# Patient Record
Sex: MALE | Race: WHITE | NOT HISPANIC OR LATINO
[De-identification: names, ages, dates, MRNs, and addresses within clinical notes are randomized per-mention and may not be internally consistent; named-entity substitution may affect disease eponyms.]

---

## 2017-01-09 ENCOUNTER — APPOINTMENT (OUTPATIENT)
Dept: DERMATOLOGY | Facility: CLINIC | Age: 82
End: 2017-01-09

## 2017-07-10 ENCOUNTER — APPOINTMENT (OUTPATIENT)
Dept: DERMATOLOGY | Facility: CLINIC | Age: 82
End: 2017-07-10

## 2018-01-10 ENCOUNTER — APPOINTMENT (OUTPATIENT)
Dept: DERMATOLOGY | Facility: CLINIC | Age: 83
End: 2018-01-10
Payer: MEDICARE

## 2018-01-10 ENCOUNTER — APPOINTMENT (OUTPATIENT)
Dept: CARDIOLOGY | Facility: CLINIC | Age: 83
End: 2018-01-10
Payer: MEDICARE

## 2018-01-10 PROCEDURE — 17003 DESTRUCT PREMALG LES 2-14: CPT

## 2018-01-10 PROCEDURE — 17000 DESTRUCT PREMALG LESION: CPT

## 2018-01-10 PROCEDURE — 99214 OFFICE O/P EST MOD 30 MIN: CPT

## 2018-01-10 PROCEDURE — 93000 ELECTROCARDIOGRAM COMPLETE: CPT

## 2018-01-10 PROCEDURE — 99214 OFFICE O/P EST MOD 30 MIN: CPT | Mod: 25

## 2018-04-02 ENCOUNTER — APPOINTMENT (OUTPATIENT)
Dept: CARDIOLOGY | Facility: CLINIC | Age: 83
End: 2018-04-02

## 2018-04-27 ENCOUNTER — APPOINTMENT (OUTPATIENT)
Dept: CARDIOLOGY | Facility: CLINIC | Age: 83
End: 2018-04-27
Payer: MEDICARE

## 2018-04-27 PROCEDURE — 93306 TTE W/DOPPLER COMPLETE: CPT

## 2018-04-27 PROCEDURE — 93880 EXTRACRANIAL BILAT STUDY: CPT

## 2018-05-30 PROBLEM — Z87.898 HISTORY OF DIZZINESS: Status: RESOLVED | Noted: 2018-05-30 | Resolved: 2018-05-30

## 2018-06-01 ENCOUNTER — APPOINTMENT (OUTPATIENT)
Dept: CARDIOLOGY | Facility: CLINIC | Age: 83
End: 2018-06-01
Payer: MEDICARE

## 2018-06-01 VITALS
HEART RATE: 63 BPM | WEIGHT: 155 LBS | SYSTOLIC BLOOD PRESSURE: 133 MMHG | HEIGHT: 68 IN | BODY MASS INDEX: 23.49 KG/M2 | DIASTOLIC BLOOD PRESSURE: 80 MMHG | RESPIRATION RATE: 16 BRPM

## 2018-06-01 DIAGNOSIS — Z87.898 PERSONAL HISTORY OF OTHER SPECIFIED CONDITIONS: ICD-10-CM

## 2018-06-01 PROCEDURE — 99214 OFFICE O/P EST MOD 30 MIN: CPT

## 2018-06-01 PROCEDURE — 93000 ELECTROCARDIOGRAM COMPLETE: CPT

## 2018-07-10 ENCOUNTER — APPOINTMENT (OUTPATIENT)
Dept: CARDIOLOGY | Facility: CLINIC | Age: 83
End: 2018-07-10
Payer: MEDICARE

## 2018-07-10 ENCOUNTER — APPOINTMENT (OUTPATIENT)
Dept: DERMATOLOGY | Facility: CLINIC | Age: 83
End: 2018-07-10
Payer: MEDICARE

## 2018-07-10 VITALS
WEIGHT: 153 LBS | RESPIRATION RATE: 16 BRPM | BODY MASS INDEX: 23.19 KG/M2 | DIASTOLIC BLOOD PRESSURE: 66 MMHG | HEIGHT: 68 IN | HEART RATE: 58 BPM | SYSTOLIC BLOOD PRESSURE: 136 MMHG

## 2018-07-10 PROCEDURE — 99213 OFFICE O/P EST LOW 20 MIN: CPT

## 2018-07-10 PROCEDURE — 93000 ELECTROCARDIOGRAM COMPLETE: CPT

## 2018-07-10 PROCEDURE — 99215 OFFICE O/P EST HI 40 MIN: CPT

## 2018-07-10 RX ORDER — AZITHROMYCIN 250 MG/1
250 TABLET, FILM COATED ORAL
Qty: 6 | Refills: 0 | Status: COMPLETED | COMMUNITY
Start: 2018-03-29

## 2018-07-10 RX ORDER — PREDNISONE 20 MG/1
20 TABLET ORAL
Qty: 5 | Refills: 0 | Status: COMPLETED | COMMUNITY
Start: 2018-04-06

## 2018-07-26 LAB — INR PPP: 2.8 RATIO

## 2018-10-05 ENCOUNTER — APPOINTMENT (OUTPATIENT)
Dept: CARDIOLOGY | Facility: CLINIC | Age: 83
End: 2018-10-05
Payer: MEDICARE

## 2018-10-05 VITALS
HEIGHT: 65 IN | RESPIRATION RATE: 15 BRPM | HEART RATE: 39 BPM | BODY MASS INDEX: 25.99 KG/M2 | SYSTOLIC BLOOD PRESSURE: 119 MMHG | DIASTOLIC BLOOD PRESSURE: 80 MMHG | WEIGHT: 156 LBS

## 2018-10-05 DIAGNOSIS — Z00.00 ENCOUNTER FOR GENERAL ADULT MEDICAL EXAMINATION W/OUT ABNORMAL FINDINGS: ICD-10-CM

## 2018-10-05 PROCEDURE — 93000 ELECTROCARDIOGRAM COMPLETE: CPT

## 2018-10-05 PROCEDURE — 99214 OFFICE O/P EST MOD 30 MIN: CPT

## 2018-10-21 ENCOUNTER — RESULT REVIEW (OUTPATIENT)
Age: 83
End: 2018-10-21

## 2018-10-22 ENCOUNTER — APPOINTMENT (OUTPATIENT)
Dept: DERMATOLOGY | Facility: CLINIC | Age: 83
End: 2018-10-22
Payer: MEDICARE

## 2018-10-22 PROCEDURE — 99214 OFFICE O/P EST MOD 30 MIN: CPT | Mod: 25

## 2018-10-22 PROCEDURE — 11100 BX SKIN SUBCUTANEOUS&/MUCOUS MEMBRANE 1 LESION: CPT

## 2018-11-14 ENCOUNTER — APPOINTMENT (OUTPATIENT)
Dept: DERMATOLOGY | Facility: CLINIC | Age: 83
End: 2018-11-14

## 2018-11-14 ENCOUNTER — APPOINTMENT (OUTPATIENT)
Dept: DERMATOLOGY | Facility: CLINIC | Age: 83
End: 2018-11-14
Payer: MEDICARE

## 2018-11-14 PROCEDURE — 17261 DSTRJ MAL LES T/A/L .6-1.0CM: CPT

## 2019-01-10 ENCOUNTER — APPOINTMENT (OUTPATIENT)
Dept: DERMATOLOGY | Facility: CLINIC | Age: 84
End: 2019-01-10

## 2019-01-15 ENCOUNTER — APPOINTMENT (OUTPATIENT)
Dept: CARDIOLOGY | Facility: CLINIC | Age: 84
End: 2019-01-15
Payer: MEDICARE

## 2019-01-15 VITALS
HEART RATE: 53 BPM | RESPIRATION RATE: 14 BRPM | WEIGHT: 157 LBS | BODY MASS INDEX: 26.16 KG/M2 | HEIGHT: 65 IN | DIASTOLIC BLOOD PRESSURE: 63 MMHG | SYSTOLIC BLOOD PRESSURE: 124 MMHG

## 2019-01-15 PROCEDURE — 93000 ELECTROCARDIOGRAM COMPLETE: CPT

## 2019-01-15 PROCEDURE — 99214 OFFICE O/P EST MOD 30 MIN: CPT

## 2019-01-15 NOTE — REASON FOR VISIT
[Follow-Up - Clinic] : a clinic follow-up of [FreeTextEntry1] : The patient is a 89-year-old white male who presents back to the office today for 24-hour Holter monitor because of feelings of intermittent fluttering-like sensation in the chest he reported. He is also reports he's been having some shortness of breath like feeling sometimes at rest and sometimes with activity and occasional chest discomfort pressure;\par \par

## 2019-01-15 NOTE — HISTORY OF PRESENT ILLNESS
[FreeTextEntry1] : He was noted to have bradycardia on his EKG and questionable rhythm a possible sinus bradycardia with first degree AV block versus possible junctional type rhythm;\par \par He states he's taking his medications regularly and was instructed in the past to cut back on Digitek to 0.1 25 QOD

## 2019-01-15 NOTE — PHYSICAL EXAM
[Normal Conjunctiva] : the conjunctiva exhibited no abnormalities [Eyelids - No Xanthelasma] : the eyelids demonstrated no xanthelasmas [Normal Oral Mucosa] : normal oral mucosa [No Oral Pallor] : no oral pallor [No Oral Cyanosis] : no oral cyanosis [Normal Jugular Venous A Waves Present] : normal jugular venous A waves present [Normal Jugular Venous V Waves Present] : normal jugular venous V waves present [No Jugular Venous Sanchez A Waves] : no jugular venous sanchez A waves [Respiration, Rhythm And Depth] : normal respiratory rhythm and effort [Exaggerated Use Of Accessory Muscles For Inspiration] : no accessory muscle use [Abdomen Soft] : soft [Abdomen Tenderness] : non-tender [Abdomen Mass (___ Cm)] : no abdominal mass palpated [Nail Clubbing] : no clubbing of the fingernails [Cyanosis, Localized] : no localized cyanosis [Petechial Hemorrhages (___cm)] : no petechial hemorrhages [] : no ischemic changes [FreeTextEntry1] : Small ecchymotic areas in the upper extremities and forearms noted [Oriented To Time, Place, And Person] : oriented to person, place, and time [Affect] : the affect was normal [Mood] : the mood was normal [No Anxiety] : not feeling anxious

## 2019-01-23 ENCOUNTER — APPOINTMENT (OUTPATIENT)
Dept: CARDIOLOGY | Facility: CLINIC | Age: 84
End: 2019-01-23
Payer: MEDICARE

## 2019-01-23 ENCOUNTER — NON-APPOINTMENT (OUTPATIENT)
Age: 84
End: 2019-01-23

## 2019-01-23 VITALS
DIASTOLIC BLOOD PRESSURE: 78 MMHG | BODY MASS INDEX: 25.83 KG/M2 | WEIGHT: 155 LBS | HEIGHT: 65 IN | RESPIRATION RATE: 14 BRPM | SYSTOLIC BLOOD PRESSURE: 138 MMHG

## 2019-01-23 PROCEDURE — 99214 OFFICE O/P EST MOD 30 MIN: CPT

## 2019-01-23 PROCEDURE — 93000 ELECTROCARDIOGRAM COMPLETE: CPT

## 2019-01-23 NOTE — REASON FOR VISIT
[Follow-Up - Clinic] : a clinic follow-up of [FreeTextEntry1] : The patient is a 89-year-old white male who presents back to the office today to discuss the results of the 24-hour Holter monitor after he had been experiencing some feelings of palpitations and fluttering in the chest with some associated shortness of breath and nondescript chest discomfort; he states at times he noted some slight audible wheezing and thought it could have been a chest cold;\par \par The 24-hour Holter monitor showed periods of marked sinus bradycardia and junctional rhythms in the high 30s with an average heart rate in the 40s and frequent PVCs and occasional bigeminy.\par \par The patient was asked to discontinue the low dose Digitek he had been taking and continue other medications the same\par \par Today, he reports that overall he is feeling better and has had no further wheezing, shortness of breath or palpitations.;;

## 2019-01-23 NOTE — REVIEW OF SYSTEMS
[Feeling Fatigued] : feeling fatigued [Palpitations] : palpitations [Joint Pain] : joint pain [Joint Stiffness] : joint stiffness [Negative] : Heme/Lymph

## 2019-01-23 NOTE — PHYSICAL EXAM
[Normal Conjunctiva] : the conjunctiva exhibited no abnormalities [Eyelids - No Xanthelasma] : the eyelids demonstrated no xanthelasmas [Normal Oral Mucosa] : normal oral mucosa [No Oral Pallor] : no oral pallor [No Oral Cyanosis] : no oral cyanosis [Normal Jugular Venous A Waves Present] : normal jugular venous A waves present [Normal Jugular Venous V Waves Present] : normal jugular venous V waves present [No Jugular Venous Sanchez A Waves] : no jugular venous sanchez A waves [Respiration, Rhythm And Depth] : normal respiratory rhythm and effort [Exaggerated Use Of Accessory Muscles For Inspiration] : no accessory muscle use [Abdomen Soft] : soft [Abdomen Tenderness] : non-tender [Abdomen Mass (___ Cm)] : no abdominal mass palpated [Nail Clubbing] : no clubbing of the fingernails [Cyanosis, Localized] : no localized cyanosis [Petechial Hemorrhages (___cm)] : no petechial hemorrhages [] : no rash [No Venous Stasis] : no venous stasis [Skin Lesions] : no skin lesions [No Skin Ulcers] : no skin ulcer [No Xanthoma] : no  xanthoma was observed [FreeTextEntry1] : Small ecchymotic areas in the upper extremities and forearms noted [Oriented To Time, Place, And Person] : oriented to person, place, and time [Affect] : the affect was normal [Mood] : the mood was normal [No Anxiety] : not feeling anxious

## 2019-01-23 NOTE — HISTORY OF PRESENT ILLNESS
[FreeTextEntry1] : His past history he reminds me is that of paroxysmal atrial fibrillation and previous history of? SVT for which he states approximately 7 years ago at [facility] in [place], he underwent an attempt at cardiac ablation;  he states it was aborted since he had "too many tracts to ablate"--and he was treated medically at that point;

## 2019-01-23 NOTE — ASSESSMENT
[FreeTextEntry1] : EKG shows sinus bradycardia rate of 52 with right bundle branch block pattern and nonspecific T-wave changes;\par \par \par Last transthoracic echocardiogram demonstrated preserved LV systolic function with ejection fraction in range of 60%, mild aortic sclerosis without stenosis, mild AI and TR and mild MVP with trivial pericardial effusion suggested;\par \par \par In summary the patient is a 89-year-old gentleman with prior history of atrial arrhythmia with paroxysmal atrophic and apparently prior history for SVT and was on Digitek 0.125 q.o.d. and metoprolol but recently found on 24-hour Holter monitor to have marked bradycardia arrhythmias with periodic junctional rhythms at a rate of around 40;\par \par Digitek was discontinued;\par Overall patient feeling better;\par \par Plan:\par \par Recommend EP evaluation with Dr. Nicolás Anand in the near future;\par Patient recommended to continue current medical regimen;\par To report any untoward chest symptoms between now and next visit within 6-8 weeks;

## 2019-01-31 ENCOUNTER — APPOINTMENT (OUTPATIENT)
Dept: ELECTROPHYSIOLOGY | Facility: CLINIC | Age: 84
End: 2019-01-31
Payer: MEDICARE

## 2019-01-31 VITALS
BODY MASS INDEX: 25.83 KG/M2 | HEIGHT: 65 IN | OXYGEN SATURATION: 99 % | HEART RATE: 48 BPM | WEIGHT: 155 LBS | RESPIRATION RATE: 17 BRPM | DIASTOLIC BLOOD PRESSURE: 76 MMHG | SYSTOLIC BLOOD PRESSURE: 135 MMHG

## 2019-01-31 PROCEDURE — 99205 OFFICE O/P NEW HI 60 MIN: CPT

## 2019-01-31 PROCEDURE — 93000 ELECTROCARDIOGRAM COMPLETE: CPT

## 2019-01-31 NOTE — HISTORY OF PRESENT ILLNESS
[FreeTextEntry1] : Mr. White is a pensive 89 yo gentleman with a h/o paroxysmal AF and "SVT" s/p EPS with no ablation at [facility] 7 ya 2/2 "too many tachycardias," (per patient) who is referred to electrophysiology for consultation regarding bradycardia. \par \par The patient states that he has no complaints other than "I'm getting old" and "I have some trouble walking." Otherwise, he denies any CP, ALMANZA, orthopnea/PND/LE edema, LH/dizziness/syncope, palpitations, or n/v/diaphoresis.

## 2019-01-31 NOTE — DISCUSSION/SUMMARY
[FreeTextEntry1] : 89 yo male with significant, but asymptomatic, conduction system disease (sinus node dysfunction, RBBB) with a stable junctional rhythm currently (either originating at the level of the AV node or in the left bundle branch system). \par \par Explained to the patient the progressive nature of degenerative conduction system disease and the unpredictable deterioration to either AV block or sinus arrest. Explored the options (from most to least conservative) of upfront PPM implantation vs. EPS to risk stratify +/- PPM vs. ILR implantation vs. ILR implantation alone. \par \par Given he currently does not feel his ADLs are limited in any way, he wishes to avoid PPM implantation at this time. The risks and benefits were explained in detail at length (he inquired specifically about the risk of death). He wants to discuss with his daughter prior to making any decision. \par \par He will call the office back, and I offered to speak with daughter to review and answer any additional questions. \par \par Plan:\par 1. Once patient decides, at the minimum will proceed with ILR implantation

## 2019-01-31 NOTE — PHYSICAL EXAM
[General Appearance - Well Developed] : well developed [Normal Appearance] : normal appearance [Well Groomed] : well groomed [General Appearance - Well Nourished] : well nourished [General Appearance - In No Acute Distress] : no acute distress [Normal Conjunctiva] : the conjunctiva exhibited no abnormalities [Eyelids - No Xanthelasma] : the eyelids demonstrated no xanthelasmas [Normal Oral Mucosa] : normal oral mucosa [No Oral Pallor] : no oral pallor [No Oral Cyanosis] : no oral cyanosis [Normal Jugular Venous A Waves Present] : normal jugular venous A waves present [Normal Jugular Venous V Waves Present] : normal jugular venous V waves present [Respiration, Rhythm And Depth] : normal respiratory rhythm and effort [Auscultation Breath Sounds / Voice Sounds] : lungs were clear to auscultation bilaterally [Abdomen Soft] : soft [Abdomen Tenderness] : non-tender [Nail Clubbing] : no clubbing of the fingernails [Cyanosis, Localized] : no localized cyanosis [Skin Color & Pigmentation] : normal skin color and pigmentation [Skin Turgor] : normal skin turgor [] : no rash [No Venous Stasis] : no venous stasis [Skin Lesions] : no skin lesions [No Skin Ulcers] : no skin ulcer [No Xanthoma] : no  xanthoma was observed [FreeTextEntry1] : Small ecchymotic areas in the upper extremities and forearms noted [Oriented To Time, Place, And Person] : oriented to person, place, and time [Impaired Insight] : insight and judgment were intact [Affect] : the affect was normal [Mood] : the mood was normal [No Anxiety] : not feeling anxious

## 2019-01-31 NOTE — CARDIOLOGY SUMMARY
[___] : [unfilled] [LVEF ___%] : LVEF [unfilled]% [None] : no pulmonary hypertension [Enlarged] : enlarged LA size [Moderate] : moderate mitral regurgitation

## 2019-03-06 ENCOUNTER — NON-APPOINTMENT (OUTPATIENT)
Age: 84
End: 2019-03-06

## 2019-03-06 ENCOUNTER — APPOINTMENT (OUTPATIENT)
Dept: CARDIOLOGY | Facility: CLINIC | Age: 84
End: 2019-03-06
Payer: MEDICARE

## 2019-03-06 VITALS
HEIGHT: 65 IN | SYSTOLIC BLOOD PRESSURE: 124 MMHG | WEIGHT: 162 LBS | DIASTOLIC BLOOD PRESSURE: 73 MMHG | BODY MASS INDEX: 26.99 KG/M2 | RESPIRATION RATE: 16 BRPM | HEART RATE: 57 BPM

## 2019-03-06 PROCEDURE — 99214 OFFICE O/P EST MOD 30 MIN: CPT

## 2019-03-06 PROCEDURE — 93000 ELECTROCARDIOGRAM COMPLETE: CPT

## 2019-03-06 NOTE — REASON FOR VISIT
[Follow-Up - Clinic] : a clinic follow-up of [FreeTextEntry1] : The patient is a 89-year-old white male with a known history for paroxysmal atrial fibrillation, bradycardia arrhythmias and some recent intermittent shortness of breath and was recommended recently to have an EEG evaluation consultation with Dr. Anand;  Patient was found to have junctional bradycardia and felt he had had some significant beginnings of conduction system disease and was offered a pacemaker versus possible ILR;  \par \par Ultimately, the patient opted to hold off on pacemaker and high LR and will notify us if he changes his mind.;\par \par

## 2019-03-06 NOTE — HISTORY OF PRESENT ILLNESS
[FreeTextEntry1] : He denies any significant dizziness or syncope but has had some periods of shortness of breath especially when he sleeps back and tries to relax sometimes he feels a little more winded; with occasional slight wheeze;

## 2019-03-06 NOTE — ASSESSMENT
[FreeTextEntry1] : EKG demonstrates probable atrial fibrillation with a slow ventricular rate, right bundle branch pattern and borderline nonspecific T-wave changes;\par \par In summary the patient is a 89-year-old gentleman with history for paroxysmal atrial fib flutter periods of bradycardia arrhythmias and possibly symptomatic intermittently with fatigue and some shortness of breath;\par \par Plan:\par \par Have counseled patient on importance of recognizing any change or worsening of his symptoms which could be secondary to bradycardia arrhythmias (i.e. shortness of breath or wheeze);\par \par He states he will consider this and get back to us if is any change in its pattern\par \par Recommend transthoracic echocardiogram in the near future to reassess cardiac function and valvulopathy\par \par Return to this office within 6-8 weeks or p.r.n.;;;

## 2019-03-20 ENCOUNTER — APPOINTMENT (OUTPATIENT)
Dept: CARDIOLOGY | Facility: CLINIC | Age: 84
End: 2019-03-20
Payer: MEDICARE

## 2019-03-20 PROCEDURE — 93306 TTE W/DOPPLER COMPLETE: CPT

## 2019-04-04 ENCOUNTER — APPOINTMENT (OUTPATIENT)
Dept: ORTHOPEDIC SURGERY | Facility: CLINIC | Age: 84
End: 2019-04-04
Payer: MEDICARE

## 2019-04-04 DIAGNOSIS — Z96.651 AFTERCARE FOLLOWING JOINT REPLACEMENT SURGERY: ICD-10-CM

## 2019-04-04 DIAGNOSIS — Z47.1 AFTERCARE FOLLOWING JOINT REPLACEMENT SURGERY: ICD-10-CM

## 2019-04-04 PROCEDURE — 99213 OFFICE O/P EST LOW 20 MIN: CPT

## 2019-04-04 NOTE — DISCUSSION/SUMMARY
[de-identified] : Due to consistent pain over 3 weeks we'll order CAT scan to evaluate for possible occult fracture.\par Patient will follow joint surgery after the CAT scan.

## 2019-04-04 NOTE — REASON FOR VISIT
[Initial Visit] : an initial visit for [Other: ____] : [unfilled] [Artificial Knee Joint] : artificial knee joint

## 2019-04-04 NOTE — REVIEW OF SYSTEMS
[Joint Pain] : joint pain [Feeling Tired] : fatigue [Negative] : Heme/Lymph [FreeTextEntry9] : right knee pain

## 2019-04-04 NOTE — PHYSICAL EXAM
[Cane] : ambulates with cane [UE/LE] : Sensory: Intact in bilateral upper & lower extremities [ALL] : dorsalis pedis, posterior tibial, femoral, popliteal, and radial 2+ and symmetric bilaterally [Poor Appearance] : well-appearing [Acute Distress] : not in acute distress [Normal] : Oriented to person, place, and time, insight and judgement were intact and the affect was normal [de-identified] : Right knee positive well-healed incision to the anterior aspect of the knee. No current edema. Range of motion 0-110 with mild pain on full flexion. Positive tenderness to the anterior aspect of the knee. Positive crepitus upon flexion of the knee. Patient able to straight leg raise. Varus and valgus stress intact. [de-identified] : Outside to review right knee and to view right tib-fib x-rays reveal no acute fracture.

## 2019-04-04 NOTE — HISTORY OF PRESENT ILLNESS
[de-identified] : The patient is a 89-year-old male who presents complaining of right knee pain. Patient had a total knee replacement done at [facility] approximately 10 years ago. Patient states she fell 3 weeks ago landing on her right knee has anterior right knee pain. Patient was seen by his PMD who ordered x-rays at the anterior tib-fib and the right knee both x-rays were reported as negative.  patient states however her pain has continued to the anterior aspect of the knee. Patient uses a walker to ambulate. Patient states he no longer see his orthopedist. his orthopedist

## 2019-04-10 ENCOUNTER — OTHER (OUTPATIENT)
Age: 84
End: 2019-04-10

## 2019-04-22 ENCOUNTER — APPOINTMENT (OUTPATIENT)
Dept: DERMATOLOGY | Facility: CLINIC | Age: 84
End: 2019-04-22
Payer: MEDICARE

## 2019-04-22 PROCEDURE — 99213 OFFICE O/P EST LOW 20 MIN: CPT

## 2019-04-25 ENCOUNTER — APPOINTMENT (OUTPATIENT)
Dept: ORTHOPEDIC SURGERY | Facility: CLINIC | Age: 84
End: 2019-04-25
Payer: MEDICARE

## 2019-04-25 VITALS
BODY MASS INDEX: 26.99 KG/M2 | WEIGHT: 162 LBS | DIASTOLIC BLOOD PRESSURE: 68 MMHG | HEART RATE: 55 BPM | HEIGHT: 65 IN | SYSTOLIC BLOOD PRESSURE: 147 MMHG

## 2019-04-25 PROCEDURE — 99213 OFFICE O/P EST LOW 20 MIN: CPT

## 2019-04-25 NOTE — PHYSICAL EXAM
[de-identified] : The patient appears well nourished  and in no apparent distress.  The patient is alert and oriented to person, place, and time.   Affect and mood appear normal. The head is normocephalic and atraumatic.  The eyes reveal normal sclera and extra ocular muscles are intact. The tongue is midline with no apparent lesions.  Skin shows normal turgor with no evidence of eczema or psoriasis.  No respiratory distress noted.  [de-identified] : Exam of the right knee shows a well healed incision. -10 degrees of extension. No pain with ROM or palpation and no effusion.  [de-identified] : CT - performed at [facility] on 4/5/2019 of the right knee- \par Total knee arthroplasty. Osteoporosis. No evidence for fracture or periprosthetic ostial lysis is demonstrated. Prepatellar soft tissue swelling . Vascular calcification.\par \par

## 2019-04-25 NOTE — HISTORY OF PRESENT ILLNESS
[de-identified] : The patient is a 89-year-old male who presents for evaluation of his right knee pain. Patient had a total knee replacement done at [facility] approximately 10 years ago. Patient states he fell about 6 weeks ago landing on her right knee has anterior right knee pain. Patient was seen by his PMD who ordered x-rays at the anterior tib-fib and the right knee both x-rays were reported as negative.  Pain has continued to the anterior aspect of the knee and he was seen in the orthopedic urgent clinic. He was sent for a CT scan of the right knee. Since that time, he reports resolution of his knee symptoms. Patient continues to use a walker to ambulate.

## 2019-04-25 NOTE — DISCUSSION/SUMMARY
[de-identified] : The patient is a 89 year old male 10 years s/p right knee replacement by another surgeon. On CT there is no evidence of radiolucency or fracture. His knee pain has resolved.  His main issue at this point is his low back and he was referred to a chiropractor upon his request.

## 2019-04-25 NOTE — REVIEW OF SYSTEMS
[Joint Pain] : joint pain [Feeling Tired] : fatigue [Depression] : depression [Negative] : Endocrine

## 2019-04-25 NOTE — ADDENDUM
[FreeTextEntry1] : This note was authored by Gerry Story working as a medical scribe for Dr. Anthony Gustafson. The note was reviewed, edited, and revised by Dr. Anthony Gustafson whom is in agreement with the exam findings, imaging findings, and treatment plan. 04/25/2019.

## 2019-04-25 NOTE — CONSULT LETTER
[Dear  ___] : Dear  [unfilled], [Please see my note below.] : Please see my note below. [Consult Closing:] : Thank you very much for allowing me to participate in the care of this patient.  If you have any questions, please do not hesitate to contact me. [Sincerely,] : Sincerely, [FreeTextEntry2] : Eric Donald MD [FreeTextEntry3] : Anthony Gustafson MD Primary [facility] Rev[facility] Hip and Knee Replacement [facility] [place]

## 2019-06-05 ENCOUNTER — NON-APPOINTMENT (OUTPATIENT)
Age: 84
End: 2019-06-05

## 2019-06-05 ENCOUNTER — APPOINTMENT (OUTPATIENT)
Dept: CARDIOLOGY | Facility: CLINIC | Age: 84
End: 2019-06-05
Payer: MEDICARE

## 2019-06-05 VITALS
HEART RATE: 81 BPM | BODY MASS INDEX: 24.66 KG/M2 | DIASTOLIC BLOOD PRESSURE: 67 MMHG | WEIGHT: 148 LBS | RESPIRATION RATE: 16 BRPM | SYSTOLIC BLOOD PRESSURE: 128 MMHG | HEIGHT: 65 IN

## 2019-06-05 PROCEDURE — 93000 ELECTROCARDIOGRAM COMPLETE: CPT

## 2019-06-05 PROCEDURE — 99214 OFFICE O/P EST MOD 30 MIN: CPT

## 2019-06-05 NOTE — REASON FOR VISIT
[Follow-Up - Clinic] : a clinic follow-up of [FreeTextEntry1] : Patient is a 89-year-old male with known history for paroxysmal atrial fibrillation on anticoagulation with warfarin as well as intermittent bradycardia and tachyarrhythmias who presents back to the office for general cardiac checkup;\par \par He had an EP evaluation with Dr. Anand who said likely the patient would need a pacemaker and at least in implantable loop recorder, but the patient had declined at this time;\par \par Fortunately, he states he's without any significant symptoms of lightheadedness dizziness or palpitations.;\par \par

## 2019-06-05 NOTE — REVIEW OF SYSTEMS
[Feeling Fatigued] : feeling fatigued [Change In The Stool] : change in stool [Negative] : Heme/Lymph

## 2019-06-05 NOTE — HISTORY OF PRESENT ILLNESS
[FreeTextEntry1] : He apparently fell a few weeks ago but says he actually slipped and lost his balance and injured his back-- and currently doing a little bit  better;\par \par \par \par ;He has additional somatic complaints such as irritable bowel and intermittent loose stools and is not sure whether it is medication related or other problems;\par \par He ambulates with a cane at times and also uses a walker;\par \par

## 2019-06-05 NOTE — ASSESSMENT
[FreeTextEntry1] : EKG shows normal sinus rhythm at a rate of 81 with right bundle branch block pattern and first degree AV block;\par \par In summary the patient is a 89-year-old gentleman with known history of paroxysmal H. with fibrillation and bradycardia/tachycardia arrhythmias who recently seems to be feeling fairly well. Her permanent pacemaker was recommended by electrophysiologist however patient reluctant at this time and says he will consider it;\par \par Plan:\par \par Patient reassured;\par \par We will continue to monitor for any symptoms or signs of advanced heart block;\par \par Patient to report any change in his symptoms with dizziness lightheadedness etc.\par \par To followup within 2-3 months or p.r.n.;

## 2019-08-26 ENCOUNTER — APPOINTMENT (OUTPATIENT)
Dept: CARDIOLOGY | Facility: CLINIC | Age: 84
End: 2019-08-26
Payer: MEDICARE

## 2019-08-26 VITALS
SYSTOLIC BLOOD PRESSURE: 122 MMHG | RESPIRATION RATE: 16 BRPM | HEIGHT: 65 IN | HEART RATE: 79 BPM | BODY MASS INDEX: 25.66 KG/M2 | DIASTOLIC BLOOD PRESSURE: 76 MMHG | WEIGHT: 154 LBS

## 2019-08-26 PROCEDURE — 99214 OFFICE O/P EST MOD 30 MIN: CPT

## 2019-08-26 NOTE — HISTORY OF PRESENT ILLNESS
[FreeTextEntry1] : Tolerating current medical regimen and has had no overt bruising or bleeding on warfarin;

## 2019-08-26 NOTE — REASON FOR VISIT
[Follow-Up - Clinic] : a clinic follow-up of [FreeTextEntry1] : The patient is a 89-year-old gentleman with known history of paroxysmal atrial fibrillation and atrial ectopic beats who presents back to the office for general cardiac checkup today;\par \par He reports that he has had no significant dizziness or lightheadedness but gets occasional infrequent palpitations of the chest;\par \par He has been on anticoagulation with warfarin which is monitored by his primary care;\par \par He denies chest pain, dyspnea or syncope;

## 2019-08-26 NOTE — ASSESSMENT
[FreeTextEntry1] : Last EKG from 6/5/19 showed normal sinus rhythm with frequent PVCs with right bundle branch block pattern and marked first degree AV block;\par \par In summary the patient is a 89-year-old gentleman who had a history of intermittent tachybradycardia syndrome and had an EP evaluation who recommended cardiac pacemaker however patient had refused at that time in the early spring/summer.;\par He remains asymptomatic from a cardiac standpoint with the exception of occasional palpitation\par \par Plan:\par \par No additional cardiac workup indicated at this time\par \par Patient to continue current medical regimen;\par \par Patient report any untoward lightheadedness or dizziness between now and next visit within 3 months;;;

## 2019-08-26 NOTE — PHYSICAL EXAM
[Normal Oral Mucosa] : normal oral mucosa [Normal Conjunctiva] : the conjunctiva exhibited no abnormalities [Eyelids - No Xanthelasma] : the eyelids demonstrated no xanthelasmas [No Oral Pallor] : no oral pallor [No Oral Cyanosis] : no oral cyanosis [Normal Jugular Venous A Waves Present] : normal jugular venous A waves present [Normal Jugular Venous V Waves Present] : normal jugular venous V waves present [No Jugular Venous Sanchez A Waves] : no jugular venous sanchez A waves [Respiration, Rhythm And Depth] : normal respiratory rhythm and effort [Exaggerated Use Of Accessory Muscles For Inspiration] : no accessory muscle use [Abdomen Soft] : soft [Abdomen Tenderness] : non-tender [Abdomen Mass (___ Cm)] : no abdominal mass palpated [Nail Clubbing] : no clubbing of the fingernails [Cyanosis, Localized] : no localized cyanosis [Petechial Hemorrhages (___cm)] : no petechial hemorrhages [No Venous Stasis] : no venous stasis [] : no rash [No Skin Ulcers] : no skin ulcer [Skin Lesions] : no skin lesions [FreeTextEntry1] : Small ecchymotic areas in the upper extremities and forearms noted [No Xanthoma] : no  xanthoma was observed [Affect] : the affect was normal [Mood] : the mood was normal [Oriented To Time, Place, And Person] : oriented to person, place, and time [No Anxiety] : not feeling anxious

## 2019-10-03 ENCOUNTER — APPOINTMENT (OUTPATIENT)
Dept: CARDIOLOGY | Facility: CLINIC | Age: 84
End: 2019-10-03
Payer: MEDICARE

## 2019-10-03 ENCOUNTER — NON-APPOINTMENT (OUTPATIENT)
Age: 84
End: 2019-10-03

## 2019-10-03 VITALS
WEIGHT: 154 LBS | HEIGHT: 65 IN | HEART RATE: 80 BPM | BODY MASS INDEX: 25.66 KG/M2 | RESPIRATION RATE: 16 BRPM | DIASTOLIC BLOOD PRESSURE: 90 MMHG | SYSTOLIC BLOOD PRESSURE: 140 MMHG

## 2019-10-03 PROCEDURE — 93000 ELECTROCARDIOGRAM COMPLETE: CPT

## 2019-10-03 PROCEDURE — 99214 OFFICE O/P EST MOD 30 MIN: CPT

## 2019-10-03 NOTE — REASON FOR VISIT
[Follow-Up - Clinic] : a clinic follow-up of [FreeTextEntry1] : The patient is a  89-year-old white male with a known history for paroxysmal atrial fibrillation and atrial ectopic beats who presents back to the office for general cardiac checkup today;\par \par He has a remote history for cardiac ablation several years ago\par \par Overall he denies any significant symptoms chest pain palpitations dizziness or syncope;\par \par He does get some exertional dyspnea and a most recent has been suffering from signs and symptoms of an upper respiratory infection and a slight bronchial wheeze;\par ;

## 2019-10-03 NOTE — PHYSICAL EXAM
[Normal Conjunctiva] : the conjunctiva exhibited no abnormalities [Eyelids - No Xanthelasma] : the eyelids demonstrated no xanthelasmas [No Oral Pallor] : no oral pallor [No Oral Cyanosis] : no oral cyanosis [Normal Jugular Venous A Waves Present] : normal jugular venous A waves present [Normal Jugular Venous V Waves Present] : normal jugular venous V waves present [No Jugular Venous Sanchez A Waves] : no jugular venous sanchez A waves [Respiration, Rhythm And Depth] : normal respiratory rhythm and effort [Abdomen Soft] : soft [Abdomen Tenderness] : non-tender [Abdomen Mass (___ Cm)] : no abdominal mass palpated [Nail Clubbing] : no clubbing of the fingernails [Cyanosis, Localized] : no localized cyanosis [Petechial Hemorrhages (___cm)] : no petechial hemorrhages [] : no rash [Skin Lesions] : no skin lesions [No Venous Stasis] : no venous stasis [No Skin Ulcers] : no skin ulcer [No Xanthoma] : no  xanthoma was observed [FreeTextEntry1] : Small ecchymotic areas in the upper extremities and forearms noted [Oriented To Time, Place, And Person] : oriented to person, place, and time [Mood] : the mood was normal [Affect] : the affect was normal [No Anxiety] : not feeling anxious

## 2019-10-03 NOTE — HISTORY OF PRESENT ILLNESS
[FreeTextEntry1] : The patient reports he's been taking some over-the-counter symptomatic supplements and decongestants and states he did have a trial of antibiotics;\par \par

## 2019-10-03 NOTE — ASSESSMENT
[FreeTextEntry1] : EKG shows normal sinus rhythm with marked first-degree AV block, right bundle branch block pattern, frequent PACs and rare PVCs seen;\par \par In summary the patient is a 89-year-old gentleman who has a history of paroxysmal atrial fib with prior cardiac ablation and now normal sinus rhythm with first degree AV blockand PACs and PVCs was had; a recent upper respiratory infection but otherwise stable cardiac panel and no overt signs of heart failure\par \par Plan:\par \par No additional cardiac workup indicated at this time;\par \par Patient to followup toprimary care if no improvement in upper respiratory; URI\par \par Followup to this office within 3; months or p.r.n.

## 2019-10-22 ENCOUNTER — APPOINTMENT (OUTPATIENT)
Dept: DERMATOLOGY | Facility: CLINIC | Age: 84
End: 2019-10-22
Payer: MEDICARE

## 2019-10-22 PROCEDURE — 17000 DESTRUCT PREMALG LESION: CPT

## 2019-10-22 PROCEDURE — 17003 DESTRUCT PREMALG LES 2-14: CPT

## 2019-10-22 PROCEDURE — 99214 OFFICE O/P EST MOD 30 MIN: CPT | Mod: 25

## 2019-10-31 ENCOUNTER — INPATIENT (INPATIENT)
Facility: HOSPITAL | Age: 84
LOS: 1 days | Discharge: HOME | DRG: 552 | End: 2019-11-02
Attending: HOSPITALIST | Admitting: HOSPITALIST
Payer: COMMERCIAL

## 2019-10-31 VITALS
TEMPERATURE: 98 F | DIASTOLIC BLOOD PRESSURE: 89 MMHG | WEIGHT: 154.98 LBS | SYSTOLIC BLOOD PRESSURE: 149 MMHG | OXYGEN SATURATION: 96 % | RESPIRATION RATE: 22 BRPM | HEART RATE: 91 BPM | HEIGHT: 65 IN

## 2019-10-31 DIAGNOSIS — Z96.651 PRESENCE OF RIGHT ARTIFICIAL KNEE JOINT: Chronic | ICD-10-CM

## 2019-10-31 LAB
ACANTHOCYTES BLD QL SMEAR: SLIGHT — SIGNIFICANT CHANGE UP
ALBUMIN SERPL ELPH-MCNC: 3.8 G/DL — SIGNIFICANT CHANGE UP (ref 3.3–5.2)
ALP SERPL-CCNC: 91 U/L — SIGNIFICANT CHANGE UP (ref 40–120)
ALT FLD-CCNC: 14 U/L — SIGNIFICANT CHANGE UP
ANION GAP SERPL CALC-SCNC: 11 MMOL/L — SIGNIFICANT CHANGE UP (ref 5–17)
APPEARANCE UR: CLEAR — SIGNIFICANT CHANGE UP
APTT BLD: 36.9 SEC — HIGH (ref 27.5–36.3)
AST SERPL-CCNC: 21 U/L — SIGNIFICANT CHANGE UP
BACTERIA # UR AUTO: ABNORMAL
BASOPHILS # BLD AUTO: 0 K/UL — SIGNIFICANT CHANGE UP (ref 0–0.2)
BASOPHILS NFR BLD AUTO: 0 % — SIGNIFICANT CHANGE UP (ref 0–2)
BILIRUB SERPL-MCNC: 0.3 MG/DL — LOW (ref 0.4–2)
BILIRUB UR-MCNC: NEGATIVE — SIGNIFICANT CHANGE UP
BUN SERPL-MCNC: 34 MG/DL — HIGH (ref 8–20)
CALCIUM SERPL-MCNC: 8.7 MG/DL — SIGNIFICANT CHANGE UP (ref 8.6–10.2)
CHLORIDE SERPL-SCNC: 103 MMOL/L — SIGNIFICANT CHANGE UP (ref 98–107)
CO2 SERPL-SCNC: 24 MMOL/L — SIGNIFICANT CHANGE UP (ref 22–29)
COLOR SPEC: YELLOW — SIGNIFICANT CHANGE UP
CREAT SERPL-MCNC: 1.04 MG/DL — SIGNIFICANT CHANGE UP (ref 0.5–1.3)
DIFF PNL FLD: ABNORMAL
EOSINOPHIL # BLD AUTO: 0 K/UL — SIGNIFICANT CHANGE UP (ref 0–0.5)
EOSINOPHIL NFR BLD AUTO: 0 % — SIGNIFICANT CHANGE UP (ref 0–6)
EPI CELLS # UR: SIGNIFICANT CHANGE UP
GLUCOSE SERPL-MCNC: 125 MG/DL — HIGH (ref 70–115)
GLUCOSE UR QL: NEGATIVE MG/DL — SIGNIFICANT CHANGE UP
HCT VFR BLD CALC: 36.9 % — LOW (ref 39–50)
HGB BLD-MCNC: 12.1 G/DL — LOW (ref 13–17)
INR BLD: 1.87 RATIO — HIGH (ref 0.88–1.16)
KETONES UR-MCNC: ABNORMAL
LEUKOCYTE ESTERASE UR-ACNC: NEGATIVE — SIGNIFICANT CHANGE UP
LIDOCAIN IGE QN: 37 U/L — SIGNIFICANT CHANGE UP (ref 22–51)
LYMPHOCYTES # BLD AUTO: 34.8 % — SIGNIFICANT CHANGE UP (ref 13–44)
LYMPHOCYTES # BLD AUTO: 4.84 K/UL — HIGH (ref 1–3.3)
MAGNESIUM SERPL-MCNC: 2.1 MG/DL — SIGNIFICANT CHANGE UP (ref 1.6–2.6)
MANUAL SMEAR VERIFICATION: SIGNIFICANT CHANGE UP
MCHC RBC-ENTMCNC: 31.2 PG — SIGNIFICANT CHANGE UP (ref 27–34)
MCHC RBC-ENTMCNC: 32.8 GM/DL — SIGNIFICANT CHANGE UP (ref 32–36)
MCV RBC AUTO: 95.1 FL — SIGNIFICANT CHANGE UP (ref 80–100)
MONOCYTES # BLD AUTO: 0.24 K/UL — SIGNIFICANT CHANGE UP (ref 0–0.9)
MONOCYTES NFR BLD AUTO: 1.7 % — LOW (ref 2–14)
NEUTROPHILS # BLD AUTO: 8.83 K/UL — HIGH (ref 1.8–7.4)
NEUTROPHILS NFR BLD AUTO: 62.6 % — SIGNIFICANT CHANGE UP (ref 43–77)
NEUTS BAND # BLD: 0.9 % — SIGNIFICANT CHANGE UP (ref 0–8)
NITRITE UR-MCNC: NEGATIVE — SIGNIFICANT CHANGE UP
NT-PROBNP SERPL-SCNC: 929 PG/ML — HIGH (ref 0–300)
PH UR: 6 — SIGNIFICANT CHANGE UP (ref 5–8)
PLAT MORPH BLD: NORMAL — SIGNIFICANT CHANGE UP
PLATELET # BLD AUTO: 101 K/UL — LOW (ref 150–400)
POIKILOCYTOSIS BLD QL AUTO: SLIGHT — SIGNIFICANT CHANGE UP
POTASSIUM SERPL-MCNC: 4.2 MMOL/L — SIGNIFICANT CHANGE UP (ref 3.5–5.3)
POTASSIUM SERPL-SCNC: 4.2 MMOL/L — SIGNIFICANT CHANGE UP (ref 3.5–5.3)
PROT SERPL-MCNC: 6.4 G/DL — LOW (ref 6.6–8.7)
PROT UR-MCNC: 30 MG/DL
PROTHROM AB SERPL-ACNC: 21.9 SEC — HIGH (ref 10–12.9)
RBC # BLD: 3.88 M/UL — LOW (ref 4.2–5.8)
RBC # FLD: 13.6 % — SIGNIFICANT CHANGE UP (ref 10.3–14.5)
RBC BLD AUTO: NORMAL — SIGNIFICANT CHANGE UP
RBC CASTS # UR COMP ASSIST: SIGNIFICANT CHANGE UP /HPF (ref 0–4)
SMUDGE CELLS # BLD: PRESENT — SIGNIFICANT CHANGE UP
SODIUM SERPL-SCNC: 138 MMOL/L — SIGNIFICANT CHANGE UP (ref 135–145)
SP GR SPEC: 1.02 — SIGNIFICANT CHANGE UP (ref 1.01–1.02)
TROPONIN T SERPL-MCNC: <0.01 NG/ML — SIGNIFICANT CHANGE UP (ref 0–0.06)
TSH SERPL-MCNC: 1.37 UIU/ML — SIGNIFICANT CHANGE UP (ref 0.27–4.2)
UROBILINOGEN FLD QL: NEGATIVE MG/DL — SIGNIFICANT CHANGE UP
WBC # BLD: 13.91 K/UL — HIGH (ref 3.8–10.5)
WBC # FLD AUTO: 13.91 K/UL — HIGH (ref 3.8–10.5)
WBC UR QL: SIGNIFICANT CHANGE UP

## 2019-10-31 PROCEDURE — 99285 EMERGENCY DEPT VISIT HI MDM: CPT

## 2019-10-31 PROCEDURE — 93010 ELECTROCARDIOGRAM REPORT: CPT

## 2019-10-31 RX ORDER — ACETAMINOPHEN 500 MG
650 TABLET ORAL ONCE
Refills: 0 | Status: COMPLETED | OUTPATIENT
Start: 2019-10-31 | End: 2019-10-31

## 2019-10-31 RX ADMIN — Medication 650 MILLIGRAM(S): at 23:59

## 2019-10-31 NOTE — ED PROVIDER NOTE - NS ED ROS FT
no weight change, no fever or chills  no recent travel, no recent abox, no sick contacts  no recent change in medications  no rash, no bruises  no visual changes no eye discharge  no cough cold or congestion,   + sob, no chest pain  no orthopnea, no pnd  no abd pain, no n/v/d  no hematuria, no change in urinary habits  + joint pain, no deformity  no headache, no paresthesia

## 2019-10-31 NOTE — ED ADULT NURSE NOTE - PMH
Afib    Cyst of kidney, acquired    Cyst of pancreas    GERD (gastroesophageal reflux disease)    Irritable bowel syndrome with diarrhea    Prostate CA  radiation  Tachycardia    VTE (venous thromboembolism)

## 2019-10-31 NOTE — ED ADULT TRIAGE NOTE - CHIEF COMPLAINT QUOTE
Patient A&Ox4 complaining of back pain & pain to left hip secondary to fall from standing height. Stated tripped & fell, hitting floor. Also complaining of shortness of breath since fall, respirations even/nonlabored. Patient on O2 by EMS, left on in triage.

## 2019-10-31 NOTE — ED PROVIDER NOTE - PHYSICAL EXAMINATION
Constitutional : Appears uncomfortably, talking in few words  Head :NC AT , no swelling  Eyes :eomi, no swelling  Mouth :mm moist,  Neck : supple, trachea in midline  Chest :Shai air entry, symm chest expansion, no distress  thoracic mid pain and chest wall pian, no skin changes, no rash noted  Heart :S1 S2 distant  Abdomen :abd soft, non tender  Musc/Skel :ext no swelling, no deformity, gen weakness  no spine tenderness, distal pulses present  Neuro  :AAO 3 no focal deficits Constitutional : Appears uncomfortably, talking in few words  Head :NC AT , no swelling  Eyes :eomi, no swelling  Mouth :mm moist,  Neck : supple, trachea in midline  Chest :Shai air entry, symm chest expansion, no distress  thoracic mid pain and chest wall pian, no skin changes, no rash noted  kyphosis  Heart :S1 S2 distant  Abdomen :abd soft, non tender  Musc/Skel :ext no swelling, no deformity, gen weakness  left sided leg pain, no skin changes noted  no spine tenderness, distal pulses present  Neuro  :AAO 3 no focal deficits

## 2019-10-31 NOTE — ED PROVIDER NOTE - CLINICAL SUMMARY MEDICAL DECISION MAKING FREE TEXT BOX
89y old with complaints of fall, presents with ant lower chest wall pain, hypoxia, h/o dvt, plan to get a cta for pe study, pain control, and re evalaution, 89y old with complaints of fall, presents with ant lower chest wall pain, hypoxia, h/o dvt, plan to get a cta for pe study, pain control, and re evalaution, patient is on coumadin, plan to get trauma scan on patient,

## 2019-10-31 NOTE — ED ADULT NURSE NOTE - NSIMPLEMENTINTERV_GEN_ALL_ED
Implemented All Universal Safety Interventions:  [place] to call system. Call bell, personal items and telephone within reach. Instruct patient to call for assistance. Room bathroom lighting operational. Non-slip footwear when patient is off stretcher. Physically safe environment: no spills, clutter or unnecessary equipment. Stretcher in lowest position, wheels locked, appropriate side rails in place.

## 2019-10-31 NOTE — ED PROVIDER NOTE - OBJECTIVE STATEMENT
y/o M pt with significant PMHx of Afib, GERD, Prostate CA radiation and VTE presents to the ED s/p fall. Pt states that he has a ligh that goes on automaticall yand it didn't go ion yet,. he turned off the other light and he fell on his back due to losing his balance. Denies hitting his head and states he remeberes what happends. states it kncokd the wind out of him. states it doesn't hurt until he breathes.   takes morphin 3 mg due to rugth sided dvt.   2 back bone fractures on his back. fell on his mid back  Denies abdominal pain and chest pain, nausea, vomiting and diarrhea.  2 dvt to the leg   states he had dinner today and it went down okay. 2 years he broke his pelvvis 89y/o M pt with significant PMHx of Afib, GERD, Prostate CA radiation and VTE presents to the ED s/p fall. Pt states that he has a ligh that goes on automaticall yand it didn't go ion yet,. he turned off the other light and he fell on his back due to losing his balance. Denies hitting his head and states he remeberes what happends. states it kncokd the wind out of him. states it doesn't hurt until he breathes.   takes morphin 3 mg due to rugth sided dvt.   2 back bone fractures on his back. fell on his mid back  Denies abdominal pain and chest pain, nausea, vomiting and diarrhea.  2 dvt to the leg   states he had dinner today and it went down okay. 2 years he broke his pelvvis

## 2019-10-31 NOTE — ED PROVIDER NOTE - CARE PLAN
Principal Discharge DX:	Hypoxia  Secondary Diagnosis:	Fall from standing, initial encounter  Secondary Diagnosis:	Back pain  Secondary Diagnosis:	Fracture of vertebra, thoracic

## 2019-11-01 ENCOUNTER — TRANSCRIPTION ENCOUNTER (OUTPATIENT)
Age: 84
End: 2019-11-01

## 2019-11-01 DIAGNOSIS — R09.02 HYPOXEMIA: ICD-10-CM

## 2019-11-01 LAB
BASE EXCESS BLDA CALC-SCNC: 0.7 MMOL/L — SIGNIFICANT CHANGE UP (ref -3–3)
BLOOD GAS COMMENTS ARTERIAL: SIGNIFICANT CHANGE UP
GAS PNL BLDA: SIGNIFICANT CHANGE UP
HCO3 BLDA-SCNC: 25 MMOL/L — SIGNIFICANT CHANGE UP (ref 20–26)
HCT VFR BLD CALC: 38.1 % — LOW (ref 39–50)
HGB BLD-MCNC: 12.6 G/DL — LOW (ref 13–17)
HOROWITZ INDEX BLDA+IHG-RTO: SIGNIFICANT CHANGE UP
INR BLD: 1.79 RATIO — HIGH (ref 0.88–1.16)
MCHC RBC-ENTMCNC: 30.9 PG — SIGNIFICANT CHANGE UP (ref 27–34)
MCHC RBC-ENTMCNC: 33.1 GM/DL — SIGNIFICANT CHANGE UP (ref 32–36)
MCV RBC AUTO: 93.4 FL — SIGNIFICANT CHANGE UP (ref 80–100)
PCO2 BLDA: 41 MMHG — SIGNIFICANT CHANGE UP (ref 35–45)
PH BLDA: 7.4 — SIGNIFICANT CHANGE UP (ref 7.35–7.45)
PLATELET # BLD AUTO: 106 K/UL — LOW (ref 150–400)
PO2 BLDA: 60 MMHG — LOW (ref 83–108)
PROTHROM AB SERPL-ACNC: 21 SEC — HIGH (ref 10–12.9)
RBC # BLD: 4.08 M/UL — LOW (ref 4.2–5.8)
RBC # FLD: 13.4 % — SIGNIFICANT CHANGE UP (ref 10.3–14.5)
SAO2 % BLDA: 92 % — LOW (ref 95–99)
WBC # BLD: 13.99 K/UL — HIGH (ref 3.8–10.5)
WBC # FLD AUTO: 13.99 K/UL — HIGH (ref 3.8–10.5)

## 2019-11-01 PROCEDURE — 71275 CT ANGIOGRAPHY CHEST: CPT | Mod: 26

## 2019-11-01 PROCEDURE — 12345: CPT | Mod: NC

## 2019-11-01 PROCEDURE — 70450 CT HEAD/BRAIN W/O DYE: CPT | Mod: 26

## 2019-11-01 PROCEDURE — 99223 1ST HOSP IP/OBS HIGH 75: CPT

## 2019-11-01 PROCEDURE — 72125 CT NECK SPINE W/O DYE: CPT | Mod: 26

## 2019-11-01 PROCEDURE — 74177 CT ABD & PELVIS W/CONTRAST: CPT | Mod: 26

## 2019-11-01 RX ORDER — LIDOCAINE 4 G/100G
1 CREAM TOPICAL EVERY 24 HOURS
Refills: 0 | Status: DISCONTINUED | OUTPATIENT
Start: 2019-11-01 | End: 2019-11-02

## 2019-11-01 RX ORDER — INFLUENZA VIRUS VACCINE 15; 15; 15; 15 UG/.5ML; UG/.5ML; UG/.5ML; UG/.5ML
0.5 SUSPENSION INTRAMUSCULAR ONCE
Refills: 0 | Status: COMPLETED | OUTPATIENT
Start: 2019-11-01 | End: 2019-11-02

## 2019-11-01 RX ORDER — METOPROLOL TARTRATE 50 MG
50 TABLET ORAL
Refills: 0 | Status: DISCONTINUED | OUTPATIENT
Start: 2019-11-01 | End: 2019-11-02

## 2019-11-01 RX ORDER — TAMSULOSIN HYDROCHLORIDE 0.4 MG/1
0.4 CAPSULE ORAL AT BEDTIME
Refills: 0 | Status: DISCONTINUED | OUTPATIENT
Start: 2019-11-01 | End: 2019-11-02

## 2019-11-01 RX ORDER — OXYCODONE AND ACETAMINOPHEN 5; 325 MG/1; MG/1
2 TABLET ORAL EVERY 6 HOURS
Refills: 0 | Status: DISCONTINUED | OUTPATIENT
Start: 2019-11-01 | End: 2019-11-02

## 2019-11-01 RX ORDER — OXYCODONE AND ACETAMINOPHEN 5; 325 MG/1; MG/1
1 TABLET ORAL EVERY 4 HOURS
Refills: 0 | Status: DISCONTINUED | OUTPATIENT
Start: 2019-11-01 | End: 2019-11-02

## 2019-11-01 RX ORDER — WARFARIN SODIUM 2.5 MG/1
3 TABLET ORAL ONCE
Refills: 0 | Status: COMPLETED | OUTPATIENT
Start: 2019-11-01 | End: 2019-11-01

## 2019-11-01 RX ORDER — DIGOXIN 250 MCG
0.12 TABLET ORAL DAILY
Refills: 0 | Status: DISCONTINUED | OUTPATIENT
Start: 2019-11-01 | End: 2019-11-02

## 2019-11-01 RX ORDER — METRONIDAZOLE 500 MG
500 TABLET ORAL EVERY 8 HOURS
Refills: 0 | Status: DISCONTINUED | OUTPATIENT
Start: 2019-11-01 | End: 2019-11-02

## 2019-11-01 RX ORDER — KETOROLAC TROMETHAMINE 30 MG/ML
15 SYRINGE (ML) INJECTION EVERY 6 HOURS
Refills: 0 | Status: DISCONTINUED | OUTPATIENT
Start: 2019-11-01 | End: 2019-11-01

## 2019-11-01 RX ORDER — MULTIVIT-MIN/FERROUS GLUCONATE 9 MG/15 ML
1 LIQUID (ML) ORAL DAILY
Refills: 0 | Status: DISCONTINUED | OUTPATIENT
Start: 2019-11-01 | End: 2019-11-02

## 2019-11-01 RX ORDER — ACETAMINOPHEN 500 MG
650 TABLET ORAL EVERY 6 HOURS
Refills: 0 | Status: DISCONTINUED | OUTPATIENT
Start: 2019-11-01 | End: 2019-11-01

## 2019-11-01 RX ADMIN — Medication 50 MILLIGRAM(S): at 06:27

## 2019-11-01 RX ADMIN — Medication 650 MILLIGRAM(S): at 06:31

## 2019-11-01 RX ADMIN — Medication 15 MILLIGRAM(S): at 17:40

## 2019-11-01 RX ADMIN — Medication 650 MILLIGRAM(S): at 00:31

## 2019-11-01 RX ADMIN — Medication 1 TABLET(S): at 11:32

## 2019-11-01 RX ADMIN — WARFARIN SODIUM 3 MILLIGRAM(S): 2.5 TABLET ORAL at 22:01

## 2019-11-01 RX ADMIN — Medication 15 MILLIGRAM(S): at 15:35

## 2019-11-01 RX ADMIN — Medication 650 MILLIGRAM(S): at 05:29

## 2019-11-01 RX ADMIN — LIDOCAINE 1 PATCH: 4 CREAM TOPICAL at 15:35

## 2019-11-01 RX ADMIN — Medication 650 MILLIGRAM(S): at 14:08

## 2019-11-01 RX ADMIN — Medication 500 MILLIGRAM(S): at 22:00

## 2019-11-01 RX ADMIN — Medication 650 MILLIGRAM(S): at 12:58

## 2019-11-01 RX ADMIN — Medication 50 MILLIGRAM(S): at 17:39

## 2019-11-01 RX ADMIN — TAMSULOSIN HYDROCHLORIDE 0.4 MILLIGRAM(S): 0.4 CAPSULE ORAL at 22:00

## 2019-11-01 NOTE — PHYSICAL THERAPY INITIAL EVALUATION ADULT - NEUROVASCULAR ASSESSMENT LLE
warm/no discoloration/Dorsal pedal pulse difficult to palpate, capillary refill <3 sec, toes warm bilaterally./no tingling/no numbness

## 2019-11-01 NOTE — H&P ADULT - HISTORY OF PRESENT ILLNESS
89 y/o male with PMH of Afib on Coumadin, Prostate CA radiation and DVT came to the ED s/p fall. Patient said he has a light in his room that normal goes on automatically; he went in his room turned off a light with the hope that the automatic light will turn on but it did not; as he was walking in the dark he lost his balance and fell on his back. He reported pain on the left thigh. Patient had no LOC, he was able to recall all that happened. He has no HA, blurry vision, dizziness, weakness, numbness, paresthesia, chest pain, shortness of breath, palpitation, fever, chills, nausea, vomiting, change in bowel/urinary habit.

## 2019-11-01 NOTE — DISCHARGE NOTE PROVIDER - HOSPITAL COURSE
89 y/o male with PMH of Afib on Coumadin, Prostate CA radiation and DVT came to the ED s/p fall. Patient said he has a light in his room that normal goes on automatically; he went in his room turned off a light with the hope that the automatic light will turn on but it did not; as he was walking in the dark he lost his balance and fell on his back. He reported pain on the left thigh. Patient had no LOC, he was able to recall all that happened. He has no HA, blurry vision, dizziness, weakness, numbness, paresthesia, chest pain, shortness of breath, palpitation, fever, chills, nausea, vomiting, change in bowel/urinary habit.          He was initially seen by Trauma and cleared after initial CT scan of head and spine were negative for any acute fractures or dislocations. He was admitted for pain management, which has been achieved with Lidoderm and Percocet. His family has arranged for 24 hours home care aid         He was noted to have Acute hypoxic respiratory distress which improved with supplemental oxygen and eventually take off O2. CTA chest done; PE ruled out; noted to have chronic rib fracture. No longer hypoxic        Mild focal Diverticulitis which may explain leukocytosis, though no other symptoms. Likely early developing. Started treatment with PO Levaquin and Flagyl x 7 days    As per daughter he has had diverticulitis in the past.         He was continued on  Digoxin 0.125mcg, Metoprolol 50mg bid and Warfarin 3mg for AF and Floamx for BPH        Medically stable for discharge        Discharge time 35 minutes

## 2019-11-01 NOTE — PATIENT PROFILE ADULT - ABILITY TO HEAR (WITH HEARING AID OR HEARING APPLIANCE IF NORMALLY USED):
Mildly to Moderately Impaired: difficulty hearing in some environments or speaker may need to increase volume or speak distinctly/pt did not bring hearing aides

## 2019-11-01 NOTE — H&P ADULT - ASSESSMENT
89 y/o male with PMH of Afib on Coumadin, Prostate CA radiation and DVT came to the ED s/p fall. Patient said he has a light in his room that normal goes on automatically; he went in his room turned off a light with the hope that the automatic light will turn on but it did not; as he was walking in the dark he lost his balance and fell on his back. He reported pain on the left thigh. Patient had no LOC, he was able to recall all that happened. He has no HA, blurry vision, dizziness, weakness, numbness, paresthesia, chest pain, shortness of breath, palpitation, fever, chills, nausea, vomiting, change in bowel/urinary habit.        Mechanical fall   Admit to medical floor with    CT head and neck: no acute intracranial finding; no fracture   Fall precaution   PT evaluation in AM   Tylenol PRN for pain    Acute hypoxic respiratory distress   As per ED, patient became hypoxic when attempted to walk him; he was placed on O2 via NC   Patient not on O2 at home and no hx of lung dx   CTA chest done; PE ruled out; noted to have chronic rib fracture  Continue O2 via NC as needed     Afib   Continue Digoxin 0.125mcg   Metoprolol 50mg bid with holding parameters  Warfarin 3mg     Urinary retention   Continue Flomax 0.4mg     Supportive   DVT prophylaxis: on Coumadin   Diet: DASH

## 2019-11-01 NOTE — CONSULT NOTE ADULT - SUBJECTIVE AND OBJECTIVE BOX
HPI:   89y Male BIBEMS on 11-01-19 by ground activated as trauma consult. Pt has PMHx afib and dvt for which he is on coumadin. Patient presents s/p mechanical fall he sustained after his outdoor lights failed to turn on properly, leaving him to trip in the dark. He denies LOC or presyncopal symptoms. He says he did not hit his head during fall.  He has history of falls w/ known chronic rib fractures, pubic rami fractures, and multiple vertebral body compression fractures. He lives at home alone. On evaluation, patient protecting airway, conversing with ease, had nonlabored breathing, had intact peripheral pulses, and was moving all extremities.     A: Protected, patient conversing w/ ease  B: CTAB. Symmetrical chest rise  C: 2+ central (carotid, femoral) & peripheral pulses (Radial, DP)  D: GCS 15, MAEO, interacting. No harry disability noted  E: No gross deformities on primary exposure    PMH:  VTE (venous thromboembolism) [Active]  Irritable bowel syndrome with diarrhea [Active]  Prostate CA [Active]: radiation  Cyst of pancreas [Active]  Cyst of kidney, acquired [Active]  GERD (gastroesophageal reflux disease) [Active]  Tachycardia [Active]  Afib [Active]      PSH:  H/O total knee replacement, right    Home Medications:  acetaminophen 325 mg oral tablet: 2 tab(s) orally every 6 hours, As needed, Moderate Pain (4 - 6) (08 Sep 2016 10:47)  digoxin 125 mcg (0.125 mg) oral tablet: 1 tab(s) orally once a day (20 Sep 2016 09:17)  Flomax 0.4 mg oral capsule: 1 cap(s) orally once a day (04 Sep 2016 12:45)  Metoprolol Tartrate 50 mg oral tablet: 1 tab(s) orally 2 times a day (01 Nov 2019 05:26)  Multiple Vitamins oral tablet: 1 tab(s) orally once a day (20 Sep 2016 09:17)  PreserVision oral tablet: 1 tab(s) orally 2 times a day (20 Sep 2016 09:17)  Systane ophthalmic solution: 1 drop(s) to each affected eye every 4 hours (20 Sep 2016 09:17)  Coumadin    ALL:  antibiotic (Rash)  tetracycline (Rash)      FMH:  No significant family history    SOC Hx:   Denies etoh, tobacco, or drug use. Lives at home alone.     Physical Exam:   Constitutional: elderly male, no acute distress  Neurological: GCS: 15 (4/5/6). A&O x 3; no focal deficits  HEENT: Head is normocephalic and atraumatic, mandibular alignment intact, midface stable, no blood or discharge from nares or oral cavity, no rice sign / racoon eyes, EOMI b/l, pupils 3 mm round and reactive to light b/l, no active drainage or redness  Neck: trachea midline  Respiratory: Breath sounds CTA b/l respirations are unlabored, no accessory muscle use, no conversational dyspnea  Cardiovascular: RRR, Chest wall is non-tender to palpation, no subQ emphysema or crepitus palpated  Gastrointestinal: Abdomen soft, non-tender, non-distended, no rebound tenderness / guarding, no ecchymosis or external signs of abdominal trauma  Pelvis: stable  Neurospinal: C/T/L/S spines have no tenderness to palpation, no step-offs or signs of external trauma.  Musculoskeletal: moving all extremities spontaneously. Pain on active flexion of left him limiting ROM. Otherwise no instability, or gross msk deformity noted to upper or lower extremities bilaterally.    Vascular: 2+ radial, femoral, and DP pulses b/l

## 2019-11-01 NOTE — PHYSICAL THERAPY INITIAL EVALUATION ADULT - DIAGNOSIS, PT EVAL
Impaired functional mobility secondary to weakness, deconditioning, recent trauma and resulting pain.

## 2019-11-01 NOTE — PHYSICAL THERAPY INITIAL EVALUATION ADULT - ADDITIONAL COMMENTS
Patient lives in a private house with 4-5 stairs to entry with railing. He lives alone, daughter notes he will have 24/7 HHA assist starting tomorrow (Nov. 2), He was independent prior to admission with cane, has no other DME.

## 2019-11-01 NOTE — PHYSICAL THERAPY INITIAL EVALUATION ADULT - NEUROVASCULAR ASSESSMENT RLE
no tingling/warm/Dorsal pedal pulse difficult to palpate, capillary refill <3 sec, toes warm bilaterally./no discoloration/no numbness

## 2019-11-01 NOTE — DISCHARGE NOTE PROVIDER - NSDCCPCAREPLAN_GEN_ALL_CORE_FT
PRINCIPAL DISCHARGE DIAGNOSIS  Diagnosis: Intractable back pain  Assessment and Plan of Treatment: Continue medications as needed      SECONDARY DISCHARGE DIAGNOSES  Diagnosis: Acute diverticulitis  Assessment and Plan of Treatment: Continue with antibiotics    Diagnosis: Hypoxia  Assessment and Plan of Treatment: Incentive spirometry    Diagnosis: Chronic atrial fibrillation  Assessment and Plan of Treatment: Continue home medications    Diagnosis: Fracture of vertebra, thoracic  Assessment and Plan of Treatment: Continue medications

## 2019-11-01 NOTE — CONSULT NOTE ADULT - ASSESSMENT
89yo male w/ afib and dvt on coumadin who presents s/p mechanical fall. No acute injuries identified on exam. Cspine cleared by imaging and clinical exam.       - rec XR left hip and femur to r/o occult fx.   - rec PT/OT/PM&R evals  - No indication for acute surgical intervention at this time. Thank you for this consult.     Seen and Discussed W/ Dr. Ashley.

## 2019-11-01 NOTE — PHYSICAL THERAPY INITIAL EVALUATION ADULT - GENERAL OBSERVATIONS, REHAB EVAL
Patient received lying in bed, NAD, breathing O2 via NC, +. Daughter present at bedside. Pt agreeable to Physical Therapy evaluation.

## 2019-11-01 NOTE — DISCHARGE NOTE PROVIDER - NSDCFUSCHEDAPPT_GEN_ALL_CORE_FT
MECHE MILLER ; 12/11/2019 ; NPP Cardiology [facility] [place] MECHE MILLER ; 12/11/2019 ; NPP Cardiology [facility] [place]

## 2019-11-01 NOTE — DISCHARGE NOTE PROVIDER - NSDCMRMEDTOKEN_GEN_ALL_CORE_FT
acetaminophen 325 mg oral tablet: 2 tab(s) orally every 6 hours, As needed, Moderate Pain (4 - 6)  Coumadin 4 mg oral tablet: 1 tab(s) orally every 48 hours  digoxin 125 mcg (0.125 mg) oral tablet: 1 tab(s) orally once a day  Flomax 0.4 mg oral capsule: 1 cap(s) orally once a day  Metoprolol Tartrate 50 mg oral tablet: 1 tab(s) orally 2 times a day  Multiple Vitamins oral tablet: 1 tab(s) orally once a day  PreserVision oral tablet: 1 tab(s) orally 2 times a day  Systane ophthalmic solution: 1 drop(s) to each affected eye every 4 hours Coumadin 4 mg oral tablet: 1 tab(s) orally every 48 hours  digoxin 125 mcg (0.125 mg) oral tablet: 1 tab(s) orally once a day  Flomax 0.4 mg oral capsule: 1 cap(s) orally once a day  levoFLOXacin 250 mg oral tablet: 1 tab(s) orally every 24 hours  lidocaine 5% topical film: Apply topically to affected area once a day   On for 12 hours, off for 12 hours  Metoprolol Tartrate 50 mg oral tablet: 1 tab(s) orally 2 times a day  metroNIDAZOLE 500 mg oral tablet: 1 tab(s) orally every 8 hours  Multiple Vitamins oral tablet: 1 tab(s) orally once a day  oxycodone-acetaminophen 5 mg-325 mg oral tablet: 1- 2 tab(s) orally every 6 hours, As needed,  Pain   MDD:8  PreserVision oral tablet: 1 tab(s) orally 2 times a day  Systane ophthalmic solution: 1 drop(s) to each affected eye every 4 hours

## 2019-11-01 NOTE — PROGRESS NOTE ADULT - SUBJECTIVE AND OBJECTIVE BOX
HOSPITALIST PROGRESS NOTE    MECHE MILLER  [id number]  89yMale    Patient is a 89y old  Male who presents with a chief complaint of Fall (2019 05:16)      SUBJECTIVE:   Chart reviewed since last visit.  Patient seen and examined at bedside.      OBJECTIVE:  Vital Signs Last 24 Hrs  T(C): 36.7 (2019 11:23), Max: 36.9 (2019 06:05)  T(F): 98.1 (2019 11:23), Max: 98.5 (2019 06:05)  HR: 98 (2019 11:23) (74 - 98)  BP: 133/90 (2019 11:23) (125/79 - 149/89)  BP(mean): --  RR: 18 (:23) (18 - 22)  SpO2: 99% (2019 11:23) (96% - 100%)    PHYSICAL EXAMINATION  General:   HEENT:    NECK:    CVS:   RESP:    GI:    :   MSK:    CNS:    INTEG:    PSYCH:      MONITOR:  CAPILLARY BLOOD GLUCOSE            I&O's Summary                          12.6   13.99 )-----------( 106      ( 2019 09:01 )             38.1     PT/INR - ( 2019 09:01 )   PT: 21.0 sec;   INR: 1.79 ratio         PTT - ( 31 Oct 2019 23:17 )  PTT:36.9 sec  10-31    138  |  103  |  34.0<H>  ----------------------------<  125<H>  4.2   |  24.0  |  1.04    Ca    8.7      31 Oct 2019 23:17  Mg     2.1     10-31    TPro  6.4<L>  /  Alb  3.8  /  TBili  0.3<L>  /  DBili  x   /  AST  21  /  ALT  14  /  AlkPhos  91  10-31    CARDIAC MARKERS ( 31 Oct 2019 23:17 )  x     / <0.01 ng/mL / x     / x     / x          Urinalysis Basic - ( 31 Oct 2019 23:31 )    Color: Yellow / Appearance: Clear / S.025 / pH: x  Gluc: x / Ketone: Trace  / Bili: Negative / Urobili: Negative mg/dL   Blood: x / Protein: 30 mg/dL / Nitrite: Negative   Leuk Esterase: Negative / RBC: 0-2 /HPF / WBC 0-2   Sq Epi: x / Non Sq Epi: Few / Bacteria: Occasional        Culture:    TTE:    RADIOLOGY  < from: CT Head No Cont (19 @ 01:03) >     EXAM:  CT CERVICAL SPINE                         EXAM:  CT BRAIN                          PROCEDURE DATE:  2019          INTERPRETATION:  CLINICAL INFORMATION: Trauma    CT head:    TECHNIQUE: Noncontrast axial CT images of the brain were acquired from   the base of skull to vertex.    COMPARISON: None.    FINDINGS: The gray-white differentiation is maintained. No intracranial   hemorrhage is seen. Moderate periventricular and subcortical white matter   hypoattenuation without mass effect is noted, non-specific, but likely   related to chronic small vessel ischemic changes. No mass effect or   midline shift is seen.    Cerebral volume loss is present with proportional prominence of the sulci   and ventricles.     Visualized paranasalsinuses and tympanomastoid cavities are clear.   Evidence of prior bilateral cataract surgery. No depressed calvarial   fracture is seen.    IMPRESSION: No intracranial hemorrhage or depressed calvarial fracture.    CT cervical spine:    COMPARISON: None    TECHNIQUE: Axial CT images were acquired from base of the skull to the   thoracic inlet without the use of intravenous contrast. Coronal and   sagittal reconstructions are provided.    FINDINGS: Exaggerated lordosis. Vertebral body heights andintervertebral   disc spaces are maintained. Normal spinal alignment is seen without   evidence of dislocation. No acute fracture is seen. No prevertebral   hematoma is seen. The regional soft tissues are grossly unremarkable.    The visualized lung apices are clear.      IMPRESSION: No acute fracture or dislocation of the cervical spine.                 DESTINY HAMPTON M.D., ATTENDING RADIOLOGIST  This document has been electronically signed. 2019  1:20AM                  < end of copied text >    < from: CT Angio Chest w/ IV Cont (19 @ 01:04) >   EXAM:  CT ABDOMEN AND PELVIS IC                         EXAM:  CT ANGIO CHEST (W)AW IC                          PROCEDURE DATE:  2019          INTERPRETATION:  CLINICAL INFORMATION: Status post fall. Shortness of   breath. History of DVT.    COMPARISON: None.    PROCEDURE:   CT Angiography of the Chest was performed followed by portal venous phase   imaging of the Abdomen and Pelvis.  IV Contrast: 90 ml of Omnipaque 350 was injected intravenously. 10 ml   were discarded.  Oral contrast: None.  Sagittal and coronal reformats were performed as well as 3D (MIP)   reconstructions.    FINDINGS:    CHEST:     LUNGS AND LARGE AIRWAYS: Patent central airways. Subsegmental atelectasis   within the right lower an left upper lobes. For millimeter pulmonary   nodule within the lingula (5:100).  PLEURA: No pleural effusion or pneumothorax.  VESSELS: Adequate opacification of the pulmonary arteries. Evaluation of   numerous subsegmental pulmonary arterial branches is limited secondary to   respiratory motion. No evidence of pulmonary embolus. Normal caliber   thoracic aorta demonstrating atherosclerosis and tortuosity  HEART: Heart size is enlarged. No pericardial effusion. Coronary artery   calcification.  MEDIASTINUM AND LIU: No lymphadenopathy.  CHEST WALL AND LOWER NECK: Bilateral symmetric gynecomastia.    ABDOMEN AND PELVIS:    LIVER: Within normal limits.  BILE DUCTS: Normal caliber.  GALLBLADDER: Cholelithiasis.  SPLEEN: Within normal limits.  PANCREAS: Within normal limits.  ADRENALS: Within normal limits.  KIDNEYS/URETERS: 6.8 cm right upper pole renal cyst. Additional   low-attenuation lesion too small to actually characterize. No   hydronephrosis. Symmetric parenchymal enhancement.    BLADDER: Trabeculated appearance to the urinary bladder, which may be   secondary to chronic bladder obstruction.  REPRODUCTIVE ORGANS: Prostate within normal limits.    BOWEL: No bowel obstruction. Appendix is normal. Large duodenal   diverticulum containing air and fluid. Colonic diverticulosis. Minimal   colonic wall thickening and pericolonic infiltration surrounding the mid   sigmoid colon.  PERITONEUM: No ascites.  VESSELS: Atherosclerosis of the abdominal aorta and its branch vessels.  RETROPERITONEUM/LYMPH NODES: No lymphadenopathy.    ABDOMINAL WALL: Within normal limits.  BONES: Bilateral glenohumeral joint arthrosis. Multilevel degenerative   changes of the spine. Age indeterminant anterior wedge compression   deformities of the T2, T8, T9, and T11 vertebral bodies.Grade 1/2   anterolisthesis of L5 on S1 secondary to facet joint arthrosis. Chronic   bilateral rib fracture deformities. No evidence of acute rib fracture.   Chronic bilateral inferior pubic rami fractures.    IMPRESSION:     No pulmonary embolus.    No CT evidence of acute traumatic sequelae within the chest, abdomen, or   pelvis.    Minimal wall thickening and pericolonic infiltration at the level of the   mid sigmoid. Mild focal diverticulitis is a diagnostic consideration.   Correlate clinically.                    LINDY LIU M.D., ATTENDING RADIOLOGIST  This document has been electronically signed. 2019  1:34AM    < end of copied text >        MEDICATIONS  (STANDING):  digoxin     Tablet 0.125 milliGRAM(s) Oral daily  influenza   Vaccine 0.5 milliLiter(s) IntraMuscular once  lidocaine   Patch 1 Patch Transdermal every 24 hours  metoprolol tartrate 50 milliGRAM(s) Oral two times a day  multivitamin/minerals 1 Tablet(s) Oral daily  tamsulosin 0.4 milliGRAM(s) Oral at bedtime  warfarin 3 milliGRAM(s) Oral once      MEDICATIONS  (PRN):  acetaminophen   Tablet .. 650 milliGRAM(s) Oral every 6 hours PRN Temp greater or equal to 38C (100.4F), Mild Pain (1 - 3), Moderate Pain (4 - 6), Severe Pain (7 - 10)  ketorolac   Injectable 15 milliGRAM(s) IV Push every 6 hours PRN Severe Pain (7 - 10) HOSPITALIST PROGRESS NOTE    MECHE MILLER  [id number]  89yMale    Patient is a 89y old  Male who presents with a chief complaint of Fall (2019 05:16)      SUBJECTIVE:   Chart reviewed since last visit.  Patient seen and examined at bedside for fall, diverticulitis  Complaining of back pain - wants Oxycodone as Tylenol not enough  Denies any dizziness, chest pain, palpitations, nausea, vomiting, diarrhea or abdominal pain  Mild occasional dyspnea      OBJECTIVE:  Vital Signs Last 24 Hrs  T(C): 36.7 (2019 11:23), Max: 36.9 (2019 06:05)  T(F): 98.1 (2019 11:23), Max: 98.5 (2019 06:05)  HR: 98 (2019 11:23) (74 - 98)  BP: 133/90 (2019 11:23) (125/79 - 149/89)   RR: 18 (2019 11:23) (18 - 22)  SpO2: 99% (2019 11:23) (96% - 100%)    PHYSICAL EXAMINATION  General: Elderly male lying in bed, NAD  HEENT:  EOMI  NECK:  Supple  CVS: regular rate and rhythm S1 S2  RESP:  CTAB  GI:  Soft nondistended nontender BS+  : No suprapubic tenderness  MSK:  FROM  CNS:  Follow commands  INTEG:  warm dry skin  PSYCH:  Fair mood, oriented x 3    MONITOR:  CAPILLARY BLOOD GLUCOSE            I&O's Summary                          12.6   13.99 )-----------( 106      ( 2019 09:01 )             38.1     PT/INR - ( 2019 09:01 )   PT: 21.0 sec;   INR: 1.79 ratio         PTT - ( 31 Oct 2019 23:17 )  PTT:36.9 sec  10-31    138  |  103  |  34.0<H>  ----------------------------<  125<H>  4.2   |  24.0  |  1.04    Ca    8.7      31 Oct 2019 23:17  Mg     2.1     10-31    TPro  6.4<L>  /  Alb  3.8  /  TBili  0.3<L>  /  DBili  x   /  AST  21  /  ALT  14  /  AlkPhos  91  10-31    CARDIAC MARKERS ( 31 Oct 2019 23:17 )  x     / <0.01 ng/mL / x     / x     / x          Urinalysis Basic - ( 31 Oct 2019 23:31 )    Color: Yellow / Appearance: Clear / S.025 / pH: x  Gluc: x / Ketone: Trace  / Bili: Negative / Urobili: Negative mg/dL   Blood: x / Protein: 30 mg/dL / Nitrite: Negative   Leuk Esterase: Negative / RBC: 0-2 /HPF / WBC 0-2   Sq Epi: x / Non Sq Epi: Few / Bacteria: Occasional        Culture:    TTE:    RADIOLOGY  < from: CT Head No Cont (19 @ 01:03) >     EXAM:  CT CERVICAL SPINE                         EXAM:  CT BRAIN                          PROCEDURE DATE:  2019          INTERPRETATION:  CLINICAL INFORMATION: Trauma    CT head:    TECHNIQUE: Noncontrast axial CT images of the brain were acquired from   the base of skull to vertex.    COMPARISON: None.    FINDINGS: The gray-white differentiation is maintained. No intracranial   hemorrhage is seen. Moderate periventricular and subcortical white matter   hypoattenuation without mass effect is noted, non-specific, but likely   related to chronic small vessel ischemic changes. No mass effect or   midline shift is seen.    Cerebral volume loss is present with proportional prominence of the sulci   and ventricles.     Visualized paranasalsinuses and tympanomastoid cavities are clear.   Evidence of prior bilateral cataract surgery. No depressed calvarial   fracture is seen.    IMPRESSION: No intracranial hemorrhage or depressed calvarial fracture.    CT cervical spine:    COMPARISON: None    TECHNIQUE: Axial CT images were acquired from base of the skull to the   thoracic inlet without the use of intravenous contrast. Coronal and   sagittal reconstructions are provided.    FINDINGS: Exaggerated lordosis. Vertebral body heights andintervertebral   disc spaces are maintained. Normal spinal alignment is seen without   evidence of dislocation. No acute fracture is seen. No prevertebral   hematoma is seen. The regional soft tissues are grossly unremarkable.    The visualized lung apices are clear.      IMPRESSION: No acute fracture or dislocation of the cervical spine.                 DESTINY HAMPTON M.D., ATTENDING RADIOLOGIST  This document has been electronically signed. 2019  1:20AM                  < end of copied text >    < from: CT Angio Chest w/ IV Cont (19 @ 01:04) >   EXAM:  CT ABDOMEN AND PELVIS IC                         EXAM:  CT ANGIO CHEST (W)AW IC                          PROCEDURE DATE:  2019          INTERPRETATION:  CLINICAL INFORMATION: Status post fall. Shortness of   breath. History of DVT.    COMPARISON: None.    PROCEDURE:   CT Angiography of the Chest was performed followed by portal venous phase   imaging of the Abdomen and Pelvis.  IV Contrast: 90 ml of Omnipaque 350 was injected intravenously. 10 ml   were discarded.  Oral contrast: None.  Sagittal and coronal reformats were performed as well as 3D (MIP)   reconstructions.    FINDINGS:    CHEST:     LUNGS AND LARGE AIRWAYS: Patent central airways. Subsegmental atelectasis   within the right lower an left upper lobes. For millimeter pulmonary   nodule within the lingula (5:100).  PLEURA: No pleural effusion or pneumothorax.  VESSELS: Adequate opacification of the pulmonary arteries. Evaluation of   numerous subsegmental pulmonary arterial branches is limited secondary to   respiratory motion. No evidence of pulmonary embolus. Normal caliber   thoracic aorta demonstrating atherosclerosis and tortuosity  HEART: Heart size is enlarged. No pericardial effusion. Coronary artery   calcification.  MEDIASTINUM AND LIU: No lymphadenopathy.  CHEST WALL AND LOWER NECK: Bilateral symmetric gynecomastia.    ABDOMEN AND PELVIS:    LIVER: Within normal limits.  BILE DUCTS: Normal caliber.  GALLBLADDER: Cholelithiasis.  SPLEEN: Within normal limits.  PANCREAS: Within normal limits.  ADRENALS: Within normal limits.  KIDNEYS/URETERS: 6.8 cm right upper pole renal cyst. Additional   low-attenuation lesion too small to actually characterize. No   hydronephrosis. Symmetric parenchymal enhancement.    BLADDER: Trabeculated appearance to the urinary bladder, which may be   secondary to chronic bladder obstruction.  REPRODUCTIVE ORGANS: Prostate within normal limits.    BOWEL: No bowel obstruction. Appendix is normal. Large duodenal   diverticulum containing air and fluid. Colonic diverticulosis. Minimal   colonic wall thickening and pericolonic infiltration surrounding the mid   sigmoid colon.  PERITONEUM: No ascites.  VESSELS: Atherosclerosis of the abdominal aorta and its branch vessels.  RETROPERITONEUM/LYMPH NODES: No lymphadenopathy.    ABDOMINAL WALL: Within normal limits.  BONES: Bilateral glenohumeral joint arthrosis. Multilevel degenerative   changes of the spine. Age indeterminant anterior wedge compression   deformities of the T2, T8, T9, and T11 vertebral bodies.Grade 1/2   anterolisthesis of L5 on S1 secondary to facet joint arthrosis. Chronic   bilateral rib fracture deformities. No evidence of acute rib fracture.   Chronic bilateral inferior pubic rami fractures.    IMPRESSION:     No pulmonary embolus.    No CT evidence of acute traumatic sequelae within the chest, abdomen, or   pelvis.    Minimal wall thickening and pericolonic infiltration at the level of the   mid sigmoid. Mild focal diverticulitis is a diagnostic consideration.   Correlate clinically.                    LINDY LIU M.D., ATTENDING RADIOLOGIST  This document has been electronically signed. 2019  1:34AM    < end of copied text >        MEDICATIONS  (STANDING):  digoxin     Tablet 0.125 milliGRAM(s) Oral daily  influenza   Vaccine 0.5 milliLiter(s) IntraMuscular once  lidocaine   Patch 1 Patch Transdermal every 24 hours  metoprolol tartrate 50 milliGRAM(s) Oral two times a day  multivitamin/minerals 1 Tablet(s) Oral daily  tamsulosin 0.4 milliGRAM(s) Oral at bedtime  warfarin 3 milliGRAM(s) Oral once      MEDICATIONS  (PRN):  acetaminophen   Tablet .. 650 milliGRAM(s) Oral every 6 hours PRN Temp greater or equal to 38C (100.4F), Mild Pain (1 - 3), Moderate Pain (4 - 6), Severe Pain (7 - 10)  ketorolac   Injectable 15 milliGRAM(s) IV Push every 6 hours PRN Severe Pain (7 - 10)

## 2019-11-01 NOTE — DISCHARGE NOTE PROVIDER - NSDCACTIVITY_GEN_ALL_CORE
Walking - Indoors allowed/No heavy lifting/straining/Bathing allowed/Do not drive or operate machinery/Do not make important decisions/Showering allowed/Walking - Outdoors allowed

## 2019-11-01 NOTE — H&P ADULT - NSICDXPASTMEDICALHX_GEN_ALL_CORE_FT
PAST MEDICAL HISTORY:  Afib     Cyst of kidney, acquired     Cyst of pancreas     GERD (gastroesophageal reflux disease)     Irritable bowel syndrome with diarrhea     Prostate CA radiation    Tachycardia     VTE (venous thromboembolism)

## 2019-11-01 NOTE — PROGRESS NOTE ADULT - ASSESSMENT
89 y/o male with PMH of Afib on Coumadin, Prostate CA radiation and DVT came to the ED s/p fall. Patient said he has a light in his room that normal goes on automatically; he went in his room turned off a light with the hope that the automatic light will turn on but it did not; as he was walking in the dark he lost his balance and fell on his back. He reported pain on the left thigh. Patient had no LOC, he was able to recall all that happened. He has no HA, blurry vision, dizziness, weakness, numbness, paresthesia, chest pain, shortness of breath, palpitation, fever, chills, nausea, vomiting, change in bowel/urinary habit.        Mechanical fall   CT head and neck: no acute intracranial finding; no fracture   Fall precaution   PT evaluation - home with home care, 24 hour assist  Tylenol PRN for pain inadequate - will change to Percocet  Add Lidoderm patch    Acute hypoxic respiratory distress   As per ED, patient became hypoxic when attempted to walk him; he was placed on O2 via NC   Patient not on O2 at home and no hx of lung dx   CTA chest done; PE ruled out; noted to have chronic rib fracture  Continue O2 via NC as needed   Incentive spirometry    Mild focal Diverticulitis which may explain leukocytosis, though no other symptoms. Likely early developing  - PO Levaquin and Flagyl x 7 days  As per daughter he has had diverticulitis in the past      Afib   Continue Digoxin 0.125mcg   Metoprolol 50mg bid with holding parameters  Warfarin 3mg     Urinary retention   Continue Flomax 0.4mg     Supportive   DVT prophylaxis: on Coumadin       Disposition - Likely home with 24 hour HHA in next 24 hours      Discussed with patient, daughter Jessica [phone] and [facility] Cici

## 2019-11-02 ENCOUNTER — TRANSCRIPTION ENCOUNTER (OUTPATIENT)
Age: 84
End: 2019-11-02

## 2019-11-02 VITALS
HEART RATE: 81 BPM | TEMPERATURE: 98 F | SYSTOLIC BLOOD PRESSURE: 120 MMHG | RESPIRATION RATE: 19 BRPM | DIASTOLIC BLOOD PRESSURE: 80 MMHG | OXYGEN SATURATION: 91 %

## 2019-11-02 LAB
ANION GAP SERPL CALC-SCNC: 10 MMOL/L — SIGNIFICANT CHANGE UP (ref 5–17)
APTT BLD: 38 SEC — HIGH (ref 27.5–36.3)
BUN SERPL-MCNC: 33 MG/DL — HIGH (ref 8–20)
CALCIUM SERPL-MCNC: 8.9 MG/DL — SIGNIFICANT CHANGE UP (ref 8.6–10.2)
CHLORIDE SERPL-SCNC: 105 MMOL/L — SIGNIFICANT CHANGE UP (ref 98–107)
CO2 SERPL-SCNC: 26 MMOL/L — SIGNIFICANT CHANGE UP (ref 22–29)
CREAT SERPL-MCNC: 0.99 MG/DL — SIGNIFICANT CHANGE UP (ref 0.5–1.3)
CULTURE RESULTS: SIGNIFICANT CHANGE UP
GLUCOSE SERPL-MCNC: 204 MG/DL — HIGH (ref 70–115)
HCT VFR BLD CALC: 36.4 % — LOW (ref 39–50)
HGB BLD-MCNC: 11.9 G/DL — LOW (ref 13–17)
INR BLD: 2.04 RATIO — HIGH (ref 0.88–1.16)
MCHC RBC-ENTMCNC: 30.8 PG — SIGNIFICANT CHANGE UP (ref 27–34)
MCHC RBC-ENTMCNC: 32.7 GM/DL — SIGNIFICANT CHANGE UP (ref 32–36)
MCV RBC AUTO: 94.3 FL — SIGNIFICANT CHANGE UP (ref 80–100)
PLATELET # BLD AUTO: 105 K/UL — LOW (ref 150–400)
POTASSIUM SERPL-MCNC: 5.2 MMOL/L — SIGNIFICANT CHANGE UP (ref 3.5–5.3)
POTASSIUM SERPL-SCNC: 5.2 MMOL/L — SIGNIFICANT CHANGE UP (ref 3.5–5.3)
PROTHROM AB SERPL-ACNC: 24 SEC — HIGH (ref 10–12.9)
RBC # BLD: 3.86 M/UL — LOW (ref 4.2–5.8)
RBC # FLD: 13.6 % — SIGNIFICANT CHANGE UP (ref 10.3–14.5)
SODIUM SERPL-SCNC: 141 MMOL/L — SIGNIFICANT CHANGE UP (ref 135–145)
SPECIMEN SOURCE: SIGNIFICANT CHANGE UP
WBC # BLD: 9.87 K/UL — SIGNIFICANT CHANGE UP (ref 3.8–10.5)
WBC # FLD AUTO: 9.87 K/UL — SIGNIFICANT CHANGE UP (ref 3.8–10.5)

## 2019-11-02 PROCEDURE — 99239 HOSP IP/OBS DSCHRG MGMT >30: CPT

## 2019-11-02 RX ORDER — METRONIDAZOLE 500 MG
1 TABLET ORAL
Qty: 18 | Refills: 0
Start: 2019-11-02 | End: 2019-11-07

## 2019-11-02 RX ORDER — LIDOCAINE 4 G/100G
1 CREAM TOPICAL
Qty: 30 | Refills: 0
Start: 2019-11-02 | End: 2019-12-01

## 2019-11-02 RX ADMIN — LIDOCAINE 1 PATCH: 4 CREAM TOPICAL at 15:46

## 2019-11-02 RX ADMIN — OXYCODONE AND ACETAMINOPHEN 1 TABLET(S): 5; 325 TABLET ORAL at 15:44

## 2019-11-02 RX ADMIN — Medication 50 MILLIGRAM(S): at 06:41

## 2019-11-02 RX ADMIN — INFLUENZA VIRUS VACCINE 0.5 MILLILITER(S): 15; 15; 15; 15 SUSPENSION INTRAMUSCULAR at 14:11

## 2019-11-02 RX ADMIN — OXYCODONE AND ACETAMINOPHEN 2 TABLET(S): 5; 325 TABLET ORAL at 07:20

## 2019-11-02 RX ADMIN — OXYCODONE AND ACETAMINOPHEN 2 TABLET(S): 5; 325 TABLET ORAL at 06:42

## 2019-11-02 RX ADMIN — OXYCODONE AND ACETAMINOPHEN 1 TABLET(S): 5; 325 TABLET ORAL at 14:10

## 2019-11-02 RX ADMIN — Medication 500 MILLIGRAM(S): at 10:11

## 2019-11-02 NOTE — DISCHARGE NOTE NURSING/CASE MANAGEMENT/SOCIAL WORK - PATIENT PORTAL LINK FT
You can access the FollowMyHealth Patient Portal offered by [facility] by registering at the following website: [email or web address]. By joining [facility]’s FollowMyHealth portal, you will also be able to view your health information using other applications (apps) compatible with our system.

## 2019-11-12 PROCEDURE — 80048 BASIC METABOLIC PNL TOTAL CA: CPT

## 2019-11-12 PROCEDURE — 90686 IIV4 VACC NO PRSV 0.5 ML IM: CPT

## 2019-11-12 PROCEDURE — 71275 CT ANGIOGRAPHY CHEST: CPT

## 2019-11-12 PROCEDURE — 87086 URINE CULTURE/COLONY COUNT: CPT

## 2019-11-12 PROCEDURE — G0008: CPT

## 2019-11-12 PROCEDURE — 83690 ASSAY OF LIPASE: CPT

## 2019-11-12 PROCEDURE — 99285 EMERGENCY DEPT VISIT HI MDM: CPT

## 2019-11-12 PROCEDURE — 93005 ELECTROCARDIOGRAM TRACING: CPT

## 2019-11-12 PROCEDURE — 84443 ASSAY THYROID STIM HORMONE: CPT

## 2019-11-12 PROCEDURE — 81001 URINALYSIS AUTO W/SCOPE: CPT

## 2019-11-12 PROCEDURE — 85730 THROMBOPLASTIN TIME PARTIAL: CPT

## 2019-11-12 PROCEDURE — 74177 CT ABD & PELVIS W/CONTRAST: CPT

## 2019-11-12 PROCEDURE — 85610 PROTHROMBIN TIME: CPT

## 2019-11-12 PROCEDURE — 36415 COLL VENOUS BLD VENIPUNCTURE: CPT

## 2019-11-12 PROCEDURE — 82803 BLOOD GASES ANY COMBINATION: CPT

## 2019-11-12 PROCEDURE — 84484 ASSAY OF TROPONIN QUANT: CPT

## 2019-11-12 PROCEDURE — 97163 PT EVAL HIGH COMPLEX 45 MIN: CPT

## 2019-11-12 PROCEDURE — 80053 COMPREHEN METABOLIC PANEL: CPT

## 2019-11-12 PROCEDURE — 83880 ASSAY OF NATRIURETIC PEPTIDE: CPT

## 2019-11-12 PROCEDURE — 70450 CT HEAD/BRAIN W/O DYE: CPT

## 2019-11-12 PROCEDURE — 85027 COMPLETE CBC AUTOMATED: CPT

## 2019-11-12 PROCEDURE — 72125 CT NECK SPINE W/O DYE: CPT

## 2019-11-12 PROCEDURE — 83735 ASSAY OF MAGNESIUM: CPT

## 2019-12-11 ENCOUNTER — NON-APPOINTMENT (OUTPATIENT)
Age: 84
End: 2019-12-11

## 2019-12-11 ENCOUNTER — APPOINTMENT (OUTPATIENT)
Dept: CARDIOLOGY | Facility: CLINIC | Age: 84
End: 2019-12-11
Payer: MEDICARE

## 2019-12-11 VITALS
DIASTOLIC BLOOD PRESSURE: 70 MMHG | HEIGHT: 65 IN | SYSTOLIC BLOOD PRESSURE: 122 MMHG | RESPIRATION RATE: 16 BRPM | WEIGHT: 149 LBS | BODY MASS INDEX: 24.83 KG/M2 | HEART RATE: 111 BPM

## 2019-12-11 DIAGNOSIS — Z86.79 PERSONAL HISTORY OF OTHER DISEASES OF THE CIRCULATORY SYSTEM: ICD-10-CM

## 2019-12-11 PROCEDURE — 99214 OFFICE O/P EST MOD 30 MIN: CPT

## 2019-12-11 PROCEDURE — 93000 ELECTROCARDIOGRAM COMPLETE: CPT

## 2019-12-11 RX ORDER — SULFAMETHOXAZOLE AND TRIMETHOPRIM 800; 160 MG/1; MG/1
800-160 TABLET ORAL
Qty: 14 | Refills: 0 | Status: DISCONTINUED | COMMUNITY
Start: 2019-04-24 | End: 2019-12-11

## 2019-12-11 NOTE — REVIEW OF SYSTEMS
[Joint Pain] : joint pain [Joint Swelling] : joint swelling [Hip Pain] : hip pain [Joint Stiffness] : joint stiffness [Thigh Pain] : pain in the thigh [Negative] : Heme/Lymph

## 2019-12-11 NOTE — REASON FOR VISIT
[Follow-Up - Clinic] : a clinic follow-up of [FreeTextEntry1] : The patient is a 89-year-old white male with a known history for paroxysmal atrial fibrillation and currently on warfarin therapy with weekly INR draws at home who ambulates with a cane and presents back to the office today for general cardiac checkup\par \par Unfortunately, the patient sustained a fall in late October and was seen in the hospital for a few days as he recuperated from an injury in his left hip and left thigh.;\par \par He denies any recent symptoms of chest pain, palpitations, dizziness or syncope;\par ;

## 2019-12-11 NOTE — ASSESSMENT
[FreeTextEntry1] : EKG demonstrating atrial fibrillation with a slightly elevated ventricular rate in the 90s to 110;\par Nonspecific ST-T changes;\par \par Summary the patient is a 89-year-old, with known history of paroxysmal atrial fibrillation who appears to be in atrial fibrillation today with a slightly elevated ventricular rate for which he appears asymptomatic;\par \par He had a recent fall and injury to the left hip and left thigh for which he is still convalescing and reports he has an upcoming orthopedic visit;\par \par Plan:\par \par Patient encouraged to followup to orthopedics and continue modest physical therapy exercises as tolerated\par \par Okay to continue current medical regimen\par \par Discussed recommendation for extra metoprolol succinate one half to one tablet p.r.n. for any palpitations of significance\par \par No additional cardiac workup indicated at this time\par \par Followup to this office within 3 months or p.r.n.;;;;;

## 2019-12-11 NOTE — HISTORY OF PRESENT ILLNESS
[FreeTextEntry1] : He states he tripped on something in the dark while at home;\par \par He is tolerating his current medical regimen but noted to be in atrial fibrillation today;\par \par

## 2019-12-11 NOTE — PHYSICAL EXAM
[Eyelids - No Xanthelasma] : the eyelids demonstrated no xanthelasmas [Normal Conjunctiva] : the conjunctiva exhibited no abnormalities [No Oral Cyanosis] : no oral cyanosis [No Oral Pallor] : no oral pallor [No Jugular Venous Sanchez A Waves] : no jugular venous sanchez A waves [Normal Jugular Venous V Waves Present] : normal jugular venous V waves present [Normal Jugular Venous A Waves Present] : normal jugular venous A waves present [Respiration, Rhythm And Depth] : normal respiratory rhythm and effort [Abdomen Soft] : soft [Abdomen Tenderness] : non-tender [Abdomen Mass (___ Cm)] : no abdominal mass palpated [Petechial Hemorrhages (___cm)] : no petechial hemorrhages [Nail Clubbing] : no clubbing of the fingernails [Cyanosis, Localized] : no localized cyanosis [] : no ischemic changes [Skin Lesions] : no skin lesions [No Venous Stasis] : no venous stasis [No Skin Ulcers] : no skin ulcer [No Xanthoma] : no  xanthoma was observed [FreeTextEntry1] : Small ecchymotic areas in the upper extremities and forearms noted [Affect] : the affect was normal [Oriented To Time, Place, And Person] : oriented to person, place, and time [No Anxiety] : not feeling anxious [Mood] : the mood was normal

## 2019-12-12 ENCOUNTER — OTHER (OUTPATIENT)
Age: 84
End: 2019-12-12

## 2019-12-12 DIAGNOSIS — M25.559 PAIN IN UNSPECIFIED HIP: ICD-10-CM

## 2019-12-13 ENCOUNTER — APPOINTMENT (OUTPATIENT)
Dept: ORTHOPEDIC SURGERY | Facility: CLINIC | Age: 84
End: 2019-12-13
Payer: MEDICARE

## 2019-12-13 VITALS
DIASTOLIC BLOOD PRESSURE: 75 MMHG | SYSTOLIC BLOOD PRESSURE: 122 MMHG | BODY MASS INDEX: 24.83 KG/M2 | WEIGHT: 149 LBS | HEART RATE: 140 BPM | HEIGHT: 65 IN

## 2019-12-13 DIAGNOSIS — S32.472A: ICD-10-CM

## 2019-12-13 DIAGNOSIS — Z96.652 PRESENCE OF LEFT ARTIFICIAL KNEE JOINT: ICD-10-CM

## 2019-12-13 PROCEDURE — 99214 OFFICE O/P EST MOD 30 MIN: CPT | Mod: 57

## 2019-12-13 PROCEDURE — 27220 TREAT HIP SOCKET FRACTURE: CPT | Mod: LT

## 2019-12-13 PROCEDURE — 73564 X-RAY EXAM KNEE 4 OR MORE: CPT | Mod: LT

## 2019-12-13 PROCEDURE — 73502 X-RAY EXAM HIP UNI 2-3 VIEWS: CPT | Mod: LT

## 2019-12-13 NOTE — PHYSICAL EXAM
[de-identified] : Exam of the left hip shows no groin pain with hip ROM.  With hip flexion and internal rotation there is pain in the left lower pelvis posteriorly near the SI joint.\par  5/5 motor strength bilaterally distally. Sensation intact distally.  [de-identified] : The patient appears well nourished  and in no apparent distress.  The patient is alert and oriented to person, place, and time.   Affect and mood appear normal. The head is normocephalic and atraumatic.  The eyes reveal normal sclera and extra ocular muscles are intact. The tongue is midline with no apparent lesions.  Skin shows normal turgor with no evidence of eczema or psoriasis.  No respiratory distress noted.  Sensation grossly intact.\par   [de-identified] : Xray- AP pelvis and 2 views left hip shows a displaced left acetabular fracture of the medial wall.  There appears to be some callus formation.  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

## 2019-12-13 NOTE — DISCUSSION/SUMMARY
[de-identified] : The patient is a 89 year old male who sustained a fall about 6 weeks ago and was seen at [facility].  He had difficulty walking and imaging at that time did not reveal any fracture.  However x-rays in the office today shows a displaced left medial wall acetabular fracture and reviewing the images of the CAT scan from the hospital there does appear to be a nondisplaced linear crack along the medial wall on the CAT scan.  At this point it is difficult to say how long the fracture has been displaced.  If it displaced shortly after the CAT scan and has remained stable and may have demonstrated some healing already.  There does appear to be some callus formation there and on exam his pain is getting better.  I recommended continuing to use a walker with toe-touch weightbearing for 4 more weeks.  He will follow-up with me in 4 weeks.  At that time it should be healed and we can start him in physical therapy.  His left knee x-rays showed his partial knee replacement to remain well fixed with arthritis of the patellofemoral compartment.  His main concern today was his hip.

## 2019-12-13 NOTE — ADDENDUM
[FreeTextEntry1] : This note was authored by Gerry Story working as a medical scribe for Dr. Anthony Gustafson. The note was reviewed, edited, and revised by Dr. Anthony Gustafson whom is in agreement with the exam findings, imaging findings, and treatment plan. 12/13/2019.

## 2019-12-13 NOTE — HISTORY OF PRESENT ILLNESS
[de-identified] : The patient is a 89 year old male being seen for evaluation of his left hip and knee.He reports that he suffered a fall on October 31 of this year. He was seen at [facility] emergency room. He underwent a CAT scan of the abdomen and pelvis. He reports being told that there were no fractures. He reports that he had originally noted groin pain that has now resolved. He reports pain to the low back with radiation into the thigh. He reports a history of partial left knee replacement. He is noting no specific pain to the knee at this time. He had some physical therapy via home care which she did not feel was helping his symptoms. He is transferring and ambulating with a walker.

## 2019-12-30 ENCOUNTER — OTHER (OUTPATIENT)
Age: 84
End: 2019-12-30

## 2020-01-09 ENCOUNTER — APPOINTMENT (OUTPATIENT)
Dept: ORTHOPEDIC SURGERY | Facility: CLINIC | Age: 85
End: 2020-01-09
Payer: MEDICARE

## 2020-01-09 VITALS
HEART RATE: 134 BPM | WEIGHT: 149 LBS | HEIGHT: 65 IN | BODY MASS INDEX: 24.83 KG/M2 | DIASTOLIC BLOOD PRESSURE: 75 MMHG | SYSTOLIC BLOOD PRESSURE: 123 MMHG

## 2020-01-09 DIAGNOSIS — S32.461D DISPLACED ASSOCIATED TRANSVERSE-POSTERIOR FRACTURE OF RIGHT ACETABULUM, SUBSEQUENT ENCOUNTER FOR FRACTURE WITH ROUTINE HEALING: ICD-10-CM

## 2020-01-09 PROCEDURE — 73502 X-RAY EXAM HIP UNI 2-3 VIEWS: CPT | Mod: RT

## 2020-01-09 PROCEDURE — 99024 POSTOP FOLLOW-UP VISIT: CPT

## 2020-01-09 NOTE — PHYSICAL EXAM
[de-identified] : The patient appears well nourished  and in no apparent distress.  The patient is alert and oriented to person, place, and time.   Affect and mood appear normal. The head is normocephalic and atraumatic.  The eyes reveal normal sclera and extra ocular muscles are intact. The tongue is midline with no apparent lesions.  Skin shows normal turgor with no evidence of eczema or psoriasis.  No respiratory distress noted.  Sensation grossly intact.\par   [de-identified] : Exam of the left hip shows SLR without difficulty, hip internal rotation is less than 10 degrees with no pain, hip external rotation of 50 degrees with no pain. 5/5 motor strength bilaterally distally. Sensation intact distally. able to lift left leg onto exam table [de-identified] : Xray- AP pelvis and 2 views left hip shows a displaced left acetabular fracture of the medial wall which appears healed.  There is callus formation and no interval displacement compared to x-rays in December

## 2020-01-09 NOTE — HISTORY OF PRESENT ILLNESS
[de-identified] : The patient is a 89 year old male being seen for evaluation of his left hip. Last seen in the office 4 weeks ago at which time he was diagnosed with a displaced left medial wall acetabular fracture. He is now 10 weeks s/p the initial fracture. He was advised on toe-touch weightbearing at the last visit however he admits to full weightbearing over the last few weeks with no increase in pain.  He is ambulating and transferring with a walker. He reports continuing physical therapy home exercises.

## 2020-01-09 NOTE — DISCUSSION/SUMMARY
[de-identified] : The patient is a 89 year old male 10 weeks s/p a displaced left medial wall acetabular fracture.  The fracture is clinically and radiographically healed at this point.  He was given a prescription for physical therapy and he may continue weightbearing as tolerated.  I would like to see him back in 6 weeks for final evaluation

## 2020-01-09 NOTE — ADDENDUM
[FreeTextEntry1] : This note was authored by Gerry Story working as a medical scribe for Dr. Anthony Gustafson. The note was reviewed, edited, and revised by Dr. Anthony Gustafson whom is in agreement with the exam findings, imaging findings, and treatment plan. 01/09/2020.

## 2020-01-16 ENCOUNTER — INPATIENT (INPATIENT)
Facility: HOSPITAL | Age: 85
LOS: 14 days | Discharge: HOME | DRG: 242 | End: 2020-01-31
Attending: INTERNAL MEDICINE | Admitting: HOSPITALIST
Payer: MEDICARE

## 2020-01-16 VITALS
HEART RATE: 139 BPM | HEIGHT: 65 IN | WEIGHT: 139.99 LBS | DIASTOLIC BLOOD PRESSURE: 106 MMHG | RESPIRATION RATE: 17 BRPM | SYSTOLIC BLOOD PRESSURE: 148 MMHG | OXYGEN SATURATION: 96 % | TEMPERATURE: 97 F

## 2020-01-16 DIAGNOSIS — R55 SYNCOPE AND COLLAPSE: ICD-10-CM

## 2020-01-16 DIAGNOSIS — Z96.651 PRESENCE OF RIGHT ARTIFICIAL KNEE JOINT: Chronic | ICD-10-CM

## 2020-01-16 LAB
ALBUMIN SERPL ELPH-MCNC: 3.8 G/DL — SIGNIFICANT CHANGE UP (ref 3.3–5.2)
ALP SERPL-CCNC: 123 U/L — HIGH (ref 40–120)
ALT FLD-CCNC: 12 U/L — SIGNIFICANT CHANGE UP
ANION GAP SERPL CALC-SCNC: 14 MMOL/L — SIGNIFICANT CHANGE UP (ref 5–17)
APTT BLD: 39.8 SEC — HIGH (ref 27.5–36.3)
AST SERPL-CCNC: 20 U/L — SIGNIFICANT CHANGE UP
BASOPHILS # BLD AUTO: 0.11 K/UL — SIGNIFICANT CHANGE UP (ref 0–0.2)
BASOPHILS NFR BLD AUTO: 0.9 % — SIGNIFICANT CHANGE UP (ref 0–2)
BILIRUB SERPL-MCNC: 0.3 MG/DL — LOW (ref 0.4–2)
BUN SERPL-MCNC: 32 MG/DL — HIGH (ref 8–20)
CALCIUM SERPL-MCNC: 9.3 MG/DL — SIGNIFICANT CHANGE UP (ref 8.6–10.2)
CHLORIDE SERPL-SCNC: 102 MMOL/L — SIGNIFICANT CHANGE UP (ref 98–107)
CO2 SERPL-SCNC: 25 MMOL/L — SIGNIFICANT CHANGE UP (ref 22–29)
CREAT SERPL-MCNC: 0.94 MG/DL — SIGNIFICANT CHANGE UP (ref 0.5–1.3)
ELLIPTOCYTES BLD QL SMEAR: SLIGHT — SIGNIFICANT CHANGE UP
EOSINOPHIL # BLD AUTO: 0.21 K/UL — SIGNIFICANT CHANGE UP (ref 0–0.5)
EOSINOPHIL NFR BLD AUTO: 1.8 % — SIGNIFICANT CHANGE UP (ref 0–6)
GIANT PLATELETS BLD QL SMEAR: PRESENT — SIGNIFICANT CHANGE UP
GLUCOSE SERPL-MCNC: 112 MG/DL — SIGNIFICANT CHANGE UP (ref 70–115)
HCT VFR BLD CALC: 38.7 % — LOW (ref 39–50)
HGB BLD-MCNC: 12.2 G/DL — LOW (ref 13–17)
INR BLD: 2.6 RATIO — HIGH (ref 0.88–1.16)
LYMPHOCYTES # BLD AUTO: 4.84 K/UL — HIGH (ref 1–3.3)
LYMPHOCYTES # BLD AUTO: 40.7 % — SIGNIFICANT CHANGE UP (ref 13–44)
MANUAL SMEAR VERIFICATION: SIGNIFICANT CHANGE UP
MCHC RBC-ENTMCNC: 30.1 PG — SIGNIFICANT CHANGE UP (ref 27–34)
MCHC RBC-ENTMCNC: 31.5 GM/DL — LOW (ref 32–36)
MCV RBC AUTO: 95.6 FL — SIGNIFICANT CHANGE UP (ref 80–100)
MONOCYTES # BLD AUTO: 0.32 K/UL — SIGNIFICANT CHANGE UP (ref 0–0.9)
MONOCYTES NFR BLD AUTO: 2.7 % — SIGNIFICANT CHANGE UP (ref 2–14)
NEUTROPHILS # BLD AUTO: 6 K/UL — SIGNIFICANT CHANGE UP (ref 1.8–7.4)
NEUTROPHILS NFR BLD AUTO: 50.4 % — SIGNIFICANT CHANGE UP (ref 43–77)
NT-PROBNP SERPL-SCNC: 2297 PG/ML — HIGH (ref 0–300)
OVALOCYTES BLD QL SMEAR: SLIGHT — SIGNIFICANT CHANGE UP
PLAT MORPH BLD: NORMAL — SIGNIFICANT CHANGE UP
PLATELET # BLD AUTO: 144 K/UL — LOW (ref 150–400)
POIKILOCYTOSIS BLD QL AUTO: SLIGHT — SIGNIFICANT CHANGE UP
POLYCHROMASIA BLD QL SMEAR: SLIGHT — SIGNIFICANT CHANGE UP
POTASSIUM SERPL-MCNC: 4.7 MMOL/L — SIGNIFICANT CHANGE UP (ref 3.5–5.3)
POTASSIUM SERPL-SCNC: 4.7 MMOL/L — SIGNIFICANT CHANGE UP (ref 3.5–5.3)
PROT SERPL-MCNC: 7.1 G/DL — SIGNIFICANT CHANGE UP (ref 6.6–8.7)
PROTHROM AB SERPL-ACNC: 30.8 SEC — HIGH (ref 10–12.9)
RAPID RVP RESULT: SIGNIFICANT CHANGE UP
RBC # BLD: 4.05 M/UL — LOW (ref 4.2–5.8)
RBC # FLD: 13.9 % — SIGNIFICANT CHANGE UP (ref 10.3–14.5)
RBC BLD AUTO: ABNORMAL
SMUDGE CELLS # BLD: PRESENT — SIGNIFICANT CHANGE UP
SODIUM SERPL-SCNC: 141 MMOL/L — SIGNIFICANT CHANGE UP (ref 135–145)
TROPONIN T SERPL-MCNC: <0.01 NG/ML — SIGNIFICANT CHANGE UP (ref 0–0.06)
VARIANT LYMPHS # BLD: 3.5 % — SIGNIFICANT CHANGE UP (ref 0–6)
WBC # BLD: 11.9 K/UL — HIGH (ref 3.8–10.5)
WBC # FLD AUTO: 11.9 K/UL — HIGH (ref 3.8–10.5)

## 2020-01-16 PROCEDURE — 72125 CT NECK SPINE W/O DYE: CPT | Mod: 26

## 2020-01-16 PROCEDURE — 93010 ELECTROCARDIOGRAM REPORT: CPT

## 2020-01-16 PROCEDURE — 99284 EMERGENCY DEPT VISIT MOD MDM: CPT

## 2020-01-16 PROCEDURE — 73502 X-RAY EXAM HIP UNI 2-3 VIEWS: CPT | Mod: 26,LT

## 2020-01-16 PROCEDURE — 71045 X-RAY EXAM CHEST 1 VIEW: CPT | Mod: 26

## 2020-01-16 PROCEDURE — 70450 CT HEAD/BRAIN W/O DYE: CPT | Mod: 26

## 2020-01-16 PROCEDURE — 99223 1ST HOSP IP/OBS HIGH 75: CPT

## 2020-01-16 RX ORDER — ACETAMINOPHEN 500 MG
625 TABLET ORAL EVERY 6 HOURS
Refills: 0 | Status: DISCONTINUED | OUTPATIENT
Start: 2020-01-16 | End: 2020-01-29

## 2020-01-16 RX ORDER — OFLOXACIN 0.3 %
1 DROPS OPHTHALMIC (EYE)
Refills: 0 | Status: DISCONTINUED | OUTPATIENT
Start: 2020-01-16 | End: 2020-01-31

## 2020-01-16 RX ORDER — WARFARIN SODIUM 2.5 MG/1
3 TABLET ORAL ONCE
Refills: 0 | Status: DISCONTINUED | OUTPATIENT
Start: 2020-01-16 | End: 2020-01-17

## 2020-01-16 RX ORDER — TAMSULOSIN HYDROCHLORIDE 0.4 MG/1
0.4 CAPSULE ORAL AT BEDTIME
Refills: 0 | Status: DISCONTINUED | OUTPATIENT
Start: 2020-01-16 | End: 2020-01-31

## 2020-01-16 RX ORDER — METOPROLOL TARTRATE 50 MG
25 TABLET ORAL ONCE
Refills: 0 | Status: COMPLETED | OUTPATIENT
Start: 2020-01-16 | End: 2020-01-16

## 2020-01-16 RX ORDER — DILTIAZEM HCL 120 MG
10 CAPSULE, EXT RELEASE 24 HR ORAL ONCE
Refills: 0 | Status: COMPLETED | OUTPATIENT
Start: 2020-01-16 | End: 2020-01-16

## 2020-01-16 RX ORDER — METOPROLOL TARTRATE 50 MG
50 TABLET ORAL
Refills: 0 | Status: DISCONTINUED | OUTPATIENT
Start: 2020-01-16 | End: 2020-01-17

## 2020-01-16 RX ORDER — METOPROLOL TARTRATE 50 MG
5 TABLET ORAL ONCE
Refills: 0 | Status: COMPLETED | OUTPATIENT
Start: 2020-01-16 | End: 2020-01-16

## 2020-01-16 RX ORDER — PANTOPRAZOLE SODIUM 20 MG/1
40 TABLET, DELAYED RELEASE ORAL
Refills: 0 | Status: DISCONTINUED | OUTPATIENT
Start: 2020-01-16 | End: 2020-01-31

## 2020-01-16 RX ADMIN — Medication 25 MILLIGRAM(S): at 19:50

## 2020-01-16 RX ADMIN — Medication 5 MILLIGRAM(S): at 19:45

## 2020-01-16 RX ADMIN — Medication 10 MILLIGRAM(S): at 21:06

## 2020-01-16 NOTE — ED PROVIDER NOTE - NS ED ROS FT
Denies f/c/n/v/cp/sob/palpitations/ cough/rash/headache/dizziness/abd.pain/d/c/dysuria/hematuria  +fall ?syncope

## 2020-01-16 NOTE — INPATIENT CERTIFICATION FOR MEDICARE PATIENTS - IN ORDER TO MEET MEDICARE REQUIREMENTS.
In order to meet Medicare requirements, the clinical documentation must support the information cited in the admission order.  Please be sure to provide detailed and clear documentation about the following in the admitting note/history and physical:
no difficulties

## 2020-01-16 NOTE — H&P ADULT - HISTORY OF PRESENT ILLNESS
89 y/o male with chronic A Fib on coumadin, HTN, lives alone with home health aid 3 times a week,  came to the ER because syncope. patient was standing preparing dinner when he found himself on the floor. patient does not know what happened. no palpitations. no SOB. X Ray pelvis showed different stages of healing chronic B/L pubic kayla fracture. no headache or blurry vision. no focal weakness or speech changes

## 2020-01-16 NOTE — ED ADULT TRIAGE NOTE - CHIEF COMPLAINT QUOTE
patient c/o fall at home while making dinner. patient denies hitting head on coumadin, MD Castillo called to evaluate, Priority CT ordered. patient is found to be in a rapid irregular heart beat.

## 2020-01-16 NOTE — ED PROVIDER NOTE - OBJECTIVE STATEMENT
89yo M hx of afib on coumadin, gerd, prostate ca sp radiation pw fall. states that was standing to make himself dinner and next thing he knows he was on the floor, denies head trauma/ loc but a poor historian as states not sure how he fell, denies tripping on anything. Denies f/c/n/v/cp/sob/palpitations/ cough/rash/headache/dizziness/abd.pain/d/c/dysuria/hematuria currently or prior to fall. no sick contacts no recent travel.

## 2020-01-16 NOTE — ED ADULT NURSE NOTE - OBJECTIVE STATEMENT
Pt received in critical care after priority CT scan. Pt in no apparent distress, airway patent, breathing spontaneous and nonlabored. Pt A&Ox3, resting in stretcher. Pt c/o fall at home on coumadin. Pt unsure of why he fell, denies any LOC. Bump noted to right side of patients head. No other signs of obvious trauma noted. Pt denies any pain, headache or dizziness. pt in afib on cardiac monitor.

## 2020-01-16 NOTE — ED PROVIDER NOTE - PHYSICAL EXAMINATION
Head: atraumatic normacephalic  Face: atraumatic, no crepitus no orbiral/maxillary/mandibular ttp  throat: uvula midline no exudates  eyes: perrla eomi  heart: rrr s1s2  lungs: ctab  abd: soft, nt nd +bs no rebound/guarding no cva ttp  skin: warm  LE: no swelling, no calf ttp  back: no midline cervical/thoracic/lumbar ttp  neuro: aa ox 3 cn iii-xii intact no focal neuro deficit moving all extremeties   pelvis stable

## 2020-01-16 NOTE — ED PROVIDER NOTE - CLINICAL SUMMARY MEDICAL DECISION MAKING FREE TEXT BOX
most likely syncope as pt doesn't remember how he fell; priority ct done no bleed c-spine; labs ekg--cardiology eval--admit vs obs

## 2020-01-16 NOTE — ED PROVIDER NOTE - CARE PLAN
Principal Discharge DX:	Syncope, unspecified syncope type Principal Discharge DX:	Syncope, unspecified syncope type  Secondary Diagnosis:	Atrial fibrillation, unspecified type

## 2020-01-17 DIAGNOSIS — R55 SYNCOPE AND COLLAPSE: ICD-10-CM

## 2020-01-17 DIAGNOSIS — I48.20 CHRONIC ATRIAL FIBRILLATION, UNSPECIFIED: ICD-10-CM

## 2020-01-17 DIAGNOSIS — H10.9 UNSPECIFIED CONJUNCTIVITIS: ICD-10-CM

## 2020-01-17 DIAGNOSIS — I10 ESSENTIAL (PRIMARY) HYPERTENSION: ICD-10-CM

## 2020-01-17 LAB
ANION GAP SERPL CALC-SCNC: 12 MMOL/L — SIGNIFICANT CHANGE UP (ref 5–17)
APPEARANCE UR: ABNORMAL
APPEARANCE UR: ABNORMAL
APTT BLD: 38 SEC — HIGH (ref 27.5–36.3)
BACTERIA # UR AUTO: ABNORMAL
BACTERIA # UR AUTO: ABNORMAL
BILIRUB UR-MCNC: NEGATIVE — SIGNIFICANT CHANGE UP
BILIRUB UR-MCNC: NEGATIVE — SIGNIFICANT CHANGE UP
BUN SERPL-MCNC: 28 MG/DL — HIGH (ref 8–20)
CALCIUM SERPL-MCNC: 9.3 MG/DL — SIGNIFICANT CHANGE UP (ref 8.6–10.2)
CHLORIDE SERPL-SCNC: 104 MMOL/L — SIGNIFICANT CHANGE UP (ref 98–107)
CO2 SERPL-SCNC: 27 MMOL/L — SIGNIFICANT CHANGE UP (ref 22–29)
COLOR SPEC: YELLOW — SIGNIFICANT CHANGE UP
COLOR SPEC: YELLOW — SIGNIFICANT CHANGE UP
CREAT SERPL-MCNC: 0.85 MG/DL — SIGNIFICANT CHANGE UP (ref 0.5–1.3)
DIFF PNL FLD: ABNORMAL
DIFF PNL FLD: ABNORMAL
EPI CELLS # UR: SIGNIFICANT CHANGE UP
GLUCOSE SERPL-MCNC: 109 MG/DL — SIGNIFICANT CHANGE UP (ref 70–115)
GLUCOSE UR QL: NEGATIVE MG/DL — SIGNIFICANT CHANGE UP
GLUCOSE UR QL: NEGATIVE — SIGNIFICANT CHANGE UP
HCT VFR BLD CALC: 35.3 % — LOW (ref 39–50)
HGB BLD-MCNC: 11.6 G/DL — LOW (ref 13–17)
INR BLD: 2.19 RATIO — HIGH (ref 0.88–1.16)
KETONES UR-MCNC: ABNORMAL
KETONES UR-MCNC: NEGATIVE — SIGNIFICANT CHANGE UP
LEUKOCYTE ESTERASE UR-ACNC: ABNORMAL
LEUKOCYTE ESTERASE UR-ACNC: ABNORMAL
MCHC RBC-ENTMCNC: 31.2 PG — SIGNIFICANT CHANGE UP (ref 27–34)
MCHC RBC-ENTMCNC: 32.9 GM/DL — SIGNIFICANT CHANGE UP (ref 32–36)
MCV RBC AUTO: 94.9 FL — SIGNIFICANT CHANGE UP (ref 80–100)
NITRITE UR-MCNC: NEGATIVE — SIGNIFICANT CHANGE UP
NITRITE UR-MCNC: POSITIVE
PH UR: 5 — SIGNIFICANT CHANGE UP (ref 5–8)
PH UR: 6 — SIGNIFICANT CHANGE UP (ref 5–8)
PLATELET # BLD AUTO: 137 K/UL — LOW (ref 150–400)
POTASSIUM SERPL-MCNC: 4.7 MMOL/L — SIGNIFICANT CHANGE UP (ref 3.5–5.3)
POTASSIUM SERPL-SCNC: 4.7 MMOL/L — SIGNIFICANT CHANGE UP (ref 3.5–5.3)
PROT UR-MCNC: 30 MG/DL
PROT UR-MCNC: 30 MG/DL
PROTHROM AB SERPL-ACNC: 25.8 SEC — HIGH (ref 10–12.9)
RBC # BLD: 3.72 M/UL — LOW (ref 4.2–5.8)
RBC # FLD: 14.1 % — SIGNIFICANT CHANGE UP (ref 10.3–14.5)
RBC CASTS # UR COMP ASSIST: ABNORMAL /HPF (ref 0–4)
RBC CASTS # UR COMP ASSIST: SIGNIFICANT CHANGE UP /HPF (ref 0–4)
SODIUM SERPL-SCNC: 143 MMOL/L — SIGNIFICANT CHANGE UP (ref 135–145)
SP GR SPEC: 1.01 — SIGNIFICANT CHANGE UP (ref 1.01–1.02)
SP GR SPEC: 1.01 — SIGNIFICANT CHANGE UP (ref 1.01–1.02)
UROBILINOGEN FLD QL: NEGATIVE MG/DL — SIGNIFICANT CHANGE UP
UROBILINOGEN FLD QL: NEGATIVE — SIGNIFICANT CHANGE UP
WBC # BLD: 9.9 K/UL — SIGNIFICANT CHANGE UP (ref 3.8–10.5)
WBC # FLD AUTO: 9.9 K/UL — SIGNIFICANT CHANGE UP (ref 3.8–10.5)
WBC UR QL: ABNORMAL
WBC UR QL: ABNORMAL

## 2020-01-17 PROCEDURE — 93880 EXTRACRANIAL BILAT STUDY: CPT | Mod: 26

## 2020-01-17 PROCEDURE — 99233 SBSQ HOSP IP/OBS HIGH 50: CPT

## 2020-01-17 PROCEDURE — 22310 CLOSED TX VERT FX W/O MANJ: CPT

## 2020-01-17 PROCEDURE — 99222 1ST HOSP IP/OBS MODERATE 55: CPT

## 2020-01-17 PROCEDURE — 99222 1ST HOSP IP/OBS MODERATE 55: CPT | Mod: 57

## 2020-01-17 RX ORDER — METOPROLOL TARTRATE 50 MG
5 TABLET ORAL ONCE
Refills: 0 | Status: COMPLETED | OUTPATIENT
Start: 2020-01-17 | End: 2020-01-17

## 2020-01-17 RX ORDER — FUROSEMIDE 40 MG
40 TABLET ORAL DAILY
Refills: 0 | Status: DISCONTINUED | OUTPATIENT
Start: 2020-01-17 | End: 2020-01-22

## 2020-01-17 RX ORDER — METOPROLOL TARTRATE 50 MG
50 TABLET ORAL EVERY 12 HOURS
Refills: 0 | Status: DISCONTINUED | OUTPATIENT
Start: 2020-01-17 | End: 2020-01-18

## 2020-01-17 RX ORDER — METOPROLOL TARTRATE 50 MG
5 TABLET ORAL ONCE
Refills: 0 | Status: COMPLETED | OUTPATIENT
Start: 2020-01-17 | End: 2020-01-18

## 2020-01-17 RX ADMIN — Medication 40 MILLIGRAM(S): at 17:22

## 2020-01-17 RX ADMIN — PANTOPRAZOLE SODIUM 40 MILLIGRAM(S): 20 TABLET, DELAYED RELEASE ORAL at 05:15

## 2020-01-17 RX ADMIN — Medication 50 MILLIGRAM(S): at 17:22

## 2020-01-17 RX ADMIN — Medication 1 DROP(S): at 22:16

## 2020-01-17 RX ADMIN — Medication 50 MILLIGRAM(S): at 05:15

## 2020-01-17 RX ADMIN — Medication 1 DROP(S): at 17:22

## 2020-01-17 RX ADMIN — Medication 1 DROP(S): at 11:01

## 2020-01-17 RX ADMIN — TAMSULOSIN HYDROCHLORIDE 0.4 MILLIGRAM(S): 0.4 CAPSULE ORAL at 22:16

## 2020-01-17 RX ADMIN — Medication 1 DROP(S): at 05:15

## 2020-01-17 RX ADMIN — Medication 1 TABLET(S): at 11:01

## 2020-01-17 RX ADMIN — Medication 5 MILLIGRAM(S): at 18:33

## 2020-01-17 NOTE — PROGRESS NOTE ADULT - SUBJECTIVE AND OBJECTIVE BOX
MECHE MILLER Male 89y [id number]    Patient is a 89y old  Male who presents with a chief complaint of syncope (2020 23:21)      On Service note:  Pt seen/ examined at bedside and charts reviewed, admitted yesterday with fall. Pt is awake/ alert now, he offers no complaints, he states he had fall but denies LOS however yesterdays documentation indicates he had LOC.     Review of system:  No fever, chills, nausea, vomiting, headache, dizziness, chest pain.    PHYSICAL EXAM:    Vital Signs Last 24 Hrs  T(C): 36.7 (2020 07:30), Max: 36.7 (2020 04:16)  T(F): 98 (2020 07:30), Max: 98 (2020 04:16)  HR: 107 (2020 07:30) (95 - 151)  BP: 126/84 (2020 07:30) (115/78 - 148/106)  BP(mean): --  RR: 18 (2020 07:30) (15 - 18)  SpO2: 95% (2020 07:30) (94% - 96%)    GENERAL: Pt lying comfortably, NAD.  CHEST/LUNG: Clear to auscultation bilaterally; No wheezing.  HEART: S1S2+, Regular rate and rhythm; No murmurs.  ABDOMEN: Soft, Nontender, Nondistended; Bowel sounds present.  Extremities: LE edema, pulses+  NEURO: AAOX3, no focal deficits, no motor r sensory loss.  PSYCH: normal mood.    MEDICATIONS  (STANDING):  metoprolol tartrate 50 milliGRAM(s) Oral two times a day  multivitamin 1 Tablet(s) Oral daily  ofloxacin 0.3% Solution 1 Drop(s) Both EYES four times a day  pantoprazole    Tablet 40 milliGRAM(s) Oral before breakfast  tamsulosin 0.4 milliGRAM(s) Oral at bedtime  warfarin 3 milliGRAM(s) Oral once    MEDICATIONS  (PRN):  acetaminophen   Tablet .. 625 milliGRAM(s) Oral every 6 hours PRN Mild Pain (1 - 3)        Labs:  LABS:                        11.6   9.90  )-----------( 137      ( 2020 09:38 )             35.3     01-17    143  |  104  |  28.0<H>  ----------------------------<  109  4.7   |  27.0  |  0.85    Ca    9.3      2020 09:38    TPro  7.1  /  Alb  3.8  /  TBili  0.3<L>  /  DBili  x   /  AST  20  /  ALT  12  /  AlkPhos  123<H>  01-16    PT/INR - ( 2020 09:24 )   PT: 25.8 sec;   INR: 2.19 ratio         PTT - ( 2020 09:24 )  PTT:38.0 sec    LIVER FUNCTIONS - ( 2020 20:27 )  Alb: 3.8 g/dL / Pro: 7.1 g/dL / ALK PHOS: 123 U/L / ALT: 12 U/L / AST: 20 U/L / GGT: x           Urinalysis Basic - ( 2020 00:26 )    Color: Yellow / Appearance: Slightly Turbid / S.015 / pH: x  Gluc: x / Ketone: Trace  / Bili: Negative / Urobili: Negative mg/dL   Blood: x / Protein: 30 mg/dL / Nitrite: Negative   Leuk Esterase: Moderate / RBC: 0-2 /HPF / WBC 11-25   Sq Epi: x / Non Sq Epi: Occasional / Bacteria: Occasional        CARDIAC MARKERS ( 2020 20:27 )  x     / <0.01 ng/mL / x     / x     / x

## 2020-01-17 NOTE — PROGRESS NOTE ADULT - ASSESSMENT
Pt is 89 y/o male with chronic A Fib on coumadin, HTN, Prostate CA radiation and DVT, lives alone at home with [facility]HA 3 times a week,  came to the ER s/p fall while preparing supper, Pt clearly denied LOC when I asked. In ED CTH negative, CT spine shows progression of T2 compression fracture- no acute fracture, x-ray hip shows acute left pubic rami fracture. Pt admitted to medicine, his home medicine resumed.    >Mechanical fall:  - Last admission in 11/19 with fall- recurrent fall.   - CT head with no acute finding.   - Pt denied LOC to me, he is awake/ alert.   - Will need PT eval and likely MONSERRAT placement- pt lives alone at home.     >Acute left Pubic rami fracture- Supportive measure with pain meds, PT eval.     >Chronic T2 compression fracture:  - Not on CT scan with interval progression, I discussed with radiologist- its chronic fracture with progression not acute.   - Pain meds as needed  - Spine surgery eval.     >Abnormal UA:  - No urinary sx, afebrile, no dysuria, wbc normal. Doubt active UTI.   - Will repeat UA.  - Monitor off Abx for now.     >Chronic AFib:  - C/w metoprolol and coumadin.   - Monitor INR and dose coumadin based on INR.   - Pt with frequent fall and is on AC, ideally he should be off. Will get cardiology opinion.     >BPH- C/w Flomax    >DVT ppx- On Coumadin

## 2020-01-17 NOTE — CONSULT NOTE ADULT - SUBJECTIVE AND OBJECTIVE BOX
MECHE MILLER  [id number]    HPI:  Mr. Miller is a 89 year old man with past medical history of Paroxysmal Atrial Fibrillation (on coumadin), history of aortic valve replacement, and Hypertension, with history of falls, who presented with syncope.          ALLERGIES:  antibiotic (Rash)  tetracycline (Rash)      PAST MEDICAL & SURGICAL HISTORY:  Paroxysmal Atrial Fibrillation (on coumadin)  History of aortic valve replacement  Hypertension  RBBB  History of falls  VTE (venous thromboembolism)  Irritable bowel syndrome with diarrhea  Prostate CA: radiation  Cyst of pancreas  Cyst of kidney, acquired  GERD (gastroesophageal reflux disease)  H/O total knee replacement, right      Current meds:   acetaminophen   Tablet .. 625 milliGRAM(s) Oral every 6 hours PRN  metoprolol tartrate 50 milliGRAM(s) Oral two times a day  multivitamin 1 Tablet(s) Oral daily  ofloxacin 0.3% Solution 1 Drop(s) Both EYES four times a day  pantoprazole    Tablet 40 milliGRAM(s) Oral before breakfast  tamsulosin 0.4 milliGRAM(s) Oral at bedtime  warfarin 3 milliGRAM(s) Oral once      Home cardiac meds:  Metoprolol tartrate 50 mg BID  Coumadin    SOCIAL HISTORY:  Patient denies alcohol, tobacco, drug use    FAMILY HISTORY:  No pertinent family history in first degree relatives      ROS:  Patient denies cough, and other than noted above full ROS is unremarkable      PHYSICAL EXAM:  Vital Signs Last 24 Hrs  T(C): 36.7 (17 Jan 2020 07:30), Max: 36.7 (17 Jan 2020 04:16)  T(F): 98 (17 Jan 2020 07:30), Max: 98 (17 Jan 2020 04:16)  HR: 107 (17 Jan 2020 07:30) (95 - 151)  BP: 126/84 (17 Jan 2020 07:30) (115/78 - 148/106)  BP(mean): --  RR: 18 (17 Jan 2020 07:30) (15 - 18)  SpO2: 95% (17 Jan 2020 07:30) (94% - 96%)  General: Patient comfortable in NAD  HEENT: NCAT, mmm, EOMI  Neck: no JVD, no carotid bruits  CVS: nl s1, split s2, no s3, no s4, no murmur or rubs, RRR  Chest: CTA b/l  Abdomen: soft, nt/nd  Extremities: No c/c/e  Neuro: A&O x3  Psych: Normal affect      ECG:      LABS:                        11.6   9.90  )-----------( 137      ( 17 Jan 2020 09:38 )             35.3     01-17    143  |  104  |  28.0<H>  ----------------------------<  109  4.7   |  27.0  |  0.85    Ca    9.3      17 Jan 2020 09:38    TPro  7.1  /  Alb  3.8  /  TBili  0.3<L>  /  DBili  x   /  AST  20  /  ALT  12  /  AlkPhos  123<H>  01-16    CARDIAC MARKERS ( 16 Jan 2020 20:27 )  x     / <0.01 ng/mL / x     / x     / x          PT/INR - ( 17 Jan 2020 09:24 )   PT: 25.8 sec;   INR: 2.19 ratio         PTT - ( 17 Jan 2020 09:24 )  PTT:38.0 sec        Outpatient TTE (3/2019):  LVEF 55%, severely dilated left atrium, severely dilated right atrium  Normal right ventricle size and systolic function  Mild dilatation of the aortic root  Mild aortic valve sclerosis without stenosis  Mild AR  Moderate MR MECHE MILLER  [id number]    HPI:  Mr. Miller is a 89 year old man with past medical history of Paroxysmal Atrial Fibrillation (on coumadin), Hypertension, with history of falls with recent fall and injury to left hip, who presented with fall. The patient reports that he lives alone and has a home health aide for 3 days per week for a few hours. He states that his walker was not around and he tried to ambulate without it and fell. He denies syncope. Reports that he has been feeling relatively well lately, sometimes has poor appetite. Denies angina or dyspnea. Denies leg edema or orthopnea. Denies palpitations. He recently saw his cardiologist, Dr. Stephenson in December 2019 and had a fall previously, otherwise medical management was continued.      ALLERGIES:  antibiotic (Rash)  tetracycline (Rash)      PAST MEDICAL & SURGICAL HISTORY:  Paroxysmal Atrial Fibrillation (on coumadin)  Hypertension  RBBB  History of falls  VTE (venous thromboembolism)  Irritable bowel syndrome with diarrhea  Prostate CA: radiation  Cyst of pancreas  Cyst of kidney, acquired  GERD (gastroesophageal reflux disease)  H/O total knee replacement, right      Current meds:   acetaminophen   Tablet .. 625 milliGRAM(s) Oral every 6 hours PRN  metoprolol tartrate 50 milliGRAM(s) Oral two times a day  multivitamin 1 Tablet(s) Oral daily  ofloxacin 0.3% Solution 1 Drop(s) Both EYES four times a day  pantoprazole    Tablet 40 milliGRAM(s) Oral before breakfast  tamsulosin 0.4 milliGRAM(s) Oral at bedtime  warfarin 3 milliGRAM(s) Oral once      Home cardiac meds:  Metoprolol tartrate 50 mg BID  Coumadin    SOCIAL HISTORY:  Patient denies alcohol, tobacco, drug use    FAMILY HISTORY:  No pertinent family history in first degree relatives      ROS:  All 10 systems reviewed and positives noted in HPI    Objective:     Vital Signs Last 24 Hrs  T(C): 36.7 (17 Jan 2020 07:30), Max: 36.7 (17 Jan 2020 04:16)  T(F): 98 (17 Jan 2020 07:30), Max: 98 (17 Jan 2020 04:16)  HR: 107 (17 Jan 2020 07:30) (95 - 151)  BP: 126/84 (17 Jan 2020 07:30) (115/78 - 148/106)  BP(mean): --  RR: 18 (17 Jan 2020 07:30) (15 - 18)  SpO2: 95% (17 Jan 2020 07:30) (94% - 96%)    Physical Exam:   General: elderly man, laying in bed, no distress  HEENT: dry oral mucosa  Neck: supple, no carotid bruits b/l  CVS: JVP ~9 cm H20, irregularly irregular, s1, s2, no murmurs  Chest: unlabored respirations, CTAB  Abdomen: non-distended  Extremities: no lower extremity edema b/l  Neuro: A&O x3  Psych: Normal affect      LABS:                        11.6   9.90  )-----------( 137      ( 17 Jan 2020 09:38 )             35.3     01-17    143  |  104  |  28.0<H>  ----------------------------<  109  4.7   |  27.0  |  0.85    Ca    9.3      17 Jan 2020 09:38    TPro  7.1  /  Alb  3.8  /  TBili  0.3<L>  /  DBili  x   /  AST  20  /  ALT  12  /  AlkPhos  123<H>  01-16    CARDIAC MARKERS ( 16 Jan 2020 20:27 )  x     / <0.01 ng/mL / x     / x     / x          PT/INR - ( 17 Jan 2020 09:24 )   PT: 25.8 sec;   INR: 2.19 ratio         PTT - ( 17 Jan 2020 09:24 )  PTT:38.0 sec        Outpatient TTE (3/2019):  LVEF 55%, severely dilated left atrium, severely dilated right atrium  Normal right ventricle size and systolic function  Mild dilatation of the aortic root  Mild aortic valve sclerosis without stenosis  Mild AR  Moderate MR    TTE (1/17/2020):  Summary:   1. Left ventricular ejection fraction, by visual estimation, is 35 to 40%.   2. Moderately decreased global left ventricular systolic function.   3. Multiple left ventricular regional wall motion abnormalities exist. See wall motion findings.   4. The mitral in-flow pattern reveals no discernable A-wave, therefore no comment on diastolic function can be made.   5. There is no evidence of pericardial effusion.   6. Moderate to severe mitral valve regurgitation.   7. Mild prolapse of the anterior and posterior mitral valve leaflets.   8. Mild-moderate tricuspid regurgitation.   9. Mild aortic regurgitation.  10. Sclerotic aortic valve with normal opening.  11. Mobile intra-atrial septum.    ECG (1/16/2020): atrial fibrillation with RVR, RBBB    CXR (1/16/2020):  Findings:  Lines: None    Heart/Mediastinum/Lungs/Other: The heart size is normal. The lungs are clear. There are no pleural effusions.    CT Head (1/16/2020):  IMPRESSION:  Brain CT:   No acute intracranial hemorrhage, mass effect, or CT evidence of a large acute or recent subacute transcortical infarct. Small right parietal scalp hematoma.    Cervical spine CT:  No acute displaced cervical spine fracture or evidence of traumatic malalignment. Compression of T2 body with interval progression. If clinical symptoms persist, MRI of the cervical spine can be considered to assess for ligamentous or cord injury.    Xray Left Hip (1/16/2020):  Old right superior and inferior pubic rami fractures. Acute appearing left superior and inferior pubic rami fracture. No definite hip fracture. Degenerative changes and osteopenia.    Impression:  Left superior and inferior pubic rami fractures.

## 2020-01-17 NOTE — CONSULT NOTE ADULT - SUBJECTIVE AND OBJECTIVE BOX
Pt Name: MECHE MILLER    MRN: [id number]    Patient is a 89y Male presenting to the emergency department brought in via ambulance after pressing his alert button around his neck after he fell yesterday because as he states he was not wearing any shoes. Patient denies pain, he states he fell onto his back but does not have any back pain. Denies paresthesias, incontinence. Patient states he has not had the opportunity to walk since he fell but would like to get up and try. Patient normally walks with a cane. Patient does note he had a fracture in his upper back after a fall about 3 years ago and the last time he fell was in october around [facility]. Patient lives alone and has an aid that comes to help him 3 days a week for 5 hrs at a time.    REVIEW OF SYSTEMS    General:	denies fever, chills     Skin/Breast: denies rashes  	  Respiratory and Thorax: denies SOB  	  Cardiovascular:	denies CP    Gastrointestinal:	denies abdominal pain    Genitourinary:	denies incontinence    Musculoskeletal:	 denies joint pain    Neurological:	denies paresthesias    PAST MEDICAL & SURGICAL HISTORY:  VTE (venous thromboembolism)  Irritable bowel syndrome with diarrhea  Prostate CA: radiation  Cyst of pancreas  Cyst of kidney, acquired  GERD (gastroesophageal reflux disease)  Tachycardia  Afib  H/O total knee replacement, right    Allergies: antibiotic (Rash)  tetracycline (Rash)    Medications: see med rec    FAMILY HISTORY:  No pertinent family history in first degree relatives  : non-contributory    Social History: lives at home alone  Denies ETOH, smoking, and illicit drug use    Ambulation: Walking With Walker                            11.6   9.90  )-----------( 137      ( 17 Jan 2020 09:38 )             35.3     01-17    143  |  104  |  28.0<H>  ----------------------------<  109  4.7   |  27.0  |  0.85    PHYSICAL EXAM:    Vital Signs Last 24 Hrs  T(C): 36.7 (17 Jan 2020 07:30), Max: 36.7 (17 Jan 2020 04:16)  T(F): 98 (17 Jan 2020 07:30), Max: 98 (17 Jan 2020 04:16)  HR: 107 (17 Jan 2020 07:30) (95 - 151)  BP: 126/84 (17 Jan 2020 07:30) (115/78 - 148/106)  RR: 18 (17 Jan 2020 07:30) (15 - 18)  SpO2: 95% (17 Jan 2020 07:30) (94% - 96%)    PE:  Alert, responsive, in no acute distress. Laying in bed comfortably   Back: Skin intact, no ecchymosis, erythema or open wounds. Mild TTP over proximal T spine, C spine and L spine grossly NT  Pathologic reflexes: neg Clonus B/L, negative babinski B/L              Motor exam:                 Lower extremity                   HF(l2)   KE(l3)    TA(l4)   EHL(l5)  GS(s1)                                                 R        5/5        5/5        5/5       5/5       5/5                                               L         5/5        5/5       5/5       5/5        5/5    SILT L2-S2 intact B/L, DP pulses 2+ B/L  Calf soft, NT B/L    Imaging Studies: < from: CT Head No Cont (01.16.20 @ 18:58) >  Cervical spine CT:  No acute displaced cervical spine fracture or evidence of traumatic malalignment. Compression of T2 body with interval progression. If clinical symptoms persist, MRI of the cervical spine can be considered to assess for ligamentous or cord injury.    < end of copied text >      A/P:  Pt is a  89y Male with chronic T2 compression fracture    PLAN:  ·	Pain control  ·	Treatment plan to be finalized after review of pending imaging studies  ·	D/w Dr. Bran  ·	Recommend work-up for repeat falls  ·	Cont care as per primary team Pt Name: MECHE MILLER    MRN: [id number]    Patient is a 89y Male presenting to the emergency department brought in via ambulance after pressing his alert button around his neck after he fell yesterday because as he states he was not wearing any shoes. Patient denies pain, he states he fell onto his back but does not have any back pain. Denies paresthesias, incontinence. Patient states he has not had the opportunity to walk since he fell but would like to get up and try. Patient normally walks with a cane. Patient does note he had a fracture in his upper back after a fall about 3 years ago and the last time he fell was in october around [facility]. Patient lives alone and has an aid that comes to help him 3 days a week for 5 hrs at a time.    REVIEW OF SYSTEMS    General:	denies fever, chills     Skin/Breast: denies rashes  	  Respiratory and Thorax: denies SOB  	  Cardiovascular:	denies CP    Gastrointestinal:	denies abdominal pain    Genitourinary:	denies incontinence    Musculoskeletal:	 denies joint pain    Neurological:	denies paresthesias    PAST MEDICAL & SURGICAL HISTORY:  VTE (venous thromboembolism)  Irritable bowel syndrome with diarrhea  Prostate CA: radiation  Cyst of pancreas  Cyst of kidney, acquired  GERD (gastroesophageal reflux disease)  Tachycardia  Afib  H/O total knee replacement, right    Allergies: antibiotic (Rash)  tetracycline (Rash)    Medications: see med rec    FAMILY HISTORY:  No pertinent family history in first degree relatives  : non-contributory    Social History: lives at home alone  Denies ETOH, smoking, and illicit drug use    Ambulation: Walking With Walker                            11.6   9.90  )-----------( 137      ( 17 Jan 2020 09:38 )             35.3     01-17    143  |  104  |  28.0<H>  ----------------------------<  109  4.7   |  27.0  |  0.85    PHYSICAL EXAM:    Vital Signs Last 24 Hrs  T(C): 36.7 (17 Jan 2020 07:30), Max: 36.7 (17 Jan 2020 04:16)  T(F): 98 (17 Jan 2020 07:30), Max: 98 (17 Jan 2020 04:16)  HR: 107 (17 Jan 2020 07:30) (95 - 151)  BP: 126/84 (17 Jan 2020 07:30) (115/78 - 148/106)  RR: 18 (17 Jan 2020 07:30) (15 - 18)  SpO2: 95% (17 Jan 2020 07:30) (94% - 96%)    PE:  Alert, responsive, in no acute distress. Laying in bed comfortably   Back: Skin intact, no ecchymosis, erythema or open wounds. Mild TTP over proximal T spine, C spine and L spine grossly NT  Pathologic reflexes: neg Clonus B/L, negative babinski B/L              Motor exam:                 Lower extremity                   HF(l2)   KE(l3)    TA(l4)   EHL(l5)  GS(s1)                                                 R        5/5        5/5        5/5       5/5       5/5                                               L         5/5        5/5       5/5       5/5        5/5    SILT L2-S2 intact B/L, DP pulses 2+ B/L  Calf soft, NT B/L    Imaging Studies: < from: CT Head No Cont (01.16.20 @ 18:58) >  Cervical spine CT:  No acute displaced cervical spine fracture or evidence of traumatic malalignment. Compression of T2 body with interval progression. If clinical symptoms persist, MRI of the cervical spine can be considered to assess for ligamentous or cord injury.    < end of copied text >      A/P:  Pt is a  89y Male with chronic T2 compression fracture, left acute superior/inferior pubic rami fracture    PLAN:  ·	Pain control  ·	Treatment plan to be finalized after review of pending imaging studies  ·	D/w Dr. Bran  ·	Recommend work-up for repeat falls  ·	Cont care as per primary team Pt Name: MECHE MILLER    MRN: [id number]    Patient is a 89y Male presenting to the emergency department brought in via ambulance after pressing his alert button around his neck after he fell yesterday because as he states he was not wearing any shoes. Patient denies pain, he states he fell onto his back but does not have any back pain. Denies paresthesias, incontinence. Patient states he has not had the opportunity to walk since he fell but would like to get up and try. Patient normally walks with a cane. Patient does note he had a fracture in his upper back after a fall about 3 years ago. Patient lives alone and has an aid that comes to help him 3 days a week for 5 hrs at a time.  Patient notes he has been followed by Dr. Gustafson for left pubic ramus fracture he sustained in October which is the last time he fell before this around [facility].     REVIEW OF SYSTEMS    General: denies fever, chills     Skin/Breast: denies rashes  	  Respiratory and Thorax: denies SOB  	  Cardiovascular:	denies CP    Gastrointestinal:	denies abdominal pain    Genitourinary:	denies incontinence    Musculoskeletal:	 denies joint pain    Neurological:	denies paresthesias    PAST MEDICAL & SURGICAL HISTORY:  VTE (venous thromboembolism)  Irritable bowel syndrome with diarrhea  Prostate CA: radiation  Cyst of pancreas  Cyst of kidney, acquired  GERD (gastroesophageal reflux disease)  Tachycardia  Afib  H/O total knee replacement, right    Allergies: antibiotic (Rash)  tetracycline (Rash)    Medications: see med rec    FAMILY HISTORY:  No pertinent family history in first degree relatives  : non-contributory    Social History: lives at home alone  Denies ETOH, smoking, and illicit drug use    Ambulation: Walking With Walker                            11.6   9.90  )-----------( 137      ( 17 Jan 2020 09:38 )             35.3     01-17    143  |  104  |  28.0<H>  ----------------------------<  109  4.7   |  27.0  |  0.85    PHYSICAL EXAM:    Vital Signs Last 24 Hrs  T(C): 36.7 (17 Jan 2020 07:30), Max: 36.7 (17 Jan 2020 04:16)  T(F): 98 (17 Jan 2020 07:30), Max: 98 (17 Jan 2020 04:16)  HR: 107 (17 Jan 2020 07:30) (95 - 151)  BP: 126/84 (17 Jan 2020 07:30) (115/78 - 148/106)  RR: 18 (17 Jan 2020 07:30) (15 - 18)  SpO2: 95% (17 Jan 2020 07:30) (94% - 96%)    PE:  Alert, responsive, in no acute distress. Laying in bed comfortably   Back: Skin intact, no ecchymosis, erythema or open wounds. Mild TTP over proximal T spine, C spine and L spine grossly NT  Left hip grossly NT, able to SLR B/L without difficulty. Nefative log roll. Left hip active flexion 120 without discofort  Pathologic reflexes: neg Clonus B/L, negative babinski B/L              Motor exam:                 Lower extremity                   HF(l2)   KE(l3)    TA(l4)   EHL(l5)  GS(s1)                                                 R        5/5        5/5        5/5       5/5       5/5                                               L         5/5        5/5       5/5       5/5        5/5    SILT L2-S2 intact B/L, DP pulses 2+ B/L  Calf soft, NT B/L    Imaging Studies: < from: CT Head No Cont (01.16.20 @ 18:58) >  Cervical spine CT:  No acute displaced cervical spine fracture or evidence of traumatic malalignment. Compression of T2 body with interval progression. If clinical symptoms persist, MRI of the cervical spine can be considered to assess for ligamentous or cord injury.    < end of copied text >      A/P:  Pt is a  89y Male with chronic T2 compression fracture, left subacute superior/inferior pubic rami fracture    PLAN:  ·	Pain control  ·	WBAT, PT/OT  ·	D/w Dr. Bran  ·	Recommend work-up for repeat falls  ·	Cont care as per primary team  ·	F/u with Dr. Bran as needed for T2 compression fracture, F/u with Dr. Gustafson for left pubic rami fracture

## 2020-01-17 NOTE — CONSULT NOTE ADULT - ASSESSMENT
Assessment:  Mr. White is a 89 year old man with past medical history of Paroxysmal Atrial Fibrillation (on coumadin), history of aortic valve replacement, and Hypertension, with history of falls, who presented with syncope.      Recommendations:    metop 50 bid Assessment:  Mr. White is a 89 year old man with past medical history of Paroxysmal Atrial Fibrillation (on coumadin), Hypertension, with history of falls, who presented with fall also with left superior and inferior pubic rami fractures (unclear acuity). The patient denies history of syncope, however no one was home with patient so hard to determine if it was only a fall. He is currently in atrial fibrillation with RVR so will rate control patient. He was also found to have volume overload, given BNP 2200s as well as TTE result of moderately reduced LVEF 35-40% and multiple LV regional wall motion abnormalities. Also with moderate to severe mitral regurgitation and mild to moderate tricuspid regurgitation. Patient denies symptoms of chest pain and troponin is negative, less concern for ischemia. Unclear if his volume overload and atrial fibrillation with RVR caused him to fall or if urinary tract infection contributed as well. Would recommend the following:     Recommendations:  [] Fall: Would complete syncope workup. Check carotid US. Continue to monitor on telemetry. Fall precautions.   [] Atrial Fibrillation with RVR: Patient is volume overloaded so would primarily diurese patient and HRs should improve with this. Dose Lasix 40 mg IVP daily. Patient takes total Metoprolol 100 mg at home, so would dose as: Metoprolol succinate 50 mg PO BID for longer lasting effects. Due to scalp hematoma please consult Neurology if ok to continue anticoagulation in this setting.  [] Cardiomyopathy: LVEF 35-40% here was previously reported as 55% in 3/2019, patient also with regional wall motion abnormalities. Currently he has no chest pain. Will have to discuss with patient if he would want to proceed with stress testing to evaluate for ischemia. Can also discuss possibility of coronary angiogram however patient has frequent falls and hip fractures and now has scalp hematoma so not entirely clear yet if patient would be best candidate for dual antiplatelet therapy if he were to receive PCI to coronary. Will have to discuss with his cardiologist, Dr. Stephenson for further clarification if patient is candidate for anticoagulation.     Thank you for the consult. We will follow along.    Luis Kirby MD  Cardiology, [facility]

## 2020-01-18 LAB
INR BLD: 2.03 RATIO — HIGH (ref 0.88–1.16)
PROTHROM AB SERPL-ACNC: 23.9 SEC — HIGH (ref 10–12.9)

## 2020-01-18 PROCEDURE — 99232 SBSQ HOSP IP/OBS MODERATE 35: CPT

## 2020-01-18 PROCEDURE — 99233 SBSQ HOSP IP/OBS HIGH 50: CPT

## 2020-01-18 RX ORDER — CEFTRIAXONE 500 MG/1
1000 INJECTION, POWDER, FOR SOLUTION INTRAMUSCULAR; INTRAVENOUS ONCE
Refills: 0 | Status: COMPLETED | OUTPATIENT
Start: 2020-01-18 | End: 2020-01-18

## 2020-01-18 RX ORDER — METOPROLOL TARTRATE 50 MG
50 TABLET ORAL THREE TIMES A DAY
Refills: 0 | Status: DISCONTINUED | OUTPATIENT
Start: 2020-01-18 | End: 2020-01-19

## 2020-01-18 RX ORDER — CEFTRIAXONE 500 MG/1
1000 INJECTION, POWDER, FOR SOLUTION INTRAMUSCULAR; INTRAVENOUS EVERY 24 HOURS
Refills: 0 | Status: DISCONTINUED | OUTPATIENT
Start: 2020-01-19 | End: 2020-01-21

## 2020-01-18 RX ORDER — ENOXAPARIN SODIUM 100 MG/ML
40 INJECTION SUBCUTANEOUS DAILY
Refills: 0 | Status: DISCONTINUED | OUTPATIENT
Start: 2020-01-18 | End: 2020-01-26

## 2020-01-18 RX ORDER — CEFTRIAXONE 500 MG/1
INJECTION, POWDER, FOR SOLUTION INTRAMUSCULAR; INTRAVENOUS
Refills: 0 | Status: DISCONTINUED | OUTPATIENT
Start: 2020-01-18 | End: 2020-01-21

## 2020-01-18 RX ADMIN — PANTOPRAZOLE SODIUM 40 MILLIGRAM(S): 20 TABLET, DELAYED RELEASE ORAL at 05:34

## 2020-01-18 RX ADMIN — Medication 1 DROP(S): at 21:24

## 2020-01-18 RX ADMIN — Medication 1 DROP(S): at 17:04

## 2020-01-18 RX ADMIN — Medication 1 DROP(S): at 05:34

## 2020-01-18 RX ADMIN — Medication 50 MILLIGRAM(S): at 21:26

## 2020-01-18 RX ADMIN — Medication 50 MILLIGRAM(S): at 05:34

## 2020-01-18 RX ADMIN — TAMSULOSIN HYDROCHLORIDE 0.4 MILLIGRAM(S): 0.4 CAPSULE ORAL at 21:23

## 2020-01-18 RX ADMIN — Medication 1 DROP(S): at 12:26

## 2020-01-18 RX ADMIN — CEFTRIAXONE 100 MILLIGRAM(S): 500 INJECTION, POWDER, FOR SOLUTION INTRAMUSCULAR; INTRAVENOUS at 17:04

## 2020-01-18 RX ADMIN — Medication 5 MILLIGRAM(S): at 01:25

## 2020-01-18 NOTE — PROGRESS NOTE ADULT - ASSESSMENT
Assessment:  Mr. White is a 89 year old man with past medical history of Paroxysmal Atrial Fibrillation (on coumadin), Hypertension, with history of falls, who presented with fall also with left superior and inferior pubic rami fractures (unclear acuity). The patient denies history of syncope, however no one was home with patient so hard to determine if it was only a fall. He is currently in atrial fibrillation with RVR so will rate control patient. He was also found to have volume overload, given BNP 2200s as well as TTE result of moderately reduced LVEF 35-40% and multiple LV regional wall motion abnormalities. Also with moderate to severe mitral regurgitation and mild to moderate tricuspid regurgitation. Patient denies symptoms of chest pain and troponin is negative, less concern for ischemia. Unclear if his volume overload and atrial fibrillation with RVR caused him to fall or if urinary tract infection contributed as well. Would recommend the following:     Recommendations:  [] Fall: May have been from deconditioning due to new cardiomyopathy and atrial fibrillation with rapid rates. Carotid US unremarkable.   [] Atrial Fibrillation with RVR: Patient is volume overloaded, agree with diuresis. Continue Lasix 40 mg IVP daily. Would dose Metoprolol tartrate 50 mg PO TID, can increase to QID if needed. Patient may require digoxin loading. Due to scalp hematoma please consult Neurology if ok to continue anticoagulation in this setting.  [] Cardiomyopathy: LVEF 35-40% here was previously reported as 55% in 3/2019, patient also with regional wall motion abnormalities. Currently he has no chest pain and troponin is negative. Discussed this result with patient and he is thinking more non-invasive approach, will follow-up with patient when he is euvolemic and rates are controlled about final decision regarding medical management vs stress testing vs coronary angiogram to evaluate for ischemic cardiomyopathy. However patient has frequent falls and hip fractures and now has scalp hematoma so not entirely clear yet if patient would be best candidate for dual antiplatelet therapy if he were to receive PCI to coronary. Will have to discuss with his cardiologist, Dr. Stephenson for further clarification if patient is candidate for anticoagulation.     Thank you for the consult. We will follow along.    Luis Kirby MD  Cardiology, [facility]

## 2020-01-18 NOTE — PROGRESS NOTE ADULT - SUBJECTIVE AND OBJECTIVE BOX
MECHE MILLER Male 89y MRN-[id number]    Patient is a 89y old  Male who presents with a chief complaint of syncope (2020 14:40)      Subjective/objective:  Pt seen and examined before 12 noon, daughter at bedside, no over night event reported by night staff. Pt offers no specific complaints. Per RN/ Pt's daughter Pt is refusing new medication except her home meds. I spoke to Pt at length and he is ok continuing Lasix/ Abx if needed. UA is positive.     Review of system:  No fever, chills, nausea, vomiting, headache, dizziness, chest pain.      PHYSICAL EXAM:    Vital Signs Last 24 Hrs  T(C): 36.7 (2020 10:04), Max: 36.9 (2020 20:17)  T(F): 98 (2020 10:04), Max: 98.4 (2020 20:17)  HR: 120 (2020 12:33) (83 - 132)  BP: 106/71 (2020 10:04) (100/69 - 130/80)  BP(mean): --  RR: 16 (2020 12:33) (16 - 18)  SpO2: 96% (2020 05:29) (94% - 98%)    GENERAL: Pt lying comfortably, NAD.  CHEST/LUNG: Clear to auscultation bilaterally; No wheezing.  HEART: S1S2+, Regular rate and rhythm; No murmurs.  ABDOMEN: Soft, Nontender, Nondistended; Bowel sounds present.  Extremities: LE edema, pulses+  NEURO: AAOX3, no focal deficits, no motor r sensory loss.  PSYCH: normal mood.    MEDICATIONS  (STANDING):  cefTRIAXone   IVPB 1000 milliGRAM(s) IV Intermittent once  cefTRIAXone   IVPB      furosemide   Injectable 40 milliGRAM(s) IV Push daily  metoprolol tartrate 50 milliGRAM(s) Oral three times a day  multivitamin 1 Tablet(s) Oral daily  ofloxacin 0.3% Solution 1 Drop(s) Both EYES four times a day  pantoprazole    Tablet 40 milliGRAM(s) Oral before breakfast  tamsulosin 0.4 milliGRAM(s) Oral at bedtime    MEDICATIONS  (PRN):  acetaminophen   Tablet .. 625 milliGRAM(s) Oral every 6 hours PRN Mild Pain (1 - 3)        Labs:  LABS:                        11.6   9.90  )-----------( 137      ( 2020 09:38 )             35.3     01-17    143  |  104  |  28.0<H>  ----------------------------<  109  4.7   |  27.0  |  0.85    Ca    9.3      2020 09:38    TPro  7.1  /  Alb  3.8  /  TBili  0.3<L>  /  DBili  x   /  AST  20  /  ALT  12  /  AlkPhos  123<H>  01-16    PT/INR - ( 2020 05:44 )   PT: 23.9 sec;   INR: 2.03 ratio         PTT - ( 2020 09:24 )  PTT:38.0 sec    LIVER FUNCTIONS - ( 2020 20:27 )  Alb: 3.8 g/dL / Pro: 7.1 g/dL / ALK PHOS: 123 U/L / ALT: 12 U/L / AST: 20 U/L / GGT: x           Urinalysis Basic - ( 2020 19:53 )    Color: Yellow / Appearance: Slightly Turbid / S.015 / pH: x  Gluc: x / Ketone: Negative  / Bili: Negative / Urobili: Negative   Blood: x / Protein: 30 mg/dL / Nitrite: Positive   Leuk Esterase: Moderate / RBC: 6-10 /HPF / WBC 26-50   Sq Epi: x / Non Sq Epi: x / Bacteria: Moderate        CARDIAC MARKERS ( 2020 20:27 )  x     / <0.01 ng/mL / x     / x     / x

## 2020-01-18 NOTE — PROGRESS NOTE ADULT - SUBJECTIVE AND OBJECTIVE BOX
MECHE MILLER  [id number]    Interval History: The patient was seen and examined at bedside. Reports that he has been urinating frequently. Denies palpitations.       Tele: atrial fibrillation in 150s BPM      Current meds:   acetaminophen   Tablet .. 625 milliGRAM(s) Oral every 6 hours PRN  cefTRIAXone   IVPB 1000 milliGRAM(s) IV Intermittent once  cefTRIAXone   IVPB      furosemide   Injectable 40 milliGRAM(s) IV Push daily  metoprolol succinate ER 50 milliGRAM(s) Oral every 12 hours  multivitamin 1 Tablet(s) Oral daily  ofloxacin 0.3% Solution 1 Drop(s) Both EYES four times a day  pantoprazole    Tablet 40 milliGRAM(s) Oral before breakfast  tamsulosin 0.4 milliGRAM(s) Oral at bedtime        Objective:     Vital Signs:   T(C): 36.7 (01-18-20 @ 10:04), Max: 36.9 (01-17-20 @ 20:17)  HR: 120 (01-18-20 @ 12:33) (80 - 132)  BP: 106/71 (01-18-20 @ 10:04) (100/69 - 130/80)  RR: 16 (01-18-20 @ 12:33) (16 - 18)  SpO2: 96% (01-18-20 @ 05:29) (93% - 98%)  Wt(kg): --    Physical Exam:   General: elderly man, sitting in chair, no distress  HEENT: dry oral mucosa  Neck: supple, no carotid bruits b/l  CVS: JVP ~9 cm H20, irregularly irregular, s1, s2, no murmurs  Chest: unlabored respirations, CTAB  Abdomen: non-distended  Extremities: no lower extremity edema b/l  Neuro: A&O x3  Psych: Normal affect    Labs:   17 Jan 2020 09:38    143    |  104    |  28.0   ----------------------------<  109    4.7     |  27.0   |  0.85     Ca    9.3        17 Jan 2020 09:38    TPro  7.1    /  Alb  3.8    /  TBili  0.3    /  DBili  x      /  AST  20     /  ALT  12     /  AlkPhos  123    16 Jan 2020 20:27                          11.6   9.90  )-----------( 137      ( 17 Jan 2020 09:38 )             35.3     PT/INR - ( 18 Jan 2020 05:44 )   PT: 23.9 sec;   INR: 2.03 ratio         PTT - ( 17 Jan 2020 09:24 )  PTT:38.0 sec  CARDIAC MARKERS ( 16 Jan 2020 20:27 )  x     / <0.01 ng/mL / x     / x     / x          Outpatient TTE (3/2019):  LVEF 55%, severely dilated left atrium, severely dilated right atrium  Normal right ventricle size and systolic function  Mild dilatation of the aortic root  Mild aortic valve sclerosis without stenosis  Mild AR  Moderate MR    TTE (1/17/2020):  Summary:   1. Left ventricular ejection fraction, by visual estimation, is 35 to 40%.   2. Moderately decreased global left ventricular systolic function.   3. Multiple left ventricular regional wall motion abnormalities exist. See wall motion findings.   4. The mitral in-flow pattern reveals no discernable A-wave, therefore no comment on diastolic function can be made.   5. There is no evidence of pericardial effusion.   6. Moderate to severe mitral valve regurgitation.   7. Mild prolapse of the anterior and posterior mitral valve leaflets.   8. Mild-moderate tricuspid regurgitation.   9. Mild aortic regurgitation.  10. Sclerotic aortic valve with normal opening.  11. Mobile intra-atrial septum.    ECG (1/16/2020): atrial fibrillation with RVR, RBBB    CXR (1/16/2020):  Findings:  Lines: None    Heart/Mediastinum/Lungs/Other: The heart size is normal. The lungs are clear. There are no pleural effusions.    CT Head (1/16/2020):  IMPRESSION:  Brain CT:   No acute intracranial hemorrhage, mass effect, or CT evidence of a large acute or recent subacute transcortical infarct. Small right parietal scalp hematoma.    Cervical spine CT:  No acute displaced cervical spine fracture or evidence of traumatic malalignment. Compression of T2 body with interval progression. If clinical symptoms persist, MRI of the cervical spine can be considered to assess for ligamentous or cord injury.    Xray Left Hip (1/16/2020):  Old right superior and inferior pubic rami fractures. Acute appearing left superior and inferior pubic rami fracture. No definite hip fracture. Degenerative changes and osteopenia.    Impression:  Left superior and inferior pubic rami fractures.      Carotid US (1/17/2020):IMPRESSION:       1.  Right carotid system:  No hemodynamically significant plaque disease seen within the RIGHT carotid bulb, proximal internal carotid artery. External carotid artery patent.  2.  Left carotid system:  Nonhemodynamically significant plaque disease within LEFT carotid bulb, proximal external carotid artery and proximal internal carotid artery.

## 2020-01-18 NOTE — PROGRESS NOTE ADULT - ASSESSMENT
Pt is 89 y/o male with chronic A Fib on coumadin, HTN, Prostate CA radiation and DVT, lives alone at home with HHA 3 times a week,  came to the ER s/p fall while preparing supper, Pt clearly denied LOC when I asked. In ED CTH negative, CT spine shows progression of T2 compression fracture- no acute fracture, x-ray hip shows acute left pubic rami fracture. Pt admitted to medicine, his home medicine resumed. Seen by orthopedic and recommended conservative management for fractures.     >Mechanical fall:  - Last admission in 11/19 with fall- recurrent fall.   - CT head with no acute finding.   - Pt denied LOC to me, he is awake/ alert.   - CTH negative, carotid with no significant stenosis.  - Will need PT eval and likely MONSERRAT placement- pt lives alone at home.     >Acute left Pubic rami fracture- Supportive measure with pain meds, PT eval.     >Chronic T2 compression fracture:  - Noted on CT scan with interval progression, I discussed with radiologist- its chronic fracture with progression not acute.   - Pain meds as needed  - Spine surgery eval noted- conservative management.     >UTI:  - UA abnormal X 2.   - Will send urine cx.  - Start Empiric Abx.     >Chronic AFib with RVR:  - Rate not controlled.   - C/w metoprolol increased to TID by cardiology.  - Per cardiology hold coumadin for now- Pt with recurrent fall/ fracture. Cardiology to decide on resumption of AC on d/c.     Acute systolic CHF:  - Mild volume overloaded.   - EF 35-40% with regional WMA. No CP or troponin leak.   - C/w Lasix IVP 40 QD for now. C/w BB.   - Defer further w/u to cardiology.       >BPH- C/w Flomax    >DVT ppx- Will start Lovenox ppx.

## 2020-01-19 LAB
HCT VFR BLD CALC: 36.4 % — LOW (ref 39–50)
HGB BLD-MCNC: 11.6 G/DL — LOW (ref 13–17)
INR BLD: 1.68 RATIO — HIGH (ref 0.88–1.16)
MCHC RBC-ENTMCNC: 30.3 PG — SIGNIFICANT CHANGE UP (ref 27–34)
MCHC RBC-ENTMCNC: 31.9 GM/DL — LOW (ref 32–36)
MCV RBC AUTO: 95 FL — SIGNIFICANT CHANGE UP (ref 80–100)
PLATELET # BLD AUTO: 140 K/UL — LOW (ref 150–400)
PROTHROM AB SERPL-ACNC: 19.6 SEC — HIGH (ref 10–12.9)
RBC # BLD: 3.83 M/UL — LOW (ref 4.2–5.8)
RBC # FLD: 14.1 % — SIGNIFICANT CHANGE UP (ref 10.3–14.5)
WBC # BLD: 11.01 K/UL — HIGH (ref 3.8–10.5)
WBC # FLD AUTO: 11.01 K/UL — HIGH (ref 3.8–10.5)

## 2020-01-19 PROCEDURE — 99232 SBSQ HOSP IP/OBS MODERATE 35: CPT

## 2020-01-19 RX ORDER — METOPROLOL TARTRATE 50 MG
5 TABLET ORAL EVERY 6 HOURS
Refills: 0 | Status: DISCONTINUED | OUTPATIENT
Start: 2020-01-19 | End: 2020-01-31

## 2020-01-19 RX ORDER — METOPROLOL TARTRATE 50 MG
50 TABLET ORAL EVERY 6 HOURS
Refills: 0 | Status: DISCONTINUED | OUTPATIENT
Start: 2020-01-19 | End: 2020-01-23

## 2020-01-19 RX ADMIN — Medication 1 DROP(S): at 23:30

## 2020-01-19 RX ADMIN — Medication 1 DROP(S): at 05:39

## 2020-01-19 RX ADMIN — Medication 1 DROP(S): at 11:29

## 2020-01-19 RX ADMIN — Medication 50 MILLIGRAM(S): at 17:46

## 2020-01-19 RX ADMIN — CEFTRIAXONE 100 MILLIGRAM(S): 500 INJECTION, POWDER, FOR SOLUTION INTRAMUSCULAR; INTRAVENOUS at 11:30

## 2020-01-19 RX ADMIN — Medication 50 MILLIGRAM(S): at 12:59

## 2020-01-19 RX ADMIN — TAMSULOSIN HYDROCHLORIDE 0.4 MILLIGRAM(S): 0.4 CAPSULE ORAL at 21:20

## 2020-01-19 RX ADMIN — PANTOPRAZOLE SODIUM 40 MILLIGRAM(S): 20 TABLET, DELAYED RELEASE ORAL at 05:39

## 2020-01-19 RX ADMIN — Medication 1 DROP(S): at 17:46

## 2020-01-19 RX ADMIN — Medication 50 MILLIGRAM(S): at 05:35

## 2020-01-19 RX ADMIN — ENOXAPARIN SODIUM 40 MILLIGRAM(S): 100 INJECTION SUBCUTANEOUS at 21:20

## 2020-01-19 RX ADMIN — Medication 5 MILLIGRAM(S): at 13:45

## 2020-01-19 RX ADMIN — Medication 50 MILLIGRAM(S): at 23:30

## 2020-01-19 RX ADMIN — Medication 40 MILLIGRAM(S): at 15:38

## 2020-01-19 NOTE — PROGRESS NOTE ADULT - SUBJECTIVE AND OBJECTIVE BOX
MECHE MILLER  [id number]      Chief Complaint: Follow up HFrEF/Atrial fibrillation with RVR      Interval History: The patient reports feeling well. Denies palpitations, chest pain or dyspnea.      Tele: atrial fibrillation in 100-110s mostly, short episode of 150 BPM    Current meds:   acetaminophen   Tablet .. 625 milliGRAM(s) Oral every 6 hours PRN  cefTRIAXone   IVPB 1000 milliGRAM(s) IV Intermittent every 24 hours  cefTRIAXone   IVPB      enoxaparin Injectable 40 milliGRAM(s) SubCutaneous daily  furosemide   Injectable 40 milliGRAM(s) IV Push daily  metoprolol tartrate 50 milliGRAM(s) Oral three times a day  metoprolol tartrate Injectable 5 milliGRAM(s) IV Push every 6 hours PRN  multivitamin 1 Tablet(s) Oral daily  ofloxacin 0.3% Solution 1 Drop(s) Both EYES four times a day  pantoprazole    Tablet 40 milliGRAM(s) Oral before breakfast  PRESERVISION 2 Tablet(s) 2 Tablet(s) Oral daily  tamsulosin 0.4 milliGRAM(s) Oral at bedtime      Objective:     Vital Signs:   T(C): 36.8 (01-19-20 @ 11:36), Max: 36.8 (01-19-20 @ 11:36)  HR: 115 (01-19-20 @ 15:39) (88 - 155)  BP: 144/91 (01-19-20 @ 15:39) (94/50 - 144/91)  RR: 16 (01-19-20 @ 13:55) (16 - 18)  SpO2: 96% (01-19-20 @ 11:36) (92% - 96%)  Wt(kg): --    Physical Exam:   General: elderly man, sitting in chair, no distress  HEENT: dry oral mucosa  Neck: supple, no carotid bruits b/l  CVS: JVP ~9 cm H20, irregularly irregular, s1, s2, no murmurs  Chest: unlabored respirations, CTAB  Abdomen: non-distended  Extremities: no lower extremity edema b/l  Neuro: A&O x3  Psych: Normal affect    Labs:   17 Jan 2020 09:38    143    |  104    |  28.0   ----------------------------<  109    4.7     |  27.0   |  0.85       Labs:                               11.6   11.01 )-----------( 140      ( 19 Jan 2020 05:52 )             36.4     PT/INR - ( 19 Jan 2020 05:52 )   PT: 19.6 sec;   INR: 1.68 ratio             Outpatient TTE (3/2019):  LVEF 55%, severely dilated left atrium, severely dilated right atrium  Normal right ventricle size and systolic function  Mild dilatation of the aortic root  Mild aortic valve sclerosis without stenosis  Mild AR  Moderate MR    TTE (1/17/2020):  Summary:   1. Left ventricular ejection fraction, by visual estimation, is 35 to 40%.   2. Moderately decreased global left ventricular systolic function.   3. Multiple left ventricular regional wall motion abnormalities exist. See wall motion findings.   4. The mitral in-flow pattern reveals no discernable A-wave, therefore no comment on diastolic function can be made.   5. There is no evidence of pericardial effusion.   6. Moderate to severe mitral valve regurgitation.   7. Mild prolapse of the anterior and posterior mitral valve leaflets.   8. Mild-moderate tricuspid regurgitation.   9. Mild aortic regurgitation.  10. Sclerotic aortic valve with normal opening.  11. Mobile intra-atrial septum.    ECG (1/16/2020): atrial fibrillation with RVR, RBBB    CXR (1/16/2020):  Findings:  Lines: None    Heart/Mediastinum/Lungs/Other: The heart size is normal. The lungs are clear. There are no pleural effusions.    CT Head (1/16/2020):  IMPRESSION:  Brain CT:   No acute intracranial hemorrhage, mass effect, or CT evidence of a large acute or recent subacute transcortical infarct. Small right parietal scalp hematoma.    Cervical spine CT:  No acute displaced cervical spine fracture or evidence of traumatic malalignment. Compression of T2 body with interval progression. If clinical symptoms persist, MRI of the cervical spine can be considered to assess for ligamentous or cord injury.    Xray Left Hip (1/16/2020):  Old right superior and inferior pubic rami fractures. Acute appearing left superior and inferior pubic rami fracture. No definite hip fracture. Degenerative changes and osteopenia.    Impression:  Left superior and inferior pubic rami fractures.      Carotid US (1/17/2020):IMPRESSION:       1.  Right carotid system:  No hemodynamically significant plaque disease seen within the RIGHT carotid bulb, proximal internal carotid artery. External carotid artery patent.  2.  Left carotid system:  Nonhemodynamically significant plaque disease within LEFT carotid bulb, proximal external carotid artery and proximal internal carotid artery.

## 2020-01-19 NOTE — PROGRESS NOTE ADULT - ASSESSMENT
Pt is 89 y/o male with chronic A Fib on coumadin, HTN, Prostate CA radiation and DVT, lives alone at home with HHA 3 times a week,  came to the ER s/p fall while preparing supper, Pt clearly denied LOC when I asked. In ED CTH negative, CT spine shows progression of T2 compression fracture- no acute fracture, x-ray hip shows acute left pubic rami fracture. Pt admitted to medicine, his home medicine resumed. Seen by orthopedic and recommended conservative management for fractures.     >Mechanical fall:  - Last admission in 11/19 with fall- recurrent fall.   - CT head with no acute finding.   - Pt denied LOC to me, he is awake/ alert.   - CTH negative, carotid with no significant stenosis.  - Will need PT eval and likely MONSERRAT placement- pt lives alone at home. Pending PT eval.     >Acute left Pubic rami fracture- Supportive measure with pain meds, PT eval.     >Chronic T2 compression fracture:  - Noted on CT scan with interval progression, I discussed with radiologist- its chronic fracture with progression not acute.   - Pain meds as needed  - Spine surgery eval noted- conservative management.     >UTI:  - UA abnormal X 2.   - Urine cx pending collection.  - C/w Empiric Abx. F/u Cx.      >Chronic AFib with RVR:  - Rate better, 100-110, one short episode of 150 bpm today.  - C/w metoprolol increased TID-->QID by cardiology.  - Per cardiology hold coumadin for now- Pt with recurrent fall/ fracture. Cardiology to decide on resumption of AC on d/c.     >Acute systolic CHF:  - Mild volume overloaded.   - EF 35-40% with regional WMA. No CP or troponin leak.   - C/w Lasix IVP 40 QD for now. C/w BB.   - Defer further w/u to cardiology.       >BPH- C/w Flomax    >DVT ppx- Lovenox.

## 2020-01-19 NOTE — PROGRESS NOTE ADULT - SUBJECTIVE AND OBJECTIVE BOX
MECHE MILLER Male 89y [id number]    Patient is a 89y old  Male who presents with a chief complaint of syncope (2020 16:01)      Subjective/objective:  Pt seen and examined, daughter at bedside, no over night event reported by night staff. Pt states he feels well, he offers no specific complaints.     Review of system:  No fever, chills, nausea, vomiting, headache, dizziness, chest pain.    PHYSICAL EXAM:    Vital Signs Last 24 Hrs  T(C): 36.8 (2020 11:36), Max: 36.8 (2020 11:36)  T(F): 98.2 (2020 11:36), Max: 98.2 (2020 11:36)  HR: 106 (2020 16:57) (88 - 155)  BP: 144/91 (2020 15:39) (94/50 - 144/91)  BP(mean): --  RR: 16 (2020 13:55) (16 - 18)  SpO2: 96% (2020 11:36) (92% - 96%)    GENERAL: Pt lying comfortably, NAD.  CHEST/LUNG: Clear to auscultation bilaterally; No wheezing.  HEART: S1S2+, irregular, No murmurs.  ABDOMEN: Soft, Nontender, Nondistended; Bowel sounds present.  Extremities: LE edema, pulses+  NEURO: AAOX3, no focal deficits, no motor r sensory loss.  PSYCH: normal mood.    MEDICATIONS  (STANDING):  cefTRIAXone   IVPB 1000 milliGRAM(s) IV Intermittent every 24 hours  cefTRIAXone   IVPB      enoxaparin Injectable 40 milliGRAM(s) SubCutaneous daily  furosemide   Injectable 40 milliGRAM(s) IV Push daily  metoprolol tartrate 50 milliGRAM(s) Oral every 6 hours  multivitamin 1 Tablet(s) Oral daily  ofloxacin 0.3% Solution 1 Drop(s) Both EYES four times a day  pantoprazole    Tablet 40 milliGRAM(s) Oral before breakfast  PRESERVISION 2 Tablet(s) 2 Tablet(s) Oral daily  tamsulosin 0.4 milliGRAM(s) Oral at bedtime    MEDICATIONS  (PRN):  acetaminophen   Tablet .. 625 milliGRAM(s) Oral every 6 hours PRN Mild Pain (1 - 3)  metoprolol tartrate Injectable 5 milliGRAM(s) IV Push every 6 hours PRN HR > 140 sustained        Labs:  LABS:                        11.6   11.01 )-----------( 140      ( 2020 05:52 )             36.4           PT/INR - ( 2020 05:52 )   PT: 19.6 sec;   INR: 1.68 ratio               Urinalysis Basic - ( 2020 19:53 )    Color: Yellow / Appearance: Slightly Turbid / S.015 / pH: x  Gluc: x / Ketone: Negative  / Bili: Negative / Urobili: Negative   Blood: x / Protein: 30 mg/dL / Nitrite: Positive   Leuk Esterase: Moderate / RBC: 6-10 /HPF / WBC 26-50   Sq Epi: x / Non Sq Epi: x / Bacteria: Moderate

## 2020-01-19 NOTE — PROGRESS NOTE ADULT - ASSESSMENT
Assessment:  Mr. White is a 89 year old man with past medical history of Paroxysmal Atrial Fibrillation (on coumadin), Hypertension, with history of falls, who presented with fall also with left superior and inferior pubic rami fractures (unclear acuity). The patient denies history of syncope, however no one was home with patient so hard to determine if it was only a fall. He is currently in atrial fibrillation with RVR so will rate control patient. He was also found to have volume overload, given BNP 2200s as well as TTE result of moderately reduced LVEF 35-40% and multiple LV regional wall motion abnormalities. Also with moderate to severe mitral regurgitation and mild to moderate tricuspid regurgitation. Patient denies symptoms of chest pain and troponin is negative, less concern for ischemia. Unclear if his volume overload and atrial fibrillation with RVR caused him to fall or if urinary tract infection contributed as well. Would recommend the following:     Recommendations:  [] Fall: May have been from deconditioning due to new cardiomyopathy and atrial fibrillation with rapid rates. Carotid US unremarkable.   [] Atrial Fibrillation with RVR: Patient is volume overloaded, agree with diuresis. Continue Lasix 40 mg IVP daily. Will increase beta blocker further to Metoprolol tartrate 50 mg PO QID. Patient may require digoxin loading. Due to scalp hematoma please consult Neurology if ok to continue anticoagulation in this setting.  [] Cardiomyopathy: LVEF 35-40% here was previously reported as 55% in 3/2019, patient also with regional wall motion abnormalities. Currently he has no chest pain and troponin is negative. Discussed this result with patient and he is thinking more non-invasive approach, will follow-up with patient when he is euvolemic and rates are controlled about final decision regarding medical management vs stress testing vs coronary angiogram to evaluate for ischemic cardiomyopathy. However patient has frequent falls and hip fractures and now has scalp hematoma so not entirely clear yet if patient would be best candidate for dual antiplatelet therapy if he were to receive PCI to coronary. Will have to discuss with his cardiologist, Dr. Stephenson for further clarification if patient is candidate for anticoagulation.     Thank you for the consult. We will follow along.    Luis Kirby MD  Cardiology, [facility]

## 2020-01-20 PROCEDURE — 99232 SBSQ HOSP IP/OBS MODERATE 35: CPT

## 2020-01-20 RX ADMIN — Medication 1 DROP(S): at 11:22

## 2020-01-20 RX ADMIN — CEFTRIAXONE 100 MILLIGRAM(S): 500 INJECTION, POWDER, FOR SOLUTION INTRAMUSCULAR; INTRAVENOUS at 11:22

## 2020-01-20 RX ADMIN — Medication 1 TABLET(S): at 11:22

## 2020-01-20 RX ADMIN — Medication 1 DROP(S): at 17:07

## 2020-01-20 RX ADMIN — PANTOPRAZOLE SODIUM 40 MILLIGRAM(S): 20 TABLET, DELAYED RELEASE ORAL at 07:11

## 2020-01-20 RX ADMIN — Medication 1 DROP(S): at 23:15

## 2020-01-20 RX ADMIN — Medication 1 DROP(S): at 06:10

## 2020-01-20 RX ADMIN — ENOXAPARIN SODIUM 40 MILLIGRAM(S): 100 INJECTION SUBCUTANEOUS at 20:07

## 2020-01-20 RX ADMIN — Medication 40 MILLIGRAM(S): at 06:10

## 2020-01-20 RX ADMIN — Medication 50 MILLIGRAM(S): at 07:10

## 2020-01-20 RX ADMIN — TAMSULOSIN HYDROCHLORIDE 0.4 MILLIGRAM(S): 0.4 CAPSULE ORAL at 20:07

## 2020-01-20 RX ADMIN — Medication 50 MILLIGRAM(S): at 23:15

## 2020-01-20 RX ADMIN — Medication 50 MILLIGRAM(S): at 17:07

## 2020-01-20 RX ADMIN — Medication 50 MILLIGRAM(S): at 11:22

## 2020-01-20 NOTE — PROGRESS NOTE ADULT - ASSESSMENT
Pt is 89 y/o male with chronic A Fib on coumadin, HTN, Prostate CA radiation and DVT, lives alone at home with HHA 3 times a week,  came to the ER s/p fall while preparing supper, Pt clearly denied LOC when I asked. In ED CTH negative, CT spine shows progression of T2 compression fracture- no acute fracture, x-ray hip shows acute left pubic rami fracture. Pt admitted to medicine, his home medicine resumed. Seen by orthopedic and recommended conservative management for fractures. Pt also noted to have Afib with RVR with some volume overload, seen in consultation with cardiology, kept on IV Diuresis,     >Mechanical fall:  - Last admission in 11/19 with fall- recurrent fall.   - CT head with no acute finding.   - Pt denied LOC to me, he is awake/ alert.   - CTH negative, carotid with no significant stenosis.  - Will need PT eval and likely MONSERRAT placement- pt lives alone at home. Pending PT eval.     >Acute left Pubic rami fracture- Supportive measure with pain meds, PT eval.     >Chronic T2 compression fracture:  - Noted on CT scan with interval progression, I discussed with radiologist- its chronic fracture with progression not acute.   - Pain meds as needed  - Spine surgery eval noted- conservative management.     >UTI:  - UA abnormal X 2.   - Urine cx in process.   - C/w Empiric Abx. F/u Cx.      >Chronic AFib with RVR:  - Rate better, 100-110.  - C/w metoprolol QID.   - Per cardiology hold coumadin for now- Pt with recurrent fall/ fracture. Cardiology to decide on resumption of AC on d/c.     >Acute systolic CHF:  - Mild volume overloaded.   - EF 35-40% with regional WMA. No CP or troponin leak.   - C/w Lasix IVP 40 QD for now. C/w BB.   - Defer further w/u to cardiology.       >BPH- C/w Flomax    >DVT ppx- Lovenox.

## 2020-01-20 NOTE — PROGRESS NOTE ADULT - SUBJECTIVE AND OBJECTIVE BOX
MECHE MILLER Male 89y [id number]    Patient is a 89y old  Male who presents with a chief complaint of syncope (19 Jan 2020 17:09)      Subjective/objective:  Pt seen and examined at bedside, no over night event reported by night staff. Pt reports some stomach pain, no other complaints. States Breathing fine, no coughing.       Review of system:  No fever, chills, nausea, vomiting, headache, dizziness, chest pain.    PHYSICAL EXAM:    Vital Signs Last 24 Hrs  T(C): 37.2 (20 Jan 2020 09:24), Max: 37.2 (20 Jan 2020 09:24)  T(F): 98.9 (20 Jan 2020 09:24), Max: 98.9 (20 Jan 2020 09:24)  HR: 93 (20 Jan 2020 09:24) (93 - 106)  BP: 106/64 (20 Jan 2020 11:21) (102/75 - 128/72)  BP(mean): --  RR: 20 (20 Jan 2020 09:24) (16 - 21)  SpO2: 100% (20 Jan 2020 09:24) (92% - 100%)    GENERAL: Pt sitting comfortably, NAD.  CHEST/LUNG: Clear to auscultation bilaterally; No wheezing.  HEART: S1S2+, irregular, No murmurs.  ABDOMEN: Soft, Nontender, Nondistended; Bowel sounds present.  Extremities: LE edema, pulses+  NEURO: AAOX3, no focal deficits, no motor r sensory loss.  PSYCH: normal mood.      MEDICATIONS  (STANDING):  cefTRIAXone   IVPB 1000 milliGRAM(s) IV Intermittent every 24 hours  cefTRIAXone   IVPB      enoxaparin Injectable 40 milliGRAM(s) SubCutaneous daily  furosemide   Injectable 40 milliGRAM(s) IV Push daily  metoprolol tartrate 50 milliGRAM(s) Oral every 6 hours  multivitamin 1 Tablet(s) Oral daily  ofloxacin 0.3% Solution 1 Drop(s) Both EYES four times a day  pantoprazole    Tablet 40 milliGRAM(s) Oral before breakfast  PRESERVISION 2 Tablet(s) 2 Tablet(s) Oral daily  tamsulosin 0.4 milliGRAM(s) Oral at bedtime    MEDICATIONS  (PRN):  acetaminophen   Tablet .. 625 milliGRAM(s) Oral every 6 hours PRN Mild Pain (1 - 3)  metoprolol tartrate Injectable 5 milliGRAM(s) IV Push every 6 hours PRN HR > 140 sustained        Labs:  LABS:                        11.6   11.01 )-----------( 140      ( 19 Jan 2020 05:52 )             36.4           PT/INR - ( 19 Jan 2020 05:52 )   PT: 19.6 sec;   INR: 1.68 ratio

## 2020-01-20 NOTE — PROGRESS NOTE ADULT - ASSESSMENT
Assessment:  Mr. White is a 89 year old man with past medical history of Paroxysmal Atrial Fibrillation (on coumadin), Hypertension, with history of falls, who presented with fall also with left superior and inferior pubic rami fractures (unclear acuity). The patient denies history of syncope, however no one was home with patient so hard to determine if it was only a fall. He is currently in atrial fibrillation with RVR so will rate control patient. He was also found to have volume overload, given BNP 2200s as well as TTE result of moderately reduced LVEF 35-40% and multiple LV regional wall motion abnormalities. Also with moderate to severe mitral regurgitation and mild to moderate tricuspid regurgitation. Patient denies symptoms of chest pain and troponin is negative, less concern for ischemia. Unclear if his volume overload and atrial fibrillation with RVR caused him to fall or if urinary tract infection contributed as well. Would recommend the following:     Recommendations:  [] Fall: May have been from deconditioning due to new cardiomyopathy and atrial fibrillation with rapid rates. Carotid US unremarkable.   [] Atrial Fibrillation with RVR: Patient is volume overloaded, agree with diuresis. Continue Lasix 40 mg IVP daily. HR in 110s today on Metoprolol tartrate 50 mg PO QID. Patient may require digoxin loading. Due to scalp hematoma please consult Neurology if ok to continue anticoagulation in this setting.  [] Cardiomyopathy: LVEF 35-40% here was previously reported as 55% in 3/2019, patient also with regional wall motion abnormalities. Currently he has no chest pain and troponin is negative. Discussed this result with patient and he is thinking more non-invasive approach, will follow-up with patient when he is euvolemic and rates are controlled about final decision regarding medical management vs stress testing vs coronary angiogram to evaluate for ischemic cardiomyopathy. However patient has frequent falls and hip fractures and now has scalp hematoma so not entirely clear yet if patient would be best candidate for dual antiplatelet therapy if he were to receive PCI to coronary. Will have to discuss with his cardiologist, Dr. Stephenson for further clarification if patient is candidate for anticoagulation.     Thank you for the consult. We will follow along.    Luis Kirby MD  Cardiology, [facility]

## 2020-01-21 LAB
ANION GAP SERPL CALC-SCNC: 12 MMOL/L — SIGNIFICANT CHANGE UP (ref 5–17)
BUN SERPL-MCNC: 28 MG/DL — HIGH (ref 8–20)
CALCIUM SERPL-MCNC: 9.1 MG/DL — SIGNIFICANT CHANGE UP (ref 8.6–10.2)
CHLORIDE SERPL-SCNC: 98 MMOL/L — SIGNIFICANT CHANGE UP (ref 98–107)
CO2 SERPL-SCNC: 29 MMOL/L — SIGNIFICANT CHANGE UP (ref 22–29)
CREAT SERPL-MCNC: 1.01 MG/DL — SIGNIFICANT CHANGE UP (ref 0.5–1.3)
GLUCOSE SERPL-MCNC: 101 MG/DL — SIGNIFICANT CHANGE UP (ref 70–115)
HCT VFR BLD CALC: 34.8 % — LOW (ref 39–50)
HGB BLD-MCNC: 11.5 G/DL — LOW (ref 13–17)
MCHC RBC-ENTMCNC: 30.9 PG — SIGNIFICANT CHANGE UP (ref 27–34)
MCHC RBC-ENTMCNC: 33 GM/DL — SIGNIFICANT CHANGE UP (ref 32–36)
MCV RBC AUTO: 93.5 FL — SIGNIFICANT CHANGE UP (ref 80–100)
PLATELET # BLD AUTO: 143 K/UL — LOW (ref 150–400)
POTASSIUM SERPL-MCNC: 4.5 MMOL/L — SIGNIFICANT CHANGE UP (ref 3.5–5.3)
POTASSIUM SERPL-SCNC: 4.5 MMOL/L — SIGNIFICANT CHANGE UP (ref 3.5–5.3)
RBC # BLD: 3.72 M/UL — LOW (ref 4.2–5.8)
RBC # FLD: 13.7 % — SIGNIFICANT CHANGE UP (ref 10.3–14.5)
SODIUM SERPL-SCNC: 139 MMOL/L — SIGNIFICANT CHANGE UP (ref 135–145)
WBC # BLD: 11.11 K/UL — HIGH (ref 3.8–10.5)
WBC # FLD AUTO: 11.11 K/UL — HIGH (ref 3.8–10.5)

## 2020-01-21 PROCEDURE — 99232 SBSQ HOSP IP/OBS MODERATE 35: CPT

## 2020-01-21 RX ADMIN — Medication 1 DROP(S): at 23:06

## 2020-01-21 RX ADMIN — Medication 1 DROP(S): at 12:21

## 2020-01-21 RX ADMIN — Medication 50 MILLIGRAM(S): at 05:58

## 2020-01-21 RX ADMIN — PANTOPRAZOLE SODIUM 40 MILLIGRAM(S): 20 TABLET, DELAYED RELEASE ORAL at 05:58

## 2020-01-21 RX ADMIN — Medication 50 MILLIGRAM(S): at 23:07

## 2020-01-21 RX ADMIN — TAMSULOSIN HYDROCHLORIDE 0.4 MILLIGRAM(S): 0.4 CAPSULE ORAL at 23:07

## 2020-01-21 RX ADMIN — Medication 40 MILLIGRAM(S): at 05:58

## 2020-01-21 RX ADMIN — Medication 1 DROP(S): at 17:28

## 2020-01-21 RX ADMIN — ENOXAPARIN SODIUM 40 MILLIGRAM(S): 100 INJECTION SUBCUTANEOUS at 23:06

## 2020-01-21 RX ADMIN — Medication 1 DROP(S): at 05:58

## 2020-01-21 RX ADMIN — Medication 1 TABLET(S): at 12:21

## 2020-01-21 RX ADMIN — Medication 50 MILLIGRAM(S): at 12:21

## 2020-01-21 RX ADMIN — Medication 50 MILLIGRAM(S): at 17:28

## 2020-01-21 NOTE — DIETITIAN INITIAL EVALUATION ADULT. - ETIOLOGY
related to inability to consume sufficient protein energy intake with decreased appetite in setting of GI discomfort

## 2020-01-21 NOTE — DIETITIAN INITIAL EVALUATION ADULT. - PERTINENT LABORATORY DATA
01-21 Na139 mmol/L Glu 101 mg/dL K+ 4.5 mmol/L Cr  1.01 mg/dL BUN 28.0 mg/dL<H> Phos n/a   Alb n/a   PAB n/a

## 2020-01-21 NOTE — PROGRESS NOTE ADULT - ASSESSMENT
Pt is 89 y/o male with chronic A Fib on coumadin, HTN, Prostate CA radiation and DVT, lives alone at home with HHA 3 times a week,  came to the ER s/p fall while preparing supper, Pt clearly denied LOC when I asked. In ED CTH negative, CT spine shows progression of T2 compression fracture- no acute fracture, x-ray hip shows acute left pubic rami fracture. Pt admitted to medicine, his home medicine resumed. Seen by orthopedic and recommended conservative management for fractures. Seen by PT and recommended MONSERRAT.   Pt also noted to have Afib with RVR with some volume overload, seen in consultation with cardiology, kept on IV Diuresis,     >Mechanical fall:  - Last admission in 11/19 with fall- recurrent fall.   - CT head with no acute finding.   - Pt denied LOC to me, he is awake/ alert.   - CTH negative, carotid with no significant stenosis.  - PT eval completed- MONSERRAT vs 24/7 assist, Pt will benefit from MONSERRAT. SW to follow.      >Acute left Pubic rami fracture- Supportive measure with pain meds, PT eval.     >Chronic T2 compression fracture:  - Noted on CT scan with interval progression, I discussed with radiologist- its chronic fracture with progression not acute.   - Pain meds as needed  - Spine surgery eval noted- conservative management.     >UTI:  - UA abnormal X 2. Urine Cx 10-49 CFU.   - Completed Abx for 3 4 days, no signs of infection. Will d/c Abx.     >Chronic AFib with RVR:  - Rate better, .  - C/w metoprolol QID.   - Per cardiology hold coumadin for now- Pt with recurrent fall/ fracture. Cardiology to decide on resumption of AC on d/c. Discussed with Dr Kirby today, continue to hold AC for now.     >Acute systolic CHF:  - Mild volume overloaded.   - EF 35-40% with regional WMA. No CP or troponin leak.   - C/w Lasix IVP 40 QD for now. C/w BB.   - Defer further w/u to cardiology.       >BPH- C/w Flomax    >DVT ppx- Lovenox.      >Dispo- PT eval completed, needs MONSERRAT placement, SW to follow. Discussed with cardiology today, pending cardiology f/u and clearance for d/c.

## 2020-01-21 NOTE — PHYSICAL THERAPY INITIAL EVALUATION ADULT - PERTINENT HX OF CURRENT PROBLEM, REHAB EVAL
Pt is 89 y/o male with chronic A Fib on coumadin, HTN, Prostate CA radiation and DVT. s/p fall while preparing supper, Pt clearly denied LOC when I asked. CT spine shows progression of T2 compression fracture- no acute fracture, x-ray hip shows acute left pubic rami fracture.  Seen by orthopedic and recommended conservative management for fractures. Pt also noted to have Afib with RVR with some volume overload.

## 2020-01-21 NOTE — DIETITIAN INITIAL EVALUATION ADULT. - MALNUTRITION
NFPE performed- severe muscle wasting at shoulder, clavicle, interosseous.  Moderate muscle wasting at temples.  Severe fat loss in orbital region, buccal pads. severe (chronic)

## 2020-01-21 NOTE — DIETITIAN INITIAL EVALUATION ADULT. - OTHER INFO
Pt with h/o chronic A Fib on coumadin, HTN, Prostate CA radiation and DVT, lives alone at home with HHA 3 times a week,  came to the ER s/p fall while preparing dinner.  Pt reports poor po intake at meals.  Pt states poor appetite/po intake prior to admission and states 15# unintentional wt loss.  Pt also c/o abdominal pain and nausea.  Discussed importance of consuming adequate protein intake at meals to optimize nutrition status.  Pt requesting Ensure supplements as he consumes at home.

## 2020-01-21 NOTE — PROGRESS NOTE ADULT - SUBJECTIVE AND OBJECTIVE BOX
MECHE MILLER Male 89y [id number]    Patient is a 89y old  Male who presents with a chief complaint of syncope (20 Jan 2020 17:55)      Subjective/objective:  Pt seen and examined at bedside, no over night event reported by night staff. Pt chronic intermittent stomach pain and intermittent nausea- nothing new, being followed by GI Dr Kidd o/p, now wants GI group in [facility]. He has no other complaints.     Review of system:  No fever, chills, nausea, vomiting, headache, dizziness, chest pain.      PHYSICAL EXAM:    Vital Signs Last 24 Hrs  T(C): 36.6 (21 Jan 2020 09:39), Max: 36.7 (20 Jan 2020 15:35)  T(F): 97.8 (21 Jan 2020 09:39), Max: 98.1 (20 Jan 2020 15:35)  HR: 103 (21 Jan 2020 09:39) (94 - 109)  BP: 110/62 (21 Jan 2020 09:39) (106/64 - 128/60)  BP(mean): --  RR: 18 (21 Jan 2020 09:39) (18 - 18)  SpO2: 96% (21 Jan 2020 09:39) (95% - 96%)    GENERAL: Pt sitting comfortably, NAD.  CHEST/LUNG: Clear to auscultation bilaterally; No wheezing.  HEART: S1S2+, irregular, No murmurs.  ABDOMEN: Soft, Nontender, Nondistended; Bowel sounds present.  Extremities: LE edema, pulses+  NEURO: AAOX3, no focal deficits, no motor r sensory loss.  PSYCH: normal mood.    MEDICATIONS  (STANDING):  cefTRIAXone   IVPB 1000 milliGRAM(s) IV Intermittent every 24 hours  cefTRIAXone   IVPB      enoxaparin Injectable 40 milliGRAM(s) SubCutaneous daily  furosemide   Injectable 40 milliGRAM(s) IV Push daily  metoprolol tartrate 50 milliGRAM(s) Oral every 6 hours  multivitamin 1 Tablet(s) Oral daily  ofloxacin 0.3% Solution 1 Drop(s) Both EYES four times a day  pantoprazole    Tablet 40 milliGRAM(s) Oral before breakfast  PRESERVISION 2 Tablet(s) 2 Tablet(s) Oral daily  tamsulosin 0.4 milliGRAM(s) Oral at bedtime    MEDICATIONS  (PRN):  acetaminophen   Tablet .. 625 milliGRAM(s) Oral every 6 hours PRN Mild Pain (1 - 3)  metoprolol tartrate Injectable 5 milliGRAM(s) IV Push every 6 hours PRN HR > 140 sustained        Labs:  LABS:                        11.5   11.11 )-----------( 143      ( 21 Jan 2020 05:56 )             34.8     01-21    139  |  98  |  28.0<H>  ----------------------------<  101  4.5   |  29.0  |  1.01    Ca    9.1      21 Jan 2020 05:56

## 2020-01-21 NOTE — PROGRESS NOTE ADULT - SUBJECTIVE AND OBJECTIVE BOX
MECHE MILLER  [id number]      Chief Complaint: Follow up fall and new cardiomyopathy      Interval History: The patient reports feeling better, has less dyspnea. Denies palpitations or chest pain.       Tele: atrial fibrillation in 100s BPM      Current meds:   acetaminophen   Tablet .. 625 milliGRAM(s) Oral every 6 hours PRN  enoxaparin Injectable 40 milliGRAM(s) SubCutaneous daily  furosemide   Injectable 40 milliGRAM(s) IV Push daily  metoprolol tartrate 50 milliGRAM(s) Oral every 6 hours  metoprolol tartrate Injectable 5 milliGRAM(s) IV Push every 6 hours PRN  multivitamin 1 Tablet(s) Oral daily  ofloxacin 0.3% Solution 1 Drop(s) Both EYES four times a day  pantoprazole    Tablet 40 milliGRAM(s) Oral before breakfast  PRESERVISION 2 Tablet(s) 2 Tablet(s) Oral daily  tamsulosin 0.4 milliGRAM(s) Oral at bedtime        Objective:    Vital signs:   T(C): 36.5 (01-21-20 @ 15:37), Max: 36.7 (01-20-20 @ 23:12)  HR: 111 (01-21-20 @ 15:37) (94 - 111)  BP: 105/58 (01-21-20 @ 15:37) (105/58 - 128/60)  RR: 18 (01-21-20 @ 15:37) (18 - 18)  SpO2: 96% (01-21-20 @ 15:37) (95% - 96%)  Wt(kg): --    Physical Exam:   General: elderly man, sitting upright, no distress  HEENT: dry oral mucosa  Neck: supple, no carotid bruits b/l  CVS: JVP ~9 cm H20, irregularly irregular, s1, s2, no murmurs  Chest: unlabored respirations, CTAB  Abdomen: non-distended  Extremities: no lower extremity edema b/l  Neuro: A&O x3  Psych: Normal affect        Labs:   21 Jan 2020 05:56    139    |  98     |  28.0   ----------------------------<  101    4.5     |  29.0   |  1.01     Ca    9.1        21 Jan 2020 05:56                            11.5   11.11 )-----------( 143      ( 21 Jan 2020 05:56 )             34.8         Outpatient TTE (3/2019):  LVEF 55%, severely dilated left atrium, severely dilated right atrium  Normal right ventricle size and systolic function  Mild dilatation of the aortic root  Mild aortic valve sclerosis without stenosis  Mild AR  Moderate MR    TTE (1/17/2020):  Summary:   1. Left ventricular ejection fraction, by visual estimation, is 35 to 40%.   2. Moderately decreased global left ventricular systolic function.   3. Multiple left ventricular regional wall motion abnormalities exist. See wall motion findings.   4. The mitral in-flow pattern reveals no discernable A-wave, therefore no comment on diastolic function can be made.   5. There is no evidence of pericardial effusion.   6. Moderate to severe mitral valve regurgitation.   7. Mild prolapse of the anterior and posterior mitral valve leaflets.   8. Mild-moderate tricuspid regurgitation.   9. Mild aortic regurgitation.  10. Sclerotic aortic valve with normal opening.  11. Mobile intra-atrial septum.    ECG (1/16/2020): atrial fibrillation with RVR, RBBB    CXR (1/16/2020):  Findings:  Lines: None    Heart/Mediastinum/Lungs/Other: The heart size is normal. The lungs are clear. There are no pleural effusions.    CT Head (1/16/2020):  IMPRESSION:  Brain CT:   No acute intracranial hemorrhage, mass effect, or CT evidence of a large acute or recent subacute transcortical infarct. Small right parietal scalp hematoma.    Cervical spine CT:  No acute displaced cervical spine fracture or evidence of traumatic malalignment. Compression of T2 body with interval progression. If clinical symptoms persist, MRI of the cervical spine can be considered to assess for ligamentous or cord injury.    Xray Left Hip (1/16/2020):  Old right superior and inferior pubic rami fractures. Acute appearing left superior and inferior pubic rami fracture. No definite hip fracture. Degenerative changes and osteopenia.    Impression:  Left superior and inferior pubic rami fractures.      Carotid US (1/17/2020):IMPRESSION:       1.  Right carotid system:  No hemodynamically significant plaque disease seen within the RIGHT carotid bulb, proximal internal carotid artery. External carotid artery patent.  2.  Left carotid system:  Nonhemodynamically significant plaque disease within LEFT carotid bulb, proximal external carotid artery and proximal internal carotid artery.

## 2020-01-21 NOTE — PHYSICAL THERAPY INITIAL EVALUATION ADULT - ADDITIONAL COMMENTS
Pt. lives alone in a house with 3 LADARIUS with one rail and 1 stair inside without rail. Pt. reports he HAD a HHA 24/7 after a fall in October 2019, however, he let the HHA go earlier this month and now has a HHA 3 days/week for 5 hours/day. Pt. reports resuming 24/7 assist may be possible, but he is not interested in that right now. Pt. was modified independent with a RW PTA and also owns a SAC, commode, and shower chair. Pt. reports receiving outpatient PT. Also reports 2 falls since Oct 2019.

## 2020-01-21 NOTE — DIETITIAN INITIAL EVALUATION ADULT. - SIGNS/SYMPTOMS
as evidenced by pt meeting <75% est energy intake > 1 mo, 9% wt loss, severe muscle wasting/fat loss

## 2020-01-21 NOTE — PROGRESS NOTE ADULT - ASSESSMENT
Assessment:  Mr. White is a 89 year old man with past medical history of Paroxysmal Atrial Fibrillation (on coumadin), Hypertension, with history of falls, who presented with fall also with left superior and inferior pubic rami fractures (unclear acuity). The patient denies history of syncope, however no one was home with patient so hard to determine if it was only a fall. He is currently in atrial fibrillation with RVR so will rate control patient. He was also found to have volume overload, given BNP 2200s as well as TTE result of moderately reduced LVEF 35-40% and multiple LV regional wall motion abnormalities. Also with moderate to severe mitral regurgitation and mild to moderate tricuspid regurgitation. Patient denies symptoms of chest pain and troponin is negative, less concern for ischemia. Unclear if his volume overload and atrial fibrillation with RVR caused him to fall or if urinary tract infection contributed as well. Would recommend the following:     Recommendations:  [] Fall: May have been from deconditioning due to new cardiomyopathy and atrial fibrillation with rapid rates. Carotid US unremarkable.   [] Atrial Fibrillation with RVR:  HRs have improved to 100s BPM. Continue Metoprolol tartrate 50 mg PO QID. Discussed with Dr. Stephenson (patient's primary cardiologist) and due to frequent falls will plan to lower INR goal for patient to 1.5 -2 upon discharge.  [] Cardiomyopathy: LVEF 35-40% here was previously reported as 55% in 3/2019, patient also with regional wall motion abnormalities. Currently he has no chest pain and troponin is negative.   Patient is volume overloaded, continue Lasix 40 mg IVP daily.   Discussed cardiomyopathy with patient and he is thinking more non-invasive approach, will follow-up with patient when he is euvolemic and rates are controlled about final decision regarding medical management vs stress testing vs coronary angiogram to evaluate for ischemic cardiomyopathy.       Thank you for the consult. We will follow along.    Luis Kirby MD  Cardiology, [facility]

## 2020-01-21 NOTE — CHART NOTE - NSCHARTNOTEFT_GEN_A_CORE
Upon Nutritional Assessment by the Registered Dietitian your patient was determined to meet criteria / has evidence of the following diagnosis/diagnoses:          [ x] Severe Protein Calorie Malnutrition         Findings as based on:  •  Comprehensive nutrition assessment and consultation  •  Calorie counts (nutrient intake analysis)  •  Food acceptance and intake status from observations by staff  •  Follow up  •  Patient education  •  Intervention secondary to interdisciplinary rounds  •   concerns      Treatment:    The following diet has been recommended:  RX: Ensure TID   RX: MVI daily     PROVIDER Section:     By signing this assessment you are acknowledging and agree with the diagnosis/diagnoses assigned by the Registered Dietitian    Comments:

## 2020-01-22 LAB
-  AMIKACIN: SIGNIFICANT CHANGE UP
-  AMPICILLIN/SULBACTAM: SIGNIFICANT CHANGE UP
-  AMPICILLIN: SIGNIFICANT CHANGE UP
-  AZTREONAM: SIGNIFICANT CHANGE UP
-  CEFAZOLIN: SIGNIFICANT CHANGE UP
-  CEFEPIME: SIGNIFICANT CHANGE UP
-  CEFOXITIN: SIGNIFICANT CHANGE UP
-  CEFTRIAXONE: SIGNIFICANT CHANGE UP
-  CIPROFLOXACIN: SIGNIFICANT CHANGE UP
-  GENTAMICIN: SIGNIFICANT CHANGE UP
-  IMIPENEM: SIGNIFICANT CHANGE UP
-  LEVOFLOXACIN: SIGNIFICANT CHANGE UP
-  MEROPENEM: SIGNIFICANT CHANGE UP
-  NITROFURANTOIN: SIGNIFICANT CHANGE UP
-  PIPERACILLIN/TAZOBACTAM: SIGNIFICANT CHANGE UP
-  TIGECYCLINE: SIGNIFICANT CHANGE UP
-  TOBRAMYCIN: SIGNIFICANT CHANGE UP
-  TRIMETHOPRIM/SULFAMETHOXAZOLE: SIGNIFICANT CHANGE UP
CULTURE RESULTS: SIGNIFICANT CHANGE UP
INR BLD: 1.11 RATIO — SIGNIFICANT CHANGE UP (ref 0.88–1.16)
METHOD TYPE: SIGNIFICANT CHANGE UP
ORGANISM # SPEC MICROSCOPIC CNT: SIGNIFICANT CHANGE UP
ORGANISM # SPEC MICROSCOPIC CNT: SIGNIFICANT CHANGE UP
PROTHROM AB SERPL-ACNC: 12.8 SEC — SIGNIFICANT CHANGE UP (ref 10–12.9)
SPECIMEN SOURCE: SIGNIFICANT CHANGE UP

## 2020-01-22 PROCEDURE — 99232 SBSQ HOSP IP/OBS MODERATE 35: CPT

## 2020-01-22 RX ORDER — LOPERAMIDE HCL 2 MG
2 TABLET ORAL ONCE
Refills: 0 | Status: COMPLETED | OUTPATIENT
Start: 2020-01-22 | End: 2020-01-22

## 2020-01-22 RX ORDER — FUROSEMIDE 40 MG
20 TABLET ORAL DAILY
Refills: 0 | Status: DISCONTINUED | OUTPATIENT
Start: 2020-01-23 | End: 2020-01-31

## 2020-01-22 RX ORDER — SACUBITRIL AND VALSARTAN 24; 26 MG/1; MG/1
1 TABLET, FILM COATED ORAL
Refills: 0 | Status: DISCONTINUED | OUTPATIENT
Start: 2020-01-22 | End: 2020-01-23

## 2020-01-22 RX ORDER — WARFARIN SODIUM 2.5 MG/1
3 TABLET ORAL ONCE
Refills: 0 | Status: COMPLETED | OUTPATIENT
Start: 2020-01-22 | End: 2020-01-22

## 2020-01-22 RX ADMIN — WARFARIN SODIUM 3 MILLIGRAM(S): 2.5 TABLET ORAL at 20:26

## 2020-01-22 RX ADMIN — Medication 1 DROP(S): at 17:29

## 2020-01-22 RX ADMIN — Medication 1 DROP(S): at 06:33

## 2020-01-22 RX ADMIN — PANTOPRAZOLE SODIUM 40 MILLIGRAM(S): 20 TABLET, DELAYED RELEASE ORAL at 06:33

## 2020-01-22 RX ADMIN — ENOXAPARIN SODIUM 40 MILLIGRAM(S): 100 INJECTION SUBCUTANEOUS at 20:26

## 2020-01-22 RX ADMIN — Medication 2 MILLIGRAM(S): at 20:26

## 2020-01-22 RX ADMIN — Medication 50 MILLIGRAM(S): at 13:35

## 2020-01-22 RX ADMIN — Medication 40 MILLIGRAM(S): at 06:33

## 2020-01-22 RX ADMIN — Medication 50 MILLIGRAM(S): at 20:26

## 2020-01-22 RX ADMIN — Medication 1 DROP(S): at 13:35

## 2020-01-22 RX ADMIN — TAMSULOSIN HYDROCHLORIDE 0.4 MILLIGRAM(S): 0.4 CAPSULE ORAL at 20:26

## 2020-01-22 RX ADMIN — Medication 50 MILLIGRAM(S): at 06:33

## 2020-01-22 RX ADMIN — SACUBITRIL AND VALSARTAN 1 TABLET(S): 24; 26 TABLET, FILM COATED ORAL at 20:26

## 2020-01-22 NOTE — PROGRESS NOTE ADULT - ASSESSMENT
Pt is 89 y/o male with chronic A Fib on coumadin, HTN, Prostate CA radiation and DVT, lives alone at home with HHA 3 times a week,  came to the ER s/p fall while preparing supper, Pt clearly denied LOC when I asked. In ED CTH negative, CT spine shows progression of T2 compression fracture- no acute fracture, x-ray hip shows acute left pubic rami fracture. Pt admitted to medicine, his home medicine resumed. Seen by orthopedic and recommended conservative management for fractures. Seen by PT and recommended [facility].   Pt also noted to have Afib with RVR with some volume overload, seen in consultation with cardiology, kept on IV Diuresis.     >Mechanical fall:  - Last admission in 11/19 with fall- recurrent fall.   - CT head with no acute finding.   - Pt denied LOC to me, he is awake/ alert.   - CTH negative, carotid with no significant stenosis.  - PT eval completed- Pt will need [facility]. SW to follow.      >Acute left Pubic rami fracture- Supportive measure with pain meds, PT eval.     >Chronic T2 compression fracture:  - Noted on CT scan with interval progression, I discussed with radiologist- its chronic fracture with progression not acute.   - Pain meds as needed  - Spine surgery eval noted- conservative management.     >UTI:  - UA abnormal X 2. Urine Cx 10-49 CFU.   - Completed Abx for 4 days, no signs of infection. D/lucho Abx.     >Chronic AFib with RVR:  - Rate better now, mostly <100.   - C/w metoprolol QID- Will change to 100 BID on d/c.   - Discussed with cardiology- plan to resume coumadin with goal INR 1.5-2.     >Acute systolic CHF:  - Volume status improved.    - EF 35-40% with regional WMA. No CP or troponin leak.   - Cardiology f/u noted-> Lasix switched to PO Lasix, entresto added. C/w BB.       >BPH- C/w Flomax    >DVT ppx- Lovenox.      >Dispo- PT eval completed, needs [facility] placement, SW to follow.

## 2020-01-22 NOTE — PROGRESS NOTE ADULT - SUBJECTIVE AND OBJECTIVE BOX
MECHE MILLER  [id number]      Chief Complaint:  New CMP/AF/CHF    Interval History:  Patient feels better, ambulating without SOB.  Denies CP/palps.    Tele:  AF with mostly controlled VR, no events      acetaminophen   Tablet .. 625 milliGRAM(s) Oral every 6 hours PRN  enoxaparin Injectable 40 milliGRAM(s) SubCutaneous daily  furosemide   Injectable 40 milliGRAM(s) IV Push daily  metoprolol tartrate 50 milliGRAM(s) Oral every 6 hours  metoprolol tartrate Injectable 5 milliGRAM(s) IV Push every 6 hours PRN  multivitamin 1 Tablet(s) Oral daily  ofloxacin 0.3% Solution 1 Drop(s) Both EYES four times a day  pantoprazole    Tablet 40 milliGRAM(s) Oral before breakfast  PRESERVISION 2 Tablet(s) 2 Tablet(s) Oral daily  tamsulosin 0.4 milliGRAM(s) Oral at bedtime          Physical Exam:  T(C): 36.5 (01-22-20 @ 09:14), Max: 36.7 (01-21-20 @ 23:02)  HR: 96 (01-22-20 @ 09:14) (93 - 111)  BP: 100/64 (01-22-20 @ 09:14) (100/64 - 118/70)  RR: 18 (01-22-20 @ 09:14) (16 - 18)  SpO2: 98% (01-22-20 @ 09:14) (96% - 98%)  Wt(kg): --  General: Comfortable in NAD  Neck: No JVD  CVS: nl s1s2, no s3  Pulm: CTA b/l  Abd: soft, non-tender  Ext: No c/c/e  Neuro A&O x3  Psych: Normal affect      Labs:   21 Jan 2020 05:56    139    |  98     |  28.0   ----------------------------<  101    4.5     |  29.0   |  1.01     Ca    9.1        21 Jan 2020 05:56                            11.5   11.11 )-----------( 143      ( 21 Jan 2020 05:56 )             34.8       Outpatient TTE (3/2019):  LVEF 55%, severely dilated left atrium, severely dilated right atrium  Normal right ventricle size and systolic function  Mild dilatation of the aortic root  Mild aortic valve sclerosis without stenosis  Mild AR  Moderate MR    TTE (1/17/2020):  Summary:   1. Left ventricular ejection fraction, by visual estimation, is 35 to 40%.   2. Moderately decreased global left ventricular systolic function.   3. Multiple left ventricular regional wall motion abnormalities exist. See wall motion findings.   4. The mitral in-flow pattern reveals no discernable A-wave, therefore no comment on diastolic function can be made.   5. There is no evidence of pericardial effusion.   6. Moderate to severe mitral valve regurgitation.   7. Mild prolapse of the anterior and posterior mitral valve leaflets.   8. Mild-moderate tricuspid regurgitation.   9. Mild aortic regurgitation.  10. Sclerotic aortic valve with normal opening.  11. Mobile intra-atrial septum.    Carotid US (1/17/2020):IMPRESSION:       1.  Right carotid system:  No hemodynamically significant plaque disease seen within the RIGHT carotid bulb, proximal internal carotid artery. External carotid artery patent.  2.  Left carotid system:  Nonhemodynamically significant plaque disease within LEFT carotid bulb, proximal external carotid artery and proximal internal carotid artery.        Assessment:  89yMale PMHX past medical history of Paroxysmal Atrial Fibrillation (on coumadin), Hypertension, with history of falls, who presented with fall also with left superior and inferior pubic rami fractures (unclear acuity). The patient denies history of syncope, however no one was home with patient so hard to determine if it was only a fall. Found to be in atrial fibrillation with RVR. He was also found to have volume overload, given BNP 2200s as well as TTE result of moderately reduced LVEF 35-40% and multiple LV regional wall motion abnormalities. Also with moderate to severe mitral regurgitation and mild to moderate tricuspid regurgitation. Patient denies symptoms of chest pain and troponin is negative, less concern for ischemia. Unclear if his volume overload and atrial fibrillation with RVR caused him to fall or if urinary tract infection contributed as well.  -Now diuresed and improved.  HR better as well.    Plan:  1. Tele  2. Change Lasix to po.  3. Continue Metoprolol QID for now.  Consider change to 100mg BID on d/c.  4. Coumadin to goal INR 1.5-2.0 given fall risk.  Consider Watchman as OP.  5. Continue other current CV meds at current doses.  6. Ischemic eval, likely via stress test as OP.  7. D/c planning to [facility]. MECHE MILLER  [id number]      Chief Complaint:  New CMP/AF/CHF    Interval History:  Patient feels better, ambulating without SOB.  Denies CP/palps.    Tele:  AF with mostly controlled VR, no events      acetaminophen   Tablet .. 625 milliGRAM(s) Oral every 6 hours PRN  enoxaparin Injectable 40 milliGRAM(s) SubCutaneous daily  furosemide   Injectable 40 milliGRAM(s) IV Push daily  metoprolol tartrate 50 milliGRAM(s) Oral every 6 hours  metoprolol tartrate Injectable 5 milliGRAM(s) IV Push every 6 hours PRN  multivitamin 1 Tablet(s) Oral daily  ofloxacin 0.3% Solution 1 Drop(s) Both EYES four times a day  pantoprazole    Tablet 40 milliGRAM(s) Oral before breakfast  PRESERVISION 2 Tablet(s) 2 Tablet(s) Oral daily  tamsulosin 0.4 milliGRAM(s) Oral at bedtime          Physical Exam:  T(C): 36.5 (01-22-20 @ 09:14), Max: 36.7 (01-21-20 @ 23:02)  HR: 96 (01-22-20 @ 09:14) (93 - 111)  BP: 100/64 (01-22-20 @ 09:14) (100/64 - 118/70)  RR: 18 (01-22-20 @ 09:14) (16 - 18)  SpO2: 98% (01-22-20 @ 09:14) (96% - 98%)  Wt(kg): --  General: Comfortable in NAD  Neck: No JVD  CVS: nl s1s2, no s3  Pulm: CTA b/l  Abd: soft, non-tender  Ext: No c/c/e  Neuro A&O x3  Psych: Normal affect      Labs:   21 Jan 2020 05:56    139    |  98     |  28.0   ----------------------------<  101    4.5     |  29.0   |  1.01     Ca    9.1        21 Jan 2020 05:56                            11.5   11.11 )-----------( 143      ( 21 Jan 2020 05:56 )             34.8       Outpatient TTE (3/2019):  LVEF 55%, severely dilated left atrium, severely dilated right atrium  Normal right ventricle size and systolic function  Mild dilatation of the aortic root  Mild aortic valve sclerosis without stenosis  Mild AR  Moderate MR    TTE (1/17/2020):  Summary:   1. Left ventricular ejection fraction, by visual estimation, is 35 to 40%.   2. Moderately decreased global left ventricular systolic function.   3. Multiple left ventricular regional wall motion abnormalities exist. See wall motion findings.   4. The mitral in-flow pattern reveals no discernable A-wave, therefore no comment on diastolic function can be made.   5. There is no evidence of pericardial effusion.   6. Moderate to severe mitral valve regurgitation.   7. Mild prolapse of the anterior and posterior mitral valve leaflets.   8. Mild-moderate tricuspid regurgitation.   9. Mild aortic regurgitation.  10. Sclerotic aortic valve with normal opening.  11. Mobile intra-atrial septum.    Carotid US (1/17/2020):IMPRESSION:       1.  Right carotid system:  No hemodynamically significant plaque disease seen within the RIGHT carotid bulb, proximal internal carotid artery. External carotid artery patent.  2.  Left carotid system:  Nonhemodynamically significant plaque disease within LEFT carotid bulb, proximal external carotid artery and proximal internal carotid artery.        Assessment:  89yMale PMHX past medical history of Paroxysmal Atrial Fibrillation (on coumadin), Hypertension, with history of falls, who presented with fall also with left superior and inferior pubic rami fractures (unclear acuity). The patient denies history of syncope, however no one was home with patient so hard to determine if it was only a fall. Found to be in atrial fibrillation with RVR. He was also found to have volume overload, given BNP 2200s as well as TTE result of moderately reduced LVEF 35-40% and multiple LV regional wall motion abnormalities. Also with moderate to severe mitral regurgitation and mild to moderate tricuspid regurgitation. Patient denies symptoms of chest pain and troponin is negative, less concern for ischemia. Unclear if his volume overload and atrial fibrillation with RVR caused him to fall or if urinary tract infection contributed as well.  -Now diuresed and improved.  HR better as well.    Plan:  1. Tele  2. Change Lasix to po.  3. Continue Metoprolol QID for now.  Consider change to 100mg BID on d/c.  4. Add low dose Entresto for CMP.  5. Coumadin to goal INR 1.5-2.0 given fall risk.  Consider Watchman as OP.  6. Continue other current CV meds at current doses.  7. Ischemic eval, likely via stress test as OP.  8. D/c planning to [facility].

## 2020-01-23 LAB
INR BLD: 1.09 RATIO — SIGNIFICANT CHANGE UP (ref 0.88–1.16)
PROTHROM AB SERPL-ACNC: 12.6 SEC — SIGNIFICANT CHANGE UP (ref 10–12.9)

## 2020-01-23 PROCEDURE — 99232 SBSQ HOSP IP/OBS MODERATE 35: CPT

## 2020-01-23 RX ORDER — WARFARIN SODIUM 2.5 MG/1
3 TABLET ORAL ONCE
Refills: 0 | Status: COMPLETED | OUTPATIENT
Start: 2020-01-23 | End: 2020-01-23

## 2020-01-23 RX ORDER — METOPROLOL TARTRATE 50 MG
50 TABLET ORAL EVERY 8 HOURS
Refills: 0 | Status: DISCONTINUED | OUTPATIENT
Start: 2020-01-23 | End: 2020-01-24

## 2020-01-23 RX ORDER — METOPROLOL TARTRATE 50 MG
25 TABLET ORAL ONCE
Refills: 0 | Status: COMPLETED | OUTPATIENT
Start: 2020-01-23 | End: 2020-01-23

## 2020-01-23 RX ADMIN — Medication 1 DROP(S): at 06:14

## 2020-01-23 RX ADMIN — Medication 50 MILLIGRAM(S): at 23:33

## 2020-01-23 RX ADMIN — ENOXAPARIN SODIUM 40 MILLIGRAM(S): 100 INJECTION SUBCUTANEOUS at 23:32

## 2020-01-23 RX ADMIN — Medication 1 DROP(S): at 00:39

## 2020-01-23 RX ADMIN — Medication 1 DROP(S): at 17:29

## 2020-01-23 RX ADMIN — Medication 1 DROP(S): at 23:33

## 2020-01-23 RX ADMIN — Medication 50 MILLIGRAM(S): at 00:39

## 2020-01-23 RX ADMIN — Medication 20 MILLIGRAM(S): at 06:37

## 2020-01-23 RX ADMIN — SACUBITRIL AND VALSARTAN 1 TABLET(S): 24; 26 TABLET, FILM COATED ORAL at 06:14

## 2020-01-23 RX ADMIN — Medication 25 MILLIGRAM(S): at 18:19

## 2020-01-23 RX ADMIN — PANTOPRAZOLE SODIUM 40 MILLIGRAM(S): 20 TABLET, DELAYED RELEASE ORAL at 06:14

## 2020-01-23 RX ADMIN — TAMSULOSIN HYDROCHLORIDE 0.4 MILLIGRAM(S): 0.4 CAPSULE ORAL at 23:32

## 2020-01-23 RX ADMIN — Medication 5 MILLIGRAM(S): at 17:50

## 2020-01-23 RX ADMIN — Medication 1 DROP(S): at 12:22

## 2020-01-23 RX ADMIN — WARFARIN SODIUM 3 MILLIGRAM(S): 2.5 TABLET ORAL at 23:32

## 2020-01-23 NOTE — PROGRESS NOTE ADULT - SUBJECTIVE AND OBJECTIVE BOX
MECHE MILLER  [id number]      Chief Complaint: Follow up fall and new cardiomyopathy      Interval History: The patient reports episode of dizziness earlier. Denies chest discomfort. Reports breathing has improved.       Tele: atrial fibrillation in 90s BPM, episode of 150 BPM when ambulating with physical therapy    Current meds:   acetaminophen   Tablet .. 625 milliGRAM(s) Oral every 6 hours PRN  enoxaparin Injectable 40 milliGRAM(s) SubCutaneous daily  furosemide    Tablet 20 milliGRAM(s) Oral daily  metoprolol tartrate 50 milliGRAM(s) Oral every 6 hours  metoprolol tartrate Injectable 5 milliGRAM(s) IV Push every 6 hours PRN  multivitamin 1 Tablet(s) Oral daily  ofloxacin 0.3% Solution 1 Drop(s) Both EYES four times a day  pantoprazole    Tablet 40 milliGRAM(s) Oral before breakfast  PRESERVISION 2 Tablet(s) 2 Tablet(s) Oral daily  sacubitril 24 mG/valsartan 26 mG 1 Tablet(s) Oral two times a day  tamsulosin 0.4 milliGRAM(s) Oral at bedtime        Objective:     Vital Signs:  T(C): 36.7 (01-23-20 @ 10:32), Max: 36.8 (01-22-20 @ 20:21)  HR: 74 (01-23-20 @ 10:32) (66 - 130)  BP: 93/53 (01-23-20 @ 10:32) (90/53 - 122/82)  RR: 16 (01-23-20 @ 10:32) (16 - 18)  SpO2: 94% (01-23-20 @ 10:32) (94% - 98%)  Wt(kg): --    Physical Exam:   General: elderly man, sitting upright, no distress  HEENT: dry oral mucosa  Neck: supple, no carotid bruits b/l  CVS: JVP ~9 cm H20, irregularly irregular, s1, s2, no murmurs  Chest: unlabored respirations, CTAB  Abdomen: non-distended  Extremities: no lower extremity edema b/l  Neuro: A&O x3  Psych: Normal affect    Labs:           PT/INR - ( 23 Jan 2020 06:45 )   PT: 12.6 sec;   INR: 1.09 ratio           Outpatient TTE (3/2019):  LVEF 55%, severely dilated left atrium, severely dilated right atrium  Normal right ventricle size and systolic function  Mild dilatation of the aortic root  Mild aortic valve sclerosis without stenosis  Mild AR  Moderate MR    TTE (1/17/2020):  Summary:   1. Left ventricular ejection fraction, by visual estimation, is 35 to 40%.   2. Moderately decreased global left ventricular systolic function.   3. Multiple left ventricular regional wall motion abnormalities exist. See wall motion findings.   4. The mitral in-flow pattern reveals no discernable A-wave, therefore no comment on diastolic function can be made.   5. There is no evidence of pericardial effusion.   6. Moderate to severe mitral valve regurgitation.   7. Mild prolapse of the anterior and posterior mitral valve leaflets.   8. Mild-moderate tricuspid regurgitation.   9. Mild aortic regurgitation.  10. Sclerotic aortic valve with normal opening.  11. Mobile intra-atrial septum.    ECG (1/16/2020): atrial fibrillation with RVR, RBBB    CXR (1/16/2020):  Findings:  Lines: None    Heart/Mediastinum/Lungs/Other: The heart size is normal. The lungs are clear. There are no pleural effusions.    CT Head (1/16/2020):  IMPRESSION:  Brain CT:   No acute intracranial hemorrhage, mass effect, or CT evidence of a large acute or recent subacute transcortical infarct. Small right parietal scalp hematoma.    Cervical spine CT:  No acute displaced cervical spine fracture or evidence of traumatic malalignment. Compression of T2 body with interval progression. If clinical symptoms persist, MRI of the cervical spine can be considered to assess for ligamentous or cord injury.    Xray Left Hip (1/16/2020):  Old right superior and inferior pubic rami fractures. Acute appearing left superior and inferior pubic rami fracture. No definite hip fracture. Degenerative changes and osteopenia.    Impression:  Left superior and inferior pubic rami fractures.      Carotid US (1/17/2020):IMPRESSION:       1.  Right carotid system:  No hemodynamically significant plaque disease seen within the RIGHT carotid bulb, proximal internal carotid artery. External carotid artery patent.  2.  Left carotid system:  Nonhemodynamically significant plaque disease within LEFT carotid bulb, proximal external carotid artery and proximal internal carotid artery.

## 2020-01-23 NOTE — PROGRESS NOTE ADULT - SUBJECTIVE AND OBJECTIVE BOX
MECHE MILLER Male 89y [id number]    Patient is a 89y old  Male who presents with a chief complaint of syncope (23 Jan 2020 12:05)      Subjective/objective:  Pt seen and examined at bedside, no over night event reported by night staff. Pt had Afib with RVR to 170s today during PT, he was persistently tachycardia to 160s during PT eval, improved at rest. His BP soft and metoprolol dose was held this morning. Pt clinically feeling better, offers no specific complaints.     Review of system:  No fever, chills, nausea, vomiting, headache, dizziness, chest pain.    PHYSICAL EXAM:    Vital Signs Last 24 Hrs  T(C): 36.7 (23 Jan 2020 10:32), Max: 36.8 (22 Jan 2020 20:21)  T(F): 98 (23 Jan 2020 10:32), Max: 98.3 (22 Jan 2020 20:21)  HR: 95 (23 Jan 2020 12:25) (66 - 130)  BP: 93/50 (23 Jan 2020 12:25) (90/53 - 122/82)  BP(mean): --  RR: 16 (23 Jan 2020 10:32) (16 - 18)  SpO2: 94% (23 Jan 2020 10:32) (94% - 98%)    GENERAL: Pt sitting comfortably, NAD.  CHEST/LUNG: Clear to auscultation bilaterally; No wheezing.  HEART: S1S2+, irregular, No murmurs.  ABDOMEN: Soft, Nontender, Nondistended; Bowel sounds present.  Extremities: LE edema, pulses+  NEURO: AAOX3, no focal deficits, no motor r sensory loss.  PSYCH: normal mood.      MEDICATIONS  (STANDING):  enoxaparin Injectable 40 milliGRAM(s) SubCutaneous daily  furosemide    Tablet 20 milliGRAM(s) Oral daily  metoprolol tartrate 50 milliGRAM(s) Oral every 8 hours  multivitamin 1 Tablet(s) Oral daily  ofloxacin 0.3% Solution 1 Drop(s) Both EYES four times a day  pantoprazole    Tablet 40 milliGRAM(s) Oral before breakfast  PRESERVISION 2 Tablet(s) 2 Tablet(s) Oral daily  tamsulosin 0.4 milliGRAM(s) Oral at bedtime  warfarin 3 milliGRAM(s) Oral once    MEDICATIONS  (PRN):  acetaminophen   Tablet .. 625 milliGRAM(s) Oral every 6 hours PRN Mild Pain (1 - 3)  metoprolol tartrate Injectable 5 milliGRAM(s) IV Push every 6 hours PRN HR > 140 sustained        Labs:  LABS:          PT/INR - ( 23 Jan 2020 06:45 )   PT: 12.6 sec;   INR: 1.09 ratio

## 2020-01-23 NOTE — PROGRESS NOTE ADULT - ASSESSMENT
Assessment:  Mr. White is a 89 year old man with past medical history of Paroxysmal Atrial Fibrillation (on coumadin), Hypertension, with history of falls, who presented with fall also with left superior and inferior pubic rami fractures (unclear acuity). The patient denies history of syncope, however no one was home with patient so hard to determine if it was only a fall. He is currently in atrial fibrillation with RVR so will rate control patient. He was also found to have volume overload, given BNP 2200s as well as TTE result of moderately reduced LVEF 35-40% and multiple LV regional wall motion abnormalities. Also with moderate to severe mitral regurgitation and mild to moderate tricuspid regurgitation. Patient denies symptoms of chest pain and troponin is negative, less concern for ischemia. Unclear if his volume overload and atrial fibrillation with RVR caused him to fall or if urinary tract infection contributed as well. Would recommend the following:     Recommendations:  [] Fall: May have been from deconditioning due to new cardiomyopathy and atrial fibrillation with rapid rates. Carotid US unremarkable.   [] Atrial Fibrillation with RVR:  HRs have improved to 90s BPM. Patient had episode today of Afib to 150s with ambulation with physical therapy but was asymptomatic. HR back down to 90s BPM for now. Can transition Metoprolol tartrate 50 mg PO QID to Metoprolol succinate 100 mg BID upon discharge. Discussed with Dr. Stephenson (patient's primary cardiologist) and due to frequent falls will plan to lower INR goal for patient to 1.5 -2 upon discharge.  [] Cardiomyopathy: LVEF 35-40% here was previously reported as 55% in 3/2019, patient also with regional wall motion abnormalities. Currently he has no chest pain and troponin is negative. Patient had low BP and reports mild dizziness, so would discontinue Entresto at this time.   Patient appears more euvolemic, continue Lasix 20 mg PO daily. Discussed cardiomyopathy with patient and he is thinking more non-invasive approach, likely stress testing as outpatient and further optimization of CHF treatment as outpatient with cardiologist.    Thank you for the consult. We will follow along.    Luis Kirby MD  Cardiology, [facility]

## 2020-01-23 NOTE — PROGRESS NOTE ADULT - ASSESSMENT
Pt is 89 y/o male with chronic A Fib on coumadin, HTN, Prostate CA radiation and DVT, lives alone at home with HHA 3 times a week,  came to the ER s/p fall while preparing supper, Pt clearly denied LOC when I asked. In ED CTH negative, CT spine shows progression of T2 compression fracture- no acute fracture, x-ray hip shows acute left pubic rami fracture. Pt admitted to medicine, his home medicine resumed. Seen by orthopedic and recommended conservative management for fractures. Seen by PT and recommended MONSERRAT.   Pt also noted to have Afib with RVR with some volume overload, seen in consultation with cardiology, kept on IV Diuresis., volume status improved, Lasix switched to PO. AC initially held given recurrent fall and then cardiology recommended to restart coumadin with lower INR goal 1.5-2.     >Mechanical fall:  - Last admission in 11/19 with fall- recurrent fall.   - CT head with no acute finding.   - Pt denied LOC to me, he is awake/ alert.   - CTH negative, carotid with no significant stenosis.  - PT eval completed- Pt will need MONSERRAT. SW to follow.      >Acute left Pubic rami fracture- Supportive measure with pain meds, PT eval.     >Chronic T2 compression fracture:  - Noted on CT scan with interval progression, I discussed with radiologist- its chronic fracture with progression not acute.   - Pain meds as needed  - Spine surgery eval noted- conservative management.     >UTI:  - UA abnormal X 2. Urine Cx 10-49 CFU.   - Completed Abx for 4 days, no signs of infection. D/lucho Abx.     >Chronic AFib with RVR:  - Rate upto 170s today on ambulation, BP soft.   - Rate improved with rest.   - Lower metoprolol QID to TID given soft BP.  - C/w coumadin with goal INR 1.5-2.   - Discussed with cardiology Dr Kirby today, will monitor on telemetry for better HR control.      >Acute systolic CHF:  - Volume status improved.    - EF 35-40% with regional WMA. No CP or troponin leak.   - Cardiology f/u noted-> Lasix switched to PO Lasix, C/w BB. Entresto d/lucho by cardiology due to soft BP.        >BPH- C/w Flomax    >DVT ppx- Lovenox.      >Dispo- PT eval completed, needs MONSERRAT placement. Not yet cleared given Afib not rate controlled, please check pt HR on ambulation prior to d/c. Discussed with cardiology.

## 2020-01-24 LAB
HCT VFR BLD CALC: 35.1 % — LOW (ref 39–50)
HGB BLD-MCNC: 11.6 G/DL — LOW (ref 13–17)
INR BLD: 1.15 RATIO — SIGNIFICANT CHANGE UP (ref 0.88–1.16)
MCHC RBC-ENTMCNC: 30.9 PG — SIGNIFICANT CHANGE UP (ref 27–34)
MCHC RBC-ENTMCNC: 33 GM/DL — SIGNIFICANT CHANGE UP (ref 32–36)
MCV RBC AUTO: 93.4 FL — SIGNIFICANT CHANGE UP (ref 80–100)
PLATELET # BLD AUTO: 151 K/UL — SIGNIFICANT CHANGE UP (ref 150–400)
PROTHROM AB SERPL-ACNC: 13.3 SEC — HIGH (ref 10–12.9)
RBC # BLD: 3.76 M/UL — LOW (ref 4.2–5.8)
RBC # FLD: 13.6 % — SIGNIFICANT CHANGE UP (ref 10.3–14.5)
WBC # BLD: 13.94 K/UL — HIGH (ref 3.8–10.5)
WBC # FLD AUTO: 13.94 K/UL — HIGH (ref 3.8–10.5)

## 2020-01-24 PROCEDURE — 99233 SBSQ HOSP IP/OBS HIGH 50: CPT

## 2020-01-24 PROCEDURE — 99223 1ST HOSP IP/OBS HIGH 75: CPT

## 2020-01-24 RX ORDER — METOPROLOL TARTRATE 50 MG
50 TABLET ORAL EVERY 8 HOURS
Refills: 0 | Status: DISCONTINUED | OUTPATIENT
Start: 2020-01-24 | End: 2020-01-31

## 2020-01-24 RX ORDER — METOPROLOL TARTRATE 50 MG
50 TABLET ORAL EVERY 6 HOURS
Refills: 0 | Status: DISCONTINUED | OUTPATIENT
Start: 2020-01-24 | End: 2020-01-24

## 2020-01-24 RX ORDER — WARFARIN SODIUM 2.5 MG/1
5 TABLET ORAL ONCE
Refills: 0 | Status: COMPLETED | OUTPATIENT
Start: 2020-01-24 | End: 2020-01-24

## 2020-01-24 RX ADMIN — ENOXAPARIN SODIUM 40 MILLIGRAM(S): 100 INJECTION SUBCUTANEOUS at 21:20

## 2020-01-24 RX ADMIN — Medication 50 MILLIGRAM(S): at 21:20

## 2020-01-24 RX ADMIN — Medication 1 DROP(S): at 12:06

## 2020-01-24 RX ADMIN — Medication 50 MILLIGRAM(S): at 05:54

## 2020-01-24 RX ADMIN — Medication 20 MILLIGRAM(S): at 05:54

## 2020-01-24 RX ADMIN — Medication 1 DROP(S): at 17:08

## 2020-01-24 RX ADMIN — Medication 50 MILLIGRAM(S): at 15:15

## 2020-01-24 RX ADMIN — Medication 1 DROP(S): at 23:29

## 2020-01-24 RX ADMIN — PANTOPRAZOLE SODIUM 40 MILLIGRAM(S): 20 TABLET, DELAYED RELEASE ORAL at 05:54

## 2020-01-24 RX ADMIN — TAMSULOSIN HYDROCHLORIDE 0.4 MILLIGRAM(S): 0.4 CAPSULE ORAL at 21:20

## 2020-01-24 RX ADMIN — Medication 1 DROP(S): at 05:54

## 2020-01-24 NOTE — CONSULT NOTE ADULT - SUBJECTIVE AND OBJECTIVE BOX
[place] [facility]                                                               Office:  [address] [place] [address]                                                              Telephone: [phone]. Fax:[phone]                                                                        CARDIOLOGY CONSULTATION NOTE                                                                                             Consult requested by:  Dr. Orozco  Reason for Consultation: Afib  History obtained by: Patient and medical record   obtained: No    Chief complaint:    Patient is a 89y old  Male who presents with a chief complaint of syncope (23 Jan 2020 13:02)        HPI:  89 y/o male with chronic A Fib on coumadin, HTN, lives alone with home health aid 3 times a week, presented to the ED for possible syncopal episode, patient was standing preparing dinner when he found himself on the floor. Pt with frequent falls, healing pelvic fx on xray, high risk for bleeding on AC. Denies fever, chills, cough, phlegm production, shortness of breath, dyspnea on exertion,  edema, chest pain, pressure, nausea, vomiting, melena, rectal bleed, hematuria, lightheadedness, dizziness.         REVIEW OF SYMPTOMS:   CONSTITUTIONAL: No fever, weight loss, or fatigue  ENMT:  No difficulty hearing, tinnitus, vertigo; No sinus or throat pain  NECK: No pain or stiffness  CARDIOVASCULAR: + palpitations No chest pain, dyspnea, syncope, dizziness, Orthopnea, Paroxsymal nocturnal dyspnea  RESPIRATORY: No Dyspnea on exertion, Shortness of breath, cough, wheezing  : No dysuria, no hematuria   GI: No dark color stool, no melena, no diarrhea, no constipation, no abdominal pain   NEURO: No headache, no dizziness, no slurred speech   MUSCULOSKELETAL: No joint pain or swelling; No muscle, back, or extremity pain  PSYCH: No agitation, no anxiety.    ALL OTHER REVIEW OF SYSTEMS ARE NEGATIVE.      PREVIOUS DIAGNOSTIC TESTING  ECHO FINDINGS:  < from: TTE Echo Complete w/Doppler (01.17.20 @ 11:51) >  PHYSICIAN INTERPRETATION:  Left Ventricle: The left ventricular internal cavity size is mild to moderately increased. Global LV systolic function was moderately decreased. Left ventricular ejection fraction, by visual estimation, is 35 to 40%. The mitral in-flow pattern reveals no discernable A-wave, therefore no comment on diastolic function can be made.       LV Wall Scoring:  The apical anterior segment, apical inferior segment, and apex are akinetic.  The apical septal segment and apical lateral segment are hypokinetic.    Right Ventricle: The right ventricular size is mildly enlarged. RV systolic function is mildly reduced.  Left Atrium: Severely enlarged left atrium. Mobile intra-atrial septum.  Right Atrium: Mildly enlarged right atrium.  Pericardium: There is no evidence of pericardial effusion.  Mitral Valve: Mitral leaflet mobility is normal. Moderate to severe mitral valve regurgitation is seen. Mild prolapse of the anterior and posterior mitral valve leaflets.  Tricuspid Valve: Mild-moderate tricuspid regurgitation is visualized.  Aortic Valve: The aortic valve is trileaflet. Sclerotic aortic valve with normal opening. Peak transaortic gradientequals 2.5 mmHg, mean transaortic gradient equals 1.5 mmHg, the calculated aortic valve area equals 3.09 cm² by the continuity equation consistent with normally opening aortic valve. Mild aortic valve regurgitation is seen.  Pulmonic Valve: Trace pulmonic valve regurgitation.  Aorta: The aortic root is normal in size and structure.  Pulmonary Artery: The main pulmonary artery is normal in size.  Venous: The inferior vena cava was normal sized, with respiratory size variation greater than 50%.       Summary:   1. Left ventricular ejection fraction, by visual estimation, is 35 to 40%.   2. Moderately decreased global left ventricular systolic function.   3. Multiple left ventricular regional wall motion abnormalities exist. See wall motion findings.   4. The mitral in-flow pattern reveals no discernable A-wave, therefore no comment on diastolic function can be made.   5. There is no evidence of pericardial effusion.   6. Moderate to severe mitral valve regurgitation.   7. Mild prolapse of the anterior and posterior mitral valve leaflets.   8. Mild-moderate tricuspid regurgitation.   9. Mild aortic regurgitation.  10. Sclerotic aortic valve with normal opening.  11. Mobile intra-atrial septum.    MD Santa Electronically signed on1/17/2020 at 2:53:11 PM       < end of copied text >    CATHETERIZATION FINDINGS:         ALLERGIES: Allergies    antibiotic (Rash)  tetracycline (Rash)    Intolerances      PAST MEDICAL HISTORY  VTE (venous thromboembolism)  Irritable bowel syndrome with diarrhea  Prostate CA  Cyst of pancreas  Cyst of kidney, acquired  GERD (gastroesophageal reflux disease)  Tachycardia  Afib      PAST SURGICAL HISTORY  H/O total knee replacement, right  Ablation - ?afib      FAMILY HISTORY:  No pertinent family history in first degree relatives      SOCIAL HISTORY:  Denies smoking/alcohol/drugs        CURRENT MEDICATIONS:  furosemide    Tablet 20 milliGRAM(s) Oral daily  metoprolol tartrate 50 milliGRAM(s) Oral every 8 hours  metoprolol tartrate Injectable 5 milliGRAM(s) IV Push every 6 hours PRN  tamsulosin 0.4 milliGRAM(s) Oral at bedtime  pantoprazole    Tablet  enoxaparin Injectable  multivitamin  ofloxacin 0.3% Solution  PRESERVISION 2 Tablet(s)  warfarin        HOME MEDICATIONS:      Vital Signs Last 24 Hrs  T(C): 36.6 (24 Jan 2020 10:33), Max: 36.8 (23 Jan 2020 15:35)  T(F): 97.8 (24 Jan 2020 10:33), Max: 98.3 (23 Jan 2020 15:35)  HR: 113 (24 Jan 2020 10:33) (99 - 121)  BP: 94/64 (24 Jan 2020 10:33) (94/64 - 124/75)  RR: 17 (24 Jan 2020 10:33) (16 - 18)  SpO2: 95% (24 Jan 2020 10:33) (95% - 98%)      PHYSICAL EXAM:  Constitutional: Comfortable . No acute distress.   HEENT: Atraumatic and normocephalic , neck is supple . no JVD. No carotid bruit. PEERL   CNS: A&Ox3. No focal deficits. EOMI. Cranial nerves II-IX are intact.   Lymph Nodes: Cervical : Not palpable.  Respiratory: CTAB  Cardiovascular: S1S2 irregular. 2/6 systolic murmur/rubs or gallop.  Gastrointestinal: Soft non-tender and non distended . +Bowel sounds.  Extremities: No edema.   Psychiatric: Calm . no agitation.  Skin: No skin rash/ulcers visualized to face, hands or feet.    Intake and output:   01-23 @ 07:01 - 01-24 @ 07:00  --------------------------------------------------------  IN: 500 mL / OUT: 450 mL / NET: 50 mL    01-24 @ 07:01 - 01-24 @ 14:37  --------------------------------------------------------  IN: 0 mL / OUT: 225 mL / NET: -225 mL        LABS:                        11.6   13.94 )-----------( 151      ( 24 Jan 2020 06:07 )             35.1       PT/INR - ( 24 Jan 2020 06:07 )   PT: 13.3 sec;   INR: 1.15 ratio        INTERPRETATION OF TELEMETRY: Afib/Aflutter RVR Rates 93-148bpm,   ECG: Afib RVR    RADIOLOGY & ADDITIONAL STUDIES:    X-ray:  reviewed by me.   CT scan:   MRI: [place] [facility]                                                               Office:  [address] [place] [address]                                                              Telephone: [phone]. Fax:[phone]                                                                        CARDIOLOGY CONSULTATION NOTE                                                                                             Consult requested by:  Dr. Orozco  Reason for Consultation: Afib  History obtained by: Patient and medical record   obtained: No    Chief complaint:    Patient is a 89y old  Male who presents with a chief complaint of syncope (23 Jan 2020 13:02)        HPI:  89 y/o male with chronic A Fib on coumadin, HTN, lives alone with home health aid 3 times a week, presented to the ED for fall. He was walking around without his walker while preparing dinner when he fell. He denied having syncope. He reports having frequent falls recently secondary to imbalance. He was found to have healing pelvic fx on xray, high risk for bleeding on AC. Denies fever, chills, cough, phlegm production, shortness of breath, dyspnea on exertion,  edema, chest pain, pressure, nausea, vomiting, melena, rectal bleed, hematuria, lightheadedness, dizziness.         REVIEW OF SYMPTOMS:   CONSTITUTIONAL: No fever, weight loss, or fatigue  ENMT:  No difficulty hearing, tinnitus, vertigo; No sinus or throat pain  NECK: No pain or stiffness  CARDIOVASCULAR: + palpitations No chest pain, dyspnea, syncope, dizziness, Orthopnea, Paroxsymal nocturnal dyspnea  RESPIRATORY: No Dyspnea on exertion, Shortness of breath, cough, wheezing  : No dysuria, no hematuria   GI: No dark color stool, no melena, no diarrhea, no constipation, no abdominal pain   NEURO: No headache, no dizziness, no slurred speech   MUSCULOSKELETAL: No joint pain or swelling; No muscle, back, or extremity pain  PSYCH: No agitation, no anxiety.    ALL OTHER REVIEW OF SYSTEMS ARE NEGATIVE.      PREVIOUS DIAGNOSTIC TESTING  ECHO FINDINGS:  < from: TTE Echo Complete w/Doppler (01.17.20 @ 11:51) >  PHYSICIAN INTERPRETATION:  Left Ventricle: The left ventricular internal cavity size is mild to moderately increased. Global LV systolic function was moderately decreased. Left ventricular ejection fraction, by visual estimation, is 35 to 40%. The mitral in-flow pattern reveals no discernable A-wave, therefore no comment on diastolic function can be made.       LV Wall Scoring:  The apical anterior segment, apical inferior segment, and apex are akinetic.  The apical septal segment and apical lateral segment are hypokinetic.    Right Ventricle: The right ventricular size is mildly enlarged. RV systolic function is mildly reduced.  Left Atrium: Severely enlarged left atrium. Mobile intra-atrial septum.  Right Atrium: Mildly enlarged right atrium.  Pericardium: There is no evidence of pericardial effusion.  Mitral Valve: Mitral leaflet mobility is normal. Moderate to severe mitral valve regurgitation is seen. Mild prolapse of the anterior and posterior mitral valve leaflets.  Tricuspid Valve: Mild-moderate tricuspid regurgitation is visualized.  Aortic Valve: The aortic valve is trileaflet. Sclerotic aortic valve with normal opening. Peak transaortic gradientequals 2.5 mmHg, mean transaortic gradient equals 1.5 mmHg, the calculated aortic valve area equals 3.09 cm² by the continuity equation consistent with normally opening aortic valve. Mild aortic valve regurgitation is seen.  Pulmonic Valve: Trace pulmonic valve regurgitation.  Aorta: The aortic root is normal in size and structure.  Pulmonary Artery: The main pulmonary artery is normal in size.  Venous: The inferior vena cava was normal sized, with respiratory size variation greater than 50%.       Summary:   1. Left ventricular ejection fraction, by visual estimation, is 35 to 40%.   2. Moderately decreased global left ventricular systolic function.   3. Multiple left ventricular regional wall motion abnormalities exist. See wall motion findings.   4. The mitral in-flow pattern reveals no discernable A-wave, therefore no comment on diastolic function can be made.   5. There is no evidence of pericardial effusion.   6. Moderate to severe mitral valve regurgitation.   7. Mild prolapse of the anterior and posterior mitral valve leaflets.   8. Mild-moderate tricuspid regurgitation.   9. Mild aortic regurgitation.  10. Sclerotic aortic valve with normal opening.  11. Mobile intra-atrial septum.    MD Santa Electronically signed on1/17/2020 at 2:53:11 PM       < end of copied text >    CATHETERIZATION FINDINGS:         ALLERGIES: Allergies    antibiotic (Rash)  tetracycline (Rash)    Intolerances      PAST MEDICAL HISTORY  VTE (venous thromboembolism)  Irritable bowel syndrome with diarrhea  Prostate CA  Cyst of pancreas  Cyst of kidney, acquired  GERD (gastroesophageal reflux disease)  Tachycardia  Afib      PAST SURGICAL HISTORY  H/O total knee replacement, right  Ablation - ?afib      FAMILY HISTORY:  No pertinent family history in first degree relatives      SOCIAL HISTORY:  Denies smoking/alcohol/drugs        CURRENT MEDICATIONS:  furosemide    Tablet 20 milliGRAM(s) Oral daily  metoprolol tartrate 50 milliGRAM(s) Oral every 8 hours  metoprolol tartrate Injectable 5 milliGRAM(s) IV Push every 6 hours PRN  tamsulosin 0.4 milliGRAM(s) Oral at bedtime  pantoprazole    Tablet  enoxaparin Injectable  multivitamin  ofloxacin 0.3% Solution  PRESERVISION 2 Tablet(s)  warfarin        HOME MEDICATIONS:      Vital Signs Last 24 Hrs  T(C): 36.6 (24 Jan 2020 10:33), Max: 36.8 (23 Jan 2020 15:35)  T(F): 97.8 (24 Jan 2020 10:33), Max: 98.3 (23 Jan 2020 15:35)  HR: 113 (24 Jan 2020 10:33) (99 - 121)  BP: 94/64 (24 Jan 2020 10:33) (94/64 - 124/75)  RR: 17 (24 Jan 2020 10:33) (16 - 18)  SpO2: 95% (24 Jan 2020 10:33) (95% - 98%)      PHYSICAL EXAM:  Constitutional: Comfortable . No acute distress.   HEENT: Atraumatic and normocephalic , neck is supple . no JVD. No carotid bruit. PEERL   CNS: A&Ox3. No focal deficits. EOMI. Cranial nerves II-IX are intact.   Lymph Nodes: Cervical : Not palpable.  Respiratory: CTAB  Cardiovascular: S1S2 irregular. 2/6 systolic murmur/rubs or gallop.  Gastrointestinal: Soft non-tender and non distended . +Bowel sounds.  Extremities: No edema.   Psychiatric: Calm . no agitation.  Skin: No skin rash/ulcers visualized to face, hands or feet.    Intake and output:   01-23 @ 07:01 - 01-24 @ 07:00  --------------------------------------------------------  IN: 500 mL / OUT: 450 mL / NET: 50 mL    01-24 @ 07:01 - 01-24 @ 14:37  --------------------------------------------------------  IN: 0 mL / OUT: 225 mL / NET: -225 mL        LABS:                        11.6   13.94 )-----------( 151      ( 24 Jan 2020 06:07 )             35.1       PT/INR - ( 24 Jan 2020 06:07 )   PT: 13.3 sec;   INR: 1.15 ratio        INTERPRETATION OF TELEMETRY: Afib/Aflutter RVR Rates 93-148bpm,   ECG: Afib RVR    RADIOLOGY & ADDITIONAL STUDIES:    X-ray:  reviewed by me.   CT scan:   MRI:

## 2020-01-24 NOTE — PROGRESS NOTE ADULT - SUBJECTIVE AND OBJECTIVE BOX
Patient is a 89y old  Male who presents with a chief complaint of syncope (24 Jan 2020 16:16) remains with uncontrolled AF RVR       HEALTH ISSUES - PROBLEM Dx:  Essential hypertension: Essential hypertension  Conjunctivitis of both eyes, unspecified conjunctivitis type: Conjunctivitis of both eyes, unspecified conjunctivitis type  Chronic atrial fibrillation: Chronic atrial fibrillation  Syncope, unspecified syncope type: Syncope, unspecified syncope type          INTERVAL HPI/OVERNIGHT EVENTS:  Patient seen and examined at bedside. No acute events overnight. Patient states he continues to feel intermittent palpations, understands his HR remains uncontrolled and needs further evaluation and adjustments of medications as well as possible procedure to slow HR and he is in agreement. Understands after HR stable will be able to go to [facility]. Patient denies chest pain, SOB, abd pain, N/V, fever, chills, dysuria or any other complaints. All remainder ROS negative.     Vital Signs Last 24 Hrs  T(C): 36.8 (25 Jan 2020 05:23), Max: 36.9 (24 Jan 2020 20:00)  T(F): 98.2 (25 Jan 2020 05:23), Max: 98.5 (24 Jan 2020 20:00)  HR: 111 (25 Jan 2020 05:23) (101 - 114)  BP: 108/62 (25 Jan 2020 05:23) (94/64 - 110/65)  BP(mean): --  RR: 18 (25 Jan 2020 05:23) (17 - 18)  SpO2: 93% (24 Jan 2020 20:00) (92% - 95%)    I&O's Summary    23 Jan 2020 07:01  -  24 Jan 2020 07:00  --------------------------------------------------------  IN: 500 mL / OUT: 450 mL / NET: 50 mL    24 Jan 2020 07:01  -  25 Jan 2020 06:07  --------------------------------------------------------  IN: 120 mL / OUT: 305 mL / NET: -185 mL        CONSTITUTIONAL: Well appearing, well nourished, awake, alert and in no apparent distress  CARDIAC: irregular irregular  Heart sounds S1, S2.  No murmurs, rubs or gallops   RESPIRATORY: Breath sounds clear and equal bilaterally. No wheezes, rhales or rhonchi  gastroenterology: soft nt nd bs +   EXTREMITIES: No edema, cyanosis or deformity   NEUROLOGICAL: Alert and oriented, no focal deficits, no motor or sensory deficits.  SKIN: No rash, skin turgor wnl     MEDICATIONS  (STANDING):  enoxaparin Injectable 40 milliGRAM(s) SubCutaneous daily  furosemide    Tablet 20 milliGRAM(s) Oral daily  metoprolol tartrate 50 milliGRAM(s) Oral every 8 hours  multivitamin 1 Tablet(s) Oral daily  ofloxacin 0.3% Solution 1 Drop(s) Both EYES four times a day  pantoprazole    Tablet 40 milliGRAM(s) Oral before breakfast  PRESERVISION 1 Tablet(s) 1 Tablet(s) Oral two times a day  tamsulosin 0.4 milliGRAM(s) Oral at bedtime    MEDICATIONS  (PRN):  acetaminophen   Tablet .. 625 milliGRAM(s) Oral every 6 hours PRN Mild Pain (1 - 3)  metoprolol tartrate Injectable 5 milliGRAM(s) IV Push every 6 hours PRN HR > 140 sustained      LABS:                        11.6   13.94 )-----------( 151      ( 24 Jan 2020 06:07 )             35.1           PT/INR - ( 24 Jan 2020 06:07 )   PT: 13.3 sec;   INR: 1.15 ratio                 RADIOLOGY & ADDITIONAL TESTS:  No new imaging studies

## 2020-01-24 NOTE — PROGRESS NOTE ADULT - SUBJECTIVE AND OBJECTIVE BOX
MECHE MILLER  [id number]      Chief Complaint:  New CMP/AF/CHF    Interval History:  Patient feels better, ambulating without SOB.  Denies CP/palps.    Tele:  AF with mostly controlled VR, no events        acetaminophen   Tablet .. 625 milliGRAM(s) Oral every 6 hours PRN  enoxaparin Injectable 40 milliGRAM(s) SubCutaneous daily  furosemide    Tablet 20 milliGRAM(s) Oral daily  metoprolol tartrate 50 milliGRAM(s) Oral every 8 hours  metoprolol tartrate Injectable 5 milliGRAM(s) IV Push every 6 hours PRN  multivitamin 1 Tablet(s) Oral daily  ofloxacin 0.3% Solution 1 Drop(s) Both EYES four times a day  pantoprazole    Tablet 40 milliGRAM(s) Oral before breakfast  PRESERVISION 2 Tablet(s) 2 Tablet(s) Oral daily  tamsulosin 0.4 milliGRAM(s) Oral at bedtime  warfarin 5 milliGRAM(s) Oral once          Physical Exam:  T(C): 36.6 (01-24-20 @ 10:33), Max: 36.8 (01-23-20 @ 15:35)  HR: 113 (01-24-20 @ 10:33) (95 - 121)  BP: 94/64 (01-24-20 @ 10:33) (92/52 - 124/75)  RR: 17 (01-24-20 @ 10:33) (16 - 18)  SpO2: 95% (01-24-20 @ 10:33) (95% - 98%)  Wt(kg): --  General: Comfortable in NAD  Neck: No JVD  CVS: nl s1s2, no s3  Pulm: Minimal bibasilar rales  Abd: soft, non-tender  Ext: No c/c/e  Neuro A&O x3  Psych: Normal affect      Labs:                               11.6   13.94 )-----------( 151      ( 24 Jan 2020 06:07 )             35.1     PT/INR - ( 24 Jan 2020 06:07 )   PT: 13.3 sec;   INR: 1.15 ratio             Outpatient TTE (3/2019):  LVEF 55%, severely dilated left atrium, severely dilated right atrium  Normal right ventricle size and systolic function  Mild dilatation of the aortic root  Mild aortic valve sclerosis without stenosis  Mild AR  Moderate MR    TTE (1/17/2020):  Summary:   1. Left ventricular ejection fraction, by visual estimation, is 35 to 40%.   2. Moderately decreased global left ventricular systolic function.   3. Multiple left ventricular regional wall motion abnormalities exist. See wall motion findings.   4. The mitral in-flow pattern reveals no discernable A-wave, therefore no comment on diastolic function can be made.   5. There is no evidence of pericardial effusion.   6. Moderate to severe mitral valve regurgitation.   7. Mild prolapse of the anterior and posterior mitral valve leaflets.   8. Mild-moderate tricuspid regurgitation.   9. Mild aortic regurgitation.  10. Sclerotic aortic valve with normal opening.  11. Mobile intra-atrial septum.    Carotid US (1/17/2020):IMPRESSION:       1.  Right carotid system:  No hemodynamically significant plaque disease seen within the RIGHT carotid bulb, proximal internal carotid artery. External carotid artery patent.  2.  Left carotid system:  Nonhemodynamically significant plaque disease within LEFT carotid bulb, proximal external carotid artery and proximal internal carotid artery.        Assessment:  89yMale PMHX past medical history of Paroxysmal Atrial Fibrillation (on coumadin), Hypertension, with history of falls, who presented with fall also with left superior and inferior pubic rami fractures (unclear acuity). The patient denies history of syncope, however no one was home with patient so hard to determine if it was only a fall. Found to be in atrial fibrillation with RVR. He was also found to have volume overload, given BNP 2200s as well as TTE result of moderately reduced LVEF 35-40% and multiple LV regional wall motion abnormalities. Also with moderate to severe mitral regurgitation and mild to moderate tricuspid regurgitation. Patient denies symptoms of chest pain and troponin is negative, less concern for ischemia. Unclear if his volume overload and atrial fibrillation with RVR caused him to fall or if urinary tract infection contributed as well.  -Now diuresed and improved.    -BP continues to be on low side, causing holding of BB.  Entresto tried but BP will not support so d/c'ed.  HR still elevated with any ambulation.  Limited in options, consider AVN ablation and PPM (Bi-V vs. HIS pacing).    Plan:  1. Tele  2. Continue Lasix po.  3. Continue Metoprolol TID for now.    4. Coumadin to goal INR 1.5-2.0 given fall risk.  Consider Watchman as OP.  5. Continue other current CV meds at current doses.  6. EP eval for ?ablate and pace or other options for AF.  7. Ischemic eval, likely via stress test as OP.

## 2020-01-24 NOTE — CONSULT NOTE ADULT - ATTENDING COMMENTS
Attending statement:  I have personally seen this patient, and formed a face to face diagnostic evaluation on this patient on this date.  I have reviewed the PA, NP and or Medical/PA student and/or Resident documentation and agree with the history, physical exam and plan of care except if noted otherwise.     Patient was evaluated emergency department January 17 with regard to upper thoracic compression fractures we'll treat these conservatively weightbearing as tolerated PT OT etc. Patient can continue with cardiac workup in followup of without hesitation. I would not recommend bracing for an upper thoracic compression fracture in this gentleman.
I have personally seen, examined, and participated in the care of this patient. I have reviewed all pertinent clinical information, including history, physical exam, plan, and the PA/NP's note and agree except as noted below.    89 year old male with paroxysmal AF on warfarin and HTN who presented with a fall and pelvic fracture. He has had frequent falls recently. During admission there has been difficulty with rate control and it has been limited by relative hypotension. Given his advanced age and inability to maintain full dose anticoagulation from falls, rhythm control option is limited. Therefore, he would benefit most with AVN ablation and pacemaker implantation. Since his LVEF is <50%, but above 35%, cardiac resynchronization with LV lead or His bundle pacing would be indicated.    After discussion of risks, benefits and alternatives with the patient, he agrees to proceed with procedure.    Recommendations:  - Continue warfarin to achieve goal INR  - AVN ablation and CRT-P on Monday (please keep patient NPO after midnight on Sunday)  - May consider Watchman implantation at a later date if pt is intolerant of AC    Thank you for this consult. We will follow with you.    Stewart Davenport MD  Clinical Cardiac Electrophysiology

## 2020-01-24 NOTE — PROGRESS NOTE ADULT - ASSESSMENT
89 y/o male with chronic A Fib on coumadin, HTN, Prostate CA radiation and DVT, lives alone at home with HHA 3 times a week,  came to the ER s/p fall while preparing supper, no loc, in the ED CT head negative, CT spine showed progression of T2 compression fracture- no acute fracture otherwise, x-ray hip shows acute left pubic rami fracture. Pt admitted to medicine s/p fall with left pubic rami fx and progression of t2 compression fx, evaluated by ortho and recommended for conservation management and pain control, no sx intervention. Hospital course complicated by A fib RVR and acute systolic HF exacerbation. Pt s/p IV diuresis with lasix with improvement in volume status and lasix switched to po. Optimization by cardio for A fib RVR but given hypotension limited medications could be used. EP consulted and pt planned for AVN ablation and CRT-P on Monday.     >Chronic AFib with RVR:  - Rate upto 160s today on ambulation with low labile bp   - Rate improves with rest   - c/w metoprolol TID given soft BP   - C/w coumadin with goal INR 1.5-2  (lower given falls per cardio)  will give 5mg po qhs tonight   INR subtherapeutic   - Discussed with cardiology Dr Santiago who informed EP to evaluate the pt, limited ability to up titrate medication   - EP consult noted and appreciated,  pt to be npo sunday night and have AVN ablation and CRT-P on Monday (please keep patient NPO after midnight on Sunday)  - May consider Watchman implantation as an outpt if pt intolerant to AC per EP     >Mechanical fall with left pubic rami fx and chronic T2 compression fx   - Last admission in 11/19 with fall- recurrent fall   - CT head with no acute finding   -  carotid with no significant stenosis  - ct pelvis and spine with interval progression t2 fx   - c/w pain medications as ordered  - ortho recommended no sx intervention conservative mx    - PT eval completed- Pt will need MONSERRAT. SW to follow    >Acute systolic CHF:  - Volume status improved   - asymptomatic   - cardiac enzymes negative   - EF 35-40% with regional WMA.    - c/w lasix po qd   - c/w metoprolol  - entresto was started inpatient and discontinued given low bp and need for bp for up titration of rate control medications for A fib   - Cardiology f/u noted and appreciated     >UTI:  - Afebrile   - no leukocytosis   - UA abnormal X 2. Urine Cx 10-49 CFU  - Completed Abx for 4 days, no signs of infection       >BPH- C/w Flomax po qhs     >DVT ppx  - covered on coumadin   - c/w lovenox not full dose dvt ppx with coumadin     >Dispo- PT eval completed, needs MONSERRAT placement. Not yet cleared given Afib not rate controlled, anticipated in 2-3 days pending further cardiac work up.

## 2020-01-24 NOTE — CONSULT NOTE ADULT - ASSESSMENT
89 y/o male with chronic A Fib on coumadin, HTN, lives alone with home health aid 3 times a week, presented to the ED for possible syncopal episode, patient was standing preparing dinner when he found himself on the floor. Pt with frequent falls, healing pelvic fx on xray, high risk for bleeding on AC.     Afib/Aflutter  - Afib rate controlled  bpm, with ambulation, exertion HR up to 140-150  - CHADSVASC (age, CHF, HTN) 4; age and frequent falls- increased risk of bleeding  - AV ton ablation and CRT- atrial and biventricular leads discussed with pt.   - Pt agreeable, Dr. Davenport following, will discuss care with pts daughter Jessica Ortega. Possible placement Monday  - continue metoprolol for now  - ?previous ischemic workup- plan for out pt stress as per chart    Please see Dr. Davenport full recommendations. 89 y/o male with chronic A Fib on coumadin, HTN, lives alone with home health aid 3 times a week, presented to the ED for fall. He was walking around without his walker while preparing dinner when he fell. He denied having syncope. He reports having frequent falls recently secondary to imbalance. He was found to have healing pelvic fx on xray, high risk for bleeding on AC. Denies fever, chills, cough, phlegm production, shortness of breath, dyspnea on exertion,  edema, chest pain, pressure, nausea, vomiting, melena, rectal bleed, hematuria, lightheadedness, dizziness.     Afib/Aflutter  - Afib rate controlled  bpm, with ambulation, exertion HR up to 140-150  - CHADSVASC (age, CHF, HTN) 4; age and frequent falls- increased risk of bleeding  - AV ton ablation and CRT- atrial and biventricular leads discussed with pt.   - Pt agreeable, Dr. Davenport following, will discuss care with pts daughter Jessica Ortega. Possible placement Monday  - continue metoprolol for now  - ?previous ischemic workup- plan for out pt stress as per chart    Please see Dr. Davenport full recommendations.

## 2020-01-25 LAB
ANION GAP SERPL CALC-SCNC: 13 MMOL/L — SIGNIFICANT CHANGE UP (ref 5–17)
ANISOCYTOSIS BLD QL: SLIGHT — SIGNIFICANT CHANGE UP
BASOPHILS # BLD AUTO: 0 K/UL — SIGNIFICANT CHANGE UP (ref 0–0.2)
BASOPHILS NFR BLD AUTO: 0 % — SIGNIFICANT CHANGE UP (ref 0–2)
BUN SERPL-MCNC: 34 MG/DL — HIGH (ref 8–20)
CALCIUM SERPL-MCNC: 9.2 MG/DL — SIGNIFICANT CHANGE UP (ref 8.6–10.2)
CHLORIDE SERPL-SCNC: 100 MMOL/L — SIGNIFICANT CHANGE UP (ref 98–107)
CO2 SERPL-SCNC: 30 MMOL/L — HIGH (ref 22–29)
CREAT SERPL-MCNC: 0.95 MG/DL — SIGNIFICANT CHANGE UP (ref 0.5–1.3)
CULTURE RESULTS: SIGNIFICANT CHANGE UP
EOSINOPHIL # BLD AUTO: 0.25 K/UL — SIGNIFICANT CHANGE UP (ref 0–0.5)
EOSINOPHIL NFR BLD AUTO: 2 % — SIGNIFICANT CHANGE UP (ref 0–6)
GLUCOSE SERPL-MCNC: 102 MG/DL — HIGH (ref 70–99)
HCT VFR BLD CALC: 34.4 % — LOW (ref 39–50)
HGB BLD-MCNC: 10.9 G/DL — LOW (ref 13–17)
INR BLD: 1.19 RATIO — HIGH (ref 0.88–1.16)
LYMPHOCYTES # BLD AUTO: 38 % — SIGNIFICANT CHANGE UP (ref 13–44)
LYMPHOCYTES # BLD AUTO: 4.81 K/UL — HIGH (ref 1–3.3)
MANUAL SMEAR VERIFICATION: YES — SIGNIFICANT CHANGE UP
MCHC RBC-ENTMCNC: 29.8 PG — SIGNIFICANT CHANGE UP (ref 27–34)
MCHC RBC-ENTMCNC: 31.7 GM/DL — LOW (ref 32–36)
MCV RBC AUTO: 94 FL — SIGNIFICANT CHANGE UP (ref 80–100)
MONOCYTES # BLD AUTO: 0.63 K/UL — SIGNIFICANT CHANGE UP (ref 0–0.9)
MONOCYTES NFR BLD AUTO: 5 % — SIGNIFICANT CHANGE UP (ref 2–14)
NEUTROPHILS # BLD AUTO: 5.82 K/UL — SIGNIFICANT CHANGE UP (ref 1.8–7.4)
NEUTROPHILS NFR BLD AUTO: 46 % — SIGNIFICANT CHANGE UP (ref 43–77)
NRBC # BLD: 0 /100 — SIGNIFICANT CHANGE UP (ref 0–0)
PLAT MORPH BLD: NORMAL — SIGNIFICANT CHANGE UP
PLATELET # BLD AUTO: 145 K/UL — LOW (ref 150–400)
POIKILOCYTOSIS BLD QL AUTO: SLIGHT — SIGNIFICANT CHANGE UP
POTASSIUM SERPL-MCNC: 4.4 MMOL/L — SIGNIFICANT CHANGE UP (ref 3.5–5.3)
POTASSIUM SERPL-SCNC: 4.4 MMOL/L — SIGNIFICANT CHANGE UP (ref 3.5–5.3)
PROCALCITONIN SERPL-MCNC: 0.06 NG/ML — SIGNIFICANT CHANGE UP (ref 0.02–0.1)
PROTHROM AB SERPL-ACNC: 13.7 SEC — HIGH (ref 10–12.9)
RBC # BLD: 3.66 M/UL — LOW (ref 4.2–5.8)
RBC # FLD: 13.8 % — SIGNIFICANT CHANGE UP (ref 10.3–14.5)
RBC BLD AUTO: SIGNIFICANT CHANGE UP
SMUDGE CELLS # BLD: PRESENT — SIGNIFICANT CHANGE UP
SODIUM SERPL-SCNC: 143 MMOL/L — SIGNIFICANT CHANGE UP (ref 135–145)
SPECIMEN SOURCE: SIGNIFICANT CHANGE UP
VARIANT LYMPHS # BLD: 9 % — HIGH (ref 0–6)
WBC # BLD: 12.66 K/UL — HIGH (ref 3.8–10.5)
WBC # FLD AUTO: 12.66 K/UL — HIGH (ref 3.8–10.5)

## 2020-01-25 PROCEDURE — 99232 SBSQ HOSP IP/OBS MODERATE 35: CPT

## 2020-01-25 PROCEDURE — 99233 SBSQ HOSP IP/OBS HIGH 50: CPT

## 2020-01-25 RX ORDER — WARFARIN SODIUM 2.5 MG/1
5 TABLET ORAL DAILY
Refills: 0 | Status: DISCONTINUED | OUTPATIENT
Start: 2020-01-25 | End: 2020-01-27

## 2020-01-25 RX ADMIN — ENOXAPARIN SODIUM 40 MILLIGRAM(S): 100 INJECTION SUBCUTANEOUS at 22:02

## 2020-01-25 RX ADMIN — Medication 20 MILLIGRAM(S): at 05:28

## 2020-01-25 RX ADMIN — Medication 50 MILLIGRAM(S): at 15:03

## 2020-01-25 RX ADMIN — Medication 50 MILLIGRAM(S): at 22:01

## 2020-01-25 RX ADMIN — PANTOPRAZOLE SODIUM 40 MILLIGRAM(S): 20 TABLET, DELAYED RELEASE ORAL at 05:28

## 2020-01-25 RX ADMIN — Medication 1 DROP(S): at 12:00

## 2020-01-25 RX ADMIN — WARFARIN SODIUM 5 MILLIGRAM(S): 2.5 TABLET ORAL at 22:01

## 2020-01-25 RX ADMIN — Medication 50 MILLIGRAM(S): at 05:28

## 2020-01-25 RX ADMIN — TAMSULOSIN HYDROCHLORIDE 0.4 MILLIGRAM(S): 0.4 CAPSULE ORAL at 22:01

## 2020-01-25 RX ADMIN — Medication 1 DROP(S): at 05:28

## 2020-01-25 RX ADMIN — Medication 1 DROP(S): at 17:07

## 2020-01-25 NOTE — PROGRESS NOTE ADULT - ASSESSMENT
1. Uncontrolled afib   refractory to medical management .  plan is for AV ton ablation and CRT placement .  continue with tele.   coumadin with prophylactic dose lovenox.    2. Acute on chronic systolic CHF   improved.  on oral lasix, cardio on board .    3. Left pubic ramus fracture / L1 fracture   conservative management.  pain control as needed .    4. GERD   protonix.    5. BPH   flomax.    6. DVT prophylaxis  coumadin and lovenox prophylactic .    discussed with cardio and with patient , he is in agreement with taking coumadin .   will follow INR in am

## 2020-01-25 NOTE — PROGRESS NOTE ADULT - SUBJECTIVE AND OBJECTIVE BOX
MECHE MILLER  [id number]      Chief Complaint:  New CMP/AF/CHF    Interval History:  Patient feels better, ambulating without SOB.  Denies CP/palps.    Tele:  AF with mostly controlled VR, no events        acetaminophen   Tablet .. 625 milliGRAM(s) Oral every 6 hours PRN  enoxaparin Injectable 40 milliGRAM(s) SubCutaneous daily  furosemide    Tablet 20 milliGRAM(s) Oral daily  metoprolol tartrate 50 milliGRAM(s) Oral every 8 hours  metoprolol tartrate Injectable 5 milliGRAM(s) IV Push every 6 hours PRN  multivitamin 1 Tablet(s) Oral daily  ofloxacin 0.3% Solution 1 Drop(s) Both EYES four times a day  pantoprazole    Tablet 40 milliGRAM(s) Oral before breakfast  PRESERVISION 1 Tablet(s) 1 Tablet(s) Oral two times a day  tamsulosin 0.4 milliGRAM(s) Oral at bedtime  warfarin 5 milliGRAM(s) Oral daily          Physical Exam:  T(C): 36.8 (01-25-20 @ 07:52), Max: 36.9 (01-24-20 @ 20:00)  HR: 89 (01-25-20 @ 07:52) (89 - 114)  BP: 100/60 (01-25-20 @ 07:52) (100/60 - 110/65)  RR: 18 (01-25-20 @ 07:52) (17 - 18)  SpO2: 93% (01-25-20 @ 07:52) (92% - 93%)  Wt(kg): --  General: Comfortable in NAD  Neck: No JVD  CVS: nl s1s2, no s3  Pulm: Minimal bibasilar rales  Abd: soft, non-tender  Ext: No c/c/e  Neuro A&O x3  Psych: Normal affect      Labs:   25 Jan 2020 06:55    143    |  100    |  34.0   ----------------------------<  102    4.4     |  30.0   |  0.95     Ca    9.2        25 Jan 2020 06:55                            10.9   12.66 )-----------( 145      ( 25 Jan 2020 06:55 )             34.4     PT/INR - ( 25 Jan 2020 06:55 )   PT: 13.7 sec;   INR: 1.19 ratio             Outpatient TTE (3/2019):  LVEF 55%, severely dilated left atrium, severely dilated right atrium  Normal right ventricle size and systolic function  Mild dilatation of the aortic root  Mild aortic valve sclerosis without stenosis  Mild AR  Moderate MR    TTE (1/17/2020):  Summary:   1. Left ventricular ejection fraction, by visual estimation, is 35 to 40%.   2. Moderately decreased global left ventricular systolic function.   3. Multiple left ventricular regional wall motion abnormalities exist. See wall motion findings.   4. The mitral in-flow pattern reveals no discernable A-wave, therefore no comment on diastolic function can be made.   5. There is no evidence of pericardial effusion.   6. Moderate to severe mitral valve regurgitation.   7. Mild prolapse of the anterior and posterior mitral valve leaflets.   8. Mild-moderate tricuspid regurgitation.   9. Mild aortic regurgitation.  10. Sclerotic aortic valve with normal opening.  11. Mobile intra-atrial septum.    Carotid US (1/17/2020):IMPRESSION:       1.  Right carotid system:  No hemodynamically significant plaque disease seen within the RIGHT carotid bulb, proximal internal carotid artery. External carotid artery patent.  2.  Left carotid system:  Nonhemodynamically significant plaque disease within LEFT carotid bulb, proximal external carotid artery and proximal internal carotid artery.        Assessment:  89yMale PMHX past medical history of Paroxysmal Atrial Fibrillation (on coumadin), Hypertension, with history of falls, who presented with fall also with left superior and inferior pubic rami fractures (unclear acuity). The patient denies history of syncope, however no one was home with patient so hard to determine if it was only a fall. Found to be in atrial fibrillation with RVR. He was also found to have volume overload, given BNP 2200s as well as TTE result of moderately reduced LVEF 35-40% and multiple LV regional wall motion abnormalities. Also with moderate to severe mitral regurgitation and mild to moderate tricuspid regurgitation. Patient denies symptoms of chest pain and troponin is negative, less concern for ischemia. Unclear if his volume overload and atrial fibrillation with RVR caused him to fall or if urinary tract infection contributed as well.  -Now diuresed and improved.    -BP continues to be on low side, causing holding of BB.  Entresto tried but BP will not support so d/c'ed.  HR still elevated with any ambulation.  Limited in options, now plan for AVN ablation and Bi-V PPM per EP.    Plan:  1. Tele  2. Continue Lasix po.  May consider discontinuation if remains euvolemic and BP low.  3. AVN ablation and Bi-V PPM per EP Monday.  NPO PMN Sunday night.  4. Continue Metoprolol TID for now.  May decrease after ablation.  5. Coumadin to goal INR 1.5-2.0 given fall risk.  Consider Watchman as OP.  6. Continue other current CV meds at current doses.  7. Ischemic eval, likely via stress test as OP.

## 2020-01-25 NOTE — PROGRESS NOTE ADULT - SUBJECTIVE AND OBJECTIVE BOX
CC: uncontrolled afib     INTERVAL HPI/OVERNIGHT EVENTS: seen and examined , stated that he has poor appetite , also mentioned that he is worried about taking coumadin , discussed the importance , this was also discussed with cardiology , they also counselled the patient .   he feels palpitations , SOB , tired . no chest pain           Vital Signs Last 24 Hrs  T(C): 37.1 (25 Jan 2020 15:50), Max: 37.1 (25 Jan 2020 15:50)  T(F): 98.8 (25 Jan 2020 15:50), Max: 98.8 (25 Jan 2020 15:50)  HR: 102 (25 Jan 2020 15:50) (89 - 113)  BP: 113/66 (25 Jan 2020 15:50) (100/60 - 113/66)  BP(mean): --  RR: 18 (25 Jan 2020 15:50) (18 - 18)  SpO2: 95% (25 Jan 2020 15:50) (93% - 95%)    PHYSICAL EXAM:    GENERAL: NAD, resting comfortably in chair , frail  CHEST/LUNG: CTAB , no wheezing , rales, rhonchi heard   HEART: S1S2+,irregular   ABDOMEN: Soft, Nontender, Nondistended; Bowel sounds present  EXTREMITIES:  no pitting edema , pulses palpated BL.        LABS:                        10.9   12.66 )-----------( 145      ( 25 Jan 2020 06:55 )             34.4     01-25    143  |  100  |  34.0<H>  ----------------------------<  102<H>  4.4   |  30.0<H>  |  0.95    Ca    9.2      25 Jan 2020 06:55      PT/INR - ( 25 Jan 2020 06:55 )   PT: 13.7 sec;   INR: 1.19 ratio                 MEDICATIONS  (STANDING):  enoxaparin Injectable 40 milliGRAM(s) SubCutaneous daily  furosemide    Tablet 20 milliGRAM(s) Oral daily  metoprolol tartrate 50 milliGRAM(s) Oral every 8 hours  multivitamin 1 Tablet(s) Oral daily  ofloxacin 0.3% Solution 1 Drop(s) Both EYES four times a day  pantoprazole    Tablet 40 milliGRAM(s) Oral before breakfast  PRESERVISION 1 Tablet(s) 1 Tablet(s) Oral two times a day  tamsulosin 0.4 milliGRAM(s) Oral at bedtime  warfarin 5 milliGRAM(s) Oral daily    MEDICATIONS  (PRN):  acetaminophen   Tablet .. 625 milliGRAM(s) Oral every 6 hours PRN Mild Pain (1 - 3)  metoprolol tartrate Injectable 5 milliGRAM(s) IV Push every 6 hours PRN HR > 140 sustained      RADIOLOGY & ADDITIONAL TESTS:

## 2020-01-26 ENCOUNTER — TRANSCRIPTION ENCOUNTER (OUTPATIENT)
Age: 85
End: 2020-01-26

## 2020-01-26 LAB
ANION GAP SERPL CALC-SCNC: 13 MMOL/L — SIGNIFICANT CHANGE UP (ref 5–17)
BUN SERPL-MCNC: 31 MG/DL — HIGH (ref 8–20)
CALCIUM SERPL-MCNC: 9.1 MG/DL — SIGNIFICANT CHANGE UP (ref 8.6–10.2)
CHLORIDE SERPL-SCNC: 97 MMOL/L — LOW (ref 98–107)
CO2 SERPL-SCNC: 30 MMOL/L — HIGH (ref 22–29)
CREAT SERPL-MCNC: 0.9 MG/DL — SIGNIFICANT CHANGE UP (ref 0.5–1.3)
GLUCOSE SERPL-MCNC: 103 MG/DL — HIGH (ref 70–99)
HCT VFR BLD CALC: 33.2 % — LOW (ref 39–50)
HGB BLD-MCNC: 10.9 G/DL — LOW (ref 13–17)
INR BLD: 1.15 RATIO — SIGNIFICANT CHANGE UP (ref 0.88–1.16)
MCHC RBC-ENTMCNC: 30.5 PG — SIGNIFICANT CHANGE UP (ref 27–34)
MCHC RBC-ENTMCNC: 32.8 GM/DL — SIGNIFICANT CHANGE UP (ref 32–36)
MCV RBC AUTO: 93 FL — SIGNIFICANT CHANGE UP (ref 80–100)
PLATELET # BLD AUTO: 143 K/UL — LOW (ref 150–400)
POTASSIUM SERPL-MCNC: 4.6 MMOL/L — SIGNIFICANT CHANGE UP (ref 3.5–5.3)
POTASSIUM SERPL-SCNC: 4.6 MMOL/L — SIGNIFICANT CHANGE UP (ref 3.5–5.3)
PROTHROM AB SERPL-ACNC: 13.3 SEC — HIGH (ref 10–12.9)
RBC # BLD: 3.57 M/UL — LOW (ref 4.2–5.8)
RBC # FLD: 13.6 % — SIGNIFICANT CHANGE UP (ref 10.3–14.5)
SODIUM SERPL-SCNC: 140 MMOL/L — SIGNIFICANT CHANGE UP (ref 135–145)
WBC # BLD: 12.27 K/UL — HIGH (ref 3.8–10.5)
WBC # FLD AUTO: 12.27 K/UL — HIGH (ref 3.8–10.5)

## 2020-01-26 PROCEDURE — 99232 SBSQ HOSP IP/OBS MODERATE 35: CPT

## 2020-01-26 RX ADMIN — Medication 1 DROP(S): at 05:23

## 2020-01-26 RX ADMIN — Medication 50 MILLIGRAM(S): at 05:23

## 2020-01-26 RX ADMIN — Medication 1 DROP(S): at 00:03

## 2020-01-26 RX ADMIN — Medication 50 MILLIGRAM(S): at 21:02

## 2020-01-26 RX ADMIN — Medication 50 MILLIGRAM(S): at 13:20

## 2020-01-26 RX ADMIN — Medication 20 MILLIGRAM(S): at 05:23

## 2020-01-26 RX ADMIN — PANTOPRAZOLE SODIUM 40 MILLIGRAM(S): 20 TABLET, DELAYED RELEASE ORAL at 05:23

## 2020-01-26 RX ADMIN — Medication 1 DROP(S): at 17:08

## 2020-01-26 RX ADMIN — Medication 1 DROP(S): at 13:20

## 2020-01-26 RX ADMIN — TAMSULOSIN HYDROCHLORIDE 0.4 MILLIGRAM(S): 0.4 CAPSULE ORAL at 21:02

## 2020-01-26 RX ADMIN — WARFARIN SODIUM 5 MILLIGRAM(S): 2.5 TABLET ORAL at 21:02

## 2020-01-26 NOTE — DISCHARGE NOTE PROVIDER - NSDCFUSCHEDAPPT_GEN_ALL_CORE_FT
MECHE MILLER ; 02/24/2020 ; [facility] [address] [facility]  MECHE MILLER ; 03/31/2020 ; NPP Cardiology [address] MECHE MILLER ; 02/11/2020 ; [facility] [address]  MECHE MILLER ; 02/24/2020 ; [facility] [address] [facility]  MECHE MILLER ; 03/31/2020 ; NPP Cardiology [address] MECHE MILLER ; 02/11/2020 ; [facility] [address]  MECHE MILLER ; 02/24/2020 ; [facility] [address] [facility]  MECHE MILLER ; 03/31/2020 ; NPP Cardiology [address]

## 2020-01-26 NOTE — DISCHARGE NOTE PROVIDER - HOSPITAL COURSE
Hospital course :                                                Physical Exam :    Vitals : reviewed , stable     GEN : age appropriate , resting , NAD     HEART: S1,S2 RRR     LUNGS: CTAB     ABDOMEN: Soft , NT , ND , positive bowel sounds     EXT: no pitting edema                 35 minutes were spent on discharge co ordination . Hospital course :    89 y/o male with chronic A Fib on coumadin, HTN, Prostate CA radiation and DVT, lives alone at home with HHA 3 times a week,  came to the ER s/p fall while preparing supper, no loc, in the ED CT head negative, CT spine showed progression of T2 compression fracture- no acute fracture otherwise, x-ray hip shows acute left pubic rami fracture. Pt admitted to medicine s/p fall with left pubic rami fx and progression of t2 compression fx, evaluated by ortho and recommended for conservation management and pain control, no sx intervention. Hospital course complicated by A fib RVR and acute systolic HF exacerbation. Pt s/p IV diuresis with lasix with improvement in volume status and lasix switched to po. Optimization by cardio for A fib RVR but given hypotension limited medications could be used. did not tolerated entresto.EP consulted and pt planned for AVN ablation and CRT-P on Monday. patient failed ablation , underwent ROSE no LA thrombus found , started on amiodarone , initial dose of 400 every 8 hours followed by 200 daily , patient will remain on therapeutic AC with goal INR of 2-2.5 , repeat AVN ablation with mapping will be done in 1 month .    patient will be discharged to rehab , follow up PCP and cardio , EP. ortho to follow as well.    patient did have complains of poor appetite he will need follow up.     coumadin to be continued , INR will need to be closely monitored .            Physical Exam :    Vitals : reviewed , stable , cachectic     GEN : age appropriate , resting , NAD     HEART: S1,S2 RRR     LUNGS: CTAB     ABDOMEN: Soft , NT , ND , positive bowel sounds     EXT: no pitting edema                 35 minutes were spent on discharge co ordination .

## 2020-01-26 NOTE — PROGRESS NOTE ADULT - ASSESSMENT
1. Uncontrolled afib   refractory to medical management .  plan is for AV ton ablation and CRT placement .  continue with tele.   coumadin with prophylactic dose lovenox. lovenox will be held as per cardio recs .  INR still subtherapeutic .    2. Acute on chronic systolic CHF   improved.  on oral lasix, cardio on board .    3. Left pubic ramus fracture / L1 fracture   conservative management.  pain control as needed .    4. GERD   protonix.    5. BPH   flomax.    6. DVT prophylaxis  coumadin .    details discussed with patient , all concerns answered

## 2020-01-26 NOTE — DISCHARGE NOTE PROVIDER - NSDCCPCAREPLAN_GEN_ALL_CORE_FT
PRINCIPAL DISCHARGE DIAGNOSIS  Diagnosis: Syncope, unspecified syncope type  Assessment and Plan of Treatment:       SECONDARY DISCHARGE DIAGNOSES  Diagnosis: Thoracic compression fracture  Assessment and Plan of Treatment: Follow all verbal and written instructions. Take medications as prescribed. DO NOT drive, operate machinery, and/or make important decisions while on prescription pain medication. DO NOT hesitate to call Doctor's office with questions or concerns.   * Follow-up with Ortho-Spine in 1-2 weeks for re-evaluation  * Reduce activities until cleared    Diagnosis: Atrial fibrillation, unspecified type  Assessment and Plan of Treatment: PRINCIPAL DISCHARGE DIAGNOSIS  Diagnosis: Afib  Assessment and Plan of Treatment:       SECONDARY DISCHARGE DIAGNOSES  Diagnosis: Systolic dysfunction with acute on chronic heart failure  Assessment and Plan of Treatment:     Diagnosis: Thoracic compression fracture  Assessment and Plan of Treatment: Follow all verbal and written instructions. Take medications as prescribed. DO NOT drive, operate machinery, and/or make important decisions while on prescription pain medication. DO NOT hesitate to call Doctor's office with questions or concerns.   * Follow-up with Ortho-Spine in 1-2 weeks for re-evaluation  * Reduce activities until cleared    Diagnosis: Atrial fibrillation, unspecified type  Assessment and Plan of Treatment:

## 2020-01-26 NOTE — DISCHARGE NOTE PROVIDER - NSDCMRMEDTOKEN_GEN_ALL_CORE_FT
acetaminophen 500 mg oral tablet: 1 tab(s) orally every 6 hours, As Needed  AcipHex 20 mg oral delayed release tablet: 1 tab(s) orally once a day  Coumadin 3 mg oral tablet: 1 tab(s) orally once a day  Metoprolol Tartrate 50 mg oral tablet: 1 tab(s) orally 2 times a day  Multiple Vitamins oral tablet: 1 tab(s) orally once a day  PreserVision oral tablet: 1 tab(s) orally 2 times a day  Systane ophthalmic solution: 1 drop(s) to each affected eye every 4 hours acetaminophen 325 mg oral tablet: 2 tab(s) orally every 6 hours, As needed, Mild Pain (1 - 3)  AcipHex 20 mg oral delayed release tablet: 1 tab(s) orally once a day  amiodarone 200 mg oral tablet: 1 tab(s) orally once a day   start after loading dose is finished on feb 6th.  amiodarone 400 mg oral tablet: 4 tab(s) orally once a day   to be continued until feb 6th than decrease dose of 200mg daily.  Coumadin 3 mg oral tablet: 1 tab(s) orally once a day  furosemide 20 mg oral tablet: 1 tab(s) orally once a day  metoprolol tartrate 50 mg oral tablet: 1 tab(s) orally every 8 hours  Multiple Vitamins oral tablet: 1 tab(s) orally once a day  ofloxacin 0.3% ophthalmic solution: 1 drop(s) to each affected eye 4 times a day  pantoprazole 40 mg oral delayed release tablet: 1 tab(s) orally once a day (before a meal)  PreserVision oral tablet: 1 tab(s) orally 2 times a day  tamsulosin 0.4 mg oral capsule: 1 cap(s) orally once a day (at bedtime)

## 2020-01-26 NOTE — PROGRESS NOTE ADULT - SUBJECTIVE AND OBJECTIVE BOX
CC: afib     INTERVAL HPI/OVERNIGHT EVENTS: seen and examined , feels ok . awaiting ablation in am . INR still subtherapeutic but patient did take coumadin .  denies palpitations , no chest pain , feels SOB . feels tired           Vital Signs Last 24 Hrs  T(C): 36.7 (26 Jan 2020 08:45), Max: 37.1 (25 Jan 2020 15:50)  T(F): 98 (26 Jan 2020 08:45), Max: 98.8 (25 Jan 2020 15:50)  HR: 115 (26 Jan 2020 13:19) (102 - 115)  BP: 112/85 (26 Jan 2020 13:19) (106/60 - 130/64)  BP(mean): --  RR: 18 (26 Jan 2020 08:45) (18 - 18)  SpO2: 95% (26 Jan 2020 08:45) (95% - 97%)    PHYSICAL EXAM:    GENERAL: NAD, resting comfortable in bed   CHEST/LUNG: CTAB , no wheezing , rales, rhonchi heard   HEART: S1S2+, irregular   ABDOMEN: Soft, Nontender, Nondistended; Bowel sounds present  EXTREMITIES:  no pitting edema , pulses palpated BL.        LABS:                        10.9   12.27 )-----------( 143      ( 26 Jan 2020 06:55 )             33.2     01-26    140  |  97<L>  |  31.0<H>  ----------------------------<  103<H>  4.6   |  30.0<H>  |  0.90    Ca    9.1      26 Jan 2020 06:55      PT/INR - ( 26 Jan 2020 06:55 )   PT: 13.3 sec;   INR: 1.15 ratio                 MEDICATIONS  (STANDING):  enoxaparin Injectable 40 milliGRAM(s) SubCutaneous daily  furosemide    Tablet 20 milliGRAM(s) Oral daily  metoprolol tartrate 50 milliGRAM(s) Oral every 8 hours  multivitamin 1 Tablet(s) Oral daily  ofloxacin 0.3% Solution 1 Drop(s) Both EYES four times a day  pantoprazole    Tablet 40 milliGRAM(s) Oral before breakfast  PRESERVISION 1 Tablet(s) 1 Tablet(s) Oral two times a day  tamsulosin 0.4 milliGRAM(s) Oral at bedtime  warfarin 5 milliGRAM(s) Oral daily    MEDICATIONS  (PRN):  acetaminophen   Tablet .. 625 milliGRAM(s) Oral every 6 hours PRN Mild Pain (1 - 3)  metoprolol tartrate Injectable 5 milliGRAM(s) IV Push every 6 hours PRN HR > 140 sustained      RADIOLOGY & ADDITIONAL TESTS:

## 2020-01-26 NOTE — DISCHARGE NOTE PROVIDER - PROVIDER TOKENS
PROVIDER:[TOKEN:[[id number]],FOLLOWUP:[2 weeks]] PROVIDER:[TOKEN:[[id number]],FOLLOWUP:[2 weeks]],PROVIDER:[TOKEN:[[id number]],FOLLOWUP:[1 week]] PROVIDER:[TOKEN:[[id number]],FOLLOWUP:[2 weeks]],PROVIDER:[TOKEN:[[id number]],FOLLOWUP:[1 week]],PROVIDER:[TOKEN:[[id number]],FOLLOWUP:[1 week]]

## 2020-01-26 NOTE — DISCHARGE NOTE PROVIDER - CARE PROVIDER_API CALL
Quinn Bran (DO)  Orthopaedic Surgery  [address]  [place] [address]  Phone: [phone]  Fax: [phone]  Follow Up Time: 2 weeks Quinn Bran (DO)  Orthopaedic Surgery  [address] [facility] [address] [facility]  [place] [address]  Phone: [phone]  Fax: [phone]  Follow Up Time: 2 weeks    Stewart Davenport)  Cardiology; Internal Medicine  [address], [address]  [place] [address]  Phone: [phone]  Fax: [phone]  Follow Up Time: 1 week Quinn Bran (DO)  Orthopaedic Surgery  [address] [facility] [address]  [place] [address]  Phone: [phone]  Fax: [phone]  Follow Up Time: 2 weeks    Stewart Davenport)  Cardiology; Internal Medicine  [address] [facility], [address]  [place] [address]  Phone: [phone]  Fax: [phone]  Follow Up Time: 1 week    Juan Shaikh)  Cardiovascular Disease  [address] [place] [address]  Phone: [phone]  Fax: [phone]  Follow Up Time: 1 week

## 2020-01-26 NOTE — DISCHARGE NOTE PROVIDER - CARE PROVIDERS DIRECT ADDRESSES
,[email or web address],[email or web address] ,[email or web address],[email or web address],[email or web address]

## 2020-01-26 NOTE — PROGRESS NOTE ADULT - SUBJECTIVE AND OBJECTIVE BOX
MECHE MILLER  [id number]      Chief Complaint:  New CMP/AF/CHF    Interval History:  Patient feels better, ambulating without SOB.  Denies CP/palps.    Tele:  AF with mostly controlled VR, no events          acetaminophen   Tablet .. 625 milliGRAM(s) Oral every 6 hours PRN  enoxaparin Injectable 40 milliGRAM(s) SubCutaneous daily  furosemide    Tablet 20 milliGRAM(s) Oral daily  metoprolol tartrate 50 milliGRAM(s) Oral every 8 hours  metoprolol tartrate Injectable 5 milliGRAM(s) IV Push every 6 hours PRN  multivitamin 1 Tablet(s) Oral daily  ofloxacin 0.3% Solution 1 Drop(s) Both EYES four times a day  pantoprazole    Tablet 40 milliGRAM(s) Oral before breakfast  PRESERVISION 1 Tablet(s) 1 Tablet(s) Oral two times a day  tamsulosin 0.4 milliGRAM(s) Oral at bedtime  warfarin 5 milliGRAM(s) Oral daily          Physical Exam:  T(C): 36.7 (01-26-20 @ 08:45), Max: 37.1 (01-25-20 @ 15:50)  HR: 115 (01-26-20 @ 13:19) (102 - 115)  BP: 112/85 (01-26-20 @ 13:19) (106/60 - 130/64)  RR: 18 (01-26-20 @ 08:45) (18 - 18)  SpO2: 95% (01-26-20 @ 08:45) (95% - 97%)  Wt(kg): --  General: Comfortable in NAD  Neck: No JVD  CVS: nl s1s2, no s3  Pulm: CTA b/l  Abd: soft, non-tender  Ext: No c/c/e  Neuro A&O x3  Psych: Normal affect    Labs:   26 Jan 2020 06:55    140    |  97     |  31.0   ----------------------------<  103    4.6     |  30.0   |  0.90     Ca    9.1        26 Jan 2020 06:55                            10.9   12.27 )-----------( 143      ( 26 Jan 2020 06:55 )             33.2     PT/INR - ( 26 Jan 2020 06:55 )   PT: 13.3 sec;   INR: 1.15 ratio             Outpatient TTE (3/2019):  LVEF 55%, severely dilated left atrium, severely dilated right atrium  Normal right ventricle size and systolic function  Mild dilatation of the aortic root  Mild aortic valve sclerosis without stenosis  Mild AR  Moderate MR    TTE (1/17/2020):  Summary:   1. Left ventricular ejection fraction, by visual estimation, is 35 to 40%.   2. Moderately decreased global left ventricular systolic function.   3. Multiple left ventricular regional wall motion abnormalities exist. See wall motion findings.   4. The mitral in-flow pattern reveals no discernable A-wave, therefore no comment on diastolic function can be made.   5. There is no evidence of pericardial effusion.   6. Moderate to severe mitral valve regurgitation.   7. Mild prolapse of the anterior and posterior mitral valve leaflets.   8. Mild-moderate tricuspid regurgitation.   9. Mild aortic regurgitation.  10. Sclerotic aortic valve with normal opening.  11. Mobile intra-atrial septum.    Carotid US (1/17/2020):IMPRESSION:       1.  Right carotid system:  No hemodynamically significant plaque disease seen within the RIGHT carotid bulb, proximal internal carotid artery. External carotid artery patent.  2.  Left carotid system:  Nonhemodynamically significant plaque disease within LEFT carotid bulb, proximal external carotid artery and proximal internal carotid artery.        Assessment:  89yMale PMHX past medical history of Paroxysmal Atrial Fibrillation (on coumadin), Hypertension, with history of falls, who presented with fall also with left superior and inferior pubic rami fractures (unclear acuity). The patient denies history of syncope, however no one was home with patient so hard to determine if it was only a fall. Found to be in atrial fibrillation with RVR. He was also found to have volume overload, given BNP 2200s as well as TTE result of moderately reduced LVEF 35-40% and multiple LV regional wall motion abnormalities. Also with moderate to severe mitral regurgitation and mild to moderate tricuspid regurgitation. Patient denies symptoms of chest pain and troponin is negative, less concern for ischemia. Unclear if his volume overload and atrial fibrillation with RVR caused him to fall or if urinary tract infection contributed as well.  -Now diuresed and improved.    -BP continues to be on low side, causing holding of BB.  Entresto tried but BP will not support so d/c'ed.  HR still elevated with any ambulation.  Limited in options, now plan for AVN ablation and Bi-V PPM per EP.    Plan:  1. Tele  2. Continue Lasix po.  May consider discontinuation if remains euvolemic and BP low.  3. AVN ablation and Bi-V PPM per EP tomorrow.  NPO PMN tonight.  Hold Lovenox until post procedure and ok per EP.  4. Continue Metoprolol TID for now.  May decrease after ablation to allow for other CHF meds and as should not need as high a dose.  5. Coumadin to goal INR 1.5-2.0 given fall risk.  Consider Watchman as OP.  6. Continue other current CV meds at current doses.  7. Ischemic eval, likely via stress test as OP.

## 2020-01-27 LAB
APPEARANCE UR: CLEAR — SIGNIFICANT CHANGE UP
BILIRUB UR-MCNC: NEGATIVE — SIGNIFICANT CHANGE UP
BLD GP AB SCN SERPL QL: SIGNIFICANT CHANGE UP
COLOR SPEC: YELLOW — SIGNIFICANT CHANGE UP
DIFF PNL FLD: ABNORMAL
EPI CELLS # UR: SIGNIFICANT CHANGE UP
GLUCOSE UR QL: NEGATIVE MG/DL — SIGNIFICANT CHANGE UP
INR BLD: 1.13 RATIO — SIGNIFICANT CHANGE UP (ref 0.88–1.16)
KETONES UR-MCNC: NEGATIVE — SIGNIFICANT CHANGE UP
LEUKOCYTE ESTERASE UR-ACNC: ABNORMAL
NITRITE UR-MCNC: NEGATIVE — SIGNIFICANT CHANGE UP
PH UR: 6 — SIGNIFICANT CHANGE UP (ref 5–8)
PROT UR-MCNC: NEGATIVE MG/DL — SIGNIFICANT CHANGE UP
PROTHROM AB SERPL-ACNC: 13 SEC — HIGH (ref 10–12.9)
RBC CASTS # UR COMP ASSIST: SIGNIFICANT CHANGE UP /HPF (ref 0–4)
SP GR SPEC: 1.01 — SIGNIFICANT CHANGE UP (ref 1.01–1.02)
UROBILINOGEN FLD QL: NEGATIVE MG/DL — SIGNIFICANT CHANGE UP
WBC UR QL: SIGNIFICANT CHANGE UP

## 2020-01-27 PROCEDURE — 99232 SBSQ HOSP IP/OBS MODERATE 35: CPT

## 2020-01-27 PROCEDURE — 99233 SBSQ HOSP IP/OBS HIGH 50: CPT

## 2020-01-27 RX ORDER — WARFARIN SODIUM 2.5 MG/1
7.5 TABLET ORAL DAILY
Refills: 0 | Status: DISCONTINUED | OUTPATIENT
Start: 2020-01-27 | End: 2020-01-29

## 2020-01-27 RX ADMIN — TAMSULOSIN HYDROCHLORIDE 0.4 MILLIGRAM(S): 0.4 CAPSULE ORAL at 21:34

## 2020-01-27 RX ADMIN — WARFARIN SODIUM 7.5 MILLIGRAM(S): 2.5 TABLET ORAL at 21:34

## 2020-01-27 RX ADMIN — Medication 50 MILLIGRAM(S): at 21:34

## 2020-01-27 RX ADMIN — Medication 20 MILLIGRAM(S): at 17:44

## 2020-01-27 RX ADMIN — Medication 1 DROP(S): at 17:44

## 2020-01-27 RX ADMIN — Medication 1 DROP(S): at 11:10

## 2020-01-27 RX ADMIN — Medication 50 MILLIGRAM(S): at 13:36

## 2020-01-27 RX ADMIN — Medication 50 MILLIGRAM(S): at 06:29

## 2020-01-27 RX ADMIN — Medication 1 DROP(S): at 22:36

## 2020-01-27 RX ADMIN — Medication 1 DROP(S): at 06:29

## 2020-01-27 RX ADMIN — PANTOPRAZOLE SODIUM 40 MILLIGRAM(S): 20 TABLET, DELAYED RELEASE ORAL at 06:29

## 2020-01-27 RX ADMIN — Medication 1 DROP(S): at 00:42

## 2020-01-27 NOTE — PROGRESS NOTE ADULT - SUBJECTIVE AND OBJECTIVE BOX
12-Jun-2017 22:00 CC: afib     INTERVAL HPI/OVERNIGHT EVENTS: seen and examined , awaiting AVN ablation . denies SOB , no chest pain , feels weak overall. no palpitations           Vital Signs Last 24 Hrs  T(C): 36.7 (27 Jan 2020 15:22), Max: 37.2 (26 Jan 2020 21:00)  T(F): 98 (27 Jan 2020 15:22), Max: 99 (26 Jan 2020 21:00)  HR: 103 (27 Jan 2020 15:22) (96 - 116)  BP: 114/68 (27 Jan 2020 15:22) (103/70 - 114/68)  BP(mean): --  RR: 18 (27 Jan 2020 15:22) (18 - 18)  SpO2: 93% (27 Jan 2020 15:22) (93% - 96%)    PHYSICAL EXAM:    GENERAL: NAD, sitting up in chair.   CHEST/LUNG: CTAB , no wheezing , rales, rhonchi heard   HEART: S1S2+, irregular   ABDOMEN: Soft, Nontender, Nondistended; Bowel sounds present  EXTREMITIES:  no pitting edema , pulses palpated BL.        LABS:                        10.9   12.27 )-----------( 143      ( 26 Jan 2020 06:55 )             33.2     01-26    140  |  97<L>  |  31.0<H>  ----------------------------<  103<H>  4.6   |  30.0<H>  |  0.90    Ca    9.1      26 Jan 2020 06:55      PT/INR - ( 27 Jan 2020 05:56 )   PT: 13.0 sec;   INR: 1.13 ratio                 MEDICATIONS  (STANDING):  furosemide    Tablet 20 milliGRAM(s) Oral daily  metoprolol tartrate 50 milliGRAM(s) Oral every 8 hours  multivitamin 1 Tablet(s) Oral daily  ofloxacin 0.3% Solution 1 Drop(s) Both EYES four times a day  pantoprazole    Tablet 40 milliGRAM(s) Oral before breakfast  PRESERVISION 1 Tablet(s) 1 Tablet(s) Oral two times a day  tamsulosin 0.4 milliGRAM(s) Oral at bedtime  warfarin 7.5 milliGRAM(s) Oral daily    MEDICATIONS  (PRN):  acetaminophen   Tablet .. 625 milliGRAM(s) Oral every 6 hours PRN Mild Pain (1 - 3)  metoprolol tartrate Injectable 5 milliGRAM(s) IV Push every 6 hours PRN HR > 140 sustained      RADIOLOGY & ADDITIONAL TESTS:

## 2020-01-27 NOTE — PROGRESS NOTE ADULT - SUBJECTIVE AND OBJECTIVE BOX
Electrophysiology Attending Follow Up Note    Subjective: Patient feels well. Denies dyspnea, chest pain, palpitations.    TELE: AF/AFL with RVR to ~120s.    MEDICATIONS  (STANDING):  furosemide    Tablet 20 milliGRAM(s) Oral daily  metoprolol tartrate 50 milliGRAM(s) Oral every 8 hours  multivitamin 1 Tablet(s) Oral daily  ofloxacin 0.3% Solution 1 Drop(s) Both EYES four times a day  pantoprazole    Tablet 40 milliGRAM(s) Oral before breakfast  PRESERVISION 1 Tablet(s) 1 Tablet(s) Oral two times a day  tamsulosin 0.4 milliGRAM(s) Oral at bedtime  warfarin 7.5 milliGRAM(s) Oral daily    MEDICATIONS  (PRN):  acetaminophen   Tablet .. 625 milliGRAM(s) Oral every 6 hours PRN Mild Pain (1 - 3)  metoprolol tartrate Injectable 5 milliGRAM(s) IV Push every 6 hours PRN HR > 140 sustained      Allergies    antibiotic (Rash)  tetracycline (Rash)    Intolerances        Vital Signs Last 24 Hrs  T(C): 36.6 (2020 21:28), Max: 36.7 (2020 10:04)  T(F): 97.9 (2020 21:28), Max: 98.1 (2020 10:04)  HR: 122 (2020 21:28) (60 - 122)  BP: 104/64 (2020 21:28) (103/70 - 133/76)  BP(mean): --  RR: 16 (2020 21:28) (16 - 18)  SpO2: 94% (2020 21:28) (93% - 98%)    Physical Exam:  Constitutional: NAD, AAOx3  Cardiovascular: +S1S2 RRR  Pulmonary: CTA b/l, unlabored  GI: soft NTND +BS  Extremities: no pedal edema  Neuro: non focal, MOORE x4    LABS:                        10.9   12.27 )-----------( 143      ( 2020 06:55 )             33.2     01-26    140  |  97<L>  |  31.0<H>  ----------------------------<  103<H>  4.6   |  30.0<H>  |  0.90    Ca    9.1      2020 06:55      PT/INR - ( 2020 05:56 )   PT: 13.0 sec;   INR: 1.13 ratio           Urinalysis Basic - ( 2020 23:00 )    Color: Yellow / Appearance: Clear / S.015 / pH: x  Gluc: x / Ketone: Negative  / Bili: Negative / Urobili: Negative mg/dL   Blood: x / Protein: Negative mg/dL / Nitrite: Negative   Leuk Esterase: Trace / RBC: 0-2 /HPF / WBC 0-2   Sq Epi: x / Non Sq Epi: Occasional / Bacteria: x        RADIOLOGY & ADDITIONAL TESTS:

## 2020-01-27 NOTE — PROGRESS NOTE ADULT - SUBJECTIVE AND OBJECTIVE BOX
MECHE ANGELA  [id number]        Chief Complaint: follow up CMP/AF/CHF/MR      Subjective: comfortable, denies CP/SOB/palps      24 hour Tele: AF, mildly elevated rates        acetaminophen   Tablet .. 625 milliGRAM(s) Oral every 6 hours PRN  furosemide    Tablet 20 milliGRAM(s) Oral daily  metoprolol tartrate 50 milliGRAM(s) Oral every 8 hours  metoprolol tartrate Injectable 5 milliGRAM(s) IV Push every 6 hours PRN  multivitamin 1 Tablet(s) Oral daily  ofloxacin 0.3% Solution 1 Drop(s) Both EYES four times a day  pantoprazole    Tablet 40 milliGRAM(s) Oral before breakfast  PRESERVISION 1 Tablet(s) 1 Tablet(s) Oral two times a day  tamsulosin 0.4 milliGRAM(s) Oral at bedtime  warfarin 7.5 milliGRAM(s) Oral daily          Physical Exam:  T(C): 36.7 (01-27-20 @ 10:04), Max: 37.2 (01-26-20 @ 21:00)  HR: 96 (01-27-20 @ 10:04) (96 - 116)  BP: 103/70 (01-27-20 @ 10:04) (102/68 - 112/85)  RR: 18 (01-27-20 @ 10:04) (18 - 18)  SpO2: 96% (01-27-20 @ 06:22) (94% - 96%)  Wt(kg): --  General: Comfortable in NAD  HEENT: MMM, sclera anicteric  Resp: CTA bilaterally, diminished at bases  CVS: nl s1s2, irregular, II/VI harsh systolic murmur, no s3  Abd: soft, NT, ND, BS+  Ext: No c/c/e  Neuro A&O x3  Psych: Normal affect    I&O's Summary    26 Jan 2020 07:01  -  27 Jan 2020 07:00  --------------------------------------------------------  IN: 760 mL / OUT: 1255 mL / NET: -495 mL    27 Jan 2020 07:01  -  27 Jan 2020 12:54  --------------------------------------------------------  IN: 0 mL / OUT: 200 mL / NET: -200 mL          Labs:   26 Jan 2020 06:55    140    |  97     |  31.0   ----------------------------<  103    4.6     |  30.0   |  0.90     Ca    9.1        26 Jan 2020 06:55                            10.9   12.27 )-----------( 143      ( 26 Jan 2020 06:55 )             33.2     PT/INR - ( 27 Jan 2020 05:56 )   PT: 13.0 sec;   INR: 1.13 ratio                 Outpatient TTE (3/2019):  LVEF 55%, severely dilated left atrium, severely dilated right atrium  Normal right ventricle size and systolic function  Mild dilatation of the aortic root  Mild aortic valve sclerosis without stenosis  Mild AR  Moderate MR    TTE (1/17/2020):  Summary:   1. Left ventricular ejection fraction, by visual estimation, is 35 to 40%.   2. Moderately decreased global left ventricular systolic function.   3. Multiple left ventricular regional wall motion abnormalities exist. See wall motion findings.   4. The mitral in-flow pattern reveals no discernable A-wave, therefore no comment on diastolic function can be made.   5. There is no evidence of pericardial effusion.   6. Moderate to severe mitral valve regurgitation.   7. Mild prolapse of the anterior and posterior mitral valve leaflets.   8. Mild-moderate tricuspid regurgitation.   9. Mild aortic regurgitation.  10. Sclerotic aortic valve with normal opening.  11. Mobile intra-atrial septum.    Carotid US (1/17/2020):IMPRESSION:       1.  Right carotid system:  No hemodynamically significant plaque disease seen within the RIGHT carotid bulb, proximal internal carotid artery. External carotid artery patent.  2.  Left carotid system:  Nonhemodynamically significant plaque disease within LEFT carotid bulb, proximal external carotid artery and proximal internal carotid artery.        Assessment:  89yMale PMHX past medical history of Paroxysmal Atrial Fibrillation (on coumadin), Hypertension, with history of falls, who presented with fall also with left superior and inferior pubic rami fractures (unclear acuity). The patient denies history of syncope, however no one was home with patient so hard to determine if it was only a fall. Found to be in atrial fibrillation with RVR. He was also found to have volume overload, given BNP 2200s as well as TTE result of moderately reduced LVEF 35-40% and multiple LV regional wall motion abnormalities. Also with moderate to severe mitral regurgitation and mild to moderate tricuspid regurgitation. Patient denies symptoms of chest pain and troponin is negative, less concern for ischemia. Unclear if his volume overload and atrial fibrillation with RVR caused him to fall or if urinary tract infection contributed as well.  -Now diuresed and improved, CHF appears compensated.    -BP continues to be on low side, causing holding of BB.  Entresto tried but BP will not support so d/c'ed.  HR still elevated with any ambulation.  Limited in options, now plan for AVN ablation and Bi-V PPM per EP.    Plan:  1. Tele  2. Continue Lasix po.    3. AVN ablation and Bi-V PPM per EP today  Hold Lovenox until post procedure and ok per EP.  4. Continue Metoprolol TID for now.  May decrease after ablation to allow for other CHF meds and as should not need as high a dose.  5. Coumadin to goal INR 1.5-2.0 given fall risk.  Consider Watchman as OP.  6. Continue other current CV meds at current doses.  7. Ischemic eval, likely via stress test as OP.        Juan Shaikh MD

## 2020-01-27 NOTE — PROGRESS NOTE ADULT - ASSESSMENT
1. Uncontrolled afib   refractory to medical management .  plan is for AV ton ablation and CRT placement .  continue with tele.   coumadin dose will be increased .  INR still subtherapeutic .  continue with beta blockers     2. Acute on chronic systolic CHF   improved.  on oral lasix, cardio on board .    3. Left pubic ramus fracture / L1 fracture   conservative management.  pain control as needed .    4. GERD   protonix.    5. BPH   flomax.    6. DVT prophylaxis  coumadin .    details discussed with patient , all concerns answered . plan is to go to rehab after discharge .

## 2020-01-27 NOTE — PROGRESS NOTE ADULT - ASSESSMENT
89 year old male with paroxysmal AF on warfarin and HTN who presented with a fall and pelvic fracture now with difficult to control AF w RVR who cannot be on complete anticoagulation secondary to falls. LVEF <50% and so will plan for AVN RFA and CRT-P implantation in AM.    Recommendations:  - NPO after MN for CRT-P + AVN RFA in AM  - May consider Watchman implantation at a later date if pt is intolerant of AC    Thank you for this consult. We will follow with you.    Stewart Davenport MD  Clinical Cardiac Electrophysiology .

## 2020-01-28 LAB
ANION GAP SERPL CALC-SCNC: 11 MMOL/L — SIGNIFICANT CHANGE UP (ref 5–17)
BUN SERPL-MCNC: 30 MG/DL — HIGH (ref 8–20)
CALCIUM SERPL-MCNC: 9 MG/DL — SIGNIFICANT CHANGE UP (ref 8.6–10.2)
CHLORIDE SERPL-SCNC: 98 MMOL/L — SIGNIFICANT CHANGE UP (ref 98–107)
CO2 SERPL-SCNC: 31 MMOL/L — HIGH (ref 22–29)
CREAT SERPL-MCNC: 0.96 MG/DL — SIGNIFICANT CHANGE UP (ref 0.5–1.3)
GLUCOSE SERPL-MCNC: 111 MG/DL — HIGH (ref 70–99)
HCT VFR BLD CALC: 33.9 % — LOW (ref 39–50)
HGB BLD-MCNC: 10.7 G/DL — LOW (ref 13–17)
INR BLD: 1.29 RATIO — HIGH (ref 0.88–1.16)
MAGNESIUM SERPL-MCNC: 2 MG/DL — SIGNIFICANT CHANGE UP (ref 1.6–2.6)
MCHC RBC-ENTMCNC: 29.6 PG — SIGNIFICANT CHANGE UP (ref 27–34)
MCHC RBC-ENTMCNC: 31.6 GM/DL — LOW (ref 32–36)
MCV RBC AUTO: 93.9 FL — SIGNIFICANT CHANGE UP (ref 80–100)
PLATELET # BLD AUTO: 152 K/UL — SIGNIFICANT CHANGE UP (ref 150–400)
POTASSIUM SERPL-MCNC: 4.3 MMOL/L — SIGNIFICANT CHANGE UP (ref 3.5–5.3)
POTASSIUM SERPL-SCNC: 4.3 MMOL/L — SIGNIFICANT CHANGE UP (ref 3.5–5.3)
PROTHROM AB SERPL-ACNC: 14.9 SEC — HIGH (ref 10–12.9)
RBC # BLD: 3.61 M/UL — LOW (ref 4.2–5.8)
RBC # FLD: 13.5 % — SIGNIFICANT CHANGE UP (ref 10.3–14.5)
SODIUM SERPL-SCNC: 140 MMOL/L — SIGNIFICANT CHANGE UP (ref 135–145)
WBC # BLD: 11.67 K/UL — HIGH (ref 3.8–10.5)
WBC # FLD AUTO: 11.67 K/UL — HIGH (ref 3.8–10.5)

## 2020-01-28 PROCEDURE — 93010 ELECTROCARDIOGRAM REPORT: CPT

## 2020-01-28 PROCEDURE — 99232 SBSQ HOSP IP/OBS MODERATE 35: CPT

## 2020-01-28 PROCEDURE — 99233 SBSQ HOSP IP/OBS HIGH 50: CPT | Mod: 25

## 2020-01-28 PROCEDURE — 33208 INSRT HEART PM ATRIAL & VENT: CPT | Mod: KX

## 2020-01-28 PROCEDURE — 33225 L VENTRIC PACING LEAD ADD-ON: CPT

## 2020-01-28 PROCEDURE — 71045 X-RAY EXAM CHEST 1 VIEW: CPT | Mod: 26

## 2020-01-28 RX ORDER — CEFAZOLIN SODIUM 1 G
2000 VIAL (EA) INJECTION EVERY 8 HOURS
Refills: 0 | Status: COMPLETED | OUTPATIENT
Start: 2020-01-28 | End: 2020-01-29

## 2020-01-28 RX ADMIN — Medication 1 DROP(S): at 23:00

## 2020-01-28 RX ADMIN — Medication 100 MILLIGRAM(S): at 23:00

## 2020-01-28 RX ADMIN — PANTOPRAZOLE SODIUM 40 MILLIGRAM(S): 20 TABLET, DELAYED RELEASE ORAL at 05:26

## 2020-01-28 RX ADMIN — Medication 1 DROP(S): at 05:26

## 2020-01-28 RX ADMIN — Medication 1 TABLET(S): at 17:07

## 2020-01-28 RX ADMIN — WARFARIN SODIUM 7.5 MILLIGRAM(S): 2.5 TABLET ORAL at 21:05

## 2020-01-28 RX ADMIN — Medication 1 DROP(S): at 17:08

## 2020-01-28 RX ADMIN — TAMSULOSIN HYDROCHLORIDE 0.4 MILLIGRAM(S): 0.4 CAPSULE ORAL at 21:05

## 2020-01-28 RX ADMIN — Medication 50 MILLIGRAM(S): at 17:07

## 2020-01-28 RX ADMIN — Medication 50 MILLIGRAM(S): at 21:05

## 2020-01-28 NOTE — PROGRESS NOTE ADULT - SUBJECTIVE AND OBJECTIVE BOX
MECHE MILLER  [id number]    Chief Complaint: Follow up fall and new cardiomyopathy and atrial fibrillation with RVR      Interval History: The patient was seen and examined. Reports fatigue.        Tele: atrial fibrillation 120s BPM      Current meds:   acetaminophen   Tablet .. 625 milliGRAM(s) Oral every 6 hours PRN  ceFAZolin   IVPB 2000 milliGRAM(s) IV Intermittent every 8 hours  furosemide    Tablet 20 milliGRAM(s) Oral daily  metoprolol tartrate 50 milliGRAM(s) Oral every 8 hours  metoprolol tartrate Injectable 5 milliGRAM(s) IV Push every 6 hours PRN  multivitamin 1 Tablet(s) Oral daily  ofloxacin 0.3% Solution 1 Drop(s) Both EYES four times a day  pantoprazole    Tablet 40 milliGRAM(s) Oral before breakfast  PRESERVISION 1 Tablet(s) 1 Tablet(s) Oral two times a day  tamsulosin 0.4 milliGRAM(s) Oral at bedtime  warfarin 7.5 milliGRAM(s) Oral daily      Objective:     Vital Signs:   T(C): 36.6 (01-28-20 @ 07:53), Max: 36.7 (01-28-20 @ 05:18)  HR: 109 (01-28-20 @ 17:03) (104 - 122)  BP: 140/86 (01-28-20 @ 17:03) (104/64 - 140/86)  RR: 16 (01-28-20 @ 17:03) (15 - 16)  SpO2: 98% (01-28-20 @ 17:03) (94% - 98%)  Wt(kg): --    Physical Exam:   General: elderly man, laying flat in bed, no distress  HEENT: dry oral mucosa  Neck: supple, no carotid bruits b/l  CVS: JVP ~9 cm H20, irregularly irregular, s1, s2, no murmurs  Chest: unlabored respirations, CTAB  Abdomen: non-distended  Extremities: no lower extremity edema b/l  Neuro: A&O x3  Psych: Normal affect    Labs:   28 Jan 2020 06:07    140    |  98     |  30.0   ----------------------------<  111    4.3     |  31.0   |  0.96     Ca    9.0        28 Jan 2020 06:07  Mg     2.0       28 Jan 2020 06:07                            10.7   11.67 )-----------( 152      ( 28 Jan 2020 06:07 )             33.9     PT/INR - ( 28 Jan 2020 07:58 )   PT: 14.9 sec;   INR: 1.29 ratio             Outpatient TTE (3/2019):  LVEF 55%, severely dilated left atrium, severely dilated right atrium  Normal right ventricle size and systolic function  Mild dilatation of the aortic root  Mild aortic valve sclerosis without stenosis  Mild AR  Moderate MR    TTE (1/17/2020):  Summary:   1. Left ventricular ejection fraction, by visual estimation, is 35 to 40%.   2. Moderately decreased global left ventricular systolic function.   3. Multiple left ventricular regional wall motion abnormalities exist. See wall motion findings.   4. The mitral in-flow pattern reveals no discernable A-wave, therefore no comment on diastolic function can be made.   5. There is no evidence of pericardial effusion.   6. Moderate to severe mitral valve regurgitation.   7. Mild prolapse of the anterior and posterior mitral valve leaflets.   8. Mild-moderate tricuspid regurgitation.   9. Mild aortic regurgitation.  10. Sclerotic aortic valve with normal opening.  11. Mobile intra-atrial septum.    ECG (1/16/2020): atrial fibrillation with RVR, RBBB    CXR (1/16/2020):  Findings:  Lines: None    Heart/Mediastinum/Lungs/Other: The heart size is normal. The lungs are clear. There are no pleural effusions.    CT Head (1/16/2020):  IMPRESSION:  Brain CT:   No acute intracranial hemorrhage, mass effect, or CT evidence of a large acute or recent subacute transcortical infarct. Small right parietal scalp hematoma.    Cervical spine CT:  No acute displaced cervical spine fracture or evidence of traumatic malalignment. Compression of T2 body with interval progression. If clinical symptoms persist, MRI of the cervical spine can be considered to assess for ligamentous or cord injury.    Xray Left Hip (1/16/2020):  Old right superior and inferior pubic rami fractures. Acute appearing left superior and inferior pubic rami fracture. No definite hip fracture. Degenerative changes and osteopenia.    Impression:  Left superior and inferior pubic rami fractures.      Carotid US (1/17/2020):IMPRESSION:       1.  Right carotid system:  No hemodynamically significant plaque disease seen within the RIGHT carotid bulb, proximal internal carotid artery. External carotid artery patent.  2.  Left carotid system:  Nonhemodynamically significant plaque disease within LEFT carotid bulb, proximal external carotid artery and proximal internal carotid artery.

## 2020-01-28 NOTE — PROGRESS NOTE ADULT - ASSESSMENT
1. Uncontrolled afib   refractory to medical management .  failed AV ton ablation , will follow with EP about further plans .  remains on coumadin , will follow INR.    2. Acute on chronic systolic CHF / cardiomyopathy   improved.  on oral lasix, cardio on board .  underwent CRT-P placement     3. Left pubic ramus fracture / L1 fracture   conservative management.  pain control as needed .    4. GERD   protonix.    5. BPH   flomax.    6. DVT prophylaxis  coumadin .    details discussed with patient , all concerns answered . plan is to go to rehab after discharge .

## 2020-01-28 NOTE — PROGRESS NOTE ADULT - ASSESSMENT
ssessment:  Mr. White is a 89 year old man with past medical history of Paroxysmal Atrial Fibrillation (on coumadin), Hypertension, with history of falls, who presented with fall also with left superior and inferior pubic rami fractures (unclear acuity). The patient denies history of syncope, however no one was home with patient so hard to determine if it was only a fall. He is currently in atrial fibrillation with RVR so will rate control patient. He was also found to have volume overload, given BNP 2200s as well as TTE result of moderately reduced LVEF 35-40% and multiple LV regional wall motion abnormalities. Also with moderate to severe mitral regurgitation and mild to moderate tricuspid regurgitation. Patient denies symptoms of chest pain and troponin is negative, less concern for ischemia. Unclear if his volume overload and atrial fibrillation with RVR caused him to fall or if urinary tract infection contributed as well. Would recommend the following:     Recommendations:  [] Fall: May have been from deconditioning due to new cardiomyopathy and atrial fibrillation with rapid rates. Carotid US unremarkable.   [] Atrial Fibrillation with RVR: Will follow-up EP recommendations regarding AVN ablation today, patient with PPM at site. Continue metoprolol tartrate 50 mg q8h.  Coumadin goal INR 1.5-2.0 due to fall risk. Possible evaluation for left atrial appendage closure device as outpatient.  [] Cardiomyopathy: LVEF 35-40% here was previously reported as 55% in 3/2019, patient also with regional wall motion abnormalities. Plan for outpatient stress evaluation. Continue beta blocker. Patient cannot tolerate Entresto, had low BP.    Thank you for the consult. We will follow along.    Luis Kirby MD  [facility]iology, [facility]

## 2020-01-28 NOTE — PROGRESS NOTE ADULT - SUBJECTIVE AND OBJECTIVE BOX
Patient is an inpatient  presenting for CRT-P implantation/ AVN Catheter ablation this am with Dr Davenport     PRE-PROCEDURE ASSESSMENT  -NPO after midnight confirmed  -Patient seen and examined Denies any c/o CP, SOB, or palpitations-feels in usual state of health-VSS   -Labs reviewed-Awaiting INR results from this am  -Pre-procedure teaching completed with patient and family  -Informed consent with Dr Davenport  -Questions answered

## 2020-01-28 NOTE — PROGRESS NOTE ADULT - SUBJECTIVE AND OBJECTIVE BOX
CC: afib     INTERVAL HPI/OVERNIGHT EVENTS: seen and examined , ablation failed today but had CRT-P placement .  seen in cath lab , no SOB , no chest pain . did mention some chest discomfort from incision           Vital Signs Last 24 Hrs  T(C): 36.6 (2020 07:53), Max: 36.7 (2020 05:18)  T(F): 97.8 (2020 07:53), Max: 98.1 (2020 05:18)  HR: 109 (2020 17:03) (104 - 122)  BP: 140/86 (2020 17:03) (104/64 - 140/86)  BP(mean): --  RR: 16 (2020 17:03) (15 - 16)  SpO2: 98% (2020 17:03) (94% - 98%)    PHYSICAL EXAM:    GENERAL: NAD, resting comfortable in bed   CHEST/LUNG: CTAB , no wheezing , rales, rhonchi heard   HEART: S1S2+, Regular rate and rhythm; No murmurs, rubs, or gallops  ABDOMEN: Soft, Nontender, Nondistended; Bowel sounds present  EXTREMITIES:  no pitting edema , pulses palpated BL.        LABS:                        10.7   11.67 )-----------( 152      ( 2020 06:07 )             33.9         140  |  98  |  30.0<H>  ----------------------------<  111<H>  4.3   |  31.0<H>  |  0.96    Ca    9.0      2020 06:07  Mg     2.0     -28      PT/INR - ( 2020 07:58 )   PT: 14.9 sec;   INR: 1.29 ratio           Urinalysis Basic - ( 2020 23:00 )    Color: Yellow / Appearance: Clear / S.015 / pH: x  Gluc: x / Ketone: Negative  / Bili: Negative / Urobili: Negative mg/dL   Blood: x / Protein: Negative mg/dL / Nitrite: Negative   Leuk Esterase: Trace / RBC: 0-2 /HPF / WBC 0-2   Sq Epi: x / Non Sq Epi: Occasional / Bacteria: x          MEDICATIONS  (STANDING):  ceFAZolin   IVPB 2000 milliGRAM(s) IV Intermittent every 8 hours  furosemide    Tablet 20 milliGRAM(s) Oral daily  metoprolol tartrate 50 milliGRAM(s) Oral every 8 hours  multivitamin 1 Tablet(s) Oral daily  ofloxacin 0.3% Solution 1 Drop(s) Both EYES four times a day  pantoprazole    Tablet 40 milliGRAM(s) Oral before breakfast  PRESERVISION 1 Tablet(s) 1 Tablet(s) Oral two times a day  tamsulosin 0.4 milliGRAM(s) Oral at bedtime  warfarin 7.5 milliGRAM(s) Oral daily    MEDICATIONS  (PRN):  acetaminophen   Tablet .. 625 milliGRAM(s) Oral every 6 hours PRN Mild Pain (1 - 3)  metoprolol tartrate Injectable 5 milliGRAM(s) IV Push every 6 hours PRN HR > 140 sustained      RADIOLOGY & ADDITIONAL TESTS:

## 2020-01-28 NOTE — PROGRESS NOTE ADULT - SUBJECTIVE AND OBJECTIVE BOX
ELECTROPHYSIOLOGY BRIEF POST-OP NOTE    PRE-OP DIAGNOSIS: PAF with RVR, Cardiomyopathy    POST-OP DIAGNOSIS: same    PROCEDURE: Successful CRT-P (Biventricular Pacing ) (MDT) (MRI Conditional) via Left Axillary venous access, unsuccessful AVN ablation                     Device settings: MODE: DDD-    Physician: Dr Desean Davenport      ANESTHESIA TYPE:  [   x]General Anesthesia  [   ]Sedation  [   ]Local/Regional    CONDITION:   [  ]Critical  [  ]Serious  [x  ]Stable  [  ]Good    Assessment: Awake and alert-responding appropriately to question                    VSS/ tele-AF with Bi-V pacing 80s                    Left ACW implant site with dermabond dry and intact/ no bleeding or hematoma                    Lungs-CTA No adventitious sounds                   CV:S1S2 RRR                  Abd: hypoactive bowel sounds                  Ext: Right groin access site with figure 8 sutures/ DSD dry and intact/ no bleeding or hematoma detected, + Pedals bilaterally    PLAN OF CARE:  -Ancef 2gram IV Q8hr x 2 more doses  -Chest X-ray STAT to r/o PTX  -Chest X-ray PA and LAT in am to eval lead position  -NO LOVENOX OR HEPARIN INCLUDING SQ UNLESS CLEARED BY EP  -no lifting affected arm over shoulder x 6 weeks  -continue coumadin to therapeutic INR 2-3  -device check in am  -wound check in 2 weeks in device clinic  -plan for outpatient repeat attempt at AVN ablation in future

## 2020-01-28 NOTE — PROGRESS NOTE ADULT - ATTENDING COMMENTS
I have personally seen, examined, and participated in the care of this patient. I have reviewed all pertinent clinical information, including history, physical exam, plan, and the PA/NP's note and agree except as noted below.    S/p successful CRT-P implant, but unsuccessful AVN ablation. Discussed options at length with patient. At this point, he should undergo repeat AVN ablation but after leads have time to heal into place. Recommend performing ROSE to rule out LA/ANSELMO thrombus and then starting Amiodarone as amiodarone can convert patient from atypical flutter to NSR. This will require patient to be on therapeutic INR for 1 month, which he seems amenable to.    Discussed with Dr. Kirby, plan for ROSE on Thursday.    Stewart Davenport MD  Clinical Cardiac Electrophysiology

## 2020-01-29 DIAGNOSIS — Z95.0 PRESENCE OF CARDIAC PACEMAKER: ICD-10-CM

## 2020-01-29 DIAGNOSIS — I48.91 UNSPECIFIED ATRIAL FIBRILLATION: ICD-10-CM

## 2020-01-29 LAB
ANION GAP SERPL CALC-SCNC: 12 MMOL/L — SIGNIFICANT CHANGE UP (ref 5–17)
ANISOCYTOSIS BLD QL: SLIGHT — SIGNIFICANT CHANGE UP
BUN SERPL-MCNC: 29 MG/DL — HIGH (ref 8–20)
CALCIUM SERPL-MCNC: 8.8 MG/DL — SIGNIFICANT CHANGE UP (ref 8.6–10.2)
CHLORIDE SERPL-SCNC: 103 MMOL/L — SIGNIFICANT CHANGE UP (ref 98–107)
CO2 SERPL-SCNC: 28 MMOL/L — SIGNIFICANT CHANGE UP (ref 22–29)
CREAT SERPL-MCNC: 0.85 MG/DL — SIGNIFICANT CHANGE UP (ref 0.5–1.3)
EOSINOPHIL NFR BLD AUTO: 2 % — SIGNIFICANT CHANGE UP (ref 0–6)
GLUCOSE SERPL-MCNC: 114 MG/DL — HIGH (ref 70–99)
HCT VFR BLD CALC: 32.4 % — LOW (ref 39–50)
HGB BLD-MCNC: 10.5 G/DL — LOW (ref 13–17)
INR BLD: 1.94 RATIO — HIGH (ref 0.88–1.16)
LYMPHOCYTES # BLD AUTO: 47 % — HIGH (ref 13–44)
MACROCYTES BLD QL: SLIGHT — SIGNIFICANT CHANGE UP
MCHC RBC-ENTMCNC: 30.3 PG — SIGNIFICANT CHANGE UP (ref 27–34)
MCHC RBC-ENTMCNC: 32.4 GM/DL — SIGNIFICANT CHANGE UP (ref 32–36)
MCV RBC AUTO: 93.6 FL — SIGNIFICANT CHANGE UP (ref 80–100)
MONOCYTES NFR BLD AUTO: 7 % — SIGNIFICANT CHANGE UP (ref 2–14)
NEUTROPHILS NFR BLD AUTO: 34 % — LOW (ref 43–77)
OVALOCYTES BLD QL SMEAR: SLIGHT — SIGNIFICANT CHANGE UP
PLAT MORPH BLD: NORMAL — SIGNIFICANT CHANGE UP
PLATELET # BLD AUTO: 139 K/UL — LOW (ref 150–400)
POIKILOCYTOSIS BLD QL AUTO: SLIGHT — SIGNIFICANT CHANGE UP
POTASSIUM SERPL-MCNC: 4.3 MMOL/L — SIGNIFICANT CHANGE UP (ref 3.5–5.3)
POTASSIUM SERPL-SCNC: 4.3 MMOL/L — SIGNIFICANT CHANGE UP (ref 3.5–5.3)
PROTHROM AB SERPL-ACNC: 22.8 SEC — HIGH (ref 10–12.9)
RBC # BLD: 3.46 M/UL — LOW (ref 4.2–5.8)
RBC # FLD: 13.6 % — SIGNIFICANT CHANGE UP (ref 10.3–14.5)
RBC BLD AUTO: ABNORMAL
SCHISTOCYTES BLD QL AUTO: SLIGHT — SIGNIFICANT CHANGE UP
SODIUM SERPL-SCNC: 143 MMOL/L — SIGNIFICANT CHANGE UP (ref 135–145)
VARIANT LYMPHS # BLD: 10 % — HIGH (ref 0–6)
WBC # BLD: 10.1 K/UL — SIGNIFICANT CHANGE UP (ref 3.8–10.5)
WBC # FLD AUTO: 10.1 K/UL — SIGNIFICANT CHANGE UP (ref 3.8–10.5)

## 2020-01-29 PROCEDURE — 93010 ELECTROCARDIOGRAM REPORT: CPT

## 2020-01-29 PROCEDURE — 99233 SBSQ HOSP IP/OBS HIGH 50: CPT | Mod: 24

## 2020-01-29 PROCEDURE — 99232 SBSQ HOSP IP/OBS MODERATE 35: CPT

## 2020-01-29 PROCEDURE — 71046 X-RAY EXAM CHEST 2 VIEWS: CPT | Mod: 26

## 2020-01-29 RX ORDER — WARFARIN SODIUM 2.5 MG/1
7.5 TABLET ORAL DAILY
Refills: 0 | Status: DISCONTINUED | OUTPATIENT
Start: 2020-01-29 | End: 2020-01-30

## 2020-01-29 RX ORDER — ACETAMINOPHEN 500 MG
650 TABLET ORAL EVERY 6 HOURS
Refills: 0 | Status: DISCONTINUED | OUTPATIENT
Start: 2020-01-29 | End: 2020-01-31

## 2020-01-29 RX ORDER — WARFARIN SODIUM 2.5 MG/1
5 TABLET ORAL DAILY
Refills: 0 | Status: DISCONTINUED | OUTPATIENT
Start: 2020-01-29 | End: 2020-01-29

## 2020-01-29 RX ADMIN — Medication 1 DROP(S): at 13:11

## 2020-01-29 RX ADMIN — Medication 650 MILLIGRAM(S): at 21:43

## 2020-01-29 RX ADMIN — Medication 1 DROP(S): at 17:59

## 2020-01-29 RX ADMIN — Medication 20 MILLIGRAM(S): at 05:20

## 2020-01-29 RX ADMIN — PANTOPRAZOLE SODIUM 40 MILLIGRAM(S): 20 TABLET, DELAYED RELEASE ORAL at 05:20

## 2020-01-29 RX ADMIN — Medication 1 DROP(S): at 21:09

## 2020-01-29 RX ADMIN — Medication 650 MILLIGRAM(S): at 22:41

## 2020-01-29 RX ADMIN — Medication 625 MILLIGRAM(S): at 02:20

## 2020-01-29 RX ADMIN — TAMSULOSIN HYDROCHLORIDE 0.4 MILLIGRAM(S): 0.4 CAPSULE ORAL at 21:08

## 2020-01-29 RX ADMIN — Medication 50 MILLIGRAM(S): at 13:13

## 2020-01-29 RX ADMIN — Medication 50 MILLIGRAM(S): at 05:20

## 2020-01-29 RX ADMIN — Medication 625 MILLIGRAM(S): at 01:39

## 2020-01-29 RX ADMIN — Medication 100 MILLIGRAM(S): at 05:20

## 2020-01-29 RX ADMIN — Medication 50 MILLIGRAM(S): at 21:07

## 2020-01-29 RX ADMIN — WARFARIN SODIUM 7.5 MILLIGRAM(S): 2.5 TABLET ORAL at 21:08

## 2020-01-29 RX ADMIN — Medication 1 DROP(S): at 05:20

## 2020-01-29 NOTE — PROGRESS NOTE ADULT - SUBJECTIVE AND OBJECTIVE BOX
CC: afib     INTERVAL HPI/OVERNIGHT EVENTS: seen and examined , failed ablation yesterday , plan is to do ROSE and load with amiodarone .   patient feels that he has poor appetite , no SOB , no palpitations , no nausea vomiting           Vital Signs Last 24 Hrs  T(C): 36.9 (2020 05:15), Max: 37.1 (2020 20:57)  T(F): 98.4 (2020 05:15), Max: 98.7 (2020 20:57)  HR: 123 (2020 13:18) (104 - 123)  BP: 132/78 (2020 13:18) (107/59 - 140/86)  BP(mean): --  RR: 16 (2020 05:15) (15 - 16)  SpO2: 96% (2020 05:15) (96% - 98%)    PHYSICAL EXAM:    GENERAL: NAD, resting comfortable in bed   CHEST/LUNG: CTAB , no wheezing , rales, rhonchi heard   HEART: S1S2+, Regular rate and rhythm; irregular   ABDOMEN: Soft, Nontender, Nondistended; Bowel sounds present  EXTREMITIES:  no pitting edema , pulses palpated BL.        LABS:                        10.5   10.10 )-----------( 139      ( 2020 06:00 )             32.4         143  |  103  |  29.0<H>  ----------------------------<  114<H>  4.3   |  28.0  |  0.85    Ca    8.8      2020 06:00  Mg     2.0           PT/INR - ( 2020 06:00 )   PT: 22.8 sec;   INR: 1.94 ratio           Urinalysis Basic - ( 2020 23:00 )    Color: Yellow / Appearance: Clear / S.015 / pH: x  Gluc: x / Ketone: Negative  / Bili: Negative / Urobili: Negative mg/dL   Blood: x / Protein: Negative mg/dL / Nitrite: Negative   Leuk Esterase: Trace / RBC: 0-2 /HPF / WBC 0-2   Sq Epi: x / Non Sq Epi: Occasional / Bacteria: x          MEDICATIONS  (STANDING):  furosemide    Tablet 20 milliGRAM(s) Oral daily  metoprolol tartrate 50 milliGRAM(s) Oral every 8 hours  multivitamin 1 Tablet(s) Oral daily  ofloxacin 0.3% Solution 1 Drop(s) Both EYES four times a day  pantoprazole    Tablet 40 milliGRAM(s) Oral before breakfast  PRESERVISION 1 Tablet(s) 1 Tablet(s) Oral two times a day  tamsulosin 0.4 milliGRAM(s) Oral at bedtime  warfarin 5 milliGRAM(s) Oral daily    MEDICATIONS  (PRN):  acetaminophen   Tablet .. 650 milliGRAM(s) Oral every 6 hours PRN Mild Pain (1 - 3)  metoprolol tartrate Injectable 5 milliGRAM(s) IV Push every 6 hours PRN HR > 140 sustained      RADIOLOGY & ADDITIONAL TESTS:

## 2020-01-29 NOTE — PROGRESS NOTE ADULT - PROBLEM SELECTOR PLAN 2
symptomatic afib   Plan initially for AV ton RF ablation which was unsuccessful   Plan for ROSE in AM to r/o LV thrombus    If no LV thrombus, will start amiodarone for rhythmn control  Plan for eval outpatient for repeat ablation symptomatic afib   Plan initially for AV ton RF ablation which was unsuccessful   Plan for ROSE in AM to r/o ANSELMO thrombus    If no LV thrombus, will start amiodarone for rhythm control  Plan for eval outpatient for repeat ablation symptomatic afib   Plan initially for AV ton RF ablation which was unsuccessful   Plan for ROSE in AM to r/o LA/Adrián thrombus    If no LA thrombus, will start amiodarone for rate and possible rhythm control. Will not pursue CV so that he is not committed to 1 month of anticoagulation in case he does not tolerate higher dose of warfarin  Increase warfarin for goal INR 2-2.5  Plan for eval outpatient for repeat ablation with electroanatomic mapping in 1 month (to allow time for leads to heal)

## 2020-01-29 NOTE — PROGRESS NOTE ADULT - ASSESSMENT
89 year old male with paroxysmal AF on warfarin and HTN who presented with a fall and pelvic fracture now with difficult to control AF w/ RVR who cannot be on complete anticoagulation secondary to falls. LVEF <50% and so will plan for AVN RFA and CRT-P implant. Now s/p successful CRT-P implant and failed AV ton radiofrequency ablation.

## 2020-01-29 NOTE — PROGRESS NOTE ADULT - PROBLEM SELECTOR PLAN 1
S/P CRT-P   settings DDD    CXR PA/LAT this AM to eval lead position  Device check/interrogation this AM   No lovenox or heparin including SQ unless cleared by EP  Continue coumadin, goal INR 2-3   Wound check in device clinic outpatient in 2 weeks  RN Instructed to apply sling to patient - he is weak and tries to use his left arm to get up.

## 2020-01-29 NOTE — PROGRESS NOTE ADULT - SUBJECTIVE AND OBJECTIVE BOX
[facility]            [facility]                          [address] [place] [address]                       Phone: [phone]. Fax:[phone]                      ________________________________________________           Reason for follow up/Overnight events: s/p CRTP implant, telemetry showing  100s.     HPI:  89 y/o male with chronic A Fib on coumadin, HTN, lives alone with home health aid 3 times a week,  came to the ER because syncope. patient was standing preparing dinner when he found himself on the floor. patient does not know what happened. no palpitations. no SOB. X Ray pelvis showed different stages of healing chronic B/L pubic kayla fracture. no headache or blurry vision. no focal weakness or speech changes (2020 23:21)      ROS: All review of systems negative unless indicated otherwise below.                          LAB RESULTS                   COMPLETE BLOOD COUNT( 2020 06:00 )                            10.5 g/dL<L>  10.10 K/uL )---------------( 139 K/uL<L>                        32.4 %<L>      Automated Differential     Auto Basophil # - X      Auto Basophil % - X      Auto Eosinophil # - X      Auto Eosinophil % - 2.0 %  Auto Immature Granulocyte # - X      Auto Immature Granulocyte % - X      Auto Lymphocyte # - X      Auto Lymphocyte % - 47.0 %<H>  Auto Monocyte # - X      Auto Monocyte % - 7.0 %  Auto Neutrophil # - X      Auto Neutrophil % - 34.0 %<L>                                  CHEMISTRY                 Basic Metabolic Panel (20 @ 06:00)    143  |  103  |  29.0<H>  ----------------------------<  114<H>  4.3   |  28.0  |  0.85    Ca    8.8      2020 06:00  Mg     2.0                         Liver Functions (20 @ 20:27))  TPro  7.1  /  Alb  3.8  /  TBili  0.3  /  DBili  x   /  AST  20  /  ALT  12  /  AlkPhos  123     PT/INR/PTT ( 2020 06:00 )                        :                       :      22.8         :       X                     .        .                   .              .           .       1.94        .                                                                   Cardiac Enzymes   ( 2020 20:27 )  Troponin T  <0.01,  CPK  X    , CKMB  X    , BNP 2297<H>                              MICROBIOLOGY/CULTURES:      Culture - Urine (collected 20 @ 02:20)  Source: .Urine  Final Report (20 @ 08:09):    10,000 - 49,000 CFU/mL Escherichia coli  Organism: Escherichia coli (20 @ 08:09)  Organism: Escherichia coli (20 @ 08:09)      -  Amikacin: S <=16      -  Ampicillin: R >16 These ampicillin results predict results for amoxicillin      -  Ampicillin/Sulbactam: S <=8/4 Enterobacter, Citrobacter, and Serratia may develop resistance during prolonged therapy (3-4 days)      -  Aztreonam: S <=4      -  Cefazolin: S <=8 (MIC_CL_COM_ENTERIC_CEFAZU) For uncomplicated UTI with K. pneumoniae, E. coli, or P. mirablis: SHAYNE <=16 is sensitive and SHAYNE >=32 is resistant. This also predicts results for oral agents cefaclor, cefdinir, cefpodoxime, cefprozil, cefuroxime axetil, cephalexin and locarbef for uncomplicated UTI. Note that some isolates may be susceptible to these agents while testing resistant to cefazolin.      -  Cefepime: S <=4      -  Cefoxitin: S <=8      -  Ceftriaxone: S <=1 Enterobacter, Citrobacter, and Serratia may develop resistance during prolonged therapy      -  Ciprofloxacin: R >2      -  Gentamicin: S <=4      -  Imipenem: S <=1      -  Levofloxacin: R >4      -  Meropenem: S <=1      -  Nitrofurantoin: S <=32 Should not be used to treat pyelonephritis      -  Piperacillin/Tazobactam: S <=16      -  Tigecycline: S <=2      -  Tobramycin: S <=4      -  Trimethoprim/Sulfamethoxazole: S <=2/38      Method Type: SHAYNE    Rapid RVP Result: NotDetec (20 @ 20:16)                          RADIOLOGY RESULTS: Personally visualized   < from: Xray Chest 1 View-PORTABLE IMMEDIATE (20 @ 14:35) >    IMPRESSION:  No left pneumothorax.    < end of copied text >    AM AP/LAT - pending                           CARDIOLOGY RESULTS: Official Report/Preliminary Verbal Reports  ECHO:   < from: TTE Echo Complete w/Doppler (20 @ 11:51) >  Summary:   1. Left ventricular ejection fraction, by visual estimation, is 35 to 40%.   2. Moderately decreased global left ventricular systolic function.   3. Multiple left ventricular regional wall motion abnormalities exist. See wall motion findings.   4. The mitral in-flow pattern reveals no discernable A-wave, therefore no comment on diastolic function can be made.   5. There is no evidence of pericardial effusion.   6. Moderate to severe mitral valve regurgitation.   7. Mild prolapse of the anterior and posterior mitral valve leaflets.   8. Mild-moderate tricuspid regurgitation.   9. Mild aortic regurgitation.  10. Sclerotic aortic valve with normal opening.  11. Mobile intra-atrial septum.    < end of copied text >                          CARDIOLOGY REVIEW: Personally visualized and reviewed    EKG:   Telemetry Last 24h:  100s no failure to capture                              DAILY WEIGHTS - 48 HOUR TREND     Daily Weight in k (2020 04:14), Weight in k (2020 06:38)                             INTAKE AND OUTPUT - 48 HOUR TREND     20 @ 07:  -  20 @ 07:00  --------------------------------------------------------  IN:  Total IN: 0 mL    OUT:    Voided: 800 mL  Total OUT: 800 mL    Total NET: -800 mL      20 @ 07:  -  20 @ 07:00  --------------------------------------------------------  IN:  Total IN: 0 mL    OUT:    Voided: 375 mL  Total OUT: 375 mL    Total NET: -375 mL          HOME MEDICATIONS:  acetaminophen 500 mg oral tablet: 1 tab(s) orally every 6 hours, As Needed (2020 23:34)  AcipHex 20 mg oral delayed release tablet: 1 tab(s) orally once a day (2020 23:34)  Coumadin 3 mg oral tablet: 1 tab(s) orally once a day (2020 23:34)  Metoprolol Tartrate 50 mg oral tablet: 1 tab(s) orally 2 times a day (2020 23:34)  Multiple Vitamins oral tablet: 1 tab(s) orally once a day (2020 23:34)  PreserVision oral tablet: 1 tab(s) orally 2 times a day (2020 23:34)  Systane ophthalmic solution: 1 drop(s) to each affected eye every 4 hours (2020 23:34)                             Current Admission Active Medications    acetaminophen   Tablet .. 625 milliGRAM(s) Oral every 6 hours PRN Mild Pain (1 - 3)  furosemide    Tablet 20 milliGRAM(s) Oral daily  metoprolol tartrate 50 milliGRAM(s) Oral every 8 hours  metoprolol tartrate Injectable 5 milliGRAM(s) IV Push every 6 hours PRN HR > 140 sustained  multivitamin 1 Tablet(s) Oral daily  ofloxacin 0.3% Solution 1 Drop(s) Both EYES four times a day  pantoprazole    Tablet 40 milliGRAM(s) Oral before breakfast  PRESERVISION 1 Tablet(s) 1 Tablet(s) Oral two times a day  tamsulosin 0.4 milliGRAM(s) Oral at bedtime  warfarin 5 milliGRAM(s) Oral daily                        PHYSICAL EXAM:    Vital Signs Last 24 Hrs  T(C): 36.9 (2020 05:15), Max: 37.1 (2020 20:57)  T(F): 98.4 (2020 05:15), Max: 98.7 (2020 20:57)  HR: 110 (2020 05:15) (104 - 116)  BP: 110/66 (2020 05:15) (107/59 - 140/86)  BP(mean): --  RR: 16 (2020 05:15) (15 - 16)  SpO2: 96% (2020 05:15) (96% - 98%)    GENERAL: NAD  NECK: Supple, No JVD  NERVOUS SYSTEM:  Alert & Oriented X3, non focal neuro exam.   CHEST/LUNG: clear lungs, No rales, rhonchi, wheezing, or rubs  PPM IMPLANT: right anterior chest PPM site benign, minimal ecchymosis, Dermabond intact, pressure dressing removed, no subcutaneous emphysema.   HEART: Regular rate and rhythm; s1 and s2 auscultated, No murmurs, rubs, or gallops  ABDOMEN: Soft, Nontender, Nondistended; Bowel sounds present and normoactive.   EXTREMITIES:  2+ Peripheral Pulses, No clubbing, cyanosis, or edema  CATH SITE: Right groin benign s/p ablation attempt, suture removal.  No bleeding, no ecchymosis, no hematoma. Extremity Warm to touch, with palpable distal pulses, and brisk capillary refill. [facility]            [facility]                          [address] [place] [address]                       Phone: [phone]. Fax:[phone]                      ________________________________________________           Reason for follow up/Overnight events: s/p CRTP implant, telemetry showing  100s.     HPI:  89 y/o male with chronic A Fib on coumadin, HTN, lives alone with home health aid 3 times a week,  came to the ER because syncope. patient was standing preparing dinner when he found himself on the floor. patient does not know what happened. no palpitations. no SOB. X Ray pelvis showed different stages of healing chronic B/L pubic kayla fracture. no headache or blurry vision. no focal weakness or speech changes (2020 23:21)      ROS: All review of systems negative unless indicated otherwise below.                          LAB RESULTS                   COMPLETE BLOOD COUNT( 2020 06:00 )                            10.5 g/dL<L>  10.10 K/uL )---------------( 139 K/uL<L>                        32.4 %<L>      Automated Differential     Auto Basophil # - X      Auto Basophil % - X      Auto Eosinophil # - X      Auto Eosinophil % - 2.0 %  Auto Immature Granulocyte # - X      Auto Immature Granulocyte % - X      Auto Lymphocyte # - X      Auto Lymphocyte % - 47.0 %<H>  Auto Monocyte # - X      Auto Monocyte % - 7.0 %  Auto Neutrophil # - X      Auto Neutrophil % - 34.0 %<L>                                  CHEMISTRY                 Basic Metabolic Panel (20 @ 06:00)    143  |  103  |  29.0<H>  ----------------------------<  114<H>  4.3   |  28.0  |  0.85    Ca    8.8      2020 06:00  Mg     2.0                         Liver Functions (20 @ 20:27))  TPro  7.1  /  Alb  3.8  /  TBili  0.3  /  DBili  x   /  AST  20  /  ALT  12  /  AlkPhos  123     PT/INR/PTT ( 2020 06:00 )                        :                       :      22.8         :       X                     .        .                   .              .           .       1.94        .                                                                   Cardiac Enzymes   ( 2020 20:27 )  Troponin T  <0.01,  CPK  X    , CKMB  X    , BNP 2297<H>                              MICROBIOLOGY/CULTURES:      Culture - Urine (collected 20 @ 02:20)  Source: .Urine  Final Report (20 @ 08:09):    10,000 - 49,000 CFU/mL Escherichia coli  Organism: Escherichia coli (20 @ 08:09)  Organism: Escherichia coli (20 @ 08:09)      -  Amikacin: S <=16      -  Ampicillin: R >16 These ampicillin results predict results for amoxicillin      -  Ampicillin/Sulbactam: S <=8/4 Enterobacter, Citrobacter, and Serratia may develop resistance during prolonged therapy (3-4 days)      -  Aztreonam: S <=4      -  Cefazolin: S <=8 (MIC_CL_COM_ENTERIC_CEFAZU) For uncomplicated UTI with K. pneumoniae, E. coli, or P. mirablis: SHAYNE <=16 is sensitive and SHAYNE >=32 is resistant. This also predicts results for oral agents cefaclor, cefdinir, cefpodoxime, cefprozil, cefuroxime axetil, cephalexin and locarbef for uncomplicated UTI. Note that some isolates may be susceptible to these agents while testing resistant to cefazolin.      -  Cefepime: S <=4      -  Cefoxitin: S <=8      -  Ceftriaxone: S <=1 Enterobacter, Citrobacter, and Serratia may develop resistance during prolonged therapy      -  Ciprofloxacin: R >2      -  Gentamicin: S <=4      -  Imipenem: S <=1      -  Levofloxacin: R >4      -  Meropenem: S <=1      -  Nitrofurantoin: S <=32 Should not be used to treat pyelonephritis      -  Piperacillin/Tazobactam: S <=16      -  Tigecycline: S <=2      -  Tobramycin: S <=4      -  Trimethoprim/Sulfamethoxazole: S <=2/38      Method Type: SHAYNE    Rapid RVP Result: NotDetec (20 @ 20:16)                          RADIOLOGY RESULTS: Personally visualized   < from: Xray Chest 1 View-PORTABLE IMMEDIATE (20 @ 14:35) >    IMPRESSION:  No left pneumothorax.    < end of copied text >    CXR 2V: Reviewed by me. Normal lead position. No clear pneumothorax.    AM AP/LAT - pending                           CARDIOLOGY RESULTS: Official Report/Preliminary Verbal Reports  ECHO:   < from: TTE Echo Complete w/Doppler (20 @ 11:51) >  Summary:   1. Left ventricular ejection fraction, by visual estimation, is 35 to 40%.   2. Moderately decreased global left ventricular systolic function.   3. Multiple left ventricular regional wall motion abnormalities exist. See wall motion findings.   4. The mitral in-flow pattern reveals no discernable A-wave, therefore no comment on diastolic function can be made.   5. There is no evidence of pericardial effusion.   6. Moderate to severe mitral valve regurgitation.   7. Mild prolapse of the anterior and posterior mitral valve leaflets.   8. Mild-moderate tricuspid regurgitation.   9. Mild aortic regurgitation.  10. Sclerotic aortic valve with normal opening.  11. Mobile intra-atrial septum.    < end of copied text >                          CARDIOLOGY REVIEW: Personally visualized and reviewed    EKG:   Telemetry Last 24h:  100s no failure to capture                              DAILY WEIGHTS - 48 HOUR TREND     Daily Weight in k (2020 04:14), Weight in k (2020 06:38)                             INTAKE AND OUTPUT - 48 HOUR TREND     20 @ 07:  -  20 @ 07:00  --------------------------------------------------------  IN:  Total IN: 0 mL    OUT:    Voided: 800 mL  Total OUT: 800 mL    Total NET: -800 mL      20 @ 07:  -  20 @ 07:00  --------------------------------------------------------  IN:  Total IN: 0 mL    OUT:    Voided: 375 mL  Total OUT: 375 mL    Total NET: -375 mL          HOME MEDICATIONS:  acetaminophen 500 mg oral tablet: 1 tab(s) orally every 6 hours, As Needed (2020 23:34)  AcipHex 20 mg oral delayed release tablet: 1 tab(s) orally once a day (2020 23:34)  Coumadin 3 mg oral tablet: 1 tab(s) orally once a day (2020 23:34)  Metoprolol Tartrate 50 mg oral tablet: 1 tab(s) orally 2 times a day (2020 23:34)  Multiple Vitamins oral tablet: 1 tab(s) orally once a day (2020 23:34)  PreserVision oral tablet: 1 tab(s) orally 2 times a day (2020 23:34)  Systane ophthalmic solution: 1 drop(s) to each affected eye every 4 hours (2020 23:34)                             Current Admission Active Medications    acetaminophen   Tablet .. 625 milliGRAM(s) Oral every 6 hours PRN Mild Pain (1 - 3)  furosemide    Tablet 20 milliGRAM(s) Oral daily  metoprolol tartrate 50 milliGRAM(s) Oral every 8 hours  metoprolol tartrate Injectable 5 milliGRAM(s) IV Push every 6 hours PRN HR > 140 sustained  multivitamin 1 Tablet(s) Oral daily  ofloxacin 0.3% Solution 1 Drop(s) Both EYES four times a day  pantoprazole    Tablet 40 milliGRAM(s) Oral before breakfast  PRESERVISION 1 Tablet(s) 1 Tablet(s) Oral two times a day  tamsulosin 0.4 milliGRAM(s) Oral at bedtime  warfarin 5 milliGRAM(s) Oral daily                        PHYSICAL EXAM:    Vital Signs Last 24 Hrs  T(C): 36.9 (2020 05:15), Max: 37.1 (2020 20:57)  T(F): 98.4 (2020 05:15), Max: 98.7 (2020 20:57)  HR: 110 (2020 05:15) (104 - 116)  BP: 110/66 (2020 05:15) (107/59 - 140/86)  BP(mean): --  RR: 16 (2020 05:15) (15 - 16)  SpO2: 96% (2020 05:15) (96% - 98%)    GENERAL: NAD  NECK: Supple, No JVD  NERVOUS SYSTEM:  Alert & Oriented X3, non focal neuro exam.   CHEST/LUNG: clear lungs, No rales, rhonchi, wheezing, or rubs  PPM IMPLANT: right anterior chest PPM site benign, minimal ecchymosis, Dermabond intact, pressure dressing removed, no subcutaneous emphysema.   HEART: Regular rate and rhythm; s1 and s2 auscultated, No murmurs, rubs, or gallops  ABDOMEN: Soft, Nontender, Nondistended; Bowel sounds present and normoactive.   EXTREMITIES:  2+ Peripheral Pulses, No clubbing, cyanosis, or edema  CATH SITE: Right groin benign s/p ablation attempt, suture removal.  No bleeding, no ecchymosis, no hematoma. Extremity Warm to touch, with palpable distal pulses, and brisk capillary refill.

## 2020-01-29 NOTE — PROGRESS NOTE ADULT - ASSESSMENT
ssessment:  Mr. White is a 89 year old man with past medical history of Paroxysmal Atrial Fibrillation (on coumadin), Hypertension, with history of falls, who presented with fall also with left superior and inferior pubic rami fractures (unclear acuity). The patient denies history of syncope, however no one was home with patient so hard to determine if it was only a fall. He is currently in atrial fibrillation with RVR so will rate control patient. He was also found to have volume overload, given BNP 2200s as well as TTE result of moderately reduced LVEF 35-40% and multiple LV regional wall motion abnormalities. Also with moderate to severe mitral regurgitation and mild to moderate tricuspid regurgitation. Patient denies symptoms of chest pain and troponin is negative, less concern for ischemia. Unclear if his volume overload and atrial fibrillation with RVR caused him to fall or if urinary tract infection contributed as well. Would recommend the following:     Recommendations:  [] Atrial Fibrillation with RVR: Asymptomatic. Had unsuccessful AV node ablation. Plan for ROSE tomorrow to evaluate for left atrial thrombus prior to initiation of amiodarone, per Cardiac EP. Discussed this with patient and he is amenable to this plan. Continue metoprolol tartrate 50 mg q8h.  Coumadin goal INR 1.5-2.0 due to fall risk. Possible evaluation for left atrial appendage closure device as outpatient.  [] Cardiomyopathy: LVEF 35-40% here was previously reported as 55% in 3/2019, patient also with regional wall motion abnormalities. Plan for outpatient stress evaluation. Continue beta blocker. Patient cannot tolerate Entresto, had low BP.    Thank you for the consult. We will follow along.    Luis Kirby MD  Cardiology, formerly [facility]

## 2020-01-29 NOTE — PROGRESS NOTE ADULT - ASSESSMENT
1. Uncontrolled afib   refractory to medical management .  failed AV ton ablation , will follow with EP about further plans .  remains on coumadin , will follow INR.    2. Acute on chronic systolic CHF / cardiomyopathy   improved.  on oral lasix, cardio on board .  underwent CRT-P placement     3. Left pubic ramus fracture / L1 fracture   conservative management.  pain control as needed .    4. GERD   protonix.    5. BPH   flomax.    6. DVT prophylaxis  coumadin .    details discussed with patient , all concerns answered . plan is to go to rehab after discharge . 1. Uncontrolled afib   refractory to medical management .  failed AV ton ablation , plan is to do ROSE, followed by amio loading .  ablation in a month if INR remains therapeutic.  will discuss with Dr. Davenport regarding INR goal.  INR improved   Plan is for  repeat ablation with electroanatomic mapping in 1 month (to allow time for leads to heal).    2. Acute on chronic systolic CHF / cardiomyopathy   improved.  on oral lasix, cardio on board .  underwent CRT-P placement     3. Left pubic ramus fracture / L1 fracture   conservative management.  pain control as needed .    4. GERD   protonix.    5. BPH   flomax.    6. DVT prophylaxis  coumadin .    details discussed with patient , all concerns answered . plan is to go to rehab after discharge .

## 2020-01-29 NOTE — PROGRESS NOTE ADULT - SUBJECTIVE AND OBJECTIVE BOX
MECHE MILLER  [id number]      Chief Complaint: Follow up fall and new cardiomyopathy and atrial fibrillation with RVR      Interval History: The patient had unsuccessful AV node ablation yesterday. Reports feeling ok today. Denies palpitations.       Tele: atrial fibrillation 120s BPM      Current Meds:   acetaminophen   Tablet .. 650 milliGRAM(s) Oral every 6 hours PRN  furosemide    Tablet 20 milliGRAM(s) Oral daily  metoprolol tartrate 50 milliGRAM(s) Oral every 8 hours  metoprolol tartrate Injectable 5 milliGRAM(s) IV Push every 6 hours PRN  multivitamin 1 Tablet(s) Oral daily  ofloxacin 0.3% Solution 1 Drop(s) Both EYES four times a day  pantoprazole    Tablet 40 milliGRAM(s) Oral before breakfast  PRESERVISION 1 Tablet(s) 1 Tablet(s) Oral two times a day  tamsulosin 0.4 milliGRAM(s) Oral at bedtime  warfarin 5 milliGRAM(s) Oral daily      Objective:     Vital Signs:   T(C): 36.9 (01-29-20 @ 05:15), Max: 37.1 (01-28-20 @ 20:57)  HR: 123 (01-29-20 @ 13:18) (104 - 123)  BP: 132/78 (01-29-20 @ 13:18) (107/59 - 140/86)  RR: 16 (01-29-20 @ 05:15) (15 - 16)  SpO2: 96% (01-29-20 @ 05:15) (96% - 98%)  Wt(kg): --    Physical Exam:   General: elderly man, laying flat in bed, no distress  HEENT: dry oral mucosa  Neck: supple, no carotid bruits b/l  CVS: JVP ~9 cm H20, irregularly irregular, s1, s2, no murmurs  Chest: unlabored respirations, CTAB, left chest with pacemaker present, wound healing  Abdomen: non-distended  Extremities: no lower extremity edema b/l  Neuro: A&O x3  Psych: Normal affect        Labs:   29 Jan 2020 06:00    143    |  103    |  29.0   ----------------------------<  114    4.3     |  28.0   |  0.85     Ca    8.8        29 Jan 2020 06:00  Mg     2.0       28 Jan 2020 06:07                            10.5   10.10 )-----------( 139      ( 29 Jan 2020 06:00 )             32.4     PT/INR - ( 29 Jan 2020 06:00 )   PT: 22.8 sec;   INR: 1.94 ratio               Outpatient TTE (3/2019):  LVEF 55%, severely dilated left atrium, severely dilated right atrium  Normal right ventricle size and systolic function  Mild dilatation of the aortic root  Mild aortic valve sclerosis without stenosis  Mild AR  Moderate MR    TTE (1/17/2020):  Summary:   1. Left ventricular ejection fraction, by visual estimation, is 35 to 40%.   2. Moderately decreased global left ventricular systolic function.   3. Multiple left ventricular regional wall motion abnormalities exist. See wall motion findings.   4. The mitral in-flow pattern reveals no discernable A-wave, therefore no comment on diastolic function can be made.   5. There is no evidence of pericardial effusion.   6. Moderate to severe mitral valve regurgitation.   7. Mild prolapse of the anterior and posterior mitral valve leaflets.   8. Mild-moderate tricuspid regurgitation.   9. Mild aortic regurgitation.  10. Sclerotic aortic valve with normal opening.  11. Mobile intra-atrial septum.    ECG (1/16/2020): atrial fibrillation with RVR, RBBB    CXR (1/16/2020):  Findings:  Lines: None    Heart/Mediastinum/Lungs/Other: The heart size is normal. The lungs are clear. There are no pleural effusions.    CT Head (1/16/2020):  IMPRESSION:  Brain CT:   No acute intracranial hemorrhage, mass effect, or CT evidence of a large acute or recent subacute transcortical infarct. Small right parietal scalp hematoma.    Cervical spine CT:  No acute displaced cervical spine fracture or evidence of traumatic malalignment. Compression of T2 body with interval progression. If clinical symptoms persist, MRI of the cervical spine can be considered to assess for ligamentous or cord injury.    Xray Left Hip (1/16/2020):  Old right superior and inferior pubic rami fractures. Acute appearing left superior and inferior pubic rami fracture. No definite hip fracture. Degenerative changes and osteopenia.    Impression:  Left superior and inferior pubic rami fractures.      Carotid US (1/17/2020):IMPRESSION:       1.  Right carotid system:  No hemodynamically significant plaque disease seen within the RIGHT carotid bulb, proximal internal carotid artery. External carotid artery patent.  2.  Left carotid system:  Nonhemodynamically significant plaque disease within LEFT carotid bulb, proximal external carotid artery and proximal internal carotid artery.

## 2020-01-30 LAB
INR BLD: 2.26 RATIO — HIGH (ref 0.88–1.16)
PROTHROM AB SERPL-ACNC: 26.6 SEC — HIGH (ref 10–12.9)

## 2020-01-30 PROCEDURE — 99232 SBSQ HOSP IP/OBS MODERATE 35: CPT

## 2020-01-30 PROCEDURE — 93325 DOPPLER ECHO COLOR FLOW MAPG: CPT | Mod: 26

## 2020-01-30 PROCEDURE — 93312 ECHO TRANSESOPHAGEAL: CPT | Mod: 26

## 2020-01-30 PROCEDURE — 93320 DOPPLER ECHO COMPLETE: CPT | Mod: 26

## 2020-01-30 RX ORDER — WARFARIN SODIUM 2.5 MG/1
4 TABLET ORAL DAILY
Refills: 0 | Status: DISCONTINUED | OUTPATIENT
Start: 2020-01-30 | End: 2020-01-31

## 2020-01-30 RX ORDER — AMIODARONE HYDROCHLORIDE 400 MG/1
400 TABLET ORAL EVERY 8 HOURS
Refills: 0 | Status: DISCONTINUED | OUTPATIENT
Start: 2020-01-30 | End: 2020-01-31

## 2020-01-30 RX ORDER — AMIODARONE HYDROCHLORIDE 400 MG/1
200 TABLET ORAL DAILY
Refills: 0 | Status: CANCELLED | OUTPATIENT
Start: 2020-02-06 | End: 2020-01-31

## 2020-01-30 RX ORDER — AMIODARONE HYDROCHLORIDE 400 MG/1
TABLET ORAL
Refills: 0 | Status: DISCONTINUED | OUTPATIENT
Start: 2020-01-30 | End: 2020-01-31

## 2020-01-30 RX ORDER — WARFARIN SODIUM 2.5 MG/1
5 TABLET ORAL DAILY
Refills: 0 | Status: DISCONTINUED | OUTPATIENT
Start: 2020-01-30 | End: 2020-01-30

## 2020-01-30 RX ADMIN — TAMSULOSIN HYDROCHLORIDE 0.4 MILLIGRAM(S): 0.4 CAPSULE ORAL at 21:22

## 2020-01-30 RX ADMIN — Medication 1 DROP(S): at 18:02

## 2020-01-30 RX ADMIN — PANTOPRAZOLE SODIUM 40 MILLIGRAM(S): 20 TABLET, DELAYED RELEASE ORAL at 05:35

## 2020-01-30 RX ADMIN — Medication 50 MILLIGRAM(S): at 05:35

## 2020-01-30 RX ADMIN — Medication 20 MILLIGRAM(S): at 05:36

## 2020-01-30 RX ADMIN — Medication 650 MILLIGRAM(S): at 19:03

## 2020-01-30 RX ADMIN — WARFARIN SODIUM 4 MILLIGRAM(S): 2.5 TABLET ORAL at 21:22

## 2020-01-30 RX ADMIN — AMIODARONE HYDROCHLORIDE 400 MILLIGRAM(S): 400 TABLET ORAL at 21:22

## 2020-01-30 RX ADMIN — Medication 1 DROP(S): at 11:34

## 2020-01-30 RX ADMIN — Medication 650 MILLIGRAM(S): at 18:09

## 2020-01-30 RX ADMIN — Medication 50 MILLIGRAM(S): at 18:02

## 2020-01-30 RX ADMIN — Medication 1 DROP(S): at 05:36

## 2020-01-30 NOTE — PROGRESS NOTE ADULT - SUBJECTIVE AND OBJECTIVE BOX
[place] CARDIAC ELECTROPHYSIOLOGY                                                       [facility] [place]                                                      Office: [address] [address]                                                       Telephone: [phone]. Fax:[phone]    Subjective: Patient feels well. Has no complaints. Status post ROSE which showed no LA/ANSELMO thrombus.    TELE: AF with RVR, intermittent pacing    MEDICATIONS  (STANDING):  aMIOdarone    Tablet 400 milliGRAM(s) Oral every 8 hours  aMIOdarone    Tablet   Oral   furosemide    Tablet 20 milliGRAM(s) Oral daily  metoprolol tartrate 50 milliGRAM(s) Oral every 8 hours  multivitamin 1 Tablet(s) Oral daily  ofloxacin 0.3% Solution 1 Drop(s) Both EYES four times a day  pantoprazole    Tablet 40 milliGRAM(s) Oral before breakfast  PRESERVISION 1 Tablet(s) 1 Tablet(s) Oral two times a day  tamsulosin 0.4 milliGRAM(s) Oral at bedtime  warfarin 4 milliGRAM(s) Oral daily    MEDICATIONS  (PRN):  acetaminophen   Tablet .. 650 milliGRAM(s) Oral every 6 hours PRN Mild Pain (1 - 3)  metoprolol tartrate Injectable 5 milliGRAM(s) IV Push every 6 hours PRN HR > 140 sustained    Allergies  antibiotic (Rash)  tetracycline (Rash)      Vital Signs Last 24 Hrs  T(C): 36.6 (30 Jan 2020 09:24), Max: 36.9 (29 Jan 2020 20:26)  T(F): 97.8 (30 Jan 2020 09:24), Max: 98.4 (29 Jan 2020 20:26)  HR: 127 (30 Jan 2020 14:10) (100 - 127)  BP: 121/84 (30 Jan 2020 17:15) (109/80 - 125/74)  BP(mean): --  RR: 12 (30 Jan 2020 14:10) (12 - 18)  SpO2: 95% (30 Jan 2020 14:10) (94% - 98%)    Physical Exam:  Constitutional: NAD, AAOx3  Cardiovascular: Tachycardic, irregular, +S1S2  Pulmonary: CTA b/l, unlabored  GI: soft NTND +BS  Extremities: no pedal edema, +distal pulses b/l  Neuro: non focal, MOORE x4    LABS:                        10.5   10.10 )-----------( 139      ( 29 Jan 2020 06:00 )             32.4     01-29    143  |  103  |  29.0<H>  ----------------------------<  114<H>  4.3   |  28.0  |  0.85    Ca    8.8      29 Jan 2020 06:00      PT/INR - ( 30 Jan 2020 06:13 )   PT: 26.6 sec;   INR: 2.26 ratio

## 2020-01-30 NOTE — PROGRESS NOTE ADULT - ASSESSMENT
89 year old male with paroxysmal AF on warfarin and HTN who presented with a fall and pelvic fracture now with difficult to control AF w RVR who previously was not on complete anticoagulation secondary to falls. LVEF <50%. Now status post MDT CRT-P with failed AVN ablation. He underwent ROSE today which showed no LA/ANSELMO thrombus. He is now therapeutic with INR. Will start Amiodarone 400mg TID x1 week followed by 200mg daily.    Recommendations:  - Amiodarone 400mg TID x1 week followed by 200mg daily  - Reduce warfarin dose as amiodarone increases warfarin effect  - Repeat attempt at AVN ablation in 1 month (to reduce risk of lead dislodgement)  - Continue metoprolol for now, can consider discontinuing in future    Thank you for this consultation. EP will sign off. Please contact EP team with any questions.    Stewart Davenport MD  Clinical Cardiac Electrophysiology

## 2020-01-30 NOTE — PROGRESS NOTE ADULT - SUBJECTIVE AND OBJECTIVE BOX
Department of Cardiology                                                                  [facility]/[place] [address] [address]                                                            Telephone: [phone]. Fax:[phone]                                                                                         ROSE NOTE     89y Male S/P ROSE which showed no LA/ANSELMO thrombus.    PAST MEDICAL & SURGICAL HISTORY:  VTE (venous thromboembolism)  Irritable bowel syndrome with diarrhea  Prostate CA: radiation  Cyst of pancreas  Cyst of kidney, acquired  GERD (gastroesophageal reflux disease)  Tachycardia  Afib  H/O total knee replacement, right    Home Medications:  acetaminophen 500 mg oral tablet: 1 tab(s) orally every 6 hours, As Needed (16 Jan 2020 23:34)  AcipHex 20 mg oral delayed release tablet: 1 tab(s) orally once a day (16 Jan 2020 23:34)  Coumadin 3 mg oral tablet: 1 tab(s) orally once a day (16 Jan 2020 23:34)  Metoprolol Tartrate 50 mg oral tablet: 1 tab(s) orally 2 times a day (16 Jan 2020 23:34)  Multiple Vitamins oral tablet: 1 tab(s) orally once a day (16 Jan 2020 23:34)  PreserVision oral tablet: 1 tab(s) orally 2 times a day (16 Jan 2020 23:34)  Systane ophthalmic solution: 1 drop(s) to each affected eye every 4 hours (16 Jan 2020 23:34)    Objective:   T(C): 36.6 (01-30-20 @ 09:24), Max: 36.9 (01-29-20 @ 20:26)  HR: 127 (01-30-20 @ 14:10) (100 - 127)  BP: 109/80 (01-30-20 @ 14:10) (109/80 - 125/74)  RR: 12 (01-30-20 @ 14:10) (12 - 18)  SpO2: 95% (01-30-20 @ 14:10) (94% - 98%)    CM: SR  General: Awake, alert, speech clear, no acute distress  Chest: S1, S2, RRR, CTA B/L  Neuro: A&OX3, CN II-XII grossly intact                          10.5   10.10 )-----------( 139      ( 29 Jan 2020 06:00 )             32.4     01-29    143  |  103  |  29.0  ------------------------<  114  4.3   |  28.0  |  0.85    Ca    8.8      29 Jan 2020 06:00    PT/INR - ( 30 Jan 2020 06:13 )   PT: 26.6 sec;   INR: 2.26 ratio         Assessment: AF    Plan:   1. NPO for 2 hours    2. Assess ability to swallow prior to PO intake.    3. Continue current medications. Department of Cardiology                                                                  [facility]/[place] [address] [address]                                                            Telephone: [phone]. Fax:[phone]                                                                                         ROSE NOTE     89y Male S/P ROSE which showed no LA/ANSELMO thrombus.    PAST MEDICAL & SURGICAL HISTORY:  VTE (venous thromboembolism)  Irritable bowel syndrome with diarrhea  Prostate CA: radiation  Cyst of pancreas  Cyst of kidney, acquired  GERD (gastroesophageal reflux disease)  Tachycardia  Afib  H/O total knee replacement, right    Home Medications:  acetaminophen 500 mg oral tablet: 1 tab(s) orally every 6 hours, As Needed (16 Jan 2020 23:34)  AcipHex 20 mg oral delayed release tablet: 1 tab(s) orally once a day (16 Jan 2020 23:34)  Coumadin 3 mg oral tablet: 1 tab(s) orally once a day (16 Jan 2020 23:34)  Metoprolol Tartrate 50 mg oral tablet: 1 tab(s) orally 2 times a day (16 Jan 2020 23:34)  Multiple Vitamins oral tablet: 1 tab(s) orally once a day (16 Jan 2020 23:34)  PreserVision oral tablet: 1 tab(s) orally 2 times a day (16 Jan 2020 23:34)  Systane ophthalmic solution: 1 drop(s) to each affected eye every 4 hours (16 Jan 2020 23:34)    Objective:   T(C): 36.6 (01-30-20 @ 09:24), Max: 36.9 (01-29-20 @ 20:26)  HR: 127 (01-30-20 @ 14:10) (100 - 127)  BP: 109/80 (01-30-20 @ 14:10) (109/80 - 125/74)  RR: 12 (01-30-20 @ 14:10) (12 - 18)  SpO2: 95% (01-30-20 @ 14:10) (94% - 98%)    CM: SR  General: Awake, alert, speech clear, no acute distress  Chest: S1, S2, RRR, CTA B/L  Neuro: A&OX3, CN II-XII grossly intact                          10.5   10.10 )-----------( 139      ( 29 Jan 2020 06:00 )             32.4     01-29    143  |  103  |  29.0  ------------------------<  114  4.3   |  28.0  |  0.85    Ca    8.8      29 Jan 2020 06:00    PT/INR - ( 30 Jan 2020 06:13 )   PT: 26.6 sec;   INR: 2.26 ratio         Assessment: AF    Plan:   1. NPO for 2 hours    2. Assess ability to swallow prior to PO intake.    3. Amiodarone 400 mg TID for 1 week then 200 mg daily    4. Decrease warfarin dose.

## 2020-01-30 NOTE — PROGRESS NOTE ADULT - SUBJECTIVE AND OBJECTIVE BOX
MECHE MILLER  [id number]      Chief Complaint: Follow up fall and new cardiomyopathy and atrial fibrillation with RVR      Interval History: The patient reports feeling ok today. Denies dyspnea and chest pain.       Tele: atrial fibrillation 120s BPM      Current Meds:   acetaminophen   Tablet .. 650 milliGRAM(s) Oral every 6 hours PRN  aMIOdarone    Tablet   Oral   furosemide    Tablet 20 milliGRAM(s) Oral daily  metoprolol tartrate 50 milliGRAM(s) Oral every 8 hours  metoprolol tartrate Injectable 5 milliGRAM(s) IV Push every 6 hours PRN  multivitamin 1 Tablet(s) Oral daily  ofloxacin 0.3% Solution 1 Drop(s) Both EYES four times a day  pantoprazole    Tablet 40 milliGRAM(s) Oral before breakfast  PRESERVISION 1 Tablet(s) 1 Tablet(s) Oral two times a day  tamsulosin 0.4 milliGRAM(s) Oral at bedtime  warfarin 4 milliGRAM(s) Oral daily      Objective:     Vital Signs:   T(C): 36.6 (01-30-20 @ 09:24), Max: 36.9 (01-29-20 @ 20:26)  HR: 127 (01-30-20 @ 14:10) (100 - 127)  BP: 109/80 (01-30-20 @ 14:10) (109/80 - 125/74)  RR: 12 (01-30-20 @ 14:10) (12 - 18)  SpO2: 95% (01-30-20 @ 14:10) (94% - 98%)  Wt(kg): --    Physical Exam:   General: elderly man, laying flat in bed, no distress  HEENT: dry oral mucosa  Neck: supple, no carotid bruits b/l  CVS: JVP ~9 cm H20, irregularly irregular, s1, s2, no murmurs  Chest: unlabored respirations, CTAB, left chest with pacemaker present, wound healing  Abdomen: non-distended  Extremities: no lower extremity edema b/l  Neuro: A&O x3  Psych: Normal affect      Labs:   29 Jan 2020 06:00    143    |  103    |  29.0   ----------------------------<  114    4.3     |  28.0   |  0.85     Ca    8.8        29 Jan 2020 06:00                            10.5   10.10 )-----------( 139      ( 29 Jan 2020 06:00 )             32.4     PT/INR - ( 30 Jan 2020 06:13 )   PT: 26.6 sec;   INR: 2.26 ratio         Outpatient TTE (3/2019):  LVEF 55%, severely dilated left atrium, severely dilated right atrium  Normal right ventricle size and systolic function  Mild dilatation of the aortic root  Mild aortic valve sclerosis without stenosis  Mild AR  Moderate MR    TTE (1/17/2020):  Summary:   1. Left ventricular ejection fraction, by visual estimation, is 35 to 40%.   2. Moderately decreased global left ventricular systolic function.   3. Multiple left ventricular regional wall motion abnormalities exist. See wall motion findings.   4. The mitral in-flow pattern reveals no discernable A-wave, therefore no comment on diastolic function can be made.   5. There is no evidence of pericardial effusion.   6. Moderate to severe mitral valve regurgitation.   7. Mild prolapse of the anterior and posterior mitral valve leaflets.   8. Mild-moderate tricuspid regurgitation.   9. Mild aortic regurgitation.  10. Sclerotic aortic valve with normal opening.  11. Mobile intra-atrial septum.    ECG (1/16/2020): atrial fibrillation with RVR, RBBB    CXR (1/16/2020):  Findings:  Lines: None    Heart/Mediastinum/Lungs/Other: The heart size is normal. The lungs are clear. There are no pleural effusions.    CT Head (1/16/2020):  IMPRESSION:  Brain CT:   No acute intracranial hemorrhage, mass effect, or CT evidence of a large acute or recent subacute transcortical infarct. Small right parietal scalp hematoma.    Cervical spine CT:  No acute displaced cervical spine fracture or evidence of traumatic malalignment. Compression of T2 body with interval progression. If clinical symptoms persist, MRI of the cervical spine can be considered to assess for ligamentous or cord injury.    Xray Left Hip (1/16/2020):  Old right superior and inferior pubic rami fractures. Acute appearing left superior and inferior pubic rami fracture. No definite hip fracture. Degenerative changes and osteopenia.    Impression:  Left superior and inferior pubic rami fractures.      Carotid US (1/17/2020):IMPRESSION:       1.  Right carotid system:  No hemodynamically significant plaque disease seen within the RIGHT carotid bulb, proximal internal carotid artery. External carotid artery patent.  2.  Left carotid system:  Nonhemodynamically significant plaque disease within LEFT carotid bulb, proximal external carotid artery and proximal internal carotid artery.

## 2020-01-30 NOTE — PROGRESS NOTE ADULT - ASSESSMENT
Assessment:  Mr. White is a 89 year old man with past medical history of Paroxysmal Atrial Fibrillation (on coumadin), Hypertension, with history of falls, who presented with fall also with left superior and inferior pubic rami fractures (unclear acuity). The patient denies history of syncope, however no one was home with patient so hard to determine if it was only a fall. He is currently in atrial fibrillation with RVR so will rate control patient. He was also found to have volume overload, given BNP 2200s as well as TTE result of moderately reduced LVEF 35-40% and multiple LV regional wall motion abnormalities. Also with moderate to severe mitral regurgitation and mild to moderate tricuspid regurgitation. Patient denies symptoms of chest pain and troponin is negative, less concern for ischemia. Unclear if his volume overload and atrial fibrillation with RVR caused him to fall or if urinary tract infection contributed as well. Would recommend the following:     Recommendations:  [] Atrial Fibrillation with RVR: Asymptomatic. Had unsuccessful AV node ablation. ROSE today with no left atrial thrombus, will discuss with Cardiac EP regarding starting amiodarone. Continue metoprolol tartrate 50 mg q8h as patient will also be on amiodarone.  Coumadin goal INR 1.5-2.0 due to fall risk. Possible evaluation for left atrial appendage closure device as outpatient.  [] Cardiomyopathy: LVEF 35-40% here was previously reported as 55% in 3/2019, patient also with regional wall motion abnormalities. Plan for outpatient stress evaluation. Continue beta blocker. Patient cannot tolerate Entresto, had low BP.    Thank you for the consult. We will follow along.    Luis Kirby MD  Cardiology, [facility]

## 2020-01-30 NOTE — PROGRESS NOTE ADULT - SUBJECTIVE AND OBJECTIVE BOX
Department of Cardiology                                                                  [facility]/[place] [address] [id number]                                                            Telephone: [phone]. Fax:[phone]                                                                                         CARIOLOGY PRE ROSE NOTE   89y Male here for a ROSE.     Indication: AF, to evaluate for ANSELMO thrombus    HPI: 89 y/o male with chronic A Fib on coumadin, HTN, lives alone with home health aid 3 times a week,  came to the ER because syncope. patient was standing preparing dinner when he found himself on the floor. patient does not know what happened. no palpitations. no SOB. X Ray pelvis showed different stages of healing chronic B/L pubic kayla fracture. no headache or blurry vision. no focal weakness or speech changes (16 Jan 2020 23:21)    Echo:   Left Ventricle: The left ventricular internal cavity size is mild to moderately increased. Global LV systolic function was moderately decreased. Left ventricular ejection fraction, by visual estimation, is 35 to 40%. The mitral in-flow pattern reveals no discernable A-wave, therefore no comment on diastolic function can be made.  LV Wall Scoring: The apical anterior segment, apical inferior segment, and apex are akinetic. The apical septal segment and apical lateral segment are hypokinetic.  Right Ventricle: The right ventricular size is mildly enlarged. RV systolic function is mildly reduced.  Left Atrium: Severely enlarged left atrium. Mobile intra-atrial septum.  Right Atrium: Mildly enlarged right atrium.  Pericardium: There is no evidence of pericardial effusion.  Mitral Valve: Mitral leaflet mobility is normal. Moderate to severe mitral valve regurgitation is seen. Mild prolapse of the anterior and posterior mitral valve leaflets.  Tricuspid Valve: Mild-moderate tricuspid regurgitation is visualized.  Aortic Valve: The aortic valve is trileaflet. Sclerotic aortic valve with normal opening. Peak transaortic gradientequals 2.5 mmHg, mean transaortic gradient equals 1.5 mmHg, the calculated aortic valve area equals 3.09 cm² by the continuity equation consistent with normally opening aortic valve. Mild aortic valve regurgitation is seen.  Pulmonic Valve: Trace pulmonic valve regurgitation.  Aorta: The aortic root is normal in size and structure.  Pulmonary Artery: The main pulmonary artery is normal in size.  Venous: The inferior vena cava was normal sized, with respiratory size variation greater than 50%.    Physical Exam:   General: Awake, alert, no acute distress  HEENT: NC, AT, airway patent.  Chest: CTA, tachycardic and irregular                          10.5   10.10 )-----------( 139      ( 29 Jan 2020 06:00 )             32.4     01-29    143  |  103  |  29.0  ----------------------<  114  4.3   |  28.0  |  0.85    Ca    8.8      29 Jan 2020 06:00    PT/INR - ( 30 Jan 2020 06:13 )   PT: 26.6 sec;   INR: 2.26 ratio             Assessment and Plan:   The patient was examined and interviewed by me today. There has been no change from the original history and physical except as noted above.    Problem List:   1. AF  ROSE    2. Cardiomyopathy

## 2020-01-30 NOTE — PROGRESS NOTE ADULT - SUBJECTIVE AND OBJECTIVE BOX
CC: afib failed ablation     INTERVAL HPI/OVERNIGHT EVENTS: seen and examined , plan is for ROSE today .  patient has poor appetite , feels tired , no SOB , no chest pain , no nausea vomiting           Vital Signs Last 24 Hrs  T(C): 36.6 (30 Jan 2020 09:24), Max: 36.9 (29 Jan 2020 20:26)  T(F): 97.8 (30 Jan 2020 09:24), Max: 98.4 (29 Jan 2020 20:26)  HR: 100 (30 Jan 2020 09:24) (100 - 127)  BP: 112/76 (30 Jan 2020 09:24) (102/66 - 132/78)  BP(mean): --  RR: 18 (30 Jan 2020 09:24) (18 - 18)  SpO2: 98% (30 Jan 2020 09:24) (94% - 98%)    PHYSICAL EXAM:    GENERAL: NAD, resting comfortable in bed   CHEST/LUNG: CTAB , no wheezing , rales, rhonchi heard   HEART: S1S2+, Regular rate and rhythm; irregular   ABDOMEN: Soft, Nontender, Nondistended; Bowel sounds present  EXTREMITIES:  no pitting edema , pulses palpated BL.        LABS:                        10.5   10.10 )-----------( 139      ( 29 Jan 2020 06:00 )             32.4     01-29    143  |  103  |  29.0<H>  ----------------------------<  114<H>  4.3   |  28.0  |  0.85    Ca    8.8      29 Jan 2020 06:00      PT/INR - ( 30 Jan 2020 06:13 )   PT: 26.6 sec;   INR: 2.26 ratio                 MEDICATIONS  (STANDING):  furosemide    Tablet 20 milliGRAM(s) Oral daily  metoprolol tartrate 50 milliGRAM(s) Oral every 8 hours  multivitamin 1 Tablet(s) Oral daily  ofloxacin 0.3% Solution 1 Drop(s) Both EYES four times a day  pantoprazole    Tablet 40 milliGRAM(s) Oral before breakfast  PRESERVISION 1 Tablet(s) 1 Tablet(s) Oral two times a day  tamsulosin 0.4 milliGRAM(s) Oral at bedtime  warfarin 5 milliGRAM(s) Oral daily    MEDICATIONS  (PRN):  acetaminophen   Tablet .. 650 milliGRAM(s) Oral every 6 hours PRN Mild Pain (1 - 3)  metoprolol tartrate Injectable 5 milliGRAM(s) IV Push every 6 hours PRN HR > 140 sustained      RADIOLOGY & ADDITIONAL TESTS:

## 2020-01-30 NOTE — PROGRESS NOTE ADULT - ASSESSMENT
1. Uncontrolled afib   refractory to medical management .  failed AV ton ablation , plan is to do ROSE, followed by amio loading .  ablation in a month if INR remains therapeutic.  INR goal is 2-2.5 . will decrease coumadin to 5mg today .  INR improved   Plan is for  repeat ablation with electroanatomic mapping in 1 month (to allow time for leads to heal).    2. Acute on chronic systolic CHF / cardiomyopathy   improved.  on oral lasix, cardio on board .  underwent CRT-P placement     3. Left pubic ramus fracture / L1 fracture   conservative management.  pain control as needed .    4. GERD   protonix.    5. BPH   flomax.    6. DVT prophylaxis  coumadin .    details discussed with patient , all concerns answered . plan is to go to rehab after discharge .

## 2020-01-31 ENCOUNTER — TRANSCRIPTION ENCOUNTER (OUTPATIENT)
Age: 85
End: 2020-01-31

## 2020-01-31 VITALS
RESPIRATION RATE: 19 BRPM | HEART RATE: 94 BPM | OXYGEN SATURATION: 97 % | SYSTOLIC BLOOD PRESSURE: 114 MMHG | TEMPERATURE: 99 F | DIASTOLIC BLOOD PRESSURE: 68 MMHG

## 2020-01-31 LAB
INR BLD: 2.69 RATIO — HIGH (ref 0.88–1.16)
PROTHROM AB SERPL-ACNC: 31.9 SEC — HIGH (ref 10–12.9)

## 2020-01-31 PROCEDURE — 87186 SC STD MICRODIL/AGAR DIL: CPT

## 2020-01-31 PROCEDURE — 96375 TX/PRO/DX INJ NEW DRUG ADDON: CPT

## 2020-01-31 PROCEDURE — 99285 EMERGENCY DEPT VISIT HI MDM: CPT | Mod: 25

## 2020-01-31 PROCEDURE — 93880 EXTRACRANIAL BILAT STUDY: CPT

## 2020-01-31 PROCEDURE — 93005 ELECTROCARDIOGRAM TRACING: CPT

## 2020-01-31 PROCEDURE — 80053 COMPREHEN METABOLIC PANEL: CPT

## 2020-01-31 PROCEDURE — C1766: CPT

## 2020-01-31 PROCEDURE — 87507 IADNA-DNA/RNA PROBE TQ 12-25: CPT

## 2020-01-31 PROCEDURE — 96374 THER/PROPH/DIAG INJ IV PUSH: CPT

## 2020-01-31 PROCEDURE — 80048 BASIC METABOLIC PNL TOTAL CA: CPT

## 2020-01-31 PROCEDURE — 84484 ASSAY OF TROPONIN QUANT: CPT

## 2020-01-31 PROCEDURE — 97530 THERAPEUTIC ACTIVITIES: CPT

## 2020-01-31 PROCEDURE — 86900 BLOOD TYPING SEROLOGIC ABO: CPT

## 2020-01-31 PROCEDURE — 36415 COLL VENOUS BLD VENIPUNCTURE: CPT

## 2020-01-31 PROCEDURE — 85730 THROMBOPLASTIN TIME PARTIAL: CPT

## 2020-01-31 PROCEDURE — C1898: CPT

## 2020-01-31 PROCEDURE — 84145 PROCALCITONIN (PCT): CPT

## 2020-01-31 PROCEDURE — 83735 ASSAY OF MAGNESIUM: CPT

## 2020-01-31 PROCEDURE — 87486 CHLMYD PNEUM DNA AMP PROBE: CPT

## 2020-01-31 PROCEDURE — C1889: CPT

## 2020-01-31 PROCEDURE — 71046 X-RAY EXAM CHEST 2 VIEWS: CPT

## 2020-01-31 PROCEDURE — 99239 HOSP IP/OBS DSCHRG MGMT >30: CPT

## 2020-01-31 PROCEDURE — 93306 TTE W/DOPPLER COMPLETE: CPT

## 2020-01-31 PROCEDURE — C2621: CPT

## 2020-01-31 PROCEDURE — 86901 BLOOD TYPING SEROLOGIC RH(D): CPT

## 2020-01-31 PROCEDURE — 73502 X-RAY EXAM HIP UNI 2-3 VIEWS: CPT

## 2020-01-31 PROCEDURE — C1893: CPT

## 2020-01-31 PROCEDURE — 83880 ASSAY OF NATRIURETIC PEPTIDE: CPT

## 2020-01-31 PROCEDURE — 97116 GAIT TRAINING THERAPY: CPT

## 2020-01-31 PROCEDURE — 70450 CT HEAD/BRAIN W/O DYE: CPT

## 2020-01-31 PROCEDURE — 93325 DOPPLER ECHO COLOR FLOW MAPG: CPT

## 2020-01-31 PROCEDURE — 87086 URINE CULTURE/COLONY COUNT: CPT

## 2020-01-31 PROCEDURE — 87798 DETECT AGENT NOS DNA AMP: CPT

## 2020-01-31 PROCEDURE — 33208 INSRT HEART PM ATRIAL & VENT: CPT

## 2020-01-31 PROCEDURE — 33225 L VENTRIC PACING LEAD ADD-ON: CPT

## 2020-01-31 PROCEDURE — 97163 PT EVAL HIGH COMPLEX 45 MIN: CPT

## 2020-01-31 PROCEDURE — 81001 URINALYSIS AUTO W/SCOPE: CPT

## 2020-01-31 PROCEDURE — 87581 M.PNEUMON DNA AMP PROBE: CPT

## 2020-01-31 PROCEDURE — 93312 ECHO TRANSESOPHAGEAL: CPT

## 2020-01-31 PROCEDURE — 87633 RESP VIRUS 12-25 TARGETS: CPT

## 2020-01-31 PROCEDURE — 71045 X-RAY EXAM CHEST 1 VIEW: CPT

## 2020-01-31 PROCEDURE — C1733: CPT

## 2020-01-31 PROCEDURE — C1730: CPT

## 2020-01-31 PROCEDURE — 99232 SBSQ HOSP IP/OBS MODERATE 35: CPT

## 2020-01-31 PROCEDURE — 93320 DOPPLER ECHO COMPLETE: CPT

## 2020-01-31 PROCEDURE — 72125 CT NECK SPINE W/O DYE: CPT

## 2020-01-31 PROCEDURE — 85027 COMPLETE CBC AUTOMATED: CPT

## 2020-01-31 PROCEDURE — C1769: CPT

## 2020-01-31 PROCEDURE — 86850 RBC ANTIBODY SCREEN: CPT

## 2020-01-31 PROCEDURE — 85610 PROTHROMBIN TIME: CPT

## 2020-01-31 RX ORDER — AMIODARONE HYDROCHLORIDE 400 MG/1
4 TABLET ORAL
Qty: 28 | Refills: 0
Start: 2020-01-31 | End: 2020-02-06

## 2020-01-31 RX ORDER — ACETAMINOPHEN 500 MG
2 TABLET ORAL
Qty: 0 | Refills: 0 | DISCHARGE
Start: 2020-01-31

## 2020-01-31 RX ORDER — FUROSEMIDE 40 MG
1 TABLET ORAL
Qty: 0 | Refills: 0 | DISCHARGE
Start: 2020-01-31

## 2020-01-31 RX ORDER — METOPROLOL TARTRATE 50 MG
1 TABLET ORAL
Qty: 0 | Refills: 0 | DISCHARGE

## 2020-01-31 RX ORDER — OFLOXACIN 0.3 %
1 DROPS OPHTHALMIC (EYE)
Qty: 0 | Refills: 0 | DISCHARGE
Start: 2020-01-31

## 2020-01-31 RX ORDER — ACETAMINOPHEN 500 MG
1 TABLET ORAL
Qty: 0 | Refills: 0 | DISCHARGE

## 2020-01-31 RX ORDER — PANTOPRAZOLE SODIUM 20 MG/1
1 TABLET, DELAYED RELEASE ORAL
Qty: 0 | Refills: 0 | DISCHARGE
Start: 2020-01-31

## 2020-01-31 RX ORDER — TAMSULOSIN HYDROCHLORIDE 0.4 MG/1
1 CAPSULE ORAL
Qty: 0 | Refills: 0 | DISCHARGE
Start: 2020-01-31

## 2020-01-31 RX ORDER — METOPROLOL TARTRATE 50 MG
1 TABLET ORAL
Qty: 0 | Refills: 0 | DISCHARGE
Start: 2020-01-31

## 2020-01-31 RX ADMIN — PANTOPRAZOLE SODIUM 40 MILLIGRAM(S): 20 TABLET, DELAYED RELEASE ORAL at 05:53

## 2020-01-31 RX ADMIN — Medication 50 MILLIGRAM(S): at 01:11

## 2020-01-31 RX ADMIN — Medication 650 MILLIGRAM(S): at 11:52

## 2020-01-31 RX ADMIN — Medication 1 DROP(S): at 05:53

## 2020-01-31 RX ADMIN — Medication 1 DROP(S): at 01:11

## 2020-01-31 RX ADMIN — Medication 20 MILLIGRAM(S): at 05:53

## 2020-01-31 RX ADMIN — Medication 50 MILLIGRAM(S): at 10:17

## 2020-01-31 RX ADMIN — Medication 650 MILLIGRAM(S): at 10:29

## 2020-01-31 RX ADMIN — Medication 1 DROP(S): at 12:05

## 2020-01-31 RX ADMIN — AMIODARONE HYDROCHLORIDE 400 MILLIGRAM(S): 400 TABLET ORAL at 05:53

## 2020-01-31 NOTE — PROGRESS NOTE ADULT - PROVIDER SPECIALTY LIST ADULT
Cardiology
Electrophysiology
Hospitalist
Electrophysiology

## 2020-01-31 NOTE — DISCHARGE NOTE NURSING/CASE MANAGEMENT/SOCIAL WORK - PATIENT PORTAL LINK FT
You can access the FollowMyHealth Patient Portal offered by [facility] by registering at the following website: [email or web address]. By joining [employer]’s FollowMyHealth portal, you will also be able to view your health information using other applications (apps) compatible with our system.

## 2020-01-31 NOTE — PROGRESS NOTE ADULT - REASON FOR ADMISSION
syncope

## 2020-01-31 NOTE — PROGRESS NOTE ADULT - SUBJECTIVE AND OBJECTIVE BOX
MECHE MILLER  [id number]        Chief Complaint: follow up fall, new CMP/AF  s/p CRT-P, failed ablation of AVN      Subjective: no cp/sob/palps      24 hour Tele: AF with predominantly BiV pacing, occasionally rapid in 100's        acetaminophen   Tablet .. 650 milliGRAM(s) Oral every 6 hours PRN  aMIOdarone    Tablet 400 milliGRAM(s) Oral every 8 hours  aMIOdarone    Tablet   Oral   furosemide    Tablet 20 milliGRAM(s) Oral daily  metoprolol tartrate 50 milliGRAM(s) Oral every 8 hours  metoprolol tartrate Injectable 5 milliGRAM(s) IV Push every 6 hours PRN  multivitamin 1 Tablet(s) Oral daily  ofloxacin 0.3% Solution 1 Drop(s) Both EYES four times a day  pantoprazole    Tablet 40 milliGRAM(s) Oral before breakfast  PRESERVISION 1 Tablet(s) 1 Tablet(s) Oral two times a day  tamsulosin 0.4 milliGRAM(s) Oral at bedtime  warfarin 4 milliGRAM(s) Oral daily          Physical Exam:  T(C): 36.4 (01-31-20 @ 05:49), Max: 36.8 (01-30-20 @ 21:02)  HR: 105 (01-31-20 @ 05:49) (64 - 127)  BP: 121/83 (01-31-20 @ 05:49) (94/60 - 121/84)  RR: 16 (01-31-20 @ 05:49) (12 - 16)  SpO2: 95% (01-31-20 @ 05:49) (95% - 96%)  Wt(kg): --  General: Comfortable in NAD, elderly frail appearing M  HEENT: MMM, sclera anicteric  Resp: CTA bilaterally  CVS: nl s1s2, irregular, II/VI systolic murmur, no s3/JVD  Abd: soft, NT, ND, BS+  Ext: No c/c/e  Neuro A&O x3  Psych: Normal affect    I&O's Summary    30 Jan 2020 07:01  -  31 Jan 2020 07:00  --------------------------------------------------------  IN: 240 mL / OUT: 1050 mL / NET: -810 mL    31 Jan 2020 07:01  -  31 Jan 2020 10:09  --------------------------------------------------------  IN: 0 mL / OUT: 150 mL / NET: -150 mL          Labs:           PT/INR - ( 31 Jan 2020 06:05 )   PT: 31.9 sec;   INR: 2.69 ratio         Outpatient TTE (3/2019):  LVEF 55%, severely dilated left atrium, severely dilated right atrium  Normal right ventricle size and systolic function  Mild dilatation of the aortic root  Mild aortic valve sclerosis without stenosis  Mild AR  Moderate MR    TTE (1/17/2020):  Summary:   1. Left ventricular ejection fraction, by visual estimation, is 35 to 40%.   2. Moderately decreased global left ventricular systolic function.   3. Multiple left ventricular regional wall motion abnormalities exist. See wall motion findings.   4. The mitral in-flow pattern reveals no discernable A-wave, therefore no comment on diastolic function can be made.   5. There is no evidence of pericardial effusion.   6. Moderate to severe mitral valve regurgitation.   7. Mild prolapse of the anterior and posterior mitral valve leaflets.   8. Mild-moderate tricuspid regurgitation.   9. Mild aortic regurgitation.  10. Sclerotic aortic valve with normal opening.  11. Mobile intra-atrial septum.      Assessment:  89yMale PMHX with PAF, HTN admitted with fall and pubic rami fractures and found to have new CMP, moderate to severe MR and rapid AF.   -Plan was for CRT-P and AVN ablation, had CRT but failed ablation  -s/P ROSE as well which excluded ANSELMO thrombus and now on amio  -Rate contorlled on metoprolol, on A/C with coumadin. May need to adjust as continues on amio  -Did not tolerate entresto    Plan:  1. CV stable, dc planning  2. Continue Amio 400mg tid 1 week then 200mg daily. Follow up  EP for repeat AVN ablation attempt  3. Did not tolerate entresto due to low BP , will keep off for now. Consider trial of valsartan 40mg po bid as outpatient if BP will tolerate  4. Consider outpatient ischemic eval  5. Follow up 1-2 weeks post discharge        Juan Shaikh MD

## 2020-01-31 NOTE — DISCHARGE NOTE NURSING/CASE MANAGEMENT/SOCIAL WORK - NSDCPEPTCOWAR_GEN_ALL_CORE
Warfarin/Coumadin - Dietary Advice/Warfarin/Coumadin - Potential for adverse drug reactions and interactions/Warfarin/Coumadin - Follow up monitoring/Warfarin/Coumadin - Compliance

## 2020-01-31 NOTE — CHART NOTE - NSCHARTNOTEFT_GEN_A_CORE
Source: Patient [ ]  Family [ ]   other [ x] pt currently sleeping    Current Diet: Diet, DASH/TLC:   Sodium & Cholesterol Restricted  Supplement Feeding Modality:  Oral  Ensure High Protein Cans or Servings Per Day:  3       Frequency:  Three Times a day (01-29-20 @ 13:13)    PO intake:  Pt with fair/poor po intake at meals per RN flowseets.     Current Weight:   (1/31) 145#  (1/30) 148#  (1/29) 156#  (1/23) 144#  (1/20) 147#  (1/18) 142#    % Weight Change - wt fluctuations noted, however seems to be maintaining wt overall.  Aware 1+ trace generalized edema noted.    Pertinent Medications: MEDICATIONS  (STANDING):  aMIOdarone    Tablet 400 milliGRAM(s) Oral every 8 hours  aMIOdarone    Tablet   Oral   furosemide    Tablet 20 milliGRAM(s) Oral daily  metoprolol tartrate 50 milliGRAM(s) Oral every 8 hours  multivitamin 1 Tablet(s) Oral daily  ofloxacin 0.3% Solution 1 Drop(s) Both EYES four times a day  pantoprazole    Tablet 40 milliGRAM(s) Oral before breakfast  PRESERVISION 1 Tablet(s) 1 Tablet(s) Oral two times a day  tamsulosin 0.4 milliGRAM(s) Oral at bedtime  warfarin 4 milliGRAM(s) Oral daily    MEDICATIONS  (PRN):  acetaminophen   Tablet .. 650 milliGRAM(s) Oral every 6 hours PRN Mild Pain (1 - 3)  metoprolol tartrate Injectable 5 milliGRAM(s) IV Push every 6 hours PRN HR > 140 sustained    Pertinent Labs: NA    Skin: no skin breakdown noted    Nutrition focused physical exam previously conducted - found signs of malnutrition [ ]absent [x ]present    Subcutaneous fat loss: [x ] Orbital fat pads region, [x ]Buccal fat region, [ ]Triceps region,  [ ]Ribs region    Muscle wasting: [x ]Temples region, [x ]Clavicle region, [x ]Shoulder region, [ ]Scapula region, [ ]Interosseous region,  [ ]thigh region, [ ]Calf region    Estimated Needs:   [x ] no change since previous assessment  [ ] recalculated:     Current Nutrition Diagnosis: Pt remains at nutrition risk secondary to severe protein calorie malnutrition (chronic) related to inability to consume sufficient protein energy intake with previous decreased appetite in setting of GI discomfort as evidenced by pt meeting <75% estimated energy intake > 1 month, 9% unintentional wt loss x 1.5 months, and severe muscle wasting/fat loss.  Pt with fair/poor po intake at meals- seems appetite is decreased since prior note.  Aware pt likes Ensure supplements.    Recommendations:   Continue with Ensure TID and encourage intake  Continue with MVI daily   Monitor daily wts     Monitoring and Evaluation:   [x ] PO intake [x ] Tolerance to diet prescription [X] Weights  [X] Follow up per protocol [X] Labs:

## 2020-02-11 ENCOUNTER — APPOINTMENT (OUTPATIENT)
Dept: ELECTROPHYSIOLOGY | Facility: CLINIC | Age: 85
End: 2020-02-11

## 2020-02-24 ENCOUNTER — APPOINTMENT (OUTPATIENT)
Dept: ORTHOPEDIC SURGERY | Facility: CLINIC | Age: 85
End: 2020-02-24

## 2020-02-25 ENCOUNTER — OUTPATIENT (OUTPATIENT)
Dept: OUTPATIENT SERVICES | Facility: HOSPITAL | Age: 85
LOS: 1 days | End: 2020-02-25
Payer: COMMERCIAL

## 2020-02-25 VITALS
OXYGEN SATURATION: 99 % | HEIGHT: 65 IN | TEMPERATURE: 98 F | WEIGHT: 153 LBS | DIASTOLIC BLOOD PRESSURE: 78 MMHG | SYSTOLIC BLOOD PRESSURE: 127 MMHG | HEART RATE: 93 BPM | RESPIRATION RATE: 18 BRPM

## 2020-02-25 VITALS
DIASTOLIC BLOOD PRESSURE: 78 MMHG | SYSTOLIC BLOOD PRESSURE: 127 MMHG | TEMPERATURE: 98 F | RESPIRATION RATE: 18 BRPM | WEIGHT: 153 LBS | HEART RATE: 93 BPM | OXYGEN SATURATION: 99 % | HEIGHT: 65 IN

## 2020-02-25 DIAGNOSIS — Z01.818 ENCOUNTER FOR OTHER PREPROCEDURAL EXAMINATION: ICD-10-CM

## 2020-02-25 DIAGNOSIS — Z95.0 PRESENCE OF CARDIAC PACEMAKER: Chronic | ICD-10-CM

## 2020-02-25 DIAGNOSIS — Z96.651 PRESENCE OF RIGHT ARTIFICIAL KNEE JOINT: Chronic | ICD-10-CM

## 2020-02-25 LAB
ANION GAP SERPL CALC-SCNC: 12 MMOL/L — SIGNIFICANT CHANGE UP (ref 5–17)
APTT BLD: 32 SEC — SIGNIFICANT CHANGE UP (ref 27.5–36.3)
BLD GP AB SCN SERPL QL: SIGNIFICANT CHANGE UP
BUN SERPL-MCNC: 32 MG/DL — HIGH (ref 8–20)
CALCIUM SERPL-MCNC: 9.2 MG/DL — SIGNIFICANT CHANGE UP (ref 8.6–10.2)
CHLORIDE SERPL-SCNC: 98 MMOL/L — SIGNIFICANT CHANGE UP (ref 98–107)
CO2 SERPL-SCNC: 30 MMOL/L — HIGH (ref 22–29)
CREAT SERPL-MCNC: 1 MG/DL — SIGNIFICANT CHANGE UP (ref 0.5–1.3)
GLUCOSE SERPL-MCNC: 108 MG/DL — HIGH (ref 70–99)
HCT VFR BLD CALC: 35.6 % — LOW (ref 39–50)
HGB BLD-MCNC: 11.4 G/DL — LOW (ref 13–17)
INR BLD: 1.83 RATIO — HIGH (ref 0.88–1.16)
MAGNESIUM SERPL-MCNC: 2 MG/DL — SIGNIFICANT CHANGE UP (ref 1.6–2.6)
MCHC RBC-ENTMCNC: 30.3 PG — SIGNIFICANT CHANGE UP (ref 27–34)
MCHC RBC-ENTMCNC: 32 GM/DL — SIGNIFICANT CHANGE UP (ref 32–36)
MCV RBC AUTO: 94.7 FL — SIGNIFICANT CHANGE UP (ref 80–100)
PLATELET # BLD AUTO: 145 K/UL — LOW (ref 150–400)
POTASSIUM SERPL-MCNC: 4.6 MMOL/L — SIGNIFICANT CHANGE UP (ref 3.5–5.3)
POTASSIUM SERPL-SCNC: 4.6 MMOL/L — SIGNIFICANT CHANGE UP (ref 3.5–5.3)
PROTHROM AB SERPL-ACNC: 21.1 SEC — HIGH (ref 10–12.9)
RBC # BLD: 3.76 M/UL — LOW (ref 4.2–5.8)
RBC # FLD: 14.1 % — SIGNIFICANT CHANGE UP (ref 10.3–14.5)
SODIUM SERPL-SCNC: 140 MMOL/L — SIGNIFICANT CHANGE UP (ref 135–145)
WBC # BLD: 10.8 K/UL — HIGH (ref 3.8–10.5)
WBC # FLD AUTO: 10.8 K/UL — HIGH (ref 3.8–10.5)

## 2020-02-25 PROCEDURE — 93010 ELECTROCARDIOGRAM REPORT: CPT

## 2020-02-25 PROCEDURE — 93005 ELECTROCARDIOGRAM TRACING: CPT

## 2020-02-25 PROCEDURE — G0463: CPT

## 2020-02-25 RX ORDER — RABEPRAZOLE 20 MG/1
1 TABLET, DELAYED RELEASE ORAL
Qty: 0 | Refills: 0 | DISCHARGE

## 2020-02-25 NOTE — H&P PST ADULT - NSICDXPASTSURGICALHX_GEN_ALL_CORE_FT
PAST SURGICAL HISTORY:  Cardiac resynchronization therapy pacemaker (CRT-P) in place MDT doi 1/28/20-Dr Marcus    H/O total knee replacement, right PAST SURGICAL HISTORY:  Cardiac resynchronization therapy pacemaker (CRT-P) in place MDT doi 1/28/20-Dr Marcus    H/O total knee replacement, right b/l

## 2020-02-25 NOTE — ASU PATIENT PROFILE, ADULT - FALL HARM RISK
bones(Osteoporosis,prev fx,steroid use,metastatic bone ca/coagulation(Bleeding disorder R/T clinical cond/anti-coags)/age(85 years old or older)/staff maintains safety in rehab

## 2020-02-25 NOTE — ASU PATIENT PROFILE, ADULT - PRO ARRIVE FROM
home [facility]  [phone]  (pt's room)  [phone]  main number/inpatient rehabilitation facility

## 2020-02-25 NOTE — H&P PST ADULT - CONTRAINDICATIONS ACEI/ARB MEDS
pt was hypotensive on entresto, will condsider valsartan in the future/other reasons documented by MD/APN/PA or Pharmacist

## 2020-02-25 NOTE — H&P PST ADULT - NOTES
was living home alone but has been residing at [facility] since discharge from [facility] 1/20

## 2020-02-25 NOTE — H&P PST ADULT - HISTORY OF PRESENT ILLNESS
TTE 1/30/20: EF 30%, mod MR, mild-mod AR, mod pulm reg, 89 year old male with pmhx HTN and atrial fibrillation (previously paroxysmal now persistent) on coumadin, admitted to [facility] 1/2020 s/p fall with pelvic fracture. While hospitalized pt with new onset HFrEF 30% and AF with RVR. Heart rates were difficult to control and ischemic workup was deferred (per report pt would not be able to tolerate antiplatelets). Decision for CRT-P implant with AV node ablation, unfortunately AV node ablation was unsuccessful, pt was discharged on amiodarone for rate control. Pt presents today for [facility] for elective AV node ablation. He presents today with his daughter Jessica Saul ([phone]). Prior to hospitalization pt was living home, he was discharged to [facility]. He feels well today and is tolerating PT at [facility].    TTE 1/30/20: EF 30%, mod MR, mild-mod AR, mod pulm reg,

## 2020-02-25 NOTE — H&P PST ADULT - NSANTHOSAYNRD_GEN_A_CORE
No. HSANEL screening performed.  STOP BANG Legend: 0-2 = LOW Risk; 3-4 = INTERMEDIATE Risk; 5-8 = HIGH Risk

## 2020-02-25 NOTE — ASU PATIENT PROFILE, ADULT - PSH
Cardiac resynchronization therapy pacemaker (CRT-P) in place  MDT doi 1/28/20-Dr Marcus  H/O total knee replacement, right

## 2020-02-25 NOTE — H&P PST ADULT - NSICDXPASTMEDICALHX_GEN_ALL_CORE_FT
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

## 2020-02-25 NOTE — ASU PATIENT PROFILE, ADULT - PMH
Afib  with RVR  CHF with cardiomyopathy  EF 30%  Cyst of kidney, acquired    Cyst of pancreas    GERD (gastroesophageal reflux disease)    HTN (hypertension)    Irritable bowel syndrome with diarrhea    Prostate CA  radiation  VTE (venous thromboembolism)

## 2020-02-25 NOTE — H&P PST ADULT - ASSESSMENT
- npo after midnight prior to procedure 89 year old male with pmhx HTN and atrial fibrillation (previously paroxysmal now persistent) on coumadin,  admitted to [facility] 1/2020 s/p fall with pelvic fracture. While hospitalized pt with new onset HFrEF 30% and AF with RVR. Heart rates were difficult to control and ischemic workup was deferred (per report pt would not be able to tolerate antiplatelets). Decision for CRT-P implant with AV node ablation, unfortunately AV node ablation was unsuccessful, pt was discharged on amiodarone for rate control. Pt presents today for PST for elective re attempt AV node ablation with SOLOMON.    - npo after midnight prior to procedure  - continue coumadin 89 year old male with pmhx HTN and atrial fibrillation (previously paroxysmal now persistent) on coumadin,  admitted to [facility] 1/2020 s/p fall with pelvic fracture. While hospitalized pt with new onset HFrEF 30% and AF with RVR. Heart rates were difficult to control and ischemic workup was deferred (per report pt would not be able to tolerate antiplatelets). Decision for CRT-P implant with AV node ablation, unfortunately AV node ablation was unsuccessful, pt was discharged on amiodarone for rate control. Pt presents today for PST for elective re attempt AV node ablation with SOLOMON.    - npo after midnight prior to procedure  - continue coumadin, INR 1.83 today, will not bridge, repeat day of procedure. Will increase post operatively if still sub therapeutic. 89 year old male with pmhx HTN and atrial fibrillation (previously paroxysmal now persistent) on coumadin,  admitted to [facility] 1/2020 s/p fall with pelvic fracture. While hospitalized pt with new onset HFrEF 30% and AF with RVR. Heart rates were difficult to control and ischemic workup was deferred (per report pt would not be able to tolerate antiplatelets). Decision for CRT-P implant with AV node ablation, unfortunately AV node ablation was unsuccessful, pt was discharged on amiodarone for rate control. Pt presents today for PST for elective re attempt AV node ablation with SOLOMON.    - npo after midnight prior to procedure  - continue coumadin, INR 1.83 today, will not bridge, repeat day of procedure. Will increase post operatively if still sub therapeutic.

## 2020-02-28 ENCOUNTER — APPOINTMENT (OUTPATIENT)
Dept: CARDIOLOGY | Facility: CLINIC | Age: 85
End: 2020-02-28

## 2020-03-05 ENCOUNTER — TRANSCRIPTION ENCOUNTER (OUTPATIENT)
Age: 85
End: 2020-03-05

## 2020-03-05 ENCOUNTER — INPATIENT (INPATIENT)
Facility: HOSPITAL | Age: 85
LOS: 0 days | Discharge: HOME | DRG: 274 | End: 2020-03-06
Attending: INTERNAL MEDICINE | Admitting: INTERNAL MEDICINE
Payer: MEDICARE

## 2020-03-05 VITALS
OXYGEN SATURATION: 95 % | TEMPERATURE: 97 F | HEART RATE: 85 BPM | RESPIRATION RATE: 16 BRPM | SYSTOLIC BLOOD PRESSURE: 139 MMHG | DIASTOLIC BLOOD PRESSURE: 86 MMHG

## 2020-03-05 DIAGNOSIS — Z95.0 PRESENCE OF CARDIAC PACEMAKER: Chronic | ICD-10-CM

## 2020-03-05 DIAGNOSIS — I48.0 PAROXYSMAL ATRIAL FIBRILLATION: ICD-10-CM

## 2020-03-05 DIAGNOSIS — Z96.651 PRESENCE OF RIGHT ARTIFICIAL KNEE JOINT: Chronic | ICD-10-CM

## 2020-03-05 LAB
APTT BLD: 34.8 SEC — SIGNIFICANT CHANGE UP (ref 27.5–36.3)
BLD GP AB SCN SERPL QL: SIGNIFICANT CHANGE UP
INR BLD: 2.19 RATIO — HIGH (ref 0.88–1.16)
PROTHROM AB SERPL-ACNC: 25.4 SEC — HIGH (ref 10–12.9)

## 2020-03-05 PROCEDURE — 93650 ICAR CATH ABLTJ AV NODE FUNC: CPT | Mod: 78

## 2020-03-05 PROCEDURE — 93010 ELECTROCARDIOGRAM REPORT: CPT

## 2020-03-05 RX ORDER — DRONABINOL 2.5 MG
2.5 CAPSULE ORAL
Refills: 0 | Status: DISCONTINUED | OUTPATIENT
Start: 2020-03-05 | End: 2020-03-06

## 2020-03-05 RX ORDER — AMIODARONE HYDROCHLORIDE 400 MG/1
200 TABLET ORAL DAILY
Refills: 0 | Status: DISCONTINUED | OUTPATIENT
Start: 2020-03-05 | End: 2020-03-05

## 2020-03-05 RX ORDER — OFLOXACIN 0.3 %
1 DROPS OPHTHALMIC (EYE)
Refills: 0 | Status: DISCONTINUED | OUTPATIENT
Start: 2020-03-05 | End: 2020-03-06

## 2020-03-05 RX ORDER — FUROSEMIDE 40 MG
20 TABLET ORAL DAILY
Refills: 0 | Status: DISCONTINUED | OUTPATIENT
Start: 2020-03-05 | End: 2020-03-06

## 2020-03-05 RX ORDER — TAMSULOSIN HYDROCHLORIDE 0.4 MG/1
0.4 CAPSULE ORAL AT BEDTIME
Refills: 0 | Status: DISCONTINUED | OUTPATIENT
Start: 2020-03-05 | End: 2020-03-06

## 2020-03-05 RX ORDER — PANTOPRAZOLE SODIUM 20 MG/1
40 TABLET, DELAYED RELEASE ORAL
Refills: 0 | Status: DISCONTINUED | OUTPATIENT
Start: 2020-03-05 | End: 2020-03-06

## 2020-03-05 RX ORDER — ACETAMINOPHEN 500 MG
650 TABLET ORAL EVERY 6 HOURS
Refills: 0 | Status: DISCONTINUED | OUTPATIENT
Start: 2020-03-05 | End: 2020-03-06

## 2020-03-05 RX ORDER — WARFARIN SODIUM 2.5 MG/1
3 TABLET ORAL ONCE
Refills: 0 | Status: COMPLETED | OUTPATIENT
Start: 2020-03-05 | End: 2020-03-05

## 2020-03-05 RX ORDER — METOCLOPRAMIDE HCL 10 MG
5 TABLET ORAL THREE TIMES A DAY
Refills: 0 | Status: DISCONTINUED | OUTPATIENT
Start: 2020-03-05 | End: 2020-03-06

## 2020-03-05 RX ORDER — METOPROLOL TARTRATE 50 MG
50 TABLET ORAL EVERY 8 HOURS
Refills: 0 | Status: DISCONTINUED | OUTPATIENT
Start: 2020-03-05 | End: 2020-03-06

## 2020-03-05 RX ADMIN — Medication 50 MILLIGRAM(S): at 21:25

## 2020-03-05 RX ADMIN — TAMSULOSIN HYDROCHLORIDE 0.4 MILLIGRAM(S): 0.4 CAPSULE ORAL at 21:25

## 2020-03-05 RX ADMIN — WARFARIN SODIUM 3 MILLIGRAM(S): 2.5 TABLET ORAL at 21:25

## 2020-03-05 NOTE — PROGRESS NOTE ADULT - SUBJECTIVE AND OBJECTIVE BOX
Admission Criteria  Please admit the patient to the following service: CARDIOLOGY    Major Criteria:  - LV dysfunction (EF<30%)  - Continuous EKG monitoring is required for condition causing arrhythmia (hyperkalemia, etc)  - Significant volume load > 200 ml    Minor Criteria:  - Age >80 years    Admit to: 3GUL     Patient is being admitted to the inpatient service due to high risk characteristics and need for further management/monitoring and is considered to be at a significantly increased risk of major adverse cardiac and vascular events if discharged. no...

## 2020-03-05 NOTE — DISCHARGE NOTE PROVIDER - CARE PROVIDER_API CALL
Stewart Davenport)  Cardiology; Internal Medicine  [address] [place] [address], [place] [address]  Phone: [phone]  Fax: [phone]  Follow Up Time:

## 2020-03-05 NOTE — PROGRESS NOTE ADULT - SUBJECTIVE AND OBJECTIVE BOX
PROCEDURE(S): Radiofrequency Ablation AV Node    ELECTROPHYSIOLOGIST(S): Stewart Davenport MD         COMPLICATIONS:  none        DISPOSITION: observation      CONDITION: stable    Pt doing well s/p elective AVN ablation via b/l groin access.  Pt denies complaint post procedure.     PAST MEDICAL & SURGICAL HISTORY:  CHF with cardiomyopathy: EF 30%  HTN (hypertension)  VTE (venous thromboembolism): RLE  Irritable bowel syndrome with diarrhea  Prostate CA: radiation  Cyst of pancreas  Cyst of kidney, acquired  GERD (gastroesophageal reflux disease)  Afib: with RVR  Cardiac resynchronization therapy pacemaker (CRT-P) in place: MDT doi 1/28/20-Dr Marcus  H/O total knee replacement, right: b/l    MEDICATIONS  (STANDING):  dronabinol 2.5 milliGRAM(s) Oral two times a day  furosemide    Tablet 20 milliGRAM(s) Oral daily  metoprolol tartrate 50 milliGRAM(s) Oral every 8 hours  multivitamin 1 Tablet(s) Oral daily  ofloxacin 0.3% Solution 1 Drop(s) Both EYES four times a day  pantoprazole    Tablet 40 milliGRAM(s) Oral before breakfast  tamsulosin 0.4 milliGRAM(s) Oral at bedtime  warfarin 3 milliGRAM(s) Oral once    MEDICATIONS  (PRN):  acetaminophen   Tablet .. 650 milliGRAM(s) Oral every 6 hours PRN Mild Pain (1 - 3)  bisacodyl Suppository 10 milliGRAM(s) Rectal daily PRN Constipation  metoclopramide 5 milliGRAM(s) Oral three times a day PRN nausea    Allergies:  antibiotic (Rash)  tetracycline (Rash)    VS:   T(C): 36.1 (03-05-20 @ 11:37), Max: 36.1 (03-05-20 @ 11:37)  HR: 99 (03-05-20 @ 18:25) (85 - 99)  BP: 149/90 (03-05-20 @ 18:25) (139/86 - 149/90)  RR: 16 (03-05-20 @ 18:25) (16 - 16)  SpO2: 95% (03-05-20 @ 18:25) (95% - 95%)    Physical exam:   VSS, NAD, A&O x 3  Card: S1/S2, RRR, no m/g/r  Resp: lungs CTA b/l  Abd: S/NT/ND  Groins: hemostatic sutures in place; sites C/D/I; no bleeding, hematoma, erythema, exudate or edema  Ext: no edema; distal pulses intact    ECG: V paced 80 bpm, poor quality    Assessment:   89 year old male with pmhx HTN and atrial fibrillation (previously paroxysmal now persistent) on coumadin,  admitted to [facility] 1/2020 s/p fall with pelvic fracture. While hospitalized pt with new onset HFrEF 30% and AF with RVR.  Heart rates were difficult to control and ischemic workup was deferred (per report pt would not be able to tolerate antiplatelets).  Decision for CRT-P implant with AV node ablation, unfortunately AV node ablation was unsuccessful, pt was discharged on amiodarone for rate control.  Pt presented electively today for re attempt and is now status post uncomplicated AV node ablation.     Plan:   Bedrest x 4 hours, then OOB with assistance and progress as tolerated.   Groin sutures to be removed by EP service in AM.   Pending groin status: Continue warfarin 3mg tonight.  INR check in AM.   Discontinue Amiodarone.    Continue other home medications.   Strict I/Os.  Please encourage incentive spirometry and ambulation once able.  Observation and monitoring on telemetry overnight with anticipated discharge in the AM and outpt follow up in 1 month.

## 2020-03-05 NOTE — DISCHARGE NOTE PROVIDER - NSDCFUSCHEDAPPT_GEN_ALL_CORE_FT
MECHE MILLER ; 03/31/2020 ; [facility] Cardiology [facility] [place] MECHE MILLER ; 03/31/2020 ; hospitals Cardiology [facility] [place]

## 2020-03-05 NOTE — DISCHARGE NOTE PROVIDER - HOSPITAL COURSE
89 year old male with a history of HTN and atrial fibrillation (previously paroxysmal now persistent) on coumadin,  admitted to [facility] 1/2020 s/p fall with pelvic fracture. While hospitalized pt with new onset HFrEF 30% and AF with RVR.  Heart rates were difficult to control and ischemic workup was deferred (per report pt would not be able to tolerate antiplatelets).  Decision for CRT-P implant with AV node ablation, unfortunately AV node ablation was unsuccessful, pt was discharged on amiodarone for rate control.  Pt presented electively today for re attempt and is now status post uncomplicated AV node ablation.

## 2020-03-05 NOTE — DISCHARGE NOTE PROVIDER - NSDCFUADDINST_GEN_ALL_CORE_FT
Follow up with Dr. Davenport in 2-3 weeks.  Our office will contact you in 3-5 days to schedule this appointment. Please call [phone] with questions or concerns.

## 2020-03-05 NOTE — DISCHARGE NOTE PROVIDER - NSDCCPTREATMENT_GEN_ALL_CORE_FT
PRINCIPAL PROCEDURE  Procedure: AV node ablation  Findings and Treatment: - Bruising at the groin, sometimes extending down the leg, and/or a small lump under the skin at the groin access site is normal and will resolve within 2 – 3 weeks.   - Occasional skipped beats or palpitations that last for a few beats are common and generally resolve within 1-2 months.   - You may walk and take stairs at a regular pace.   - Do not perform any exercise more strenuous than walking for 1 week.   - Do not strain or lift heavy objects for 1 week.  - You may shower the day after the procedure.  - Do not soak in water (such as tub baths, hot tubs, swimming, etc.) for 1 week.   - You may resume all other activities the day after the procedure.  Call your doctor if:   - you notice bleeding, redness, drainage, swelling, increased tenderness or a hot sensation around the catheter insertion site.   - your temperature is greater than 100 degrees F for more than 24 hours.  - your rapid heart rhythm returns.  - you have any questions or concerns regarding the procedure.  If significant bleeding and/or a large lump (the size of a golf ball or bigger) occurs:  - Lie flat and apply continuous direct pressure just above the puncture site for at least 10 minutes  - If the issue resolves, notify your physician immediately.    - If the bleeding cannot be controlled, please seek immediate medical attention.  If you experience increased difficulty breathing or chest pain, or if you faint or have dizzy spells, please seek immediate medical attention.

## 2020-03-05 NOTE — DISCHARGE NOTE PROVIDER - NSDCMRMEDTOKEN_GEN_ALL_CORE_FT
acetaminophen 325 mg oral tablet: 2 tab(s) orally every 6 hours, As needed, Mild Pain (1 - 3)  amiodarone 200 mg oral tablet: 1 tab(s) orally once a day  bisacodyl 10 mg rectal suppository: 1 suppository(ies) rectal once a day, As Needed  Coumadin 3 mg oral tablet: 1 tab(s) orally once a day  dronabinol 2.5 mg oral capsule: 1 cap(s) orally 2 times a day  furosemide 20 mg oral tablet: 1 tab(s) orally once a day  metoprolol tartrate 50 mg oral tablet: 1 tab(s) orally every 8 hours  Milk of Magnesia 400 mg/5 ml  oral suspension: 30 milliliter(s) orally , As Needed if no bm  for  3  days  Multiple Vitamins oral tablet: 1 tab(s) orally once a day  multivitamin:   mylanta maximum strength 400 mg-40 mg/5 ml: 20 milliliter(s) orally 3 times a day, As Needed  Mylanta oral suspension:   ofloxacin 0.3% ophthalmic solution: 1 drop(s) to each affected eye 4 times a day  omeprazole 20 mg oral delayed release capsule: 1 cap(s) orally once a day  PreserVision oral tablet: 1 tab(s) orally 2 times a day  Reglan 5 mg oral tablet: 1 tab(s) orally 3 times a day, As Needed  tamsulosin 0.4 mg oral capsule: 1 cap(s) orally once a day (at bedtime) acetaminophen 325 mg oral tablet: 2 tab(s) orally every 6 hours, As needed, Mild Pain (1 - 3)  bisacodyl 10 mg rectal suppository: 1 suppository(ies) rectal once a day, As Needed  Coumadin 3 mg oral tablet: 1 tab(s) orally once a day  dronabinol 2.5 mg oral capsule: 1 cap(s) orally 2 times a day  furosemide 20 mg oral tablet: 1 tab(s) orally once a day  metoprolol tartrate 50 mg oral tablet: 1 tab(s) orally every 8 hours  Milk of Magnesia 400 mg/5 ml  oral suspension: 30 milliliter(s) orally , As Needed if no bm  for  3  days  Multiple Vitamins oral tablet: 1 tab(s) orally once a day  multivitamin:   mylanta maximum strength 400 mg-40 mg/5 ml: 20 milliliter(s) orally 3 times a day, As Needed  Mylanta oral suspension:   ofloxacin 0.3% ophthalmic solution: 1 drop(s) to each affected eye 4 times a day  omeprazole 20 mg oral delayed release capsule: 1 cap(s) orally once a day  PreserVision oral tablet: 1 tab(s) orally 2 times a day  Reglan 5 mg oral tablet: 1 tab(s) orally 3 times a day, As Needed  tamsulosin 0.4 mg oral capsule: 1 cap(s) orally once a day (at bedtime)

## 2020-03-06 ENCOUNTER — TRANSCRIPTION ENCOUNTER (OUTPATIENT)
Age: 85
End: 2020-03-06

## 2020-03-06 VITALS
DIASTOLIC BLOOD PRESSURE: 53 MMHG | HEART RATE: 80 BPM | OXYGEN SATURATION: 96 % | RESPIRATION RATE: 18 BRPM | SYSTOLIC BLOOD PRESSURE: 89 MMHG

## 2020-03-06 LAB
ANION GAP SERPL CALC-SCNC: 12 MMOL/L — SIGNIFICANT CHANGE UP (ref 5–17)
APTT BLD: 38.5 SEC — HIGH (ref 27.5–36.3)
BUN SERPL-MCNC: 22 MG/DL — HIGH (ref 8–20)
CALCIUM SERPL-MCNC: 8.7 MG/DL — SIGNIFICANT CHANGE UP (ref 8.6–10.2)
CHLORIDE SERPL-SCNC: 100 MMOL/L — SIGNIFICANT CHANGE UP (ref 98–107)
CO2 SERPL-SCNC: 29 MMOL/L — SIGNIFICANT CHANGE UP (ref 22–29)
CREAT SERPL-MCNC: 0.99 MG/DL — SIGNIFICANT CHANGE UP (ref 0.5–1.3)
GLUCOSE SERPL-MCNC: 88 MG/DL — SIGNIFICANT CHANGE UP (ref 70–99)
HCT VFR BLD CALC: 34.8 % — LOW (ref 39–50)
HGB BLD-MCNC: 11.3 G/DL — LOW (ref 13–17)
INR BLD: 2.52 RATIO — HIGH (ref 0.88–1.16)
MAGNESIUM SERPL-MCNC: 2.1 MG/DL — SIGNIFICANT CHANGE UP (ref 1.6–2.6)
MCHC RBC-ENTMCNC: 30.2 PG — SIGNIFICANT CHANGE UP (ref 27–34)
MCHC RBC-ENTMCNC: 32.5 GM/DL — SIGNIFICANT CHANGE UP (ref 32–36)
MCV RBC AUTO: 93 FL — SIGNIFICANT CHANGE UP (ref 80–100)
PLATELET # BLD AUTO: 133 K/UL — LOW (ref 150–400)
POTASSIUM SERPL-MCNC: 4.2 MMOL/L — SIGNIFICANT CHANGE UP (ref 3.5–5.3)
POTASSIUM SERPL-SCNC: 4.2 MMOL/L — SIGNIFICANT CHANGE UP (ref 3.5–5.3)
PROTHROM AB SERPL-ACNC: 29.3 SEC — HIGH (ref 10–12.9)
RBC # BLD: 3.74 M/UL — LOW (ref 4.2–5.8)
RBC # FLD: 14.2 % — SIGNIFICANT CHANGE UP (ref 10.3–14.5)
SODIUM SERPL-SCNC: 141 MMOL/L — SIGNIFICANT CHANGE UP (ref 135–145)
WBC # BLD: 10.61 K/UL — HIGH (ref 3.8–10.5)
WBC # FLD AUTO: 10.61 K/UL — HIGH (ref 3.8–10.5)

## 2020-03-06 PROCEDURE — 85730 THROMBOPLASTIN TIME PARTIAL: CPT

## 2020-03-06 PROCEDURE — 93010 ELECTROCARDIOGRAM REPORT: CPT

## 2020-03-06 PROCEDURE — 86901 BLOOD TYPING SEROLOGIC RH(D): CPT

## 2020-03-06 PROCEDURE — 85027 COMPLETE CBC AUTOMATED: CPT

## 2020-03-06 PROCEDURE — 93650 ICAR CATH ABLTJ AV NODE FUNC: CPT

## 2020-03-06 PROCEDURE — 93005 ELECTROCARDIOGRAM TRACING: CPT

## 2020-03-06 PROCEDURE — 36415 COLL VENOUS BLD VENIPUNCTURE: CPT

## 2020-03-06 PROCEDURE — 85610 PROTHROMBIN TIME: CPT

## 2020-03-06 PROCEDURE — C1730: CPT

## 2020-03-06 PROCEDURE — C1733: CPT

## 2020-03-06 PROCEDURE — 83735 ASSAY OF MAGNESIUM: CPT

## 2020-03-06 PROCEDURE — 80048 BASIC METABOLIC PNL TOTAL CA: CPT

## 2020-03-06 PROCEDURE — C1766: CPT

## 2020-03-06 PROCEDURE — 86900 BLOOD TYPING SEROLOGIC ABO: CPT

## 2020-03-06 PROCEDURE — 86850 RBC ANTIBODY SCREEN: CPT

## 2020-03-06 RX ORDER — AMIODARONE HYDROCHLORIDE 400 MG/1
1 TABLET ORAL
Qty: 60 | Refills: 0

## 2020-03-06 RX ADMIN — Medication 50 MILLIGRAM(S): at 06:08

## 2020-03-06 RX ADMIN — Medication 2.5 MILLIGRAM(S): at 06:08

## 2020-03-06 RX ADMIN — Medication 20 MILLIGRAM(S): at 06:08

## 2020-03-06 NOTE — DISCHARGE NOTE NURSING/CASE MANAGEMENT/SOCIAL WORK - PATIENT PORTAL LINK FT
You can access the FollowMyHealth Patient Portal offered by [facility] by registering at the following website: [email or web address]. By joining [facility]’s FollowMyHealth portal, you will also be able to view your health information using other applications (apps) compatible with our system.

## 2020-03-06 NOTE — PROGRESS NOTE ADULT - SUBJECTIVE AND OBJECTIVE BOX
Patient seen today in bed. Figure 8 sutures removed from right and left groin without complication. Patient offer no overnight complaints    EKG: pending  TELE: Paced at 80bpm. No overnight events recorded.     MEDICATIONS  (STANDING):  dronabinol 2.5 milliGRAM(s) Oral two times a day  furosemide    Tablet 20 milliGRAM(s) Oral daily  metoprolol tartrate 50 milliGRAM(s) Oral every 8 hours  multivitamin 1 Tablet(s) Oral daily  ofloxacin 0.3% Solution 1 Drop(s) Both EYES four times a day  pantoprazole    Tablet 40 milliGRAM(s) Oral before breakfast  tamsulosin 0.4 milliGRAM(s) Oral at bedtime    MEDICATIONS  (PRN):  acetaminophen   Tablet .. 650 milliGRAM(s) Oral every 6 hours PRN Mild Pain (1 - 3)  bisacodyl Suppository 10 milliGRAM(s) Rectal daily PRN Constipation  metoclopramide 5 milliGRAM(s) Oral three times a day PRN nausea    Allergies  antibiotic (Rash)  tetracycline (Rash)    PAST MEDICAL & SURGICAL HISTORY:  CHF with cardiomyopathy: EF 30%  HTN (hypertension)  VTE (venous thromboembolism): RLE  Irritable bowel syndrome with diarrhea  Prostate CA: radiation  Cyst of pancreas  Cyst of kidney, acquired  GERD (gastroesophageal reflux disease)  Afib: with RVR  Cardiac resynchronization therapy pacemaker (CRT-P) in place: MDT doi 1/28/20-Dr Marcus  H/O total knee replacement, right: b/l    Vital Signs Last 24 Hrs  T(C): 36.1 (05 Mar 2020 11:37), Max: 36.1 (05 Mar 2020 11:37)  T(F): 96.9 (05 Mar 2020 11:37), Max: 96.9 (05 Mar 2020 11:37)  HR: 80 (06 Mar 2020 06:00) (80 - 99)  BP: 137/83 (06 Mar 2020 06:00) (137/83 - 151/89)  RR: 20 (06 Mar 2020 06:00) (16 - 20)  SpO2: 95% (06 Mar 2020 06:00) (95% - 98%)    Physical Exam:  Constitutional: NAD, AAOx3  Cardiovascular: +S1S2 RRR  Pulmonary: CTA b/l, unlabored  GI: soft NTND +BS  Extremities: no pedal edema, +  Right and Left Groin: No hematoma observed.   Neuro: non focal, MOORE x4    LABS:                        11.3   10.61 )-----------( 133      ( 06 Mar 2020 06:02 )             34.8     141  |  100  |  22.0<H>  ----------------------------<  88  4.2   |  29.0  |  0.99  Ca    8.7      06 Mar 2020 06:02  Mg     2.1     03-06  PT/INR - ( 06 Mar 2020 06:02 )   PT: 29.3 sec;   INR: 2.52 ratio    PTT - ( 06 Mar 2020 06:02 )  PTT:38.5 sec    A/P  89 year old male with pmhx HTN and  persistent atrial fibrillation on coumadin, CRT-P who is now s/p successful ablation of AV node. (2nd attempt)    - Off Amiodarone  - Continue Coumadin. Outpatient INR check in 2-3 days.   - Discharge home today.

## 2020-03-07 PROBLEM — I50.9 HEART FAILURE, UNSPECIFIED: Chronic | Status: ACTIVE | Noted: 2020-02-25

## 2020-03-07 PROBLEM — I10 ESSENTIAL (PRIMARY) HYPERTENSION: Chronic | Status: ACTIVE | Noted: 2020-02-25

## 2020-03-12 ENCOUNTER — APPOINTMENT (OUTPATIENT)
Dept: CARDIOLOGY | Facility: CLINIC | Age: 85
End: 2020-03-12
Payer: MEDICARE

## 2020-03-12 ENCOUNTER — NON-APPOINTMENT (OUTPATIENT)
Age: 85
End: 2020-03-12

## 2020-03-12 VITALS
SYSTOLIC BLOOD PRESSURE: 105 MMHG | RESPIRATION RATE: 14 BRPM | HEART RATE: 90 BPM | WEIGHT: 148 LBS | BODY MASS INDEX: 24.66 KG/M2 | DIASTOLIC BLOOD PRESSURE: 80 MMHG | HEIGHT: 65 IN

## 2020-03-12 PROCEDURE — 93000 ELECTROCARDIOGRAM COMPLETE: CPT

## 2020-03-12 PROCEDURE — 99214 OFFICE O/P EST MOD 30 MIN: CPT

## 2020-03-12 NOTE — ASSESSMENT
[FreeTextEntry1] : EKG demonstrates ventricular paced rhythm at a rate of 90 with 100% capture;\par \par \par In summary the patient is a 89-year-old gentleman with known history for persistent atrial fibrillation in the face of moderately severe LV dysfunction requiring AV ton ablation and CRT pacemaker insertion in January 2020;\par \par \par \par Plan:\par \par Recommend patient continue current medical regimen including anticoagulation with warfarin with INR goal is between 2.0-3.0;\par \par Awaiting followup pacemaker interrogation with Dr. Davenport;\par \par Followup to this office within 2-3 months or p.r.n.\par \par Patient to report any untoward chest symptoms or changes between now and next visit\par \par Continued timely checkups and laboratory blood tests with primary care;;;

## 2020-03-12 NOTE — PHYSICAL EXAM
[Normal Conjunctiva] : the conjunctiva exhibited no abnormalities [Eyelids - No Xanthelasma] : the eyelids demonstrated no xanthelasmas [No Oral Pallor] : no oral pallor [No Oral Cyanosis] : no oral cyanosis [Normal Jugular Venous A Waves Present] : normal jugular venous A waves present [Normal Jugular Venous V Waves Present] : normal jugular venous V waves present [No Jugular Venous Sanchez A Waves] : no jugular venous sanchez A waves [Respiration, Rhythm And Depth] : normal respiratory rhythm and effort [Abdomen Soft] : soft [Abdomen Tenderness] : non-tender [Abdomen Mass (___ Cm)] : no abdominal mass palpated [FreeTextEntry1] : Shuffling gait using a cane noted [Nail Clubbing] : no clubbing of the fingernails [Cyanosis, Localized] : no localized cyanosis [Petechial Hemorrhages (___cm)] : no petechial hemorrhages [Skin Color & Pigmentation] : normal skin color and pigmentation [] : no rash [No Venous Stasis] : no venous stasis [Skin Lesions] : no skin lesions [No Skin Ulcers] : no skin ulcer [No Xanthoma] : no  xanthoma was observed [Oriented To Time, Place, And Person] : oriented to person, place, and time [Affect] : the affect was normal [Mood] : the mood was normal [No Anxiety] : not feeling anxious

## 2020-03-12 NOTE — REASON FOR VISIT
[Follow-Up - Clinic] : a clinic follow-up of [FreeTextEntry1] : The patient is a 89-year-old white male who has a remote history of bilateral aVR and more recent paroxysmal/persistent atrial fibrillation with difficult to control heart rate after being hospitalized for recent fall and pelvic fracture in January 2020;\par \par He was found to have HFr EF, with reduced LVEF in the range of 30%;\par Subsequently he was recommended to have AV ton ablation and CRT pacemaker insertion which was ultimately completed after a second attempt at AV ton ablation was necessary.;\par \par Today he reports back to the office for general cardiac checkup and reports that besides being still somewhat weakened and ambulating with cane he denies any significant chest pain, shortness of breath, dizziness or syncope;\par \par He does admit to still some feelings of slight palpitations but they are not severe;

## 2020-03-31 ENCOUNTER — APPOINTMENT (OUTPATIENT)
Dept: CARDIOLOGY | Facility: CLINIC | Age: 85
End: 2020-03-31

## 2020-04-09 PROBLEM — M25.559 HIP PAIN: Status: ACTIVE | Noted: 2019-12-12

## 2020-06-23 ENCOUNTER — APPOINTMENT (OUTPATIENT)
Dept: CARDIOLOGY | Facility: CLINIC | Age: 85
End: 2020-06-23
Payer: MEDICARE

## 2020-06-23 VITALS
HEIGHT: 65 IN | HEART RATE: 93 BPM | RESPIRATION RATE: 14 BRPM | BODY MASS INDEX: 24.16 KG/M2 | DIASTOLIC BLOOD PRESSURE: 64 MMHG | SYSTOLIC BLOOD PRESSURE: 95 MMHG | WEIGHT: 145 LBS

## 2020-06-23 PROCEDURE — 93000 ELECTROCARDIOGRAM COMPLETE: CPT

## 2020-06-23 PROCEDURE — 99214 OFFICE O/P EST MOD 30 MIN: CPT

## 2020-06-23 RX ORDER — DRONABINOL 2.5 MG/1
2.5 CAPSULE ORAL TWICE DAILY
Refills: 0 | Status: DISCONTINUED | COMMUNITY
End: 2020-06-23

## 2020-06-23 RX ORDER — FLUTICASONE PROPIONATE 50 MCG
SPRAY, SUSPENSION NASAL
Refills: 0 | Status: DISCONTINUED | COMMUNITY
End: 2020-06-23

## 2020-06-23 RX ORDER — PROPYLENE GLYCOL 0.06 MG/ML
0.6 SOLUTION/ DROPS OPHTHALMIC
Refills: 0 | Status: DISCONTINUED | COMMUNITY
End: 2020-06-23

## 2020-06-23 RX ORDER — FUROSEMIDE 20 MG/1
20 TABLET ORAL DAILY
Qty: 90 | Refills: 3 | Status: DISCONTINUED | COMMUNITY
End: 2020-06-23

## 2020-06-23 RX ORDER — BISACODYL 10 MG/1
SUPPOSITORY RECTAL
Refills: 0 | Status: DISCONTINUED | COMMUNITY
End: 2020-06-23

## 2020-06-23 RX ORDER — ACETAMINOPHEN 325 MG/1
325 TABLET ORAL
Refills: 0 | Status: DISCONTINUED | COMMUNITY
End: 2020-06-23

## 2020-06-23 NOTE — REASON FOR VISIT
[Follow-Up - Clinic] : a clinic follow-up of [FreeTextEntry1] : The patient is a 89-year-old white male with a known history for bio-aVR, persistent atrial fibrillation with tachycardia arrhythmias requiring hospitalization and ultimately AV ton ablation with CRT pacemaker insertion for LV dysfunction (completed after second attempt procedure); LVEF in the range of 30%;\par \par Patient returns to the office today for general cardiac checkup and states that while he was in the [facility] rehabilitation facility, his metoprolol was changed to tartrate 25 mg 2 tabs t.i.d.;\par \par Today, he reports that he gets some exertional dyspnea but denies any chest pain, palpitations or significant dizziness;\par \par

## 2020-06-23 NOTE — PHYSICAL EXAM
[Normal Conjunctiva] : the conjunctiva exhibited no abnormalities [Eyelids - No Xanthelasma] : the eyelids demonstrated no xanthelasmas [No Oral Pallor] : no oral pallor [No Oral Cyanosis] : no oral cyanosis [Normal Jugular Venous A Waves Present] : normal jugular venous A waves present [No Jugular Venous Sanchez A Waves] : no jugular venous sanchez A waves [Normal Jugular Venous V Waves Present] : normal jugular venous V waves present [Respiration, Rhythm And Depth] : normal respiratory rhythm and effort [Abdomen Soft] : soft [Abdomen Tenderness] : non-tender [Abdomen Mass (___ Cm)] : no abdominal mass palpated [Nail Clubbing] : no clubbing of the fingernails [FreeTextEntry1] : Shuffling gait using walker [Petechial Hemorrhages (___cm)] : no petechial hemorrhages [Cyanosis, Localized] : no localized cyanosis [] : no rash [No Venous Stasis] : no venous stasis [Skin Color & Pigmentation] : normal skin color and pigmentation [Skin Lesions] : no skin lesions [No Xanthoma] : no  xanthoma was observed [No Skin Ulcers] : no skin ulcer [Oriented To Time, Place, And Person] : oriented to person, place, and time [Affect] : the affect was normal [Mood] : the mood was normal [No Anxiety] : not feeling anxious

## 2020-06-23 NOTE — ASSESSMENT
[FreeTextEntry1] : EKG demonstrates ventricular paced rhythm at a rate of 92;\par \par In summary the patient is a 89-year-old gentleman with known history of cardiomyopathy and LV dysfunction with rapid atrial fibrillation requiring AV ton ablation and CRT pacing with history for by her prostatic aortic valve replacement as well;'\par \par Plan:\par \par Patient now recommended to decrease metoprolol tartrate 225 mg 2 tabs b.i.d.;\par \par Continue other medications the same including anticoagulation\par \par Recommend arrange EP evaluation (Dr Davenport)--for further adjustment of CRT pacing and assessment\par \par Low sodium diet reinforce and patient to monitor for any significant bruising or bleeding on anticoagulation\par \par Followup to this office within 2-3 months or p.r.n.;;;;;

## 2020-06-23 NOTE — HISTORY OF PRESENT ILLNESS
[FreeTextEntry1] : Noted is his blood pressure on the low normal range 95/64 and pulse is in the 90s; paced;\par \par He is tolerating his other medication without difficulty including warfarin anticoagulation;

## 2020-06-23 NOTE — REVIEW OF SYSTEMS
[Dyspnea on exertion] : dyspnea during exertion [Joint Pain] : joint pain [Feeling Fatigued] : feeling fatigued [Joint Stiffness] : joint stiffness [Negative] : Heme/Lymph

## 2020-07-14 ENCOUNTER — APPOINTMENT (OUTPATIENT)
Dept: ELECTROPHYSIOLOGY | Facility: CLINIC | Age: 85
End: 2020-07-14
Payer: MEDICARE

## 2020-07-14 ENCOUNTER — NON-APPOINTMENT (OUTPATIENT)
Age: 85
End: 2020-07-14

## 2020-07-14 VITALS
SYSTOLIC BLOOD PRESSURE: 160 MMHG | OXYGEN SATURATION: 94 % | BODY MASS INDEX: 23.99 KG/M2 | DIASTOLIC BLOOD PRESSURE: 94 MMHG | WEIGHT: 144 LBS | HEIGHT: 65 IN | HEART RATE: 103 BPM

## 2020-07-14 PROCEDURE — 99214 OFFICE O/P EST MOD 30 MIN: CPT | Mod: 25

## 2020-07-14 PROCEDURE — 93000 ELECTROCARDIOGRAM COMPLETE: CPT | Mod: 59

## 2020-07-14 PROCEDURE — 93281 PM DEVICE PROGR EVAL MULTI: CPT | Mod: 59

## 2020-07-14 RX ORDER — OMEPRAZOLE 20 MG/1
20 TABLET, DELAYED RELEASE ORAL DAILY
Refills: 0 | Status: DISCONTINUED | COMMUNITY
End: 2020-07-14

## 2020-07-14 NOTE — PHYSICAL EXAM
[Normal Conjunctiva] : the conjunctiva exhibited no abnormalities [Respiration, Rhythm And Depth] : normal respiratory rhythm and effort [Auscultation Breath Sounds / Voice Sounds] : lungs were clear to auscultation bilaterally [Heart Sounds] : normal S1 and S2 [Heart Rate And Rhythm] : heart rate and rhythm were normal [Bowel Sounds] : normal bowel sounds [Murmurs] : no murmurs present [Abdomen Tenderness] : non-tender [Abdomen Soft] : soft [Oriented To Time, Place, And Person] : oriented to person, place, and time [Nail Clubbing] : no clubbing of the fingernails [Cyanosis, Localized] : no localized cyanosis [No Anxiety] : not feeling anxious [Impaired Insight] : insight and judgment were intact [FreeTextEntry1] : Uses walker

## 2020-07-14 NOTE — REASON FOR VISIT
[Follow-Up - Clinic] : a clinic follow-up of [Atrial Fibrillation] : atrial fibrillation [Cardiomyopathy] : cardiomyopathy [Pacemaker Evaluation] : pacemaker ~T evaluation ~C was performed [FreeTextEntry1] : Cardiologist: Dr. Stephenson

## 2020-07-14 NOTE — HISTORY OF PRESENT ILLNESS
[FreeTextEntry1] : MECHE MILLER is a 89 year old male with hypertension, cardiomyopathy (LVEF: 40-45%) and persistent AF (on Warfarin) status post AVN ablation and MDT CRT-P who presents for follow up.\par \par To summarize his history, in mid January, 2020 he presented to [facility] with a mechanical fall complicated by pelvic fracture. He had rapid AF and atrial flutter with inability to tolerate AVN blockers secondary to relative hypotension. He could not tolerate anticoagulation due to multiple falls so rhythm control was not pursued. Instead, he underwent MDT CRT-P implantation on 1/28/2020. He did undergo ROSE which demonstrated no LA/ANSELMO thrombus and was started on amiodarone for rate control. An attempt was made for AVN ablation but this was unsuccessful. He returned on 3/5/2020 at which time successful AVN ablation was performed. His device was programmed to VVIR at 90 bpm.\par \par Since then he has done well but states that he has significant fatigue. He denies having any falls.\par \par He denies any fevers, chills, cough, sweats, chest pain, palpitations, dyspnea on exertion, orthopnea, paroxysmal nocturnal dyspnea, peripheral edema, lightheadedness, dizziness, syncope, nausea, vomiting, melena, hematochezia, or hematemesis.

## 2020-07-14 NOTE — DISCUSSION/SUMMARY
[FreeTextEntry1] : MECHE MILLER is a 89 year old male with hypertension, cardiomyopathy (LVEF: 35-40%) and persistent AF (on Warfarin) status post AVN ablation and MDT CRT-P who presents for follow up.\par \par His CRT-P rate was lowered from 80 bpm to 70 bpm. His rate histograms show predominantly CRT pacing at 80 bpm with some increases in HR. He has no other arrhythmias aside from persistent AF. LV pacing was optimized with a change in vector from LV1-LV2 to LV1-LV4 as it had a lower threshold. Of note, only LV1 pole was capturing today.\par \par Since he has persistent fatigue and persistent atrial flutter/fibrillation, I discussed with him the option of cardioverting to restore normal rhythm. At the current time he states he would like to avoid further procedures. As such, we will keep pacemaker set at VVIR  bpm.\par \par In regards to anticoagulation, his CHADS2-VASc score is 4 (HTN, CHF, Agex2) so he will continue on warfarin.\par \par Recommendations:\par - Reduced pacing rate to 70 bpm.\par - Discussed ROSE/CV, but patient reluctant to proceed with this currently.\par - Change Metoprolol Tartrate 50mg BID to Succinate 100mg daily per patient request\par - Set up with remote monitoring\par - Repeat TTE at Dr. Stephenson's office\par \par RTC in 3 months for follow up.\par \par Stewart Davenport MD\par Clinical Cardiac Electrophysiology

## 2020-09-16 ENCOUNTER — NON-APPOINTMENT (OUTPATIENT)
Age: 85
End: 2020-09-16

## 2020-09-16 ENCOUNTER — APPOINTMENT (OUTPATIENT)
Dept: CARDIOLOGY | Facility: CLINIC | Age: 85
End: 2020-09-16
Payer: MEDICARE

## 2020-09-16 VITALS
BODY MASS INDEX: 24.66 KG/M2 | SYSTOLIC BLOOD PRESSURE: 130 MMHG | WEIGHT: 148 LBS | HEIGHT: 65 IN | RESPIRATION RATE: 14 BRPM | DIASTOLIC BLOOD PRESSURE: 72 MMHG | HEART RATE: 94 BPM

## 2020-09-16 DIAGNOSIS — I45.10 UNSPECIFIED RIGHT BUNDLE-BRANCH BLOCK: ICD-10-CM

## 2020-09-16 DIAGNOSIS — R09.89 OTHER SPECIFIED SYMPTOMS AND SIGNS INVOLVING THE CIRCULATORY AND RESPIRATORY SYSTEMS: ICD-10-CM

## 2020-09-16 PROCEDURE — 99215 OFFICE O/P EST HI 40 MIN: CPT

## 2020-09-16 PROCEDURE — 93000 ELECTROCARDIOGRAM COMPLETE: CPT

## 2020-09-16 NOTE — REASON FOR VISIT
[FreeTextEntry1] : The patient is a 89-year-old white male with a known history for bio-aVR, persistent atrial fibrillation with tachycardia arrhythmias requiring hospitalization and ultimately AV ton ablation with CRT pacemaker insertion for LV dysfunction (completed after second attempt procedure); LVEF in the range of 30%;\par \par Patient returns to the office today for general cardiac checkup and states that while he was being evaluated by EP specialist (Dr. Davenport), his metoprolol tartrate 50 mg BID was changed to metoprolol succinate 100 mg daily.\par \par Today, he continues to report getting some exertional dyspnea and intermittent palpitations but unchanged from her baseline.  He denies CP, orthopnea, presyncope, syncope.

## 2020-09-16 NOTE — HISTORY OF PRESENT ILLNESS
[FreeTextEntry1] : He is tolerating his other medication without difficulty including warfarin 3 mg daily.  This is being managed with weekly INR's by his PCP (Dr. Reyes).\par \par During his EP evaluation with (Dr. Davenport) last July, his CRT-P rate was lowered from 80 to 70 bpm.  In addition at that time, he was c/o increased fatigue with having persistent a-flutter/a-fib and the option of cardioversion was discussed but the patient was reluctant to proceed at that time.\par \par He reports trying to eat a well balanced diet as much as he can and that it is too difficult for him to participate in any formal exercise program.
Yes

## 2020-09-16 NOTE — DISCUSSION/SUMMARY
[FreeTextEntry1] : 1).  Patient advised to complete a Transthoracic Echocardiogram and Carotid Doppler in the near future to assess cardiac function, cardiomyopathy and valvulopathy as well as carotid plaquing respectively.\par \par 2).  Patient is tolerating cardiac medications without negative side effects, continue with current cardiac medication regimen including augmented dosage of beta blocker as well as  anticoagulation for thromboembolic prophylaxis.  \par \par Monitor for any significant bruising or bleeding on anticoagulation.\par \par 3).  Follow up with PCP (Dr. Reyes) regarding routine checkups and blood work including routine PT-INR checks and fasting lipid panel / renal function / electrolytes, have copy faxed to our office.  \par \par 4).  Follow up with Electrophysiology (Dr. Davenport) this October regarding routine checkups for any needed further adjustment of CRT pacing and assessment.\par \par 5).   Recommend patient report any untoward symptoms. \par \par 6).  Follow up with Dr. Stephenson in 2 to 3 months or PRN.

## 2020-09-16 NOTE — ASSESSMENT
[FreeTextEntry1] : EKG 9/16/2020:  The EKG illustrates ventricularly paced rhythm, rate of 94 bpm.  Essentially unchanged.\par \par In summary the patient is a 89-year-old gentleman with known history of cardiomyopathy and LV dysfunction with rapid atrial fibrillation requiring AV ton ablation and CRT pacing with history for prosthetic aortic valve replacement as well;

## 2020-09-16 NOTE — PHYSICAL EXAM
[General Appearance - Well Developed] : well developed [General Appearance - Well Nourished] : well nourished [Normal Appearance] : normal appearance [Normal Conjunctiva] : the conjunctiva exhibited no abnormalities [General Appearance - In No Acute Distress] : no acute distress [Normal Jugular Venous A Waves Present] : normal jugular venous A waves present [Normal Oral Mucosa] : normal oral mucosa [No Jugular Venous Sanchez A Waves] : no jugular venous sanchez A waves [Normal Jugular Venous V Waves Present] : normal jugular venous V waves present [] : no respiratory distress [Respiration, Rhythm And Depth] : normal respiratory rhythm and effort [Auscultation Breath Sounds / Voice Sounds] : lungs were clear to auscultation bilaterally [Edema] : no peripheral edema present [Heart Sounds] : normal S1 and S2 [Bowel Sounds] : normal bowel sounds [Nail Clubbing] : no clubbing of the fingernails [Cyanosis, Localized] : no localized cyanosis [Skin Color & Pigmentation] : normal skin color and pigmentation [Skin Turgor] : normal skin turgor [Oriented To Time, Place, And Person] : oriented to person, place, and time [Affect] : the affect was normal [Impaired Insight] : insight and judgment were intact [FreeTextEntry1] : sitting in wheel chair

## 2020-10-05 ENCOUNTER — APPOINTMENT (OUTPATIENT)
Dept: ELECTROPHYSIOLOGY | Facility: CLINIC | Age: 85
End: 2020-10-05
Payer: MEDICARE

## 2020-10-05 PROCEDURE — 93294 REM INTERROG EVL PM/LDLS PM: CPT

## 2020-10-05 PROCEDURE — 93296 REM INTERROG EVL PM/IDS: CPT

## 2020-10-12 DIAGNOSIS — N40.0 BENIGN PROSTATIC HYPERPLASIA WITHOUT LOWER URINARY TRACT SYMPMS: ICD-10-CM

## 2020-10-13 ENCOUNTER — APPOINTMENT (OUTPATIENT)
Dept: ELECTROPHYSIOLOGY | Facility: CLINIC | Age: 85
End: 2020-10-13
Payer: MEDICARE

## 2020-10-13 ENCOUNTER — NON-APPOINTMENT (OUTPATIENT)
Age: 85
End: 2020-10-13

## 2020-10-13 VITALS
BODY MASS INDEX: 24.66 KG/M2 | DIASTOLIC BLOOD PRESSURE: 88 MMHG | WEIGHT: 148 LBS | HEIGHT: 65 IN | OXYGEN SATURATION: 98 % | SYSTOLIC BLOOD PRESSURE: 144 MMHG | HEART RATE: 90 BPM | RESPIRATION RATE: 18 BRPM

## 2020-10-13 PROCEDURE — 99214 OFFICE O/P EST MOD 30 MIN: CPT | Mod: 25

## 2020-10-13 PROCEDURE — 93000 ELECTROCARDIOGRAM COMPLETE: CPT | Mod: 59

## 2020-10-13 PROCEDURE — 93281 PM DEVICE PROGR EVAL MULTI: CPT | Mod: 59

## 2020-10-14 NOTE — DISCUSSION/SUMMARY
[FreeTextEntry1] : MECHE MILLER is a 89 year old male with hypertension, cardiomyopathy (LVEF: 35-40%) and persistent AF (on Warfarin) status post AVN ablation and MDT CRT-P who presents for follow up.\par \par His CRT-P is functioning well with stable thresholds and good longevity. He continues to have some symptoms of dyspnea, which occur mostly when he lays down. He has no evidence of volume overload. To help with his fatigue and dyspnea we made the rate response 1 step more aggressive to provide him with increase heart rate response as needed.\par \par We again discussed cardioversion to restore normal rhythm as this may explain some of his symptoms. However, he does not want to pursue invasive procedures at any time. As such, we will otherwise keep his settings the same. He will continue with remote monitoring and he can follow up in 6 months.\par \par In regards to anticoagulation, his CHADS2-VASc score is 4 (HTN, CHF, Agex2) so he will continue on warfarin.\par \par Recommendations:\par - Rate response made more aggressive\par - Discussed ROSE/CV, but patient remains reluctant to proceed\par - Consider lowering Metoprolol dose as it may be contributing to fatigue\par - Continue with remote monitoring\par \par RTC in 6 months or earlier as needed.\par \par Stewart Davenport MD, FACC, [facility]\par Clinical Cardiac Electrophysiology

## 2020-10-14 NOTE — PHYSICAL EXAM
[Normal Conjunctiva] : the conjunctiva exhibited no abnormalities [Normal Oral Mucosa] : normal oral mucosa [Normal Oropharynx] : normal oropharynx [Normal Jugular Venous V Waves Present] : normal jugular venous V waves present [Respiration, Rhythm And Depth] : normal respiratory rhythm and effort [Auscultation Breath Sounds / Voice Sounds] : lungs were clear to auscultation bilaterally [Heart Rate And Rhythm] : heart rate and rhythm were normal [Heart Sounds] : normal S1 and S2 [Murmurs] : no murmurs present [Edema] : no peripheral edema present [Bowel Sounds] : normal bowel sounds [Abdomen Soft] : soft [Skin Color & Pigmentation] : normal skin color and pigmentation [Skin Turgor] : normal skin turgor [] : no rash [Oriented To Time, Place, And Person] : oriented to person, place, and time [Impaired Insight] : insight and judgment were intact [No Anxiety] : not feeling anxious [FreeTextEntry1] : Walks with walker

## 2020-10-14 NOTE — HISTORY OF PRESENT ILLNESS
[FreeTextEntry1] : MECHE MILLER is a 89 year old male with hypertension, cardiomyopathy (LVEF: 35-40%) and persistent AF (on Warfarin) status post AVN ablation and MDT CRT-P who presents for follow up.\par \par To summarize his history, in mid January, 2020 he presented to [facility] with a mechanical fall complicated by pelvic fracture. He had rapid AF and atrial flutter with inability to tolerate AVN blockers secondary to relative hypotension. He could not tolerate anticoagulation due to multiple falls so rhythm control was not pursued. Instead, he underwent MDT CRT-P implantation on 1/28/2020. He did undergo ROSE which demonstrated no LA/ANSELMO thrombus and was started on amiodarone for rate control. An attempt was made for AVN ablation but this was unsuccessful. He returned on 3/5/2020 at which time successful AVN ablation was performed. His device was programmed to VVIR at 90 bpm and has gradually had his lower rate limit lowered to 70 bpm. In follow up he continued to have persistent fatigue and exertional dyspnea.\par \par At his last visit, cardioversion for AF was recommended but he preferred to wait on any invasive procedures.\par \par Today, he states that he feels more short of breath recently. He reported having a lot of palpitations. He notes that he gets dyspnea when laying down, but no dyspnea on exertion.\par \par He denies any fevers, chills, cough, sweats, chest pain, palpitations, lightheadedness, dizziness, syncope, nausea, vomiting, melena, hematochezia, or hematemesis.

## 2020-10-26 ENCOUNTER — APPOINTMENT (OUTPATIENT)
Dept: CARDIOLOGY | Facility: CLINIC | Age: 85
End: 2020-10-26
Payer: MEDICARE

## 2020-10-26 PROCEDURE — 93306 TTE W/DOPPLER COMPLETE: CPT

## 2020-10-26 PROCEDURE — 93880 EXTRACRANIAL BILAT STUDY: CPT

## 2020-10-27 ENCOUNTER — APPOINTMENT (OUTPATIENT)
Dept: DERMATOLOGY | Facility: CLINIC | Age: 85
End: 2020-10-27
Payer: MEDICARE

## 2020-10-27 PROCEDURE — 17003 DESTRUCT PREMALG LES 2-14: CPT | Mod: 59

## 2020-10-27 PROCEDURE — 17110 DESTRUCTION B9 LES UP TO 14: CPT

## 2020-10-27 PROCEDURE — 17000 DESTRUCT PREMALG LESION: CPT | Mod: 59

## 2020-10-27 PROCEDURE — 99214 OFFICE O/P EST MOD 30 MIN: CPT | Mod: 25

## 2020-11-16 ENCOUNTER — APPOINTMENT (OUTPATIENT)
Dept: ORTHOPEDIC SURGERY | Facility: CLINIC | Age: 85
End: 2020-11-16
Payer: MEDICARE

## 2020-11-16 VITALS
DIASTOLIC BLOOD PRESSURE: 85 MMHG | HEIGHT: 65 IN | WEIGHT: 148 LBS | HEART RATE: 103 BPM | SYSTOLIC BLOOD PRESSURE: 142 MMHG | TEMPERATURE: 95.4 F | BODY MASS INDEX: 24.66 KG/M2

## 2020-11-16 PROCEDURE — 20610 DRAIN/INJ JOINT/BURSA W/O US: CPT | Mod: RT

## 2020-11-16 PROCEDURE — 99214 OFFICE O/P EST MOD 30 MIN: CPT | Mod: 25

## 2020-11-16 PROCEDURE — 73030 X-RAY EXAM OF SHOULDER: CPT | Mod: 50

## 2020-12-15 ENCOUNTER — APPOINTMENT (OUTPATIENT)
Dept: CARDIOLOGY | Facility: CLINIC | Age: 85
End: 2020-12-15

## 2021-01-01 ENCOUNTER — APPOINTMENT (OUTPATIENT)
Dept: ELECTROPHYSIOLOGY | Facility: CLINIC | Age: 86
End: 2021-01-01
Payer: MEDICARE

## 2021-01-01 ENCOUNTER — TRANSCRIPTION ENCOUNTER (OUTPATIENT)
Age: 86
End: 2021-01-01

## 2021-01-01 ENCOUNTER — NON-APPOINTMENT (OUTPATIENT)
Age: 86
End: 2021-01-01

## 2021-01-01 ENCOUNTER — APPOINTMENT (OUTPATIENT)
Dept: ORTHOPEDIC SURGERY | Facility: CLINIC | Age: 86
End: 2021-01-01
Payer: MEDICARE

## 2021-01-01 ENCOUNTER — APPOINTMENT (OUTPATIENT)
Dept: CARDIOLOGY | Facility: CLINIC | Age: 86
End: 2021-01-01
Payer: MEDICARE

## 2021-01-01 ENCOUNTER — APPOINTMENT (OUTPATIENT)
Dept: DERMATOLOGY | Facility: CLINIC | Age: 86
End: 2021-01-01
Payer: MEDICARE

## 2021-01-01 ENCOUNTER — APPOINTMENT (OUTPATIENT)
Dept: CARDIOLOGY | Facility: CLINIC | Age: 86
End: 2021-01-01

## 2021-01-01 ENCOUNTER — APPOINTMENT (OUTPATIENT)
Dept: ORTHOPEDIC SURGERY | Facility: CLINIC | Age: 86
End: 2021-01-01

## 2021-01-01 ENCOUNTER — INPATIENT (INPATIENT)
Facility: HOSPITAL | Age: 86
LOS: 3 days | Discharge: NURSING, REHAB OR LONG-TERM CARE | DRG: 184 | End: 2021-07-27
Attending: SURGERY | Admitting: SURGERY
Payer: MEDICARE

## 2021-01-01 ENCOUNTER — EMERGENCY (EMERGENCY)
Facility: HOSPITAL | Age: 86
LOS: 1 days | Discharge: HOME | End: 2021-01-01
Attending: STUDENT IN AN ORGANIZED HEALTH CARE EDUCATION/TRAINING PROGRAM
Payer: MEDICARE

## 2021-01-01 ENCOUNTER — EMERGENCY (EMERGENCY)
Facility: HOSPITAL | Age: 86
LOS: 1 days | Discharge: HOME | End: 2021-01-01
Attending: EMERGENCY MEDICINE
Payer: MEDICARE

## 2021-01-01 ENCOUNTER — APPOINTMENT (OUTPATIENT)
Dept: NEUROSURGERY | Facility: CLINIC | Age: 86
End: 2021-01-01

## 2021-01-01 VITALS
HEART RATE: 72 BPM | BODY MASS INDEX: 25.37 KG/M2 | TEMPERATURE: 98.3 F | WEIGHT: 152.25 LBS | SYSTOLIC BLOOD PRESSURE: 113 MMHG | OXYGEN SATURATION: 96 % | DIASTOLIC BLOOD PRESSURE: 69 MMHG | HEIGHT: 65 IN

## 2021-01-01 VITALS
BODY MASS INDEX: 25.33 KG/M2 | SYSTOLIC BLOOD PRESSURE: 117 MMHG | WEIGHT: 152 LBS | HEART RATE: 88 BPM | DIASTOLIC BLOOD PRESSURE: 71 MMHG | HEIGHT: 65 IN

## 2021-01-01 VITALS
SYSTOLIC BLOOD PRESSURE: 145 MMHG | BODY MASS INDEX: 25.33 KG/M2 | WEIGHT: 152 LBS | HEIGHT: 65 IN | HEART RATE: 88 BPM | DIASTOLIC BLOOD PRESSURE: 90 MMHG

## 2021-01-01 VITALS
OXYGEN SATURATION: 95 % | RESPIRATION RATE: 18 BRPM | HEART RATE: 102 BPM | HEIGHT: 65 IN | TEMPERATURE: 98 F | SYSTOLIC BLOOD PRESSURE: 125 MMHG | DIASTOLIC BLOOD PRESSURE: 83 MMHG | WEIGHT: 130.07 LBS

## 2021-01-01 VITALS
HEIGHT: 65 IN | HEART RATE: 89 BPM | OXYGEN SATURATION: 96 % | TEMPERATURE: 97 F | RESPIRATION RATE: 16 BRPM | DIASTOLIC BLOOD PRESSURE: 94 MMHG | SYSTOLIC BLOOD PRESSURE: 169 MMHG

## 2021-01-01 VITALS
SYSTOLIC BLOOD PRESSURE: 111 MMHG | HEART RATE: 94 BPM | RESPIRATION RATE: 18 BRPM | DIASTOLIC BLOOD PRESSURE: 67 MMHG | OXYGEN SATURATION: 98 % | TEMPERATURE: 99 F

## 2021-01-01 VITALS
SYSTOLIC BLOOD PRESSURE: 144 MMHG | HEIGHT: 65 IN | WEIGHT: 151.9 LBS | DIASTOLIC BLOOD PRESSURE: 92 MMHG | RESPIRATION RATE: 18 BRPM | TEMPERATURE: 98 F | OXYGEN SATURATION: 97 % | HEART RATE: 91 BPM

## 2021-01-01 VITALS
RESPIRATION RATE: 18 BRPM | OXYGEN SATURATION: 99 % | TEMPERATURE: 98 F | SYSTOLIC BLOOD PRESSURE: 130 MMHG | DIASTOLIC BLOOD PRESSURE: 78 MMHG | HEART RATE: 74 BPM

## 2021-01-01 VITALS
WEIGHT: 152 LBS | DIASTOLIC BLOOD PRESSURE: 93 MMHG | SYSTOLIC BLOOD PRESSURE: 153 MMHG | HEIGHT: 65 IN | HEART RATE: 89 BPM | BODY MASS INDEX: 25.33 KG/M2

## 2021-01-01 VITALS
DIASTOLIC BLOOD PRESSURE: 78 MMHG | SYSTOLIC BLOOD PRESSURE: 112 MMHG | RESPIRATION RATE: 12 BRPM | HEART RATE: 71 BPM | HEIGHT: 65 IN

## 2021-01-01 VITALS — OXYGEN SATURATION: 95 % | RESPIRATION RATE: 17 BRPM

## 2021-01-01 VITALS
SYSTOLIC BLOOD PRESSURE: 135 MMHG | HEIGHT: 65 IN | DIASTOLIC BLOOD PRESSURE: 78 MMHG | WEIGHT: 152 LBS | HEART RATE: 86 BPM | BODY MASS INDEX: 25.33 KG/M2

## 2021-01-01 DIAGNOSIS — M84.48XA PATHOLOGICAL FRACTURE, OTHER SITE, INITIAL ENCOUNTER FOR FRACTURE: ICD-10-CM

## 2021-01-01 DIAGNOSIS — Z98.890 OTHER SPECIFIED POSTPROCEDURAL STATES: Chronic | ICD-10-CM

## 2021-01-01 DIAGNOSIS — M17.12 UNILATERAL PRIMARY OSTEOARTHRITIS, LEFT KNEE: ICD-10-CM

## 2021-01-01 DIAGNOSIS — S12.591S: ICD-10-CM

## 2021-01-01 DIAGNOSIS — Z86.79 PERSONAL HISTORY OF OTHER DISEASES OF THE CIRCULATORY SYSTEM: ICD-10-CM

## 2021-01-01 DIAGNOSIS — Z95.2 PRESENCE OF PROSTHETIC HEART VALVE: ICD-10-CM

## 2021-01-01 DIAGNOSIS — R06.02 SHORTNESS OF BREATH: ICD-10-CM

## 2021-01-01 DIAGNOSIS — S12.9XXD FRACTURE OF NECK, UNSPECIFIED, SUBSEQUENT ENCOUNTER: ICD-10-CM

## 2021-01-01 DIAGNOSIS — R60.0 LOCALIZED EDEMA: ICD-10-CM

## 2021-01-01 DIAGNOSIS — Z98.890 OTHER SPECIFIED POSTPROCEDURAL STATES: ICD-10-CM

## 2021-01-01 DIAGNOSIS — Z86.718 PERSONAL HISTORY OF OTHER VENOUS THROMBOSIS AND EMBOLISM: ICD-10-CM

## 2021-01-01 DIAGNOSIS — Z95.0 PRESENCE OF CARDIAC PACEMAKER: Chronic | ICD-10-CM

## 2021-01-01 DIAGNOSIS — I10 ESSENTIAL (PRIMARY) HYPERTENSION: ICD-10-CM

## 2021-01-01 DIAGNOSIS — Z96.652 PRESENCE OF LEFT ARTIFICIAL KNEE JOINT: Chronic | ICD-10-CM

## 2021-01-01 DIAGNOSIS — S22.000A WEDGE COMPRESSION FRACTURE OF UNSPECIFIED THORACIC VERTEBRA, INITIAL ENCOUNTER FOR CLOSED FRACTURE: ICD-10-CM

## 2021-01-01 DIAGNOSIS — Z96.651 PRESENCE OF RIGHT ARTIFICIAL KNEE JOINT: Chronic | ICD-10-CM

## 2021-01-01 DIAGNOSIS — Z85.46 PERSONAL HISTORY OF MALIGNANT NEOPLASM OF PROSTATE: ICD-10-CM

## 2021-01-01 DIAGNOSIS — I48.19 OTHER PERSISTENT ATRIAL FIBRILLATION: ICD-10-CM

## 2021-01-01 DIAGNOSIS — Z87.39 PERSONAL HISTORY OF OTHER DISEASES OF THE MUSCULOSKELETAL SYSTEM AND CONNECTIVE TISSUE: ICD-10-CM

## 2021-01-01 DIAGNOSIS — S22.49XA MULTIPLE FRACTURES OF RIBS, UNSPECIFIED SIDE, INITIAL ENCOUNTER FOR CLOSED FRACTURE: ICD-10-CM

## 2021-01-01 LAB
-  AMIKACIN: SIGNIFICANT CHANGE UP
-  AZTREONAM: SIGNIFICANT CHANGE UP
-  CEFEPIME: SIGNIFICANT CHANGE UP
-  CEFTAZIDIME: SIGNIFICANT CHANGE UP
-  CIPROFLOXACIN: SIGNIFICANT CHANGE UP
-  GENTAMICIN: SIGNIFICANT CHANGE UP
-  IMIPENEM: SIGNIFICANT CHANGE UP
-  LEVOFLOXACIN: SIGNIFICANT CHANGE UP
-  MEROPENEM: SIGNIFICANT CHANGE UP
-  PIPERACILLIN/TAZOBACTAM: SIGNIFICANT CHANGE UP
-  TOBRAMYCIN: SIGNIFICANT CHANGE UP
ALBUMIN SERPL ELPH-MCNC: 3.4 G/DL — SIGNIFICANT CHANGE UP (ref 3.3–5.2)
ALP SERPL-CCNC: 96 U/L — SIGNIFICANT CHANGE UP (ref 40–120)
ALT FLD-CCNC: 15 U/L — SIGNIFICANT CHANGE UP
ANION GAP SERPL CALC-SCNC: 11 MMOL/L — SIGNIFICANT CHANGE UP (ref 5–17)
ANION GAP SERPL CALC-SCNC: 12 MMOL/L — SIGNIFICANT CHANGE UP (ref 5–17)
ANION GAP SERPL CALC-SCNC: 8 MMOL/L — SIGNIFICANT CHANGE UP (ref 5–17)
ANION GAP SERPL CALC-SCNC: 8 MMOL/L — SIGNIFICANT CHANGE UP (ref 5–17)
ANION GAP SERPL CALC-SCNC: 9 MMOL/L — SIGNIFICANT CHANGE UP (ref 5–17)
APPEARANCE UR: ABNORMAL
APPEARANCE UR: CLEAR — SIGNIFICANT CHANGE UP
APTT BLD: 30.7 SEC — SIGNIFICANT CHANGE UP (ref 27.5–35.5)
APTT BLD: 34.6 SEC — SIGNIFICANT CHANGE UP (ref 27.5–35.5)
APTT BLD: 34.7 SEC — SIGNIFICANT CHANGE UP (ref 27.5–35.5)
APTT BLD: 36.2 SEC — HIGH (ref 27.5–35.5)
APTT BLD: 49.1 SEC — HIGH (ref 27.5–35.5)
AST SERPL-CCNC: 48 U/L — HIGH
BACTERIA # UR AUTO: ABNORMAL
BACTERIA # UR AUTO: ABNORMAL
BASOPHILS # BLD AUTO: 0.11 K/UL — SIGNIFICANT CHANGE UP (ref 0–0.2)
BASOPHILS # BLD AUTO: 0.22 K/UL — HIGH (ref 0–0.2)
BASOPHILS NFR BLD AUTO: 1 % — SIGNIFICANT CHANGE UP (ref 0–2)
BASOPHILS NFR BLD AUTO: 1.7 % — SIGNIFICANT CHANGE UP (ref 0–2)
BILIRUB SERPL-MCNC: 0.5 MG/DL — SIGNIFICANT CHANGE UP (ref 0.4–2)
BILIRUB UR-MCNC: NEGATIVE — SIGNIFICANT CHANGE UP
BILIRUB UR-MCNC: NEGATIVE — SIGNIFICANT CHANGE UP
BUN SERPL-MCNC: 31.4 MG/DL — HIGH (ref 8–20)
BUN SERPL-MCNC: 32 MG/DL — HIGH (ref 8–20)
BUN SERPL-MCNC: 32.9 MG/DL — HIGH (ref 8–20)
BUN SERPL-MCNC: 39.2 MG/DL — HIGH (ref 8–20)
BUN SERPL-MCNC: 42 MG/DL — HIGH (ref 8–20)
CALCIUM SERPL-MCNC: 9 MG/DL — SIGNIFICANT CHANGE UP (ref 8.6–10.2)
CALCIUM SERPL-MCNC: 9.2 MG/DL — SIGNIFICANT CHANGE UP (ref 8.6–10.2)
CALCIUM SERPL-MCNC: 9.2 MG/DL — SIGNIFICANT CHANGE UP (ref 8.6–10.2)
CALCIUM SERPL-MCNC: 9.4 MG/DL — SIGNIFICANT CHANGE UP (ref 8.6–10.2)
CALCIUM SERPL-MCNC: 9.4 MG/DL — SIGNIFICANT CHANGE UP (ref 8.6–10.2)
CHLORIDE SERPL-SCNC: 101 MMOL/L — SIGNIFICANT CHANGE UP (ref 98–107)
CHLORIDE SERPL-SCNC: 101 MMOL/L — SIGNIFICANT CHANGE UP (ref 98–107)
CHLORIDE SERPL-SCNC: 102 MMOL/L — SIGNIFICANT CHANGE UP (ref 98–107)
CHLORIDE SERPL-SCNC: 104 MMOL/L — SIGNIFICANT CHANGE UP (ref 98–107)
CHLORIDE SERPL-SCNC: 106 MMOL/L — SIGNIFICANT CHANGE UP (ref 98–107)
CO2 SERPL-SCNC: 24 MMOL/L — SIGNIFICANT CHANGE UP (ref 22–29)
CO2 SERPL-SCNC: 27 MMOL/L — SIGNIFICANT CHANGE UP (ref 22–29)
CO2 SERPL-SCNC: 31 MMOL/L — HIGH (ref 22–29)
CO2 SERPL-SCNC: 31 MMOL/L — HIGH (ref 22–29)
CO2 SERPL-SCNC: 32 MMOL/L — HIGH (ref 22–29)
COLOR SPEC: YELLOW — SIGNIFICANT CHANGE UP
COLOR SPEC: YELLOW — SIGNIFICANT CHANGE UP
COMMENT - URINE: SIGNIFICANT CHANGE UP
COVID-19 SPIKE DOMAIN AB INTERP: POSITIVE
COVID-19 SPIKE DOMAIN ANTIBODY RESULT: >250 U/ML — HIGH
CREAT SERPL-MCNC: 0.95 MG/DL — SIGNIFICANT CHANGE UP (ref 0.5–1.3)
CREAT SERPL-MCNC: 0.98 MG/DL — SIGNIFICANT CHANGE UP (ref 0.5–1.3)
CREAT SERPL-MCNC: 1.09 MG/DL — SIGNIFICANT CHANGE UP (ref 0.5–1.3)
CREAT SERPL-MCNC: 1.09 MG/DL — SIGNIFICANT CHANGE UP (ref 0.5–1.3)
CREAT SERPL-MCNC: 1.12 MG/DL — SIGNIFICANT CHANGE UP (ref 0.5–1.3)
CULTURE RESULTS: SIGNIFICANT CHANGE UP
CULTURE RESULTS: SIGNIFICANT CHANGE UP
DIFF PNL FLD: ABNORMAL
DIFF PNL FLD: ABNORMAL
EOSINOPHIL # BLD AUTO: 0 K/UL — SIGNIFICANT CHANGE UP (ref 0–0.5)
EOSINOPHIL # BLD AUTO: 0.11 K/UL — SIGNIFICANT CHANGE UP (ref 0–0.5)
EOSINOPHIL NFR BLD AUTO: 0 % — SIGNIFICANT CHANGE UP (ref 0–6)
EOSINOPHIL NFR BLD AUTO: 1 % — SIGNIFICANT CHANGE UP (ref 0–6)
EPI CELLS # UR: SIGNIFICANT CHANGE UP
EPI CELLS # UR: SIGNIFICANT CHANGE UP
GIANT PLATELETS BLD QL SMEAR: PRESENT — SIGNIFICANT CHANGE UP
GIANT PLATELETS BLD QL SMEAR: PRESENT — SIGNIFICANT CHANGE UP
GLUCOSE SERPL-MCNC: 108 MG/DL — HIGH (ref 70–99)
GLUCOSE SERPL-MCNC: 120 MG/DL — HIGH (ref 70–99)
GLUCOSE SERPL-MCNC: 124 MG/DL — HIGH (ref 70–99)
GLUCOSE SERPL-MCNC: 130 MG/DL — HIGH (ref 70–99)
GLUCOSE SERPL-MCNC: 210 MG/DL — HIGH (ref 70–99)
GLUCOSE UR QL: NEGATIVE MG/DL — SIGNIFICANT CHANGE UP
GLUCOSE UR QL: NEGATIVE — SIGNIFICANT CHANGE UP
HCT VFR BLD CALC: 35.6 % — LOW (ref 39–50)
HCT VFR BLD CALC: 36.1 % — LOW (ref 39–50)
HCT VFR BLD CALC: 36.2 % — LOW (ref 39–50)
HCT VFR BLD CALC: 36.9 % — LOW (ref 39–50)
HCT VFR BLD CALC: 37.6 % — LOW (ref 39–50)
HGB BLD-MCNC: 11.1 G/DL — LOW (ref 13–17)
HGB BLD-MCNC: 11.5 G/DL — LOW (ref 13–17)
HGB BLD-MCNC: 11.5 G/DL — LOW (ref 13–17)
HGB BLD-MCNC: 11.7 G/DL — LOW (ref 13–17)
HGB BLD-MCNC: 12.2 G/DL — LOW (ref 13–17)
INR BLD: 1.82 RATIO — HIGH (ref 0.88–1.16)
INR BLD: 1.94 RATIO — HIGH (ref 0.88–1.16)
INR BLD: 2 RATIO — HIGH (ref 0.88–1.16)
INR BLD: 2.24 RATIO — HIGH (ref 0.88–1.16)
INR BLD: 3.69 RATIO — HIGH (ref 0.88–1.16)
INR BLD: 6.64 RATIO — CRITICAL HIGH (ref 0.88–1.16)
KETONES UR-MCNC: ABNORMAL
KETONES UR-MCNC: NEGATIVE — SIGNIFICANT CHANGE UP
LEUKOCYTE ESTERASE UR-ACNC: ABNORMAL
LEUKOCYTE ESTERASE UR-ACNC: ABNORMAL
LYMPHOCYTES # BLD AUTO: 33.1 % — SIGNIFICANT CHANGE UP (ref 13–44)
LYMPHOCYTES # BLD AUTO: 4.28 K/UL — HIGH (ref 1–3.3)
LYMPHOCYTES # BLD AUTO: 5.69 K/UL — HIGH (ref 1–3.3)
LYMPHOCYTES # BLD AUTO: 53 % — HIGH (ref 13–44)
LYMPHOCYTES # SPEC AUTO: 8 % — HIGH (ref 0–0)
MAGNESIUM SERPL-MCNC: 2.3 MG/DL — SIGNIFICANT CHANGE UP (ref 1.6–2.6)
MAGNESIUM SERPL-MCNC: 2.3 MG/DL — SIGNIFICANT CHANGE UP (ref 1.6–2.6)
MANUAL SMEAR VERIFICATION: SIGNIFICANT CHANGE UP
MANUAL SMEAR VERIFICATION: SIGNIFICANT CHANGE UP
MCHC RBC-ENTMCNC: 29.8 PG — SIGNIFICANT CHANGE UP (ref 27–34)
MCHC RBC-ENTMCNC: 30.4 PG — SIGNIFICANT CHANGE UP (ref 27–34)
MCHC RBC-ENTMCNC: 30.5 PG — SIGNIFICANT CHANGE UP (ref 27–34)
MCHC RBC-ENTMCNC: 30.6 PG — SIGNIFICANT CHANGE UP (ref 27–34)
MCHC RBC-ENTMCNC: 30.7 GM/DL — LOW (ref 32–36)
MCHC RBC-ENTMCNC: 30.8 PG — SIGNIFICANT CHANGE UP (ref 27–34)
MCHC RBC-ENTMCNC: 31.7 GM/DL — LOW (ref 32–36)
MCHC RBC-ENTMCNC: 31.9 GM/DL — LOW (ref 32–36)
MCHC RBC-ENTMCNC: 32.3 GM/DL — SIGNIFICANT CHANGE UP (ref 32–36)
MCHC RBC-ENTMCNC: 32.4 GM/DL — SIGNIFICANT CHANGE UP (ref 32–36)
MCV RBC AUTO: 94.7 FL — SIGNIFICANT CHANGE UP (ref 80–100)
MCV RBC AUTO: 94.9 FL — SIGNIFICANT CHANGE UP (ref 80–100)
MCV RBC AUTO: 95.5 FL — SIGNIFICANT CHANGE UP (ref 80–100)
MCV RBC AUTO: 96.1 FL — SIGNIFICANT CHANGE UP (ref 80–100)
MCV RBC AUTO: 97.3 FL — SIGNIFICANT CHANGE UP (ref 80–100)
METHOD TYPE: SIGNIFICANT CHANGE UP
MONOCYTES # BLD AUTO: 0.12 K/UL — SIGNIFICANT CHANGE UP (ref 0–0.9)
MONOCYTES # BLD AUTO: 0.75 K/UL — SIGNIFICANT CHANGE UP (ref 0–0.9)
MONOCYTES NFR BLD AUTO: 0.9 % — LOW (ref 2–14)
MONOCYTES NFR BLD AUTO: 7 % — SIGNIFICANT CHANGE UP (ref 2–14)
NEUTROPHILS # BLD AUTO: 3 K/UL — SIGNIFICANT CHANGE UP (ref 1.8–7.4)
NEUTROPHILS # BLD AUTO: 8.1 K/UL — HIGH (ref 1.8–7.4)
NEUTROPHILS NFR BLD AUTO: 28 % — LOW (ref 43–77)
NEUTROPHILS NFR BLD AUTO: 62.6 % — SIGNIFICANT CHANGE UP (ref 43–77)
NITRITE UR-MCNC: NEGATIVE — SIGNIFICANT CHANGE UP
NITRITE UR-MCNC: POSITIVE
NRBC # BLD: 0 /100 — SIGNIFICANT CHANGE UP (ref 0–0)
ORGANISM # SPEC MICROSCOPIC CNT: SIGNIFICANT CHANGE UP
ORGANISM # SPEC MICROSCOPIC CNT: SIGNIFICANT CHANGE UP
PH UR: 6 — SIGNIFICANT CHANGE UP (ref 5–8)
PH UR: 6 — SIGNIFICANT CHANGE UP (ref 5–8)
PHOSPHATE SERPL-MCNC: 2.5 MG/DL — SIGNIFICANT CHANGE UP (ref 2.4–4.7)
PHOSPHATE SERPL-MCNC: 2.9 MG/DL — SIGNIFICANT CHANGE UP (ref 2.4–4.7)
PLAT MORPH BLD: NORMAL — SIGNIFICANT CHANGE UP
PLAT MORPH BLD: NORMAL — SIGNIFICANT CHANGE UP
PLATELET # BLD AUTO: 100 K/UL — LOW (ref 150–400)
PLATELET # BLD AUTO: 104 K/UL — LOW (ref 150–400)
PLATELET # BLD AUTO: 118 K/UL — LOW (ref 150–400)
PLATELET # BLD AUTO: 118 K/UL — LOW (ref 150–400)
PLATELET # BLD AUTO: 123 K/UL — LOW (ref 150–400)
POLYCHROMASIA BLD QL SMEAR: SLIGHT — SIGNIFICANT CHANGE UP
POTASSIUM SERPL-MCNC: 4.1 MMOL/L — SIGNIFICANT CHANGE UP (ref 3.5–5.3)
POTASSIUM SERPL-MCNC: 4.2 MMOL/L — SIGNIFICANT CHANGE UP (ref 3.5–5.3)
POTASSIUM SERPL-MCNC: 4.7 MMOL/L — SIGNIFICANT CHANGE UP (ref 3.5–5.3)
POTASSIUM SERPL-MCNC: 5.1 MMOL/L — SIGNIFICANT CHANGE UP (ref 3.5–5.3)
POTASSIUM SERPL-MCNC: 5.3 MMOL/L — SIGNIFICANT CHANGE UP (ref 3.5–5.3)
POTASSIUM SERPL-SCNC: 4.1 MMOL/L — SIGNIFICANT CHANGE UP (ref 3.5–5.3)
POTASSIUM SERPL-SCNC: 4.2 MMOL/L — SIGNIFICANT CHANGE UP (ref 3.5–5.3)
POTASSIUM SERPL-SCNC: 4.7 MMOL/L — SIGNIFICANT CHANGE UP (ref 3.5–5.3)
POTASSIUM SERPL-SCNC: 5.1 MMOL/L — SIGNIFICANT CHANGE UP (ref 3.5–5.3)
POTASSIUM SERPL-SCNC: 5.3 MMOL/L — SIGNIFICANT CHANGE UP (ref 3.5–5.3)
PROT SERPL-MCNC: 7 G/DL — SIGNIFICANT CHANGE UP (ref 6.6–8.7)
PROT UR-MCNC: 30 MG/DL
PROT UR-MCNC: NEGATIVE MG/DL — SIGNIFICANT CHANGE UP
PROTHROM AB SERPL-ACNC: 20.5 SEC — HIGH (ref 10.6–13.6)
PROTHROM AB SERPL-ACNC: 21.8 SEC — HIGH (ref 10.6–13.6)
PROTHROM AB SERPL-ACNC: 22.5 SEC — HIGH (ref 10.6–13.6)
PROTHROM AB SERPL-ACNC: 25 SEC — HIGH (ref 10.6–13.6)
PROTHROM AB SERPL-ACNC: 40.2 SEC — HIGH (ref 10.6–13.6)
PROTHROM AB SERPL-ACNC: 70.4 SEC — HIGH (ref 10.6–13.6)
RBC # BLD: 3.72 M/UL — LOW (ref 4.2–5.8)
RBC # BLD: 3.76 M/UL — LOW (ref 4.2–5.8)
RBC # BLD: 3.78 M/UL — LOW (ref 4.2–5.8)
RBC # BLD: 3.84 M/UL — LOW (ref 4.2–5.8)
RBC # BLD: 3.96 M/UL — LOW (ref 4.2–5.8)
RBC # FLD: 13.4 % — SIGNIFICANT CHANGE UP (ref 10.3–14.5)
RBC # FLD: 13.5 % — SIGNIFICANT CHANGE UP (ref 10.3–14.5)
RBC # FLD: 13.5 % — SIGNIFICANT CHANGE UP (ref 10.3–14.5)
RBC # FLD: 13.7 % — SIGNIFICANT CHANGE UP (ref 10.3–14.5)
RBC # FLD: 16.3 % — HIGH (ref 10.3–14.5)
RBC BLD AUTO: ABNORMAL
RBC BLD AUTO: NORMAL — SIGNIFICANT CHANGE UP
RBC CASTS # UR COMP ASSIST: ABNORMAL /HPF (ref 0–4)
RBC CASTS # UR COMP ASSIST: SIGNIFICANT CHANGE UP /HPF (ref 0–4)
SARS-COV-2 IGG+IGM SERPL QL IA: >250 U/ML — HIGH
SARS-COV-2 IGG+IGM SERPL QL IA: POSITIVE
SARS-COV-2 RNA SPEC QL NAA+PROBE: SIGNIFICANT CHANGE UP
SMUDGE CELLS # BLD: PRESENT — SIGNIFICANT CHANGE UP
SODIUM SERPL-SCNC: 138 MMOL/L — SIGNIFICANT CHANGE UP (ref 135–145)
SODIUM SERPL-SCNC: 139 MMOL/L — SIGNIFICANT CHANGE UP (ref 135–145)
SODIUM SERPL-SCNC: 140 MMOL/L — SIGNIFICANT CHANGE UP (ref 135–145)
SODIUM SERPL-SCNC: 143 MMOL/L — SIGNIFICANT CHANGE UP (ref 135–145)
SODIUM SERPL-SCNC: 146 MMOL/L — HIGH (ref 135–145)
SP GR SPEC: 1.01 — SIGNIFICANT CHANGE UP (ref 1.01–1.02)
SP GR SPEC: 1.01 — SIGNIFICANT CHANGE UP (ref 1.01–1.02)
SPECIMEN SOURCE: SIGNIFICANT CHANGE UP
SPECIMEN SOURCE: SIGNIFICANT CHANGE UP
UROBILINOGEN FLD QL: NEGATIVE MG/DL — SIGNIFICANT CHANGE UP
UROBILINOGEN FLD QL: NEGATIVE — SIGNIFICANT CHANGE UP
VARIANT LYMPHS # BLD: 1.7 % — SIGNIFICANT CHANGE UP (ref 0–6)
VARIANT LYMPHS # BLD: 2 % — SIGNIFICANT CHANGE UP (ref 0–6)
WBC # BLD: 10.73 K/UL — HIGH (ref 3.8–10.5)
WBC # BLD: 12.77 K/UL — HIGH (ref 3.8–10.5)
WBC # BLD: 12.94 K/UL — HIGH (ref 3.8–10.5)
WBC # BLD: 8.46 K/UL — SIGNIFICANT CHANGE UP (ref 3.8–10.5)
WBC # BLD: 9.89 K/UL — SIGNIFICANT CHANGE UP (ref 3.8–10.5)
WBC # FLD AUTO: 10.73 K/UL — HIGH (ref 3.8–10.5)
WBC # FLD AUTO: 12.77 K/UL — HIGH (ref 3.8–10.5)
WBC # FLD AUTO: 12.94 K/UL — HIGH (ref 3.8–10.5)
WBC # FLD AUTO: 8.46 K/UL — SIGNIFICANT CHANGE UP (ref 3.8–10.5)
WBC # FLD AUTO: 9.89 K/UL — SIGNIFICANT CHANGE UP (ref 3.8–10.5)
WBC UR QL: >50
WBC UR QL: ABNORMAL

## 2021-01-01 PROCEDURE — 87186 SC STD MICRODIL/AGAR DIL: CPT

## 2021-01-01 PROCEDURE — 36415 COLL VENOUS BLD VENIPUNCTURE: CPT

## 2021-01-01 PROCEDURE — 36000 PLACE NEEDLE IN VEIN: CPT

## 2021-01-01 PROCEDURE — G0378: CPT

## 2021-01-01 PROCEDURE — 99223 1ST HOSP IP/OBS HIGH 75: CPT

## 2021-01-01 PROCEDURE — 17110 DESTRUCTION B9 LES UP TO 14: CPT

## 2021-01-01 PROCEDURE — 72170 X-RAY EXAM OF PELVIS: CPT | Mod: 26

## 2021-01-01 PROCEDURE — 70450 CT HEAD/BRAIN W/O DYE: CPT

## 2021-01-01 PROCEDURE — 20610 DRAIN/INJ JOINT/BURSA W/O US: CPT | Mod: LT

## 2021-01-01 PROCEDURE — 71260 CT THORAX DX C+: CPT | Mod: 26,MG

## 2021-01-01 PROCEDURE — U0003: CPT

## 2021-01-01 PROCEDURE — 99285 EMERGENCY DEPT VISIT HI MDM: CPT | Mod: 25

## 2021-01-01 PROCEDURE — 72146 MRI CHEST SPINE W/O DYE: CPT | Mod: 26

## 2021-01-01 PROCEDURE — 72128 CT CHEST SPINE W/O DYE: CPT | Mod: 26,MA

## 2021-01-01 PROCEDURE — 93971 EXTREMITY STUDY: CPT

## 2021-01-01 PROCEDURE — 96374 THER/PROPH/DIAG INJ IV PUSH: CPT

## 2021-01-01 PROCEDURE — 93005 ELECTROCARDIOGRAM TRACING: CPT

## 2021-01-01 PROCEDURE — 70450 CT HEAD/BRAIN W/O DYE: CPT | Mod: 26,MH

## 2021-01-01 PROCEDURE — 93306 TTE W/DOPPLER COMPLETE: CPT

## 2021-01-01 PROCEDURE — 93296 REM INTERROG EVL PM/IDS: CPT

## 2021-01-01 PROCEDURE — 72131 CT LUMBAR SPINE W/O DYE: CPT | Mod: 26,ME

## 2021-01-01 PROCEDURE — 80048 BASIC METABOLIC PNL TOTAL CA: CPT

## 2021-01-01 PROCEDURE — 99217: CPT

## 2021-01-01 PROCEDURE — 99232 SBSQ HOSP IP/OBS MODERATE 35: CPT

## 2021-01-01 PROCEDURE — 99221 1ST HOSP IP/OBS SF/LOW 40: CPT

## 2021-01-01 PROCEDURE — 85730 THROMBOPLASTIN TIME PARTIAL: CPT

## 2021-01-01 PROCEDURE — 81001 URINALYSIS AUTO W/SCOPE: CPT

## 2021-01-01 PROCEDURE — G1004: CPT

## 2021-01-01 PROCEDURE — 96372 THER/PROPH/DIAG INJ SC/IM: CPT | Mod: XU

## 2021-01-01 PROCEDURE — 85025 COMPLETE CBC W/AUTO DIFF WBC: CPT

## 2021-01-01 PROCEDURE — 99284 EMERGENCY DEPT VISIT MOD MDM: CPT

## 2021-01-01 PROCEDURE — 99214 OFFICE O/P EST MOD 30 MIN: CPT

## 2021-01-01 PROCEDURE — 85610 PROTHROMBIN TIME: CPT

## 2021-01-01 PROCEDURE — 74177 CT ABD & PELVIS W/CONTRAST: CPT | Mod: MG

## 2021-01-01 PROCEDURE — 99231 SBSQ HOSP IP/OBS SF/LOW 25: CPT

## 2021-01-01 PROCEDURE — 74177 CT ABD & PELVIS W/CONTRAST: CPT | Mod: 26,MG

## 2021-01-01 PROCEDURE — 93294 REM INTERROG EVL PM/LDLS PM: CPT

## 2021-01-01 PROCEDURE — 87635 SARS-COV-2 COVID-19 AMP PRB: CPT

## 2021-01-01 PROCEDURE — U0005: CPT

## 2021-01-01 PROCEDURE — 72125 CT NECK SPINE W/O DYE: CPT

## 2021-01-01 PROCEDURE — 70450 CT HEAD/BRAIN W/O DYE: CPT | Mod: MG

## 2021-01-01 PROCEDURE — 71260 CT THORAX DX C+: CPT | Mod: MG

## 2021-01-01 PROCEDURE — 70450 CT HEAD/BRAIN W/O DYE: CPT | Mod: 26,ME

## 2021-01-01 PROCEDURE — 85027 COMPLETE CBC AUTOMATED: CPT

## 2021-01-01 PROCEDURE — 99218: CPT

## 2021-01-01 PROCEDURE — 70450 CT HEAD/BRAIN W/O DYE: CPT | Mod: 26,MG

## 2021-01-01 PROCEDURE — 99222 1ST HOSP IP/OBS MODERATE 55: CPT

## 2021-01-01 PROCEDURE — 93971 EXTREMITY STUDY: CPT | Mod: 26,LT

## 2021-01-01 PROCEDURE — 99220: CPT | Mod: CS

## 2021-01-01 PROCEDURE — 72141 MRI NECK SPINE W/O DYE: CPT | Mod: 26

## 2021-01-01 PROCEDURE — 99233 SBSQ HOSP IP/OBS HIGH 50: CPT

## 2021-01-01 PROCEDURE — 72170 X-RAY EXAM OF PELVIS: CPT

## 2021-01-01 PROCEDURE — 93281 PM DEVICE PROGR EVAL MULTI: CPT

## 2021-01-01 PROCEDURE — 99239 HOSP IP/OBS DSCHRG MGMT >30: CPT

## 2021-01-01 PROCEDURE — 99284 EMERGENCY DEPT VISIT MOD MDM: CPT | Mod: 25

## 2021-01-01 PROCEDURE — 99213 OFFICE O/P EST LOW 20 MIN: CPT

## 2021-01-01 PROCEDURE — 97110 THERAPEUTIC EXERCISES: CPT

## 2021-01-01 PROCEDURE — 72146 MRI CHEST SPINE W/O DYE: CPT

## 2021-01-01 PROCEDURE — 97116 GAIT TRAINING THERAPY: CPT

## 2021-01-01 PROCEDURE — 87086 URINE CULTURE/COLONY COUNT: CPT

## 2021-01-01 PROCEDURE — 80053 COMPREHEN METABOLIC PANEL: CPT

## 2021-01-01 PROCEDURE — 83735 ASSAY OF MAGNESIUM: CPT

## 2021-01-01 PROCEDURE — 72125 CT NECK SPINE W/O DYE: CPT | Mod: 26,MH

## 2021-01-01 PROCEDURE — 73562 X-RAY EXAM OF KNEE 3: CPT | Mod: 26,LT

## 2021-01-01 PROCEDURE — 99212 OFFICE O/P EST SF 10 MIN: CPT | Mod: 25

## 2021-01-01 PROCEDURE — 72125 CT NECK SPINE W/O DYE: CPT | Mod: 26,MG

## 2021-01-01 PROCEDURE — 72040 X-RAY EXAM NECK SPINE 2-3 VW: CPT

## 2021-01-01 PROCEDURE — 93000 ELECTROCARDIOGRAM COMPLETE: CPT

## 2021-01-01 PROCEDURE — 73562 X-RAY EXAM OF KNEE 3: CPT

## 2021-01-01 PROCEDURE — 87077 CULTURE AEROBIC IDENTIFY: CPT

## 2021-01-01 PROCEDURE — 84100 ASSAY OF PHOSPHORUS: CPT

## 2021-01-01 PROCEDURE — 72125 CT NECK SPINE W/O DYE: CPT | Mod: MG

## 2021-01-01 PROCEDURE — 72141 MRI NECK SPINE W/O DYE: CPT

## 2021-01-01 PROCEDURE — 93010 ELECTROCARDIOGRAM REPORT: CPT

## 2021-01-01 PROCEDURE — 97163 PT EVAL HIGH COMPLEX 45 MIN: CPT

## 2021-01-01 PROCEDURE — 99213 OFFICE O/P EST LOW 20 MIN: CPT | Mod: 25

## 2021-01-01 PROCEDURE — 99285 EMERGENCY DEPT VISIT HI MDM: CPT | Mod: GC

## 2021-01-01 PROCEDURE — 90715 TDAP VACCINE 7 YRS/> IM: CPT

## 2021-01-01 PROCEDURE — 86769 SARS-COV-2 COVID-19 ANTIBODY: CPT

## 2021-01-01 PROCEDURE — 93000 ELECTROCARDIOGRAM COMPLETE: CPT | Mod: 59

## 2021-01-01 RX ORDER — GABAPENTIN 400 MG/1
100 CAPSULE ORAL
Refills: 0 | Status: DISCONTINUED | OUTPATIENT
Start: 2021-01-01 | End: 2021-01-01

## 2021-01-01 RX ORDER — OMEPRAZOLE 10 MG/1
1 CAPSULE, DELAYED RELEASE ORAL
Qty: 0 | Refills: 0 | DISCHARGE

## 2021-01-01 RX ORDER — METOCLOPRAMIDE HCL 10 MG
1 TABLET ORAL
Qty: 0 | Refills: 0 | DISCHARGE

## 2021-01-01 RX ORDER — METOPROLOL SUCCINATE 100 MG/1
100 TABLET, EXTENDED RELEASE ORAL DAILY
Qty: 45 | Refills: 2 | Status: DISCONTINUED | COMMUNITY
Start: 2020-07-14 | End: 2021-01-01

## 2021-01-01 RX ORDER — CELECOXIB 200 MG/1
200 CAPSULE ORAL
Refills: 0 | Status: DISCONTINUED | OUTPATIENT
Start: 2021-01-01 | End: 2021-01-01

## 2021-01-01 RX ORDER — PANTOPRAZOLE SODIUM 20 MG/1
40 TABLET, DELAYED RELEASE ORAL
Refills: 0 | Status: DISCONTINUED | OUTPATIENT
Start: 2021-01-01 | End: 2021-01-01

## 2021-01-01 RX ORDER — DRONABINOL 2.5 MG
1 CAPSULE ORAL
Qty: 0 | Refills: 0 | DISCHARGE

## 2021-01-01 RX ORDER — LIDOCAINE 4 G/100G
1 CREAM TOPICAL
Qty: 0 | Refills: 0 | DISCHARGE
Start: 2021-01-01

## 2021-01-01 RX ORDER — METHOCARBAMOL 500 MG/1
500 TABLET, FILM COATED ORAL EVERY 6 HOURS
Refills: 0 | Status: DISCONTINUED | OUTPATIENT
Start: 2021-01-01 | End: 2021-01-01

## 2021-01-01 RX ORDER — TRAMADOL HYDROCHLORIDE 50 MG/1
1 TABLET ORAL
Qty: 0 | Refills: 0 | DISCHARGE
Start: 2021-01-01

## 2021-01-01 RX ORDER — NITROFURANTOIN MACROCRYSTAL 50 MG
1 CAPSULE ORAL
Qty: 0 | Refills: 0 | DISCHARGE
Start: 2021-01-01

## 2021-01-01 RX ORDER — CEPHALEXIN 500 MG/1
500 CAPSULE ORAL 3 TIMES DAILY
Refills: 0 | Status: DISCONTINUED | COMMUNITY
End: 2021-01-01

## 2021-01-01 RX ORDER — SENNA PLUS 8.6 MG/1
2 TABLET ORAL AT BEDTIME
Refills: 0 | Status: DISCONTINUED | OUTPATIENT
Start: 2021-01-01 | End: 2021-01-01

## 2021-01-01 RX ORDER — FUROSEMIDE 40 MG
40 TABLET ORAL DAILY
Refills: 0 | Status: DISCONTINUED | OUTPATIENT
Start: 2021-01-01 | End: 2021-01-01

## 2021-01-01 RX ORDER — METHOCARBAMOL 500 MG/1
1 TABLET, FILM COATED ORAL
Qty: 0 | Refills: 0 | DISCHARGE
Start: 2021-01-01

## 2021-01-01 RX ORDER — CELECOXIB 200 MG/1
1 CAPSULE ORAL
Qty: 0 | Refills: 0 | DISCHARGE
Start: 2021-01-01

## 2021-01-01 RX ORDER — ACETAMINOPHEN 500 MG
650 TABLET ORAL ONCE
Refills: 0 | Status: COMPLETED | OUTPATIENT
Start: 2021-01-01 | End: 2021-01-01

## 2021-01-01 RX ORDER — HEPARIN SODIUM 5000 [USP'U]/ML
5000 INJECTION INTRAVENOUS; SUBCUTANEOUS EVERY 8 HOURS
Refills: 0 | Status: DISCONTINUED | OUTPATIENT
Start: 2021-01-01 | End: 2021-01-01

## 2021-01-01 RX ORDER — SENNA PLUS 8.6 MG/1
1 TABLET ORAL ONCE
Refills: 0 | Status: COMPLETED | OUTPATIENT
Start: 2021-01-01 | End: 2021-01-01

## 2021-01-01 RX ORDER — METOPROLOL TARTRATE 50 MG
100 TABLET ORAL DAILY
Refills: 0 | Status: DISCONTINUED | OUTPATIENT
Start: 2021-01-01 | End: 2021-01-01

## 2021-01-01 RX ORDER — ACETAMINOPHEN 500 MG
2 TABLET ORAL
Qty: 0 | Refills: 0 | DISCHARGE
Start: 2021-01-01

## 2021-01-01 RX ORDER — TETANUS TOXOID, REDUCED DIPHTHERIA TOXOID AND ACELLULAR PERTUSSIS VACCINE, ADSORBED 5; 2.5; 8; 8; 2.5 [IU]/.5ML; [IU]/.5ML; UG/.5ML; UG/.5ML; UG/.5ML
0.5 SUSPENSION INTRAMUSCULAR ONCE
Refills: 0 | Status: COMPLETED | OUTPATIENT
Start: 2021-01-01 | End: 2021-01-01

## 2021-01-01 RX ORDER — ACETAMINOPHEN 500 MG
650 TABLET ORAL EVERY 6 HOURS
Refills: 0 | Status: DISCONTINUED | OUTPATIENT
Start: 2021-01-01 | End: 2021-01-01

## 2021-01-01 RX ORDER — FUROSEMIDE 40 MG/1
40 TABLET ORAL
Qty: 90 | Refills: 1 | Status: DISCONTINUED | COMMUNITY
Start: 2021-01-01 | End: 2021-01-01

## 2021-01-01 RX ORDER — METOPROLOL SUCCINATE 25 MG/1
25 TABLET, EXTENDED RELEASE ORAL
Qty: 90 | Refills: 2 | Status: DISCONTINUED | COMMUNITY
Start: 2021-01-01 | End: 2021-01-01

## 2021-01-01 RX ORDER — LIDOCAINE 4 G/100G
1 CREAM TOPICAL DAILY
Refills: 0 | Status: DISCONTINUED | OUTPATIENT
Start: 2021-01-01 | End: 2021-01-01

## 2021-01-01 RX ORDER — PHYTONADIONE (VIT K1) 5 MG
5 TABLET ORAL ONCE
Refills: 0 | Status: COMPLETED | OUTPATIENT
Start: 2021-01-01 | End: 2021-01-01

## 2021-01-01 RX ORDER — CEPHALEXIN 500 MG
500 CAPSULE ORAL
Refills: 0 | Status: DISCONTINUED | OUTPATIENT
Start: 2021-01-01 | End: 2021-01-01

## 2021-01-01 RX ORDER — WARFARIN SODIUM 2.5 MG/1
1 TABLET ORAL
Qty: 0 | Refills: 0 | DISCHARGE

## 2021-01-01 RX ORDER — LEVOFLOXACIN 250 MG/1
250 TABLET, FILM COATED ORAL
Refills: 0 | Status: DISCONTINUED | COMMUNITY
Start: 2020-10-13 | End: 2021-01-01

## 2021-01-01 RX ORDER — RABEPRAZOLE SODIUM 20 MG/1
20 TABLET, DELAYED RELEASE ORAL
Refills: 0 | Status: DISCONTINUED | COMMUNITY
End: 2021-01-01

## 2021-01-01 RX ORDER — TRAMADOL HYDROCHLORIDE 50 MG/1
50 TABLET ORAL EVERY 6 HOURS
Refills: 0 | Status: DISCONTINUED | OUTPATIENT
Start: 2021-01-01 | End: 2021-01-01

## 2021-01-01 RX ORDER — TAMSULOSIN HYDROCHLORIDE 0.4 MG/1
0.4 CAPSULE ORAL AT BEDTIME
Refills: 0 | Status: DISCONTINUED | OUTPATIENT
Start: 2021-01-01 | End: 2021-01-01

## 2021-01-01 RX ORDER — POLYETHYLENE GLYCOL 3350 17 G/17G
17 POWDER, FOR SOLUTION ORAL
Refills: 0 | Status: DISCONTINUED | OUTPATIENT
Start: 2021-01-01 | End: 2021-01-01

## 2021-01-01 RX ORDER — POLYETHYLENE GLYCOL 3350 17 G/17G
17 POWDER, FOR SOLUTION ORAL
Qty: 0 | Refills: 0 | DISCHARGE
Start: 2021-01-01

## 2021-01-01 RX ORDER — WARFARIN SODIUM 2.5 MG/1
1 TABLET ORAL
Qty: 0 | Refills: 0 | DISCHARGE
Start: 2021-01-01

## 2021-01-01 RX ORDER — MORPHINE SULFATE 50 MG/1
2 CAPSULE, EXTENDED RELEASE ORAL ONCE
Refills: 0 | Status: DISCONTINUED | OUTPATIENT
Start: 2021-01-01 | End: 2021-01-01

## 2021-01-01 RX ORDER — OXYCODONE HYDROCHLORIDE 5 MG/1
5 TABLET ORAL EVERY 6 HOURS
Refills: 0 | Status: DISCONTINUED | OUTPATIENT
Start: 2021-01-01 | End: 2021-01-01

## 2021-01-01 RX ORDER — SENNA PLUS 8.6 MG/1
2 TABLET ORAL
Qty: 0 | Refills: 0 | DISCHARGE
Start: 2021-01-01

## 2021-01-01 RX ORDER — WARFARIN SODIUM 2.5 MG/1
3 TABLET ORAL AT BEDTIME
Refills: 0 | Status: DISCONTINUED | OUTPATIENT
Start: 2021-01-01 | End: 2021-01-01

## 2021-01-01 RX ORDER — TAMSULOSIN HYDROCHLORIDE 0.4 MG/1
0.8 CAPSULE ORAL AT BEDTIME
Refills: 0 | Status: DISCONTINUED | OUTPATIENT
Start: 2021-01-01 | End: 2021-01-01

## 2021-01-01 RX ORDER — ACETAMINOPHEN 500 MG
1000 TABLET ORAL ONCE
Refills: 0 | Status: COMPLETED | OUTPATIENT
Start: 2021-01-01 | End: 2021-01-01

## 2021-01-01 RX ORDER — TAMSULOSIN HYDROCHLORIDE 0.4 MG/1
0.8 CAPSULE ORAL ONCE
Refills: 0 | Status: COMPLETED | OUTPATIENT
Start: 2021-01-01 | End: 2021-01-01

## 2021-01-01 RX ORDER — WARFARIN SODIUM 2.5 MG/1
3 TABLET ORAL DAILY
Refills: 0 | Status: DISCONTINUED | OUTPATIENT
Start: 2021-01-01 | End: 2021-01-01

## 2021-01-01 RX ORDER — NITROFURANTOIN MACROCRYSTAL 50 MG
100 CAPSULE ORAL
Refills: 0 | Status: DISCONTINUED | OUTPATIENT
Start: 2021-01-01 | End: 2021-01-01

## 2021-01-01 RX ADMIN — Medication 500 MILLIGRAM(S): at 11:15

## 2021-01-01 RX ADMIN — TETANUS TOXOID, REDUCED DIPHTHERIA TOXOID AND ACELLULAR PERTUSSIS VACCINE, ADSORBED 0.5 MILLILITER(S): 5; 2.5; 8; 8; 2.5 SUSPENSION INTRAMUSCULAR at 15:47

## 2021-01-01 RX ADMIN — Medication 100 MILLIGRAM(S): at 05:18

## 2021-01-01 RX ADMIN — Medication 650 MILLIGRAM(S): at 11:05

## 2021-01-01 RX ADMIN — TAMSULOSIN HYDROCHLORIDE 0.8 MILLIGRAM(S): 0.4 CAPSULE ORAL at 21:12

## 2021-01-01 RX ADMIN — Medication 1 TABLET(S): at 11:06

## 2021-01-01 RX ADMIN — GABAPENTIN 100 MILLIGRAM(S): 400 CAPSULE ORAL at 05:36

## 2021-01-01 RX ADMIN — METHOCARBAMOL 500 MILLIGRAM(S): 500 TABLET, FILM COATED ORAL at 18:56

## 2021-01-01 RX ADMIN — WARFARIN SODIUM 3 MILLIGRAM(S): 2.5 TABLET ORAL at 21:35

## 2021-01-01 RX ADMIN — PANTOPRAZOLE SODIUM 40 MILLIGRAM(S): 20 TABLET, DELAYED RELEASE ORAL at 05:18

## 2021-01-01 RX ADMIN — TRAMADOL HYDROCHLORIDE 50 MILLIGRAM(S): 50 TABLET ORAL at 02:06

## 2021-01-01 RX ADMIN — Medication 100 MILLIGRAM(S): at 05:44

## 2021-01-01 RX ADMIN — METHOCARBAMOL 500 MILLIGRAM(S): 500 TABLET, FILM COATED ORAL at 00:27

## 2021-01-01 RX ADMIN — GABAPENTIN 100 MILLIGRAM(S): 400 CAPSULE ORAL at 17:13

## 2021-01-01 RX ADMIN — Medication 500 MILLIGRAM(S): at 05:44

## 2021-01-01 RX ADMIN — OXYCODONE HYDROCHLORIDE 5 MILLIGRAM(S): 5 TABLET ORAL at 17:45

## 2021-01-01 RX ADMIN — Medication 650 MILLIGRAM(S): at 11:15

## 2021-01-01 RX ADMIN — Medication 650 MILLIGRAM(S): at 17:16

## 2021-01-01 RX ADMIN — METHOCARBAMOL 500 MILLIGRAM(S): 500 TABLET, FILM COATED ORAL at 05:19

## 2021-01-01 RX ADMIN — Medication 5 MILLIGRAM(S): at 22:06

## 2021-01-01 RX ADMIN — GABAPENTIN 100 MILLIGRAM(S): 400 CAPSULE ORAL at 05:28

## 2021-01-01 RX ADMIN — Medication 650 MILLIGRAM(S): at 06:51

## 2021-01-01 RX ADMIN — Medication 650 MILLIGRAM(S): at 12:04

## 2021-01-01 RX ADMIN — Medication 400 MILLIGRAM(S): at 06:42

## 2021-01-01 RX ADMIN — LIDOCAINE 1 PATCH: 4 CREAM TOPICAL at 12:03

## 2021-01-01 RX ADMIN — METHOCARBAMOL 500 MILLIGRAM(S): 500 TABLET, FILM COATED ORAL at 12:03

## 2021-01-01 RX ADMIN — Medication 100 MILLIGRAM(S): at 17:17

## 2021-01-01 RX ADMIN — Medication 650 MILLIGRAM(S): at 17:13

## 2021-01-01 RX ADMIN — Medication 40 MILLIGRAM(S): at 06:22

## 2021-01-01 RX ADMIN — METHOCARBAMOL 500 MILLIGRAM(S): 500 TABLET, FILM COATED ORAL at 11:50

## 2021-01-01 RX ADMIN — TAMSULOSIN HYDROCHLORIDE 0.4 MILLIGRAM(S): 0.4 CAPSULE ORAL at 22:23

## 2021-01-01 RX ADMIN — Medication 650 MILLIGRAM(S): at 12:50

## 2021-01-01 RX ADMIN — Medication 100 MILLIGRAM(S): at 05:41

## 2021-01-01 RX ADMIN — WARFARIN SODIUM 3 MILLIGRAM(S): 2.5 TABLET ORAL at 23:08

## 2021-01-01 RX ADMIN — GABAPENTIN 100 MILLIGRAM(S): 400 CAPSULE ORAL at 05:42

## 2021-01-01 RX ADMIN — Medication 650 MILLIGRAM(S): at 05:22

## 2021-01-01 RX ADMIN — OXYCODONE HYDROCHLORIDE 5 MILLIGRAM(S): 5 TABLET ORAL at 16:45

## 2021-01-01 RX ADMIN — Medication 650 MILLIGRAM(S): at 18:16

## 2021-01-01 RX ADMIN — METHOCARBAMOL 500 MILLIGRAM(S): 500 TABLET, FILM COATED ORAL at 23:09

## 2021-01-01 RX ADMIN — METHOCARBAMOL 500 MILLIGRAM(S): 500 TABLET, FILM COATED ORAL at 17:13

## 2021-01-01 RX ADMIN — Medication 650 MILLIGRAM(S): at 23:08

## 2021-01-01 RX ADMIN — Medication 650 MILLIGRAM(S): at 12:05

## 2021-01-01 RX ADMIN — Medication 650 MILLIGRAM(S): at 10:48

## 2021-01-01 RX ADMIN — WARFARIN SODIUM 3 MILLIGRAM(S): 2.5 TABLET ORAL at 21:12

## 2021-01-01 RX ADMIN — TAMSULOSIN HYDROCHLORIDE 0.8 MILLIGRAM(S): 0.4 CAPSULE ORAL at 21:35

## 2021-01-01 RX ADMIN — Medication 650 MILLIGRAM(S): at 06:43

## 2021-01-01 RX ADMIN — METHOCARBAMOL 500 MILLIGRAM(S): 500 TABLET, FILM COATED ORAL at 05:40

## 2021-01-01 RX ADMIN — TAMSULOSIN HYDROCHLORIDE 0.8 MILLIGRAM(S): 0.4 CAPSULE ORAL at 22:31

## 2021-01-01 RX ADMIN — HEPARIN SODIUM 5000 UNIT(S): 5000 INJECTION INTRAVENOUS; SUBCUTANEOUS at 16:12

## 2021-01-01 RX ADMIN — Medication 650 MILLIGRAM(S): at 00:25

## 2021-01-01 RX ADMIN — PANTOPRAZOLE SODIUM 40 MILLIGRAM(S): 20 TABLET, DELAYED RELEASE ORAL at 05:40

## 2021-01-01 RX ADMIN — Medication 650 MILLIGRAM(S): at 23:09

## 2021-01-01 RX ADMIN — Medication 650 MILLIGRAM(S): at 05:35

## 2021-01-01 RX ADMIN — PANTOPRAZOLE SODIUM 40 MILLIGRAM(S): 20 TABLET, DELAYED RELEASE ORAL at 08:41

## 2021-01-01 RX ADMIN — Medication 100 MILLIGRAM(S): at 16:13

## 2021-01-01 RX ADMIN — GABAPENTIN 100 MILLIGRAM(S): 400 CAPSULE ORAL at 05:18

## 2021-01-01 RX ADMIN — Medication 100 MILLIGRAM(S): at 05:22

## 2021-01-01 RX ADMIN — Medication 500 MILLIGRAM(S): at 23:09

## 2021-01-01 RX ADMIN — TAMSULOSIN HYDROCHLORIDE 0.8 MILLIGRAM(S): 0.4 CAPSULE ORAL at 02:00

## 2021-01-01 RX ADMIN — MORPHINE SULFATE 2 MILLIGRAM(S): 50 CAPSULE, EXTENDED RELEASE ORAL at 02:00

## 2021-01-01 RX ADMIN — TAMSULOSIN HYDROCHLORIDE 0.4 MILLIGRAM(S): 0.4 CAPSULE ORAL at 23:08

## 2021-01-01 RX ADMIN — Medication 650 MILLIGRAM(S): at 01:25

## 2021-01-01 RX ADMIN — Medication 650 MILLIGRAM(S): at 11:50

## 2021-01-01 RX ADMIN — Medication 40 MILLIGRAM(S): at 05:18

## 2021-01-01 RX ADMIN — Medication 1 TABLET(S): at 12:04

## 2021-01-01 RX ADMIN — METHOCARBAMOL 500 MILLIGRAM(S): 500 TABLET, FILM COATED ORAL at 11:05

## 2021-01-01 RX ADMIN — LIDOCAINE 1 PATCH: 4 CREAM TOPICAL at 11:48

## 2021-01-01 RX ADMIN — Medication 100 MILLIGRAM(S): at 18:09

## 2021-01-01 RX ADMIN — Medication 650 MILLIGRAM(S): at 18:03

## 2021-01-01 RX ADMIN — PANTOPRAZOLE SODIUM 40 MILLIGRAM(S): 20 TABLET, DELAYED RELEASE ORAL at 07:22

## 2021-01-01 RX ADMIN — Medication 650 MILLIGRAM(S): at 19:05

## 2021-01-01 RX ADMIN — GABAPENTIN 100 MILLIGRAM(S): 400 CAPSULE ORAL at 17:14

## 2021-01-01 RX ADMIN — Medication 100 MILLIGRAM(S): at 06:22

## 2021-01-01 RX ADMIN — METHOCARBAMOL 500 MILLIGRAM(S): 500 TABLET, FILM COATED ORAL at 17:17

## 2021-01-01 RX ADMIN — TRAMADOL HYDROCHLORIDE 50 MILLIGRAM(S): 50 TABLET ORAL at 03:06

## 2021-01-01 RX ADMIN — Medication 650 MILLIGRAM(S): at 12:03

## 2021-01-01 RX ADMIN — Medication 40 MILLIGRAM(S): at 05:25

## 2021-01-01 RX ADMIN — Medication 650 MILLIGRAM(S): at 06:35

## 2021-01-01 RX ADMIN — SENNA PLUS 2 TABLET(S): 8.6 TABLET ORAL at 21:35

## 2021-01-01 RX ADMIN — LIDOCAINE 1 PATCH: 4 CREAM TOPICAL at 23:05

## 2021-01-01 RX ADMIN — GABAPENTIN 100 MILLIGRAM(S): 400 CAPSULE ORAL at 17:16

## 2021-01-01 RX ADMIN — Medication 650 MILLIGRAM(S): at 05:18

## 2021-01-01 RX ADMIN — Medication 100 MILLIGRAM(S): at 05:40

## 2021-01-01 RX ADMIN — Medication 1 TABLET(S): at 10:49

## 2021-01-01 RX ADMIN — PANTOPRAZOLE SODIUM 40 MILLIGRAM(S): 20 TABLET, DELAYED RELEASE ORAL at 05:22

## 2021-01-01 RX ADMIN — Medication 650 MILLIGRAM(S): at 18:13

## 2021-01-01 RX ADMIN — Medication 650 MILLIGRAM(S): at 19:00

## 2021-01-01 RX ADMIN — Medication 650 MILLIGRAM(S): at 05:43

## 2021-01-01 RX ADMIN — GABAPENTIN 100 MILLIGRAM(S): 400 CAPSULE ORAL at 18:34

## 2021-01-01 RX ADMIN — GABAPENTIN 100 MILLIGRAM(S): 400 CAPSULE ORAL at 17:25

## 2021-01-01 RX ADMIN — PANTOPRAZOLE SODIUM 40 MILLIGRAM(S): 20 TABLET, DELAYED RELEASE ORAL at 05:44

## 2021-01-01 RX ADMIN — Medication 100 MILLIGRAM(S): at 18:34

## 2021-01-01 RX ADMIN — METHOCARBAMOL 500 MILLIGRAM(S): 500 TABLET, FILM COATED ORAL at 05:22

## 2021-01-01 RX ADMIN — Medication 650 MILLIGRAM(S): at 19:53

## 2021-01-01 RX ADMIN — Medication 650 MILLIGRAM(S): at 11:48

## 2021-01-01 RX ADMIN — METHOCARBAMOL 500 MILLIGRAM(S): 500 TABLET, FILM COATED ORAL at 10:48

## 2021-01-01 RX ADMIN — LIDOCAINE 1 PATCH: 4 CREAM TOPICAL at 19:58

## 2021-01-01 RX ADMIN — Medication 100 MILLIGRAM(S): at 05:27

## 2021-01-01 RX ADMIN — HEPARIN SODIUM 5000 UNIT(S): 5000 INJECTION INTRAVENOUS; SUBCUTANEOUS at 21:15

## 2021-01-01 RX ADMIN — GABAPENTIN 100 MILLIGRAM(S): 400 CAPSULE ORAL at 06:22

## 2021-01-04 ENCOUNTER — APPOINTMENT (OUTPATIENT)
Dept: ELECTROPHYSIOLOGY | Facility: CLINIC | Age: 86
End: 2021-01-04
Payer: MEDICARE

## 2021-01-04 PROCEDURE — 93294 REM INTERROG EVL PM/LDLS PM: CPT

## 2021-01-04 PROCEDURE — 93296 REM INTERROG EVL PM/IDS: CPT

## 2021-01-11 ENCOUNTER — NON-APPOINTMENT (OUTPATIENT)
Age: 86
End: 2021-01-11

## 2021-01-11 ENCOUNTER — APPOINTMENT (OUTPATIENT)
Dept: CARDIOLOGY | Facility: CLINIC | Age: 86
End: 2021-01-11
Payer: MEDICARE

## 2021-01-11 VITALS
WEIGHT: 140 LBS | HEIGHT: 65 IN | RESPIRATION RATE: 12 BRPM | TEMPERATURE: 96.9 F | DIASTOLIC BLOOD PRESSURE: 70 MMHG | BODY MASS INDEX: 23.32 KG/M2 | OXYGEN SATURATION: 97 % | SYSTOLIC BLOOD PRESSURE: 120 MMHG | HEART RATE: 96 BPM

## 2021-01-11 PROCEDURE — 99214 OFFICE O/P EST MOD 30 MIN: CPT

## 2021-01-11 PROCEDURE — 93000 ELECTROCARDIOGRAM COMPLETE: CPT

## 2021-01-11 NOTE — PHYSICAL EXAM
[Normal Conjunctiva] : the conjunctiva exhibited no abnormalities [Eyelids - No Xanthelasma] : the eyelids demonstrated no xanthelasmas [No Oral Pallor] : no oral pallor [No Oral Cyanosis] : no oral cyanosis [Normal Jugular Venous A Waves Present] : normal jugular venous A waves present [Normal Jugular Venous V Waves Present] : normal jugular venous V waves present [No Jugular Venous Sanchez A Waves] : no jugular venous sanchez A waves [Respiration, Rhythm And Depth] : normal respiratory rhythm and effort [Abdomen Tenderness] : non-tender [Abdomen Soft] : soft [Abdomen Mass (___ Cm)] : no abdominal mass palpated [FreeTextEntry1] : Shuffling gait using walker [Nail Clubbing] : no clubbing of the fingernails [Cyanosis, Localized] : no localized cyanosis [Petechial Hemorrhages (___cm)] : no petechial hemorrhages [Skin Color & Pigmentation] : normal skin color and pigmentation [] : no rash [No Venous Stasis] : no venous stasis [Skin Lesions] : no skin lesions [No Skin Ulcers] : no skin ulcer [No Xanthoma] : no  xanthoma was observed [Oriented To Time, Place, And Person] : oriented to person, place, and time [Affect] : the affect was normal [Mood] : the mood was normal [No Anxiety] : not feeling anxious

## 2021-01-11 NOTE — REVIEW OF SYSTEMS
[Feeling Fatigued] : feeling fatigued [Dyspnea on exertion] : dyspnea during exertion [Lower Ext Edema] : lower extremity edema [Joint Pain] : joint pain [Joint Stiffness] : joint stiffness [Limb Weakness (Paresis)] : limb weakness [Negative] : Heme/Lymph

## 2021-01-11 NOTE — ASSESSMENT
[FreeTextEntry1] : EKG demonstrating intermittent ventricular paced rhythm sensing occasional intrinsic beats with nonspecific diffuse ST-T changes rate of ;\par \par \par Last transthoracic echo from 10/26/20 shows global LV systolic function and EF and low normal 50-55%. Paradoxical septal motion secondary to pacemaker moderate AI severe dilated left atrium and moderate dilated right atrium moderate to severe MR mild to moderate TR enlarged ascending aortic root no pericardial effusion;\par \par \par plan:\par \par Patient reassured;\par \par Discuss possibility of cardiac interventions if necessary or if patient notices significant change in symptoms; to notify us\par \par ;Continue current medical regimen\par \par Reinforced low-sodium diet;\par \par Followup; to office within 3 months or p.r.n.\par \par Continue timely checkups and laboratory blood tests with primary care;\par

## 2021-01-11 NOTE — REASON FOR VISIT
[Follow-Up - Clinic] : a clinic follow-up of [FreeTextEntry1] : The patient is a 89-year-old white male with known history for bio aVR, atrial fibrillation and tachyarrhythmias with AV ton ablation CRT pacemaker insertion for LV dysfunction. Previous LVEF range of 30%;\par \par He returns to the office today for general cardiac checkup. He had been evaluated this past fall with Dr. Davenport, from the EP standpoint for possible DC cardioversion or cardio ablation, for which patient was not interested at this time;\par \par Has a history of severe mitral insufficiency and was evaluated from the surgical intervention standpoint for possible mitral valve clip--for which patient is reluctant to go for at this time;\par \par Additionally, he states he's been fairly stable without significant chest pain, palpitations or dizziness;\par \par He ambulates with a walker and get some exertional dyspnea and some mild edema of the lower extremities and ankles-which has been stable;

## 2021-01-11 NOTE — HISTORY OF PRESENT ILLNESS
[FreeTextEntry1] : He is tolerating his current medical regimen including warfarin and monitoring her INR is;\par \par

## 2021-01-14 ENCOUNTER — APPOINTMENT (OUTPATIENT)
Dept: ORTHOPEDIC SURGERY | Facility: CLINIC | Age: 86
End: 2021-01-14
Payer: MEDICARE

## 2021-01-14 VITALS
HEART RATE: 87 BPM | DIASTOLIC BLOOD PRESSURE: 73 MMHG | SYSTOLIC BLOOD PRESSURE: 122 MMHG | HEIGHT: 65 IN | BODY MASS INDEX: 23.32 KG/M2 | WEIGHT: 140 LBS

## 2021-01-14 DIAGNOSIS — M25.562 PAIN IN LEFT KNEE: ICD-10-CM

## 2021-01-14 PROCEDURE — 73564 X-RAY EXAM KNEE 4 OR MORE: CPT | Mod: LT

## 2021-01-14 PROCEDURE — 20610 DRAIN/INJ JOINT/BURSA W/O US: CPT | Mod: LT

## 2021-01-14 PROCEDURE — 99214 OFFICE O/P EST MOD 30 MIN: CPT | Mod: 25

## 2021-01-15 ENCOUNTER — APPOINTMENT (OUTPATIENT)
Dept: ORTHOPEDIC SURGERY | Facility: CLINIC | Age: 86
End: 2021-01-15
Payer: MEDICARE

## 2021-01-15 VITALS
WEIGHT: 140 LBS | BODY MASS INDEX: 23.32 KG/M2 | HEART RATE: 93 BPM | SYSTOLIC BLOOD PRESSURE: 105 MMHG | DIASTOLIC BLOOD PRESSURE: 71 MMHG | HEIGHT: 65 IN

## 2021-01-15 VITALS — TEMPERATURE: 97.1 F

## 2021-01-15 PROCEDURE — 99213 OFFICE O/P EST LOW 20 MIN: CPT

## 2021-01-21 ENCOUNTER — APPOINTMENT (OUTPATIENT)
Dept: ORTHOPEDIC SURGERY | Facility: CLINIC | Age: 86
End: 2021-01-21
Payer: MEDICARE

## 2021-01-21 VITALS
BODY MASS INDEX: 23.32 KG/M2 | HEART RATE: 88 BPM | SYSTOLIC BLOOD PRESSURE: 123 MMHG | HEIGHT: 65 IN | DIASTOLIC BLOOD PRESSURE: 81 MMHG | WEIGHT: 140 LBS

## 2021-01-21 PROCEDURE — 20610 DRAIN/INJ JOINT/BURSA W/O US: CPT | Mod: LT

## 2021-01-21 NOTE — DISCUSSION/SUMMARY
[de-identified] : The patient is a 89 year old male s/p a partial medial left unicondylar knee replacement by another surgeon with moderate arthritis of the lateral and patellofemoral compartments. Conservative options were discussed. He began Visco Supplementation in the left knee today. He will follow up next week for his second injection of Visco Supplementation.

## 2021-01-21 NOTE — ADDENDUM
[FreeTextEntry1] : This note was authored by Gerry Story working as a medical scribe for Dr. Anthony Gustafson. The note was reviewed, edited, and revised by Dr. Anthony Gustafson whom is in agreement with the exam findings, imaging findings, and treatment plan. 01/14/2021.

## 2021-01-21 NOTE — HISTORY OF PRESENT ILLNESS
[de-identified] : Mr. MECHE MILLER is a 89 year old male presenting for evaluation of left knee pain. He is status post left knee unicompartmental replacement 15-20 years ago.  Over the past 6 weeks he has developed pain to the anterior lateral aspect of the left knee.  He denies any injury or trauma related to this.  Patient notes his pain is worse with weightbearing including walking any distance, getting out of a chair, and standing for long periods time. He feels as though the knee olamide at times. Patient has not had recent PT for this knee. Patient has not had injections. He takes Tylenol for pain with minimal relief, he is on Warfarin for a anticoagulation.

## 2021-01-21 NOTE — PHYSICAL EXAM
[de-identified] : The patient appears well nourished  and in no apparent distress.  The patient is alert and oriented to person, place, and time.   Affect and mood appear normal. The head is normocephalic and atraumatic.  The eyes reveal normal sclera and extra ocular muscles are intact. The tongue is midline with no apparent lesions.  Skin shows 2+ pitting edema to the mid lower legs.  No respiratory distress noted.  Sensation grossly intact.\par   [de-identified] : Exam of the left knee shows a healed prior incision. A small effusion, no warmth no erythema, full extension, flexion of 110 degrees. 5/5 motor strength bilaterally distally. Sensation intact distally.  [de-identified] : Xray- 3 views of the left knee - well aligned and well fixed partial medial left knee replacement. There is moderate arthritis of the lateral and patellofemoral compartments. \par

## 2021-01-21 NOTE — HISTORY OF PRESENT ILLNESS
[de-identified] : The patient is here today for their second Euflexxa injection for the left knee. \par Allergies: The patient denies allergies to medications and has no allergies to chicken,eggs, or feathers.\par Procedure: The patient has been identified by name and date of birth. Patient confirms that we are treating the left knee today.\par The left knee was prepped in the usual sterile fashion. The area was cleansed with chlorhexadine, then sprayed with ethyl chloride. The patient was then injected with the Euflexxa into the left knee. The patient tolerated the procedure well. The medication was delivered aseptically and atraumatically.\par Diagnosis: Osteoarthritis of the left knee \par Treatment: The patient was advised on the activities for today. I gave the patient instructions on postinjection ice and analgesia.\par Follow up next week is recommended

## 2021-01-21 NOTE — ADDENDUM
[FreeTextEntry1] : This note was authored by Gerry Story working as a medical scribe for Dr. Anthony Gustafson. The note was reviewed, edited, and revised by Dr. Anthony Gustafson whom is in agreement with the exam findings, imaging findings, and treatment plan. 01/21/2021.

## 2021-01-21 NOTE — PROCEDURE
[de-identified] : Allergies: The patient denies allergies to medications and has no allergies to chicken,eggs, or feathers.\par Procedure: The patient has been identified by name and date of birth. Patient confirms that we are treating the left knee today.\par The left knee was prepped in the usual sterile fashion. The area was cleansed with chlorhexadine, then sprayed with ethyl chloride. The patient The left knee was aspirated then injected with the Euflexxa into the left knee. The patient tolerated the procedure well. The medication was delivered aseptically and atraumatically.\par Diagnosis: Osteoarthritis of the left knee \par Treatment: The patient was advised on the activities for today. I gave the patient instructions on postinjection ice and analgesia.\par

## 2021-01-29 ENCOUNTER — APPOINTMENT (OUTPATIENT)
Dept: ORTHOPEDIC SURGERY | Facility: CLINIC | Age: 86
End: 2021-01-29
Payer: MEDICARE

## 2021-01-29 VITALS
WEIGHT: 140 LBS | HEIGHT: 65 IN | BODY MASS INDEX: 23.32 KG/M2 | DIASTOLIC BLOOD PRESSURE: 76 MMHG | SYSTOLIC BLOOD PRESSURE: 117 MMHG | HEART RATE: 95 BPM | OXYGEN SATURATION: 95 %

## 2021-01-29 PROCEDURE — 20610 DRAIN/INJ JOINT/BURSA W/O US: CPT

## 2021-01-29 NOTE — ADDENDUM
[FreeTextEntry1] : This note was authored by Gerry Story working as a medical scribe for Dr. Anthony Gustafson. The note was reviewed, edited, and revised by Dr. Anthony Gustafson whom is in agreement with the exam findings, imaging findings, and treatment plan. 01/29/2021.

## 2021-01-29 NOTE — HISTORY OF PRESENT ILLNESS
[de-identified] : The patient is here today for their third Euflexxa injection for the left knee.\par Allergies: The patient denies allergies to medications and has no allergies to chicken,eggs, or feathers.\par Procedure: The patient has been identified by name and date of birth. Patient confirms that we are treating the left knee today.\par The left knee was prepped in the usual sterile fashion. The area was cleansed with chlorhexadine, then sprayed with ethyl chloride. The patient The left knee was aspirated then injected with the Euflexxa into the left knee. The patient tolerated the procedure well. The medication was delivered aseptically and atraumatically.\par Diagnosis: Osteoarthritis of the left knee \par Treatment: The patient was advised on the activities for today. I gave the patient instructions on postinjection ice and analgesia.\par

## 2021-02-04 ENCOUNTER — RESULT REVIEW (OUTPATIENT)
Age: 86
End: 2021-02-04

## 2021-02-04 ENCOUNTER — APPOINTMENT (OUTPATIENT)
Dept: INTERVENTIONAL RADIOLOGY/VASCULAR | Facility: CLINIC | Age: 86
End: 2021-02-04
Payer: MEDICARE

## 2021-02-04 ENCOUNTER — OUTPATIENT (OUTPATIENT)
Dept: OUTPATIENT SERVICES | Facility: HOSPITAL | Age: 86
LOS: 1 days | End: 2021-02-04

## 2021-02-04 DIAGNOSIS — Z96.651 PRESENCE OF RIGHT ARTIFICIAL KNEE JOINT: Chronic | ICD-10-CM

## 2021-02-04 DIAGNOSIS — Z95.0 PRESENCE OF CARDIAC PACEMAKER: Chronic | ICD-10-CM

## 2021-02-04 DIAGNOSIS — M25.511 PAIN IN RIGHT SHOULDER: ICD-10-CM

## 2021-02-04 PROCEDURE — 23350 INJECTION FOR SHOULDER X-RAY: CPT | Mod: RT

## 2021-02-04 PROCEDURE — 73040 CONTRAST X-RAY OF SHOULDER: CPT | Mod: 26,RT

## 2021-02-12 ENCOUNTER — APPOINTMENT (OUTPATIENT)
Dept: ORTHOPEDIC SURGERY | Facility: CLINIC | Age: 86
End: 2021-02-12
Payer: MEDICARE

## 2021-02-12 VITALS — TEMPERATURE: 98.3 F

## 2021-02-12 DIAGNOSIS — M25.512 PAIN IN RIGHT SHOULDER: ICD-10-CM

## 2021-02-12 DIAGNOSIS — M25.511 PAIN IN RIGHT SHOULDER: ICD-10-CM

## 2021-02-12 PROCEDURE — 99213 OFFICE O/P EST LOW 20 MIN: CPT

## 2021-05-19 NOTE — ED PROVIDER NOTE - PSH
Cardiac resynchronization therapy pacemaker (CRT-P) in place  MDT doi 1/28/20-Dr Marcus  H/O total knee replacement, right  b/l

## 2021-05-19 NOTE — ED ADULT TRIAGE NOTE - CHIEF COMPLAINT QUOTE
Pt BIBA from home, states he was in the bathroom when his left knee gave out on him, denies hitting head, denies LOC, denies blood thinners, AOx3, states he had partial replacement of left knee and receives injections to area by Dr Gustafson

## 2021-05-19 NOTE — ED PROVIDER NOTE - PMH
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

## 2021-05-19 NOTE — ED ADULT NURSE NOTE - OBJECTIVE STATEMENT
89y male AOx2,poor historian regarding medical hx, forgetful presents s/p mechanical fall. pt states he was walking when his knee gave in and he fell. pt states he couldn't get up, used life alert necklace. bleeding noted to right elbow. pt also noted to have increased edema to left leg greater than right. respirations even and unlabored, denies chest discomfort or SOB. abd soft and nondistended. skin WNL.

## 2021-05-19 NOTE — ED CDU PROVIDER INITIAL DAY NOTE - PHYSICAL EXAMINATION
Gen: NAD, AOx3  Head: NCAT  HEENT: EOMI, oral mucosa moist, normal conjunctiva, neck supple  Lung: no respiratory distress  CV:  Normal perfusion  Abd: soft, NTND  MSK: +1 Lt LE pitting edema with ttp distal patella tendon with intact straight leg raise, pelvis stable, no ttp rt elbow with FROM   Neuro: No focal neurologic deficits  Skin: skin tear to rt elbow. midline healed TKR scar RLE  Psych: normal affect

## 2021-05-19 NOTE — ED CDU PROVIDER INITIAL DAY NOTE - OBJECTIVE STATEMENT
89yo M pmhx Afib on warfarin, chronic left knee pain s/p partial replacement, currently on abx for uti presented to ED s/p fall. Stated his knee gave out causing him to fall, landed on elbow and buttocks. Xray pelvis and knee unremarkable in ED, sono CHANCEE neg for dvt. ED attending spoke with pt's family who states pt lives alone and cannot ambulate without walker, requested pt see physical therapy. PT recommending [facility] placement, pt agreeable. Pt put in obs for SW in morning. Denies back pain, neck pain, headache, numbness, tingling, weakness.

## 2021-05-19 NOTE — ED PROVIDER NOTE - PHYSICAL EXAMINATION
Gen: NAD, AOx3  Head: NCAT  HEENT: EOMI, oral mucosa moist, normal conjunctiva, neck supple  Lung: no respiratory distress  CV:  Normal perfusion  Abd: soft, NTND  MSK: +1 Lt LE pitting edema with ttp distal patella tendon with intact straight leg raise, pelvis stable, no ttp rt elbow with FROM   Neuro: No focal neurologic deficits  Skin: skin tear to rt elbow  Psych: normal affect

## 2021-05-19 NOTE — ED PROVIDER NOTE - OBJECTIVE STATEMENT
89yo M with chronic Lt knee issues, h/o partial replacement, states it started after taking an antibiotic that 'affects the tendons' today knee just gave out and he fell onto his butt and rt elbow, couldn't get up. called lifealert. no pain. states 'im not hurt' denies head injury. no HA. no focal weakness

## 2021-05-19 NOTE — ED ADULT NURSE REASSESSMENT NOTE - NS ED NURSE REASSESS COMMENT FT1
pt handed off to CHRISTI Gonzalez  in stable condition. Pt oriented to unit, plan of care explained. call bell system explained to pt. no apparent distress noted at this time.

## 2021-05-19 NOTE — ED PROVIDER NOTE - PROGRESS NOTE DETAILS
no acute traumatic findings, tried to get patient to stand but unable to even with 2 person assist, spoke with daughter patient lives alone concern for his ability to take care of himself, has an aide but still lives alone. patient states he cannot walk at all without a walker and the situation has gotten worse over time. PT consulted. will get labs and COVID. SW/CM consult. obs overnight -Slowey DO

## 2021-05-19 NOTE — PROVIDER CONTACT NOTE (OTHER) - ASSESSMENT
PT ordered. chart reviewed and noted. pt received in ED, semifowler position in stretcher, agreeable to PT. pt requires assistance for functional mobility due to strength and balance deficits. left knee pain pre/post session: 5/10. pt left as received will continue to follow, NAD, all lines intact,  goals: 2-3x a week for 2 weeks  bed mobility: S  transfers: S  gait: 50 feet RW S  stairs: TBA

## 2021-05-19 NOTE — ED PROVIDER NOTE - CLINICAL SUMMARY MEDICAL DECISION MAKING FREE TEXT BOX
patient with mechanical fall, tdap for skin tear bleednig controlled. xray pelivs and left knee, sono r/o DVT. denies head injury but also was unaware he hit his elbow. will get head CT likely dc patient with mechanical fall, tdap for skin tear bleednig controlled. xray pelivs and left knee, sono r/o DVT. denies head injury buton coumadin will get head CT likely dc pending reassment/ambulation

## 2021-05-19 NOTE — ED ADULT NURSE NOTE - NSIMPLEMENTINTERV_GEN_ALL_ED
Implemented All Universal Safety Interventions:  Soso to call system. Call bell, personal items and telephone within reach. Instruct patient to call for assistance. Room bathroom lighting operational. Non-slip footwear when patient is off stretcher. Physically safe environment: no spills, clutter or unnecessary equipment. Stretcher in lowest position, wheels locked, appropriate side rails in place.

## 2021-05-20 NOTE — ED CDU PROVIDER SUBSEQUENT DAY NOTE - HISTORY
Pt resting comfortably at time of re-assessment. No events overnight. Pending MONSERRAT placement. Will continue to monitor.

## 2021-05-20 NOTE — ED CDU PROVIDER SUBSEQUENT DAY NOTE - LIVES WITH, PROFILE
Number Of Freeze-Thaw Cycles: 1 freeze-thaw cycle Consent: The patient's consent was obtained including but not limited to risks of crusting, scabbing, blistering, scarring, darker or lighter pigmentary change, recurrence, incomplete removal and infection. Duration Of Freeze Thaw-Cycle (Seconds): 15 Render Post-Care Instructions In Note?: no Post-Care Instructions: I reviewed with the patient in detail post-care instructions. Patient is to wear sunprotection, and avoid picking at any of the treated lesions. Pt may apply Vaseline to crusted or scabbing areas. Detail Level: Detailed alone

## 2021-05-20 NOTE — ED CDU PROVIDER DISPOSITION NOTE - CLINICAL COURSE
89yo M pmhx Afib on warfarin, chronic left knee pain s/p partial replacement, currently on abx for uti presented to ED s/p fall. Stated his knee gave out causing him to fall, landed on elbow and buttocks. Xray pelvis and knee unremarkable in ED, sono LLE neg for dvt. ED attending spoke with pt's family who states pt lives alone and cannot ambulate without walker, requested pt see physical therapy. PT recommending MONSERRAT placement, pt agreeable. Continue all home medications

## 2021-05-20 NOTE — ED CDU PROVIDER DISPOSITION NOTE - PATIENT PORTAL LINK FT
You can access the FollowMyHealth Patient Portal offered by [facility] by registering at the following website: [email or web address]. By joining [employer]’s FollowMyHealth portal, you will also be able to view your health information using other applications (apps) compatible with our system.

## 2021-05-20 NOTE — CHART NOTE - NSCHARTNOTEFT_GEN_A_CORE
SOCIAL WORK NOTE:  CASE MANAGEMENT TEAM SPOKE WITH DAUGHTER ON THE PHONE AS AXOX4 PATIENT REPORTED HE WAS IN AGREEMENT WITH [facility] BUT REPORTED HE WAS ALLOWING HIS DAUGHTER- AMY TO MAKE THE DECISION ON WHICH SNF HE SHOULD GO TO.  AMY REQUESTED [facility] AS HER PRIMARY PREFERENCE AND [facility] AS HER SECONDARY. SHE REPORTED A BAD EXPERIENCE WITH [facility].  [facility] DID OFFER THE BED AND CONFIRMED THEY CAN ACCEPT THE PATIENT TODAY.  SPOKE WITH HELENA AT [facility] AND SHE CONFIREMD SHE WOULD BE ABLE TO ACCEPT THE PATIENT AT 1 PM.  AMBULANCE ARRANGED WITH A REQUESTED PICKUP TIME OF 1 PM.  NEAF AND SCREEN COMPLETED. DC PAPERS TO BE SENT UPON COMPLETION AND DAUGHTER - AMY # [phone] MADE AWARE. SHE WAS PLEASED WITH ACCEPTANCE AND IN AGREEMENT FOR THE TRANSFER TODAY.

## 2021-05-20 NOTE — ED ADULT NURSE REASSESSMENT NOTE - NS ED NURSE REASSESS COMMENT FT1
Received report from CHRISTI Sands. Pt resting comfortably, NAD noted, respirations even and nonlabored. Call bell placed within reach, pt educated on plan of care.

## 2021-05-21 NOTE — ED ADULT NURSE NOTE - CAS TRG GEN SKIN CONDITION
Warm
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; now still has some pain but no distention; able to have BM;  TODAY DOING WELL; SHE HAD POST PROCEDRUAL MRI; saw gyn today and planning for US \par \par had EGD and colonoscopy in 11/04/2020 with normal result and repeat in 2025;\par \par LAST TIME SAW hematology  in 10/2020\par \par denies of any HA/CP/SOB/Palpitation \par \par exercise for 3 times and walking on treadmills for 30 minus; not always eating healthy; \par \par reported Lt wrist pain  chronic for 2 yrs; had remote injury of wrist and foot in 3 yrs ago but no fracture; stapping pain and the pain radiate to the elbow and Lt finger of pinky and ring criss; lifting weight make the pain worse; no numbness or weakness \par \par reported that  Rt foot pain chronic intermittent for 5 yr ; recently worsening; remote injury in 5 yrs ago; but no fracture; \par \par right back pain chronic for 1 yr; no injury  or trauma; no problems or uriantion or BM; no fever or chills; no joints swelling; no numbness or weakness; pain is not radiating; \par \par pt sometimes take tylenols for joints pain with some relieve;  \par \par has cyst of her posterior head of right side and Rt breast; had procedural 5 yr; now recurrent; no erythema or spreading or fever or chills; no uncomfortable when pt lying and pressure on it; \par \par \par pt reported that she has throat pain more on the Rt side for 2-3 weeks ; no fever or chills or sob or cough; also has bleeding of nose chronic but worsening recently with once a week; no dizziness or chocking of blood or n/v; stop spontaneous; \par never smoke NO gerd PER PT \par also reported Rt anterior cervical lymph node pain and mild swelling \par now sore throat resolved

## 2021-07-14 PROBLEM — Z98.890 S/P AV NODAL ABLATION: Status: ACTIVE | Noted: 2020-10-13

## 2021-07-14 NOTE — REVIEW OF SYSTEMS
[Dyspnea on exertion] : not dyspnea during exertion [Chest Discomfort] : no chest discomfort [Palpitations] : no palpitations [Dizziness] : no dizziness

## 2021-07-14 NOTE — PROCEDURE
[Pacemaker] : pacemaker [VVIR] : VVIR [Longevity: ___ months] : The estimated remaining battery life is [unfilled] months [Threshold Testing Performed] : Threshold testing was performed [Sensing Amplitude ___mv] : sensing amplitude was [unfilled] mv [Lead Imp:  ___ohms] : lead impedance was [unfilled] ohms [___V @] : [unfilled] V [___ ms] : [unfilled] ms [de-identified] : AT, CHB [de-identified] : Medtronic [de-identified] : Percepta [de-identified] : [id number] [de-identified] : 01/28/20 [de-identified] : 70 [de-identified] : Effective BVP 98.1%

## 2021-07-14 NOTE — DISCUSSION/SUMMARY
[FreeTextEntry1] : MECHE MILLER is a 89 year old male with hypertension, cardiomyopathy (LVEF: 35-40%) and persistent AF (on Warfarin) status post AVN ablation and MDT CRT-P who presents for device follow up.\par \par To summarize his history, in mid January, 2020 he presented to [facility] with a mechanical fall complicated by pelvic fracture. He had rapid AF and atrial flutter with inability to tolerate AVN blockers secondary to relative hypotension. He could not tolerate anticoagulation due to multiple falls so rhythm control was not pursued. Instead, he underwent MDT CRT-P implantation on 1/28/2020. He did undergo ROSE which demonstrated no LA/ANSELMO thrombus and was started on amiodarone for rate control. An attempt was made for AVN ablation but this was unsuccessful. He returned on 3/5/2020 at which time successful AVN ablation was performed. His device was programmed to VVIR at 90 bpm and has gradually had his lower rate limit lowered to 70 bpm. In follow up he continued to have persistent fatigue and exertional dyspnea. During previous f/u cardioversion for AF was recommended but he preferred to defer any invasive procedures.\par \par He presents today for device follow up after remote monitoring revealed previously persistent AF was now paroxysmal. Interrogation of CRT-P revealed pt in AT. Effective BVP 98.1%. Optivol WNL. He reports he has been wheel chair bound since roughly December after he experienced severe tendonitis after taking Levaquin. He has been participating in PT. Denies any missed doses of warfarin or subtherapeutic INR.\par \par Recommendation:\par \par - Atrial burst pacing attempted to restore SR, unsuccessful. Programmed DDIR to avoid tracking of slow AT. To continue warfarin for stroke prophylaxis. Effective BVP 98.1%. \par -Continue remote monitoring, EP follow up PRN. \par \par Jolly العلي ANP-C

## 2021-07-23 NOTE — ED ADULT NURSE NOTE - DOES PATIENT HAVE ADVANCE DIRECTIVE
Subjective:   He presents for follow-up from ER he had abdominal cramping and severe diarrhea. He states usually has constipation. He has had similar episodes in the past with severe abdominal pain and cramping. He is 62 and never had colonoscopy screening he denies any rectal bleeding. He also has a rash on his legs at the ER doctor thought was ringworm but they did not give a medicine for it. often times his stools are hard        He is allergic to pravastatin and statins [statins]. Objective:   /69   Pulse 79   Temp 98.4 °F (36.9 °C) (Oral)   Wt 259 lb (117.5 kg)   SpO2 95%   BMI 40.57 kg/m²   No results found for this visit on 05/31/19. Exam:  Constitutional:  Well developed, well nourished, no acute distress, non-toxic appearance    HENT:  Atraumatic,oropharynx moist,  Neck-  no tenderness, supple   Respiratory:  No respiratory distress, normal breath sounds, no rales, no wheezing   Cardiovascular:  Normal rate, normal rhythm, no murmurs, no gallops, no rubs   GI:  Soft, nondistended, nontender, no organomegaly, no mass, no rebound, no guarding obeseMusculoskeletal:  No edema  Integument:  Well hydrated, red circular rash on lower legs  Neurologic:  Alert & oriented x 3, no focal deficits noted   Psychiatric:  Speech and behavior appropriate, normal affect and mood   Assessment and Plan:   Diagnosis Orders   1. Recurrent abdominal pain  Wes Almodovar MD, Gastroenterology, [facility]   2. Tinea corporis  DISCONTINUED: terbinafine (LAMISIL AT) 1 % cream   3. Other constipation  docusate sodium (COLACE) 100 MG capsule     Since he's having recurrent lower abdominal pain and is overdue for colonoscopy screening will refer to GI for further evaluation and colonoscopy Call or return to office prn if these symptoms worsen or fail to improve as anticipated. Avoid tobacco products exposure. The Healthy Family Handout was given to the patient today.   Henri Khoury M.D.
Yes

## 2021-07-23 NOTE — ED PROVIDER NOTE - ATTENDING CONTRIBUTION TO CARE
Dr. Solorio : I have personally seen and examined this patient at the bedside. I have fully participated in the care of this patient. I have reviewed all pertinent clinical information, including history, physical exam, plan and the Resident's note and agree except as noted.     89 yo male with hx of HTN, Afib, HLD, CHF pw right rib/back pain. notes that his left knee gave out and he fell. denies hitting his head. no loc. no paresthesias no weakness.    Denies f/c/n/v/cp/sob/palpitations/cough/abd.pain/d/c/dysuria/hematuria. no sick contacts/recent travel.    PE:  Head: atraumatic, normacephalic  Face: atraumatic, no crepitus no orbiral/maxillary/mandibular ttp  throat: uvula midline no exudates  eyes: perrla eomi  heart: rrr s1s2  lungs: ctab  abd: soft, nt nd +bs no rebound/guarding no cva ttp  skin: warm  LE: no swelling, no calf ttp  back: no midline cervical/thoracic; ttp to lumbar midline  chest: ttp to right sided ribs  neuro: no focal deficit    -->will do trauma scans with spine reformats given age; labs reassess

## 2021-07-23 NOTE — ED ADULT TRIAGE NOTE - CHIEF COMPLAINT QUOTE
pt A&OX 4 BIBA from home c/o back pain after he was walking with walker and his knee gave out. Pt denies any LOC, hitting head, blood thinners. Offers no other complaints at this time

## 2021-07-23 NOTE — ED ADULT NURSE NOTE - OBJECTIVE STATEMENT
pt presents to ED from home s/p mechanical fall. pt denies hitting head, LOC or blood thinners. pt uses walker at baseline with 2 full time aides at home. pt states his knees "gave out" while using walker and home aide was not with him to catch him. pt with hx of back pain reports increased pain at this time.

## 2021-07-23 NOTE — ED PROVIDER NOTE - OBJECTIVE STATEMENT
89 yo male with hx of HTN, Afib, HLD, CHF presents to the ED for fall. Patient states that he was at home when he felt his knee give out causing him to fall. Denies hitting head, denies LOC. Now complaining of right sided back pain. No motor or sensation loss.

## 2021-07-23 NOTE — ED PROVIDER NOTE - PROGRESS NOTE DETAILS
CT imaging shows spinous process fractures and rib fractures.  Admit to trauma for further management.  Patient amenable to the plan.

## 2021-07-23 NOTE — ED PROVIDER NOTE - PHYSICAL EXAMINATION
Constitutional: Awake, alert, in no acute distress  Eyes: PERRL  HENT: no scalp tenderness or deformity, no facial tenderness, airway patent  Neck: no cervical spine tenderness, no palpable stepoff, no tracheal deviation  CV: +right rib pain. no chest wall tenderness, no crepitus or subcutaneous emphysema.  RRR, no murmur, 2+ distal pulses in all extremities  Pulm: non-labored respirations, CTAB  Abdomen: soft, non-tender, non-distended, no ecchymosis  Back: no spinal tenderness, tenderness to right flank no palpable stepoff  Extremities: stable pelvis, no extremity tenderness or deformity  Skin: no rash  Neuro: AAOx3, GCS 15, moving all extremities equally, no focal neurologic deficit

## 2021-07-23 NOTE — ED PROVIDER NOTE - CLINICAL SUMMARY MEDICAL DECISION MAKING FREE TEXT BOX
89 yo male presenting s/p fall. Back pain. possible rib fractures. Will order CT scans, labs, pain control. PT consult.

## 2021-07-24 NOTE — CHART NOTE - NSCHARTNOTEFT_GEN_A_CORE
Tertiary Trauma Survey (TTS)    Date of TTS: 21 @ 13:56                             Admit Date: 21 @ 04:29      Trauma Activation: consult  Admit GCS: 15    HPI:  89yoM w/ PMH of CHF, A.fib (now paced), HTN, and prostate CA s/p radiation who presents after ground level fall. Patient was exercising when his left knee gave out and he fell backwards. Has 24hr assist at home, but they were not able to catch his fall. Did not hit head, no LOC. Pain is mostly at mid upper back. Denies dizziness, headache, nausea, vomiting, fevers, chills, chest pain, or SOB. No numbness or tingling. Patient reports joint pains and tendinopathy since taking levofloxacin for pyelonephritis.  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 650 milliGRAM(s) Oral every 6 hours  celecoxib 200 milliGRAM(s) Oral two times a day  furosemide    Tablet 40 milliGRAM(s) Oral daily  gabapentin 100 milliGRAM(s) Oral two times a day  heparin   Injectable 5000 Unit(s) SubCutaneous every 8 hours  lidocaine   4% Patch 1 Patch Transdermal daily  methocarbamol 500 milliGRAM(s) Oral every 6 hours  metoprolol succinate  milliGRAM(s) Oral daily  multivitamin Oral Tab/Cap - Peds 1 Tablet(s) Oral daily  nitrofurantoin monohydrate/macrocrystals (MACROBID) 100 milliGRAM(s) Oral two times a day  pantoprazole    Tablet 40 milliGRAM(s) Oral before breakfast  tamsulosin 0.4 milliGRAM(s) Oral at bedtime    Medications (PRN):oxyCODONE    IR 5 milliGRAM(s) Oral every 6 hours PRN  traMADol 50 milliGRAM(s) Oral every 6 hours PRN    Allergies: antibiotic (Rash)  tetracycline (Rash)  (Intolerances: )    Vital Signs Last 24 Hrs  T(C): 36.9 (2021 11:49), Max: 36.9 (2021 08:01)  T(F): 98.4 (2021 11:49), Max: 98.5 (2021 08:01)  HR: 73 (2021 11:49) (69 - 91)  BP: 114/75 (2021 11:49) (114/75 - 144/92)  BP(mean): 89 (2021 05:41) (89 - 89)  RR: 18 (2021 11:49) (16 - 18)  SpO2: 96% (2021 11:49) (95% - 97%)    Physical Exam:    Neuro: alert and oriented x3    HEENT: atraumatic    Pulm: No increased WOB    Cardiac: +s1/s2    GI: soft    Musculoskeletal: moving all extremities                          11.7   10.73 )-----------( 104      ( 2021 06:50 )             36.9     07-24    140  |  101  |  39.2<H>  ----------------------------<  120<H>  4.2   |  32.0<H>  |  1.09    Ca    9.4      2021 06:50  Phos  2.5     07-24  Mg     2.3     07-24    TPro  7.0  /  Alb  3.4  /  TBili  0.5  /  DBili  x   /  AST  48<H>  /  ALT  15  /  AlkPhos  96  07-23    PT/INR - ( 2021 06:51 )   PT: 22.5 sec;   INR: 2.00 ratio         PTT - ( 2021 06:51 )  PTT:34.7 sec  Urinalysis Basic - ( 2021 22:49 )    Color: Yellow / Appearance: Slightly Turbid / S.015 / pH: x  Gluc: x / Ketone: Negative  / Bili: Negative / Urobili: Negative   Blood: x / Protein: 30 mg/dL / Nitrite: Positive   Leuk Esterase: Moderate / RBC: 3-5 /HPF / WBC >50   Sq Epi: x / Non Sq Epi: Few / Bacteria: Few        List Injuries Identified to Date: L sided 8, 9, 10 and T3 fracture    List Operative and Interventional Radiological Procedures:     Consults (Date):  [x] Neurosurgery   [  ] Orthopedics  [  ] Plastics  [  ] Urology  [  ] PM&R  [  ] Social Work    RADIOLOGICAL FINDINGS REVIEW:    CT c/a/p:   Minimally displaced acute fracture of the left lateral eighth rib.    There are deformities of multiple right posterior ribs which were not seen on the previous exam involving the 10th 9th and eighth ribs. These may represent acute nondisplaced fractures.    There is multilevel vertebral body height loss in the thorax spine which is stable except for T3 which shows new height loss since 2019. This is of uncertain chronicity. Correlate clinically for pain at this site.    CT T and L spine:   Minimally displaced acute fracture of the left lateral eighth rib.    There are deformities of multiple right posterior ribs which were not seen on the previous exam involving the 10th 9th and eighth ribs. These may represent acute nondisplaced fractures.    There is multilevel vertebral body height loss in the thorax spine which is stable except for T3 which shows new height loss since 2019. This is of uncertain chronicity. Correlate clinically for pain at this site.    Dispo: Pain control with PIC protocol, pending MRI for T and C spine fx

## 2021-07-24 NOTE — H&P ADULT - HISTORY OF PRESENT ILLNESS
89yoM w/ PMH of CHF, A.fib (now paced), HTN, and prostate CA s/p radiation who presents after ground level fall. Patient was exercising when his left knee gave out and he fell backwards. Has 24hr assist at home, but they were not able to catch his fall. Did not hit head, no LOC. Pain is mostly at mid upper back. Denies dizziness, headache, nausea, vomiting, fevers, chills, chest pain, or SOB. No numbness or tingling. Patient reports joint pains and tendinopathy since taking levofloxacin for pyelonephritis.

## 2021-07-24 NOTE — H&P ADULT - NSHPPHYSICALEXAM_GEN_ALL_CORE
Constitutional: Well-developed, elderly male in no acute distress  HEENT: Head is normocephalic and atraumatic, maxillofacial structures stable, no blood or discharge from nares or oral cavity, no rice sign / raccoon eyes, EOMI b/l, no active drainage or redness  Neck: cervical collar in place, trachea midline  Respiratory: Respirations are unlabored, no accessory muscle use, no conversational dyspnea  Cardiovascular: Regular rate & rhythm, paced  Chest: Chest wall is tender to palpation on right posteriorly, no subQ emphysema or crepitus palpated  Gastrointestinal: Abdomen soft, non-tender, non-distended, no rebound tenderness / guarding, no ecchymosis or external signs of abdominal trauma  Extremities: moving all extremities spontaneously, no point tenderness or deformity noted to upper or lower extremities b/l  Pelvis: stable  Neurological: GCS: 15 (4/5/6). A&O x 3; no gross sensory / motor / coordination deficits  Back: no C/T/LS spine tenderness to palpation, no step-offs or signs of external trauma to the back

## 2021-07-24 NOTE — H&P ADULT - NSICDXPASTMEDICALHX_GEN_ALL_CORE_FT
PAST MEDICAL HISTORY:  Afib with RVR    CHF with cardiomyopathy EF 30%    Cyst of kidney, acquired     Cyst of pancreas     GERD (gastroesophageal reflux disease)     HTN (hypertension)     Irritable bowel syndrome with diarrhea     Prostate CA radiation    VTE (venous thromboembolism) RLE

## 2021-07-24 NOTE — CONSULT NOTE ADULT - ATTENDING COMMENTS
NSGY Attg:    see above    patient seen and examined    imaging reviewed    agree with exam and plan as above

## 2021-07-24 NOTE — H&P ADULT - ATTENDING COMMENTS
89 yo M w/ PMH of CHF, A.fib pacemaker, HTN, and prostate CA s/p radiation presents after  fall and was found to have T3 VB and C5 TP fracture, R 8-10th posterior rib fx, and old left 8th rib fx.   AAOx3, GCS 15, PIC 6 , Pain 3-4 on spine, good cough.   +collar, c-spine NT  PUL CTA   CV IRIR, mild Right chest wall tenderness  GI benign  MS MOORE, Tender upper T-spine, NV intact  WBC 12.9  Plan  1. Admit to Trauma to monitored bed with   2. PIC protocol and multimodal pain control  3. Consult NSG for T 3 VB fx and obtain MRI T-spine   4. Regular diet  5. DVT ppx      code [id number]

## 2021-07-24 NOTE — ED ADULT NURSE REASSESSMENT NOTE - NS ED NURSE REASSESS COMMENT FT1
trauma at bedside. proper sized c collar placed. CM in place NSR HR 65. pt updated on plan of care. given incentive spirometer.

## 2021-07-24 NOTE — CONSULT NOTE ADULT - ASSESSMENT
Upper back pain after a fall backwards and on CT was found to have multilevel vertebral body height loss in the thorax spine which is stable except for T3 which shows new height loss since 11/1/2019. This is of uncertain chronicity. pt states he has a brace at home, however o one to deliver it to the hospital.  pt has PPM - states it is MRI compactable, done by DR Davenport in March 2021 - pls confirm and get MRI if compatible  TLSO brace - orthotist consulted   bedrest at this time until brace delivered   OOB W assist and brace once delivered & PT eval   further plan as per Dr Watson  Upper back pain after a fall backwards and on CT was found to have multilevel vertebral body height loss in the thorax spine which is stable except for T3 which shows new height loss since 11/1/2019. This is of uncertain chronicity. pt states he has a brace at home, however o one to deliver it to the hospital.  pt has PPM - states it is MRI compactable, done by DR Davenport in March 2021 - pls confirm and get MRI if compatible and order MRI C and T Spine   cervical collar w thoracic extension brace - orthotist consulted   bedrest at this time until brace delivered   OOB W assist and brace once delivered & PT eval   further plan as per Dr Watson once imaging reviewed   case and plan d/ w ACS team

## 2021-07-24 NOTE — CHART NOTE - NSCHARTNOTEFT_GEN_A_CORE
Patient Александр White was fit and delivered a CTLSO with additional soft sleeve protection for skin protection and hygiene. Александр and hid grandson were educated on the care use and function of the orthosis. Contact info was given to Александр's grandson. All went without incident.   Allan JASSO  [facility]  [phone]

## 2021-07-24 NOTE — H&P ADULT - NSICDXPASTSURGICALHX_GEN_ALL_CORE_FT
PAST SURGICAL HISTORY:  Cardiac resynchronization therapy pacemaker (CRT-P) in place MDT doi 1/28/20-Dr Marcus    H/O total knee replacement, right b/l    S/P bilateral inguinal hernia repair     Status post left partial knee replacement

## 2021-07-24 NOTE — H&P ADULT - ASSESSMENT
89yoM w/ PMH of CHF, A.fib (now paced), HTN, and prostate CA s/p radiation who presents after ground level fall, complaining of mid upper back and right chest wall pain, found with T3 and C5 TP fracture, R 8-10th posterior rib frx, and possiblevs old left 8th rib frx. In no acute distress, HD stable.    #Right Rib Frx  - Admit to Trauma under Dr. Carmichael  - DASH Diet  - multimodal pain control  - to obtain med rec  - DVT ppx    #T3 and C5 TP frx   - To consult neurosurgery spine  - C-collar in place  - Likely MRI    Patient examined and plan discussed with Dr. Carmichael who agrees.   Consulted at 1:45, seen at 2:50.  Late note entry

## 2021-07-24 NOTE — CONSULT NOTE ADULT - SUBJECTIVE AND OBJECTIVE BOX
full note to follow  Patient is a 89y old  Male who presents with a chief complaint of Traumatic Fall (2021 08:05)    HPI: 89yoM w/ PMH of CHF, A.fib (now paced), HTN, and prostate CA s/p radiation who presents after ground level fall. Patient was exercising when his left knee gave out and he fell backwards. Has 24hr assist at home, but they were not able to catch his fall. Did not hit head, no LOC. Pain is mostly at mid upper/ midline back. Denies dizziness, headache, nausea, vomiting, fevers, chills, chest pain, or SOB. No numbness or tingling or neck pain. Patient reports joint pains and tendinopathy since taking levofloxacin for pyelonephritis.  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 650 milliGRAM(s) Oral every 6 hours  celecoxib 200 milliGRAM(s) Oral two times a day  furosemide    Tablet 40 milliGRAM(s) Oral daily  gabapentin 100 milliGRAM(s) Oral two times a day  heparin   Injectable 5000 Unit(s) SubCutaneous every 8 hours  lidocaine   4% Patch 1 Patch Transdermal daily  methocarbamol 500 milliGRAM(s) Oral every 6 hours  metoprolol succinate  milliGRAM(s) Oral daily  multivitamin Oral Tab/Cap - Peds 1 Tablet(s) Oral daily  nitrofurantoin monohydrate/macrocrystals (MACROBID) 100 milliGRAM(s) Oral two times a day  pantoprazole    Tablet 40 milliGRAM(s) Oral before breakfast  tamsulosin 0.8 milliGRAM(s) Oral at bedtime    MEDICATIONS  (PRN):  oxyCODONE    IR 5 milliGRAM(s) Oral every 6 hours PRN Severe Pain (7 - 10)  traMADol 50 milliGRAM(s) Oral every 6 hours PRN Moderate Pain (4 - 6)    Allergies    antibiotic (Rash)  tetracycline (Rash)    Intolerances      Vital Signs Last 24 Hrs  T(C): 36.8 (2021 20:05), Max: 36.9 (2021 08:01)  T(F): 98.3 (2021 20:05), Max: 98.5 (2021 08:01)  HR: 70 (2021 20:05) (69 - 81)  BP: 135/79 (2021 20:05) (114/75 - 135/79)  BP(mean): 89 (2021 05:41) (89 - 89)  RR: 18 (2021 20:05) (16 - 18)  SpO2: 96% (2021 20:05) (91% - 97%)  ICP Min - Max:   CPP:  MAP:         PHYSICAL EXAM:  GENERAL: NAD, well-groomed, well-developed, AAOx3 and very cooperative  HEAD: Atraumatic, Normocephalic, no palpable step-off appreciated on palpation  EYES: b/l EOMI, surgical pupils, conjunctiva and sclera clear,   NECK: Supple, nontender to palpation, no collar noted at the time of evaluation   TS: + tenderness over the midline of the upper T2-T4 area on palpation, nontender distally   LS: nontender to palpation midline or paraspinal muscles b/l,  NERVOUS SYSTEM:  Alert & Oriented X3, speech is clear and fluent, no dysarthria appreciated. Good concentration & very cooperative; Motor Strength 5/5 B/L upper and lower extremities, sensory is at baseline and symmetric b/l; No pronators or ankle clonus appreciated b/l, cerebellar signs grossly intact b/l. CN II-XII grossly intact b/l and symmetric; no reed's b/l appreciated.   EXTREMITIES:  2+ Peripheral Pulses, No clubbing, cyanosis, or edema,  SKIN: No rashes or lesions appreciated, scabbing at the vertex - pt states no head injury and that those are birthmarks                           11.7   10.73 )-----------( 104      ( 2021 06:50 )             36.9     07-24    140  |  101  |  39.2<H>  ----------------------------<  120<H>  4.2   |  32.0<H>  |  1.09    Ca    9.4      2021 06:50  Phos  2.5       Mg     2.3     -    TPro  7.0  /  Alb  3.4  /  TBili  0.5  /  DBili  x   /  AST  48<H>  /  ALT  15  /  AlkPhos  96      PT/INR - ( 2021 06:51 )   PT: 22.5 sec;   INR: 2.00 ratio         PTT - ( 2021 06:51 )  PTT:34.7 sec  Urinalysis Basic - ( 2021 22:49 )    Color: Yellow / Appearance: Slightly Turbid / S.015 / pH: x  Gluc: x / Ketone: Negative  / Bili: Negative / Urobili: Negative   Blood: x / Protein: 30 mg/dL / Nitrite: Positive   Leuk Esterase: Moderate / RBC: 3-5 /HPF / WBC >50   Sq Epi: x / Non Sq Epi: Few / Bacteria: Few      LIVER FUNCTIONS - ( 2021 20:39 )  Alb: 3.4 g/dL / Pro: 7.0 g/dL / ALK PHOS: 96 U/L / ALT: 15 U/L / AST: 48 U/L / GGT: x             RADIOLOGY & ADDITIONAL STUDIES:  < from: CT Head No Cont (21 @ 23:45) >  IMPRESSION:  No acute intracranial hemorrhage, mass effect, or acute osseous fracture.  Mild to moderate chronic ischemic changes in the frontoparietal white matter and old right cerebellar infarct.  < end of copied text >      < from: CT Cervical Spine No Cont (21 @ 23:49) >  IMPRESSION:  Acute nondisplaced fracture of the C5 spinous process. Acute appearing fracture through the T3 vertebral body with minimal bony retropulsion. Chronic compression fractures of T2 and T4. No evidence of traumatic malalignment.  < end of copied text >      < from: CT Thoracic Spine Reform No Cont (21 @ 00:15) >  IMPRESSION:  Minimally displaced acute fracture of the left lateral eighth rib.  There are deformities of multiple right posterior ribs which were not seen onthe previous exam involving the 10th 9th and eighth ribs. These may represent acute nondisplaced fractures.  There is multilevel vertebral body height loss in the thorax spine which is stable except for T3 which shows new height loss since 2019. This is of uncertain chronicity. Correlate clinically for pain at this site.  < end of copied text >      Time Spent with patient/ education on the floor & arranging care: >55 mins

## 2021-07-25 NOTE — PHYSICAL THERAPY INITIAL EVALUATION ADULT - ADDITIONAL COMMENTS
Pt reports living in private home with a 24/7 live in aide with a ramp to enter and all needs met on the first floor. Pt independent with mobility with use of RW prior to admission. Owns commode, shower chair, wheelchair and canes. Pt reports requiring assist with ADLs/IADLs from aide. Pt does not drive/work.

## 2021-07-25 NOTE — PHYSICAL THERAPY INITIAL EVALUATION ADULT - RANGE OF MOTION EXAMINATION, REHAB EVAL
*except right shoulder flexion decreased due to pt reports he has a tendon injury/bilateral upper extremity ROM was WNL (within normal limits)/bilateral lower extremity was ROM was WNL (within normal limits)

## 2021-07-25 NOTE — PHYSICAL THERAPY INITIAL EVALUATION ADULT - REFERRAL TO ANOTHER SERVICE NEEDED, PT EVAL
Final Anesthesia Post-op Assessment    Patient: Kirsten Schaefer  Procedure(s) Performed: ERCP (ENDOSCOPIC RETROGRADE CHOLANGIOPANCREATOGRAPHY)  Anesthesia type: General    Vitals Value Taken Time   Temp 36.2 °C (97.2 °F) 01/11/21 1216   Pulse 74 01/11/21 1231   Resp 13 01/11/21 1225   SpO2 94 % 01/11/21 1231   /84 01/11/21 1231         Patient Location: PACU Phase 1  Post-op Vital Signs:stable  Level of Consciousness: participates in exam, awake, alert and oriented  Respiratory Status: spontaneous ventilation  Cardiovascular blood pressure returned to baseline  Hydration: euvolemic  Pain Management: adequately controlled and adequately managed  Handoff: Handoff to receiving clinician was performed and questions were answered  Vomiting: none   Nausea: None  Airway Patency:patent  Post-op Assessment: awake, alert, appropriately conversant, or baseline, no complications, patient tolerated procedure well with no complications and evidence of recall      No complications documented.   
PM&R Consult for Acute Rehab/occupational therapy

## 2021-07-25 NOTE — DISCHARGE NOTE PROVIDER - NSDCMRMEDTOKEN_GEN_ALL_CORE_FT
acetaminophen 325 mg oral tablet: 2 tab(s) orally every 6 hours  gabapentin 100 mg oral capsule: 1 cap(s) orally 2 times a day  Lasix 40 mg oral tablet: 1 tab(s) orally once a day  metoprolol succinate 100 mg oral tablet, extended release: 1 tab(s) orally once a day  Multiple Vitamins oral tablet: 1 tab(s) orally once a day  multivitamin:   mylanta maximum strength 400 mg-40 mg/5 ml: 20 milliliter(s) orally 3 times a day, As Needed  pantoprazole 40 mg oral delayed release tablet: 1 tab(s) orally once a day  polyethylene glycol 3350 oral powder for reconstitution: 17 gram(s) orally 2 times a day, As needed, Constipation  senna oral tablet: 2 tab(s) orally once a day (at bedtime)  tamsulosin 0.4 mg oral capsule: 1 cap(s) orally 2 times a day   acetaminophen 325 mg oral tablet: 2 tab(s) orally every 6 hours  celecoxib 200 mg oral capsule: 1 cap(s) orally 2 times a day  gabapentin 100 mg oral capsule: 1 cap(s) orally 2 times a day  Lasix 40 mg oral tablet: 1 tab(s) orally once a day  lidocaine 4% topical film: Apply topically to affected area once a day  methocarbamol 500 mg oral tablet: 1 tab(s) orally every 6 hours  metoprolol succinate 100 mg oral tablet, extended release: 1 tab(s) orally once a day  Multiple Vitamins oral tablet: 1 tab(s) orally once a day  multivitamin:   mylanta maximum strength 400 mg-40 mg/5 ml: 20 milliliter(s) orally 3 times a day, As Needed  nitrofurantoin macrocrystals-monohydrate 100 mg oral capsule: 1 cap(s) orally 2 times a day  pantoprazole 40 mg oral delayed release tablet: 1 tab(s) orally once a day  polyethylene glycol 3350 oral powder for reconstitution: 17 gram(s) orally 2 times a day, As needed, Constipation  senna oral tablet: 2 tab(s) orally once a day (at bedtime)  tamsulosin 0.4 mg oral capsule: 1 cap(s) orally 2 times a day  traMADol 50 mg oral tablet: 1 tab(s) orally every 6 hours, As needed, Moderate Pain (4 - 6)  warfarin 3 mg oral tablet: 1 tab(s) orally once a day

## 2021-07-25 NOTE — PHYSICAL THERAPY INITIAL EVALUATION ADULT - GENERAL OBSERVATIONS, REHAB EVAL
Pt received in bed + IV Loc, +Tele, + CTLSO, breathing on RA in NAD, in 6-7/10 back pain, agreeable to PT evaluation

## 2021-07-25 NOTE — DISCHARGE NOTE PROVIDER - NSDCCPCAREPLAN_GEN_ALL_CORE_FT
PRINCIPAL DISCHARGE DIAGNOSIS  Diagnosis: Rib fractures  Assessment and Plan of Treatment: Pt may resume diet as tolerated, showering as tolerated.  It is recommended that you do not lift/pull/push anything greater than 5-10lbs for the nex 3-4 weeks.  Follow up with your OMD 1-2 weeks from discharge.  Patient is advised to RETURN TO THE EMERGENCY DEPARTMENT for any of the following - worsening pain, fever/chills, nausea/vomiting, altered mental status, chest pain, shortness of breath, or any other new / worsening symptom.        SECONDARY DISCHARGE DIAGNOSES  Diagnosis: T3 vertebral fracture  Assessment and Plan of Treatment: - Cervical collar w/ thoracic extension   - OOB w/ assist and brace  Follow up with Dr Watson from Neurosurgery 2-3 weeks from discharge.  You must call to make an appointment.

## 2021-07-25 NOTE — DISCHARGE NOTE PROVIDER - CARE PROVIDER_API CALL
Gerardo Watson)  Neurosurgery  [address] [place] [address]  Phone: [phone]  Fax: [phone]  Follow Up Time:

## 2021-07-25 NOTE — PROGRESS NOTE ADULT - ASSESSMENT
89yoM w/ PMH of CHF, A.fib (now paced), HTN, and prostate CA s/p radiation who presents after ground level fall, complaining of mid upper back and right chest wall pain, found with T3 and C5 TP fracture, R 8-10th posterior rib frx, and possible old left 8th rib frx. In no acute distress, HD stable.    #Right Rib Frx  - PIC protocol    #T3 and C5 TP frx   - C-collar in place  - AICD needs to be turned off by rep before MRI, TLSO brace at bedside, will FU PT

## 2021-07-25 NOTE — PROGRESS NOTE ADULT - ASSESSMENT
89M w/ PMH of CHF, A.fib (now paced), HTN, and prostate CA s/p radiation who presents after ground level fall. Patient was exercising when his left knee gave out and he fell backwards  7/23 CT C Spine: C5 SP acute nondisplace fx  7/24 CT T Spine: T3 compression fx      Plan  - Q4 neuro checks  - MRI Cervical and Thoracic spine if PPM compatible   - Cervical collar w/ thoracic extension bedside  - OOB w/ assist and brace  - PT eval  - Pain control PRN  - Medical management/ supportive care per primary team  - D/w Dr. Watson 89M w/ PMH of CHF, A.fib (now paced), HTN, and prostate CA s/p radiation who presents after ground level fall. Patient was exercising when his left knee gave out and he fell backwards  7/23 CT C Spine: C5 SP acute nondisplaced fx  7/24 CT T Spine: T3 compression fx      Plan  - Q4 neuro checks  - MRI Cervical and Thoracic spine if PPM compatible   - Further plan pending imaging  - Cervical collar w/ thoracic extension bedside  - OOB w/ assist and brace  - PT eval  - Pain control PRN  - Medical management/ supportive care per primary team  - D/w Dr. Watson 89M w/ PMH of CHF, A.fib (now paced), HTN, and prostate CA s/p radiation who presents after ground level fall. Patient was exercising when his left knee gave out and he fell backwards  7/23 CT C Spine: C5 SP acute nondisplaced fx  7/24 CT T Spine: T3 compression fx      Plan  - Q4 neuro checks  - MRI Cervical and Thoracic spine if PPM compatible   - Further plan pending imaging  - Cervical collar w/ thoracic extension bedside  - OOB w/ assist and brace  - PT eval  - Pain control PRN  - Medical management/ supportive care per primary team  - D/w Dr. Watson    --    NSGY Attg:    see above    patient seen and examined by PA staff    agree with plan as above

## 2021-07-25 NOTE — DISCHARGE NOTE PROVIDER - HOSPITAL COURSE
89yoM w/ PMH of CHF, A.fib (now paced), HTN, and prostate CA s/p radiation who presents after ground level fall. Patient was exercising when his left knee gave out and he fell backwards. Has 24hr assist at home, but they were not able to catch his fall. Did not hit head, no LOC. Pain is mostly at mid upper back. Denies dizziness, headache, nausea, vomiting, fevers, chills, chest pain, or SOB. No numbness or tingling. Patient reports joint pains and tendinopathy since taking levofloxacin for pyelonephritis.       CT Head:  NEGATIVE for acute traumatic injury  CT CSpine:  Acute nondisplaced fracture of the C5 spinous process. Acute appearing fracture through the T3 vertebral body with minimal bony retropulsion. Chronic compression fractures of T2 and T4. No evidence of traumatic malalignment.  CT CAP:  Minimally displaced acute fracture of the left lateral eighth rib.  There are deformities of multiple right posterior ribs which were not seen on the previous exam involving the 10th 9th and eighth ribs. These may represent acute nondisplaced fractures.  There is multilevel vertebral body height loss in the thorax spine which is stable except for T3 which shows new height loss since 11/1/2019. This is of uncertain chronicity. Correlate clinically for pain at this site.    Pt was admitted for pain control and monitoring of pulmonary status.  Neurosx consulted for spinal fxs.  Recs made for cervical collar with thoracic extension and to obtain an MRI.    Pt with recent PPM placement by Dr Davenport in March '21................................ 89yoM w/ PMH of CHF, A.fib (now paced), HTN, and prostate CA s/p radiation who presents after ground level fall. Patient was exercising when his left knee gave out and he fell backwards. Has 24hr assist at home, but they were not able to catch his fall. Did not hit head, no LOC. Pain is mostly at mid upper back. Denies dizziness, headache, nausea, vomiting, fevers, chills, chest pain, or SOB. No numbness or tingling. Patient reports joint pains and tendinopathy since taking levofloxacin for pyelonephritis.       CT Head:  NEGATIVE for acute traumatic injury  CT CSpine:  Acute nondisplaced fracture of the C5 spinous process. Acute appearing fracture through the T3 vertebral body with minimal bony retropulsion. Chronic compression fractures of T2 and T4. No evidence of traumatic malalignment.  CT CAP:  Minimally displaced acute fracture of the left lateral eighth rib.  There are deformities of multiple right posterior ribs which were not seen on the previous exam involving the 10th 9th and eighth ribs. These may represent acute nondisplaced fractures.  There is multilevel vertebral body height loss in the thorax spine which is stable except for T3 which shows new height loss since 11/1/2019. This is of uncertain chronicity. Correlate clinically for pain at this site.    Pt was admitted for pain control and monitoring of pulmonary status.  Neurosx consulted for spinal fxs.  Recs made for cervical collar with thoracic extension and to obtain an MRI. MRI showed no ligamentous injury. pic protocol started for rib fx along with multimodal pain regimen. Patient was treated with 5 day course of Macrobid for Pseudomonas UTI. INR was therapeutic on home dose of 3 mg Warfarin daily. Continued to work with PT/OT while inpatient and recommended [facility] for discharge. Tolerating regular PO diet well. Pain was well controlled at time of discharge. Voiding spontaneously. Patient was stable for discharge to [facility].       Length of time preparing discharge > 30 minutes

## 2021-07-25 NOTE — PHYSICAL THERAPY INITIAL EVALUATION ADULT - PERTINENT HX OF CURRENT PROBLEM, REHAB EVAL
presents after ground level fall. Patient was exercising when his left knee gave out and he fell backwards. 7/23 CT C Spine: C5 SP acute nondisplace fx, CT T Spine: T3 compression fx

## 2021-07-26 NOTE — CHART NOTE - NSCHARTNOTEFT_GEN_A_CORE
89M with C5 and T3 compression fracture, neuro stable. Has CTLSO.   Patient with no neurosurgical intervention on this admission. Can follow up with Dr. Watson in 2-4 weeks can mobilize with PT and Brace.   will sign off at this time. reconsult prn. 89M with C5 and T3 compression fracture, neuro stable. Has CTLSO.   Patient with PPM needs MRI of Cervical and thoracic spine.   Please consult cardiology EP for MRI 89M with C5 and T3 compression fracture, neuro stable. Has CTLSO.   Patient with PPM needs MRI of Cervical and thoracic spine.   Please consult cardiology EP for MRI    NSGY Attg:    see above    patient seen and examined    agree with above

## 2021-07-26 NOTE — CONSULT NOTE ADULT - SUBJECTIVE AND OBJECTIVE BOX
89yM was admitted on 07-24 after a fall while exercising.     Imaging performed:  HEAD CT 7/23 - No acute intracranial hemorrhage, mass effect, or acute osseous fracture. Mild to moderate chronic ischemic changes in the frontoparietal white matter and old right cerebellar infarct.    CT C SPINE 7/23 - Acute nondisplaced fracture of the C5 spinous process. Acute appearing fracture through the T3 vertebral body with minimal bony retropulsion. Chronic compression fractures of T2 and T4. No evidence of traumatic malalignment.    CT CAP, T & L SPINE 7/23 - Minimally displaced acute fracture of the left lateral eighth rib. There are deformities of multiple right posterior ribs which were not seen on the previous exam involving the 10th 9th and eighth ribs. These may represent acute nondisplaced fractures. There is multilevel vertebral body height loss in the thorax spine which is stable except for T3 which shows new height loss since 11/1/2019. This is of uncertain chronicity. Correlate clinically for pain at this site.    Patient seen with CTLSO laying in bed.  Reports back pain.  Also reports difficulty with urinating.     REVIEW OF SYSTEMS  Constitutional - No fever, No weight loss, +fatigue  HEENT - No eye pain, No visual disturbances, No difficulty hearing, No tinnitus, No vertigo, No neck pain  Respiratory - No cough, No wheezing, No shortness of breath  Cardiovascular - No chest pain, No palpitations  Gastrointestinal - No abdominal pain, No nausea, No vomiting, No diarrhea, No constipation  Genitourinary - +dysuria, No frequency, No hematuria, No incontinence  Neurological - No headaches, No memory loss, +loss of strength, No numbness, No tremors  Skin - No itching, No rashes, No lesions   Endocrine - No temperature intolerance  Musculoskeletal - +joint pain, No joint swelling, +muscle pain  Psychiatric - +depression, No anxiety    VITALS  T(C): 36.6 (07-26-21 @ 04:36), Max: 36.7 (07-25-21 @ 11:12)  HR: 75 (07-26-21 @ 04:36) (69 - 78)  BP: 113/68 (07-26-21 @ 04:36) (103/70 - 140/71)  RR: 18 (07-26-21 @ 04:36) (16 - 18)  SpO2: 97% (07-26-21 @ 07:14) (95% - 97%)  Wt(kg): --    PAST MEDICAL & SURGICAL HISTORY  Afib    Tachycardia    GERD (gastroesophageal reflux disease)    Cyst of kidney, acquired    Cyst of pancreas    Prostate CA    Irritable bowel syndrome with diarrhea    VTE (venous thromboembolism)    HTN (hypertension)    CHF with cardiomyopathy    No significant past surgical history    H/O total knee replacement, right    Cardiac resynchronization therapy pacemaker (CRT-P) in place    S/P bilateral inguinal hernia repair    Status post left partial knee replacement        SOCIAL HISTORY - as per documentation/history  Smoking - None  EtOH - None  Drugs - None    FUNCTIONAL HISTORY  Lives with 24 hour aide, Ramp to enter  Independent with RW  Needs assist with ADLs    CURRENT FUNCTIONAL STATUS  7/25  Bed Mobility: Sit to Supine:     · Level of [place]	moderate assist (50% patients effort)  · Physical Assist/Nonphysical Assist	1 person assist; nonverbal cues (demo/gestures); verbal cues    Bed Mobility: Supine to Sit:     · Level of [place]	moderate assist (50% patients effort)  · Physical Assist/Nonphysical Assist	1 person assist; nonverbal cues (demo/gestures); verbal cues    Bed Mobility Analysis:     · Impairments Contributing to Impaired Bed Mobility	pain; decreased strength; decreased flexibility    Transfer: Sit to Stand:     · Level of [place]	moderate assist (50% patients effort)  · Physical Assist/Nonphysical Assist	1 person assist; nonverbal cues (demo/gestures); verbal cues  · Assistive Device	rolling walker; + CTLSO    Transfer: Stand to Sit:     · Level of [place]	moderate assist (50% patients effort)  · Physical Assist/Nonphysical Assist	1 person assist; nonverbal cues (demo/gestures); verbal cues  · Assistive Device	rolling walker; + CTLSO    Sit/Stand Transfer Safety Analysis:     · Impairments Contributing to Impaired Transfers	decreased flexibility; impaired balance; pain; decreased strength    Gait Skills:     · Level of [place]	minimum assist (75% patients effort)  · Physical Assist/Nonphysical Assist	1 person assist; nonverbal cues (demo/gestures); verbal cues  · Assistive Device	rolling walker  · Gait Distance	25 feet  · Brace/Orthotics	+ CTLSO    Gait Analysis:     · Gait Pattern Used	3-point gait  · Gait Deviations Noted	decreased jaycob; decreased step length; decreased stride length  · Impairments Contributing to Gait Deviations	impaired balance; decreased flexibility; pain; decreased strength    Stair Negotiation:     · Level of [place]	pt has no stairs at home to negotiate; deferring assessment at this time      FAMILY HISTORY   No pertinent family history in first degree relatives        RECENT LABS - Reviewed  CBC Full  -  ( 25 Jul 2021 06:14 )  WBC Count : 9.89 K/uL  RBC Count : 3.76 M/uL  Hemoglobin : 11.5 g/dL  Hematocrit : 35.6 %  Platelet Count - Automated : 100 K/uL  Mean Cell Volume : 94.7 fl  Mean Cell Hemoglobin : 30.6 pg  Mean Cell Hemoglobin Concentration : 32.3 gm/dL  Auto Neutrophil # : x  Auto Lymphocyte # : x  Auto Monocyte # : x  Auto Eosinophil # : x  Auto Basophil # : x  Auto Neutrophil % : x  Auto Lymphocyte % : x  Auto Monocyte % : x  Auto Eosinophil % : x  Auto Basophil % : x    07-25    143  |  104  |  32.9<H>  ----------------------------<  108<H>  4.1   |  31.0<H>  |  0.98    Ca    9.2      25 Jul 2021 06:14  Phos  2.9     07-25  Mg     2.3     07-25          ALLERGIES  antibiotic (Rash)  tetracycline (Rash)      MEDICATIONS   acetaminophen   Tablet .. 650 milliGRAM(s) Oral every 6 hours  celecoxib 200 milliGRAM(s) Oral two times a day  furosemide    Tablet 40 milliGRAM(s) Oral daily  gabapentin 100 milliGRAM(s) Oral two times a day  lidocaine   4% Patch 1 Patch Transdermal daily  methocarbamol 500 milliGRAM(s) Oral every 6 hours  metoprolol succinate  milliGRAM(s) Oral daily  multivitamin Oral Tab/Cap - Peds 1 Tablet(s) Oral daily  nitrofurantoin monohydrate/macrocrystals (MACROBID) 100 milliGRAM(s) Oral two times a day  oxyCODONE    IR 5 milliGRAM(s) Oral every 6 hours PRN  pantoprazole    Tablet 40 milliGRAM(s) Oral before breakfast  polyethylene glycol 3350 17 Gram(s) Oral two times a day PRN  preservision areds 2 1 Capsule(s) 1 Capsule(s) Oral daily  senna 2 Tablet(s) Oral at bedtime  tamsulosin 0.8 milliGRAM(s) Oral at bedtime  traMADol 50 milliGRAM(s) Oral every 6 hours PRN  warfarin 3 milliGRAM(s) Oral daily      ----------------------------------------------------------------------------------------  PHYSICAL EXAM  Constitutional - NAD, Comfortable  HEENT - EOMI  Neck - C Collar   Chest - Breathing comfortably, No wheezing  Cardiovascular - S1S2   Abdomen - Soft   Extremities - No C/C/E, No calf tenderness   Neurologic Exam -                    Cognitive - AAO to self, place, date, year, situation     Motor -  Right shoulder injury                    LEFT    UE - ShAB 3/5, EF 4/5, EE 4/5,  5/5                    RIGHT UE - ShAB 1/5, EF 3/5, EE 4/5,  5/5                    LEFT    LE - HF 4/5, KE 5/5, DF 5/5, PF 5/5                    RIGHT LE - HF 4/5, KE 5/5, DF 5/5, PF 5/5        Sensory - Intact to LT  Psychiatric - Mood depressed, Affect WNL  ----------------------------------------------------------------------------------------  ASSESSMENT/PLAN  89yMale with functional deficits after a fall sustaining trauma  C5 & T3 fractures - TLSO  Rib Fractures - Consider Duonebs, Recommend Incentive Spirometer  HTN - Toprol, Lasix  UTI - Macrobid  Pain - Tylenol, celebrex, Neurontin, Lidoderm, Robaxin, Oxycodone, Tramadol  DVT PPX - SCDs  Rehab - Patient may want [facility], as he has been there. Does meet criteria for AR. Recommend ACUTE inpatient rehabilitation for the functional deficits consisting of 3 hours of therapy/day & 24 hour RN/daily PMR physician for comorbid medical management. Will continue to follow for ongoing rehab needs and recommendations. Patient will be able to tolerate 3 hours a day.    Continue bedside therapy as well as OOB throughout the day with mobilization throughout the day with staff to maintain cardiopulmonary function and prevention of secondary complications related to debility.     Discussed with rehab clinical team.

## 2021-07-27 NOTE — DISCHARGE NOTE NURSING/CASE MANAGEMENT/SOCIAL WORK - NSDCVIVACCINE_GEN_ALL_CORE_FT
influenza, injectable, quadrivalent, preservative free; 21-Sep-2016 06:34; Cynthia James (RN); [employer]; [id number]; IntraMuscular; Deltoid Left.; 0.5 milliLiter(s); VIS (VIS Published: 07-Aug-2015, VIS Presented: 21-Sep-2016);   influenza, injectable, quadrivalent, preservative free; 02-Nov-2019 14:11; Morena Steele (RN); [employer]; [id number] (Exp. Date: 30-Jun-2020); IntraMuscular; Deltoid Left.; 0.5 milliLiter(s); VIS (VIS Published: 15-Aug-2019, VIS Presented: 02-Nov-2019);   Tdap; 19-May-2021 15:47; Elodia Herbert (RN); [employer]; [id number] (Exp. Date: 31-Jul-2022); IntraMuscular; Deltoid Right.; 0.5 milliLiter(s); VIS (VIS Published: 09-May-2013, VIS Presented: 19-May-2021);

## 2021-07-27 NOTE — PROGRESS NOTE ADULT - REASON FOR ADMISSION
s/p Traumatic Fall

## 2021-07-27 NOTE — PROGRESS NOTE ADULT - ATTENDING COMMENTS
The patient was seen and examined  Events noted  No new problems  C-TLSO in place  Not sure the need for coumadin  Discharge planning
89 yo M w/ PMH of CHF, A.fib pacemaker, HTN, and prostate CA s/p radiation presents after  fall and was found to have T3 VB and C5 TP fracture, R 8-10th posterior rib fx, and old left 8th rib fx.   AAOx3, GCS 15, PIC 8 IS 1000, Pain 3-4 on spine, good cough.   +collar with thoracic extension  PUL CTA   CV IRIR, mild Right chest wall tenderness  GI benign  MS MOORE, Tender upper T-spine, NV intact  Plan  1. C-collar and TLSO brace for mobility  2. Continue PIC protocol and multimodal pain control  3. NSG recommend MRI for T 3 VB fx but will be done monday when AICD rep can come turn of AICD for MRI    4. Regular diet  5. DVT ppx        cide [id number]
The patient was seen and examined  Events noted  No new problems  Patient is in C-TLSO  Will need physical and occupational therapy  DVT prophylaxis  Discharge planning

## 2021-07-27 NOTE — DISCHARGE NOTE NURSING/CASE MANAGEMENT/SOCIAL WORK - PATIENT PORTAL LINK FT
You can access the FollowMyHealth Patient Portal offered by [facility] by registering at the following website: [email or web address]. By joining [employer]’s FollowMyHealth portal, you will also be able to view your health information using other applications (apps) compatible with our system.

## 2021-07-27 NOTE — PROGRESS NOTE ADULT - SUBJECTIVE AND OBJECTIVE BOX
HPI/OVERNIGHT EVENTS: Patient seen and examined at bedside this AM. No overnight events. No complaints. Denies fever, chills, nausea, vomiting, chest pain, SOB, dizziness, abd pain or any other concerning symptoms.    Vital Signs Last 24 Hrs  T(C): 36.6 (25 Jul 2021 23:57), Max: 36.7 (25 Jul 2021 11:12)  T(F): 97.8 (25 Jul 2021 23:57), Max: 98 (25 Jul 2021 11:12)  HR: 69 (25 Jul 2021 23:57) (69 - 78)  BP: 140/71 (25 Jul 2021 23:57) (103/70 - 140/71)  BP(mean): --  RR: 18 (25 Jul 2021 23:57) (16 - 18)  SpO2: 96% (25 Jul 2021 23:57) (95% - 98%)    I&O's Detail    24 Jul 2021 07:01  -  25 Jul 2021 07:00  --------------------------------------------------------  IN:  Total IN: 0 mL    OUT:    Voided (mL): 400 mL  Total OUT: 400 mL    Total NET: -400 mL      25 Jul 2021 07:01  -  26 Jul 2021 03:46  --------------------------------------------------------  IN:    Oral Fluid: 720 mL  Total IN: 720 mL    OUT:    Voided (mL): 550 mL  Total OUT: 550 mL    Total NET: 170 mL          Neck: cervical collar in place, trachea midline  Respiratory: no accessory muscle use, no conversational dyspnea  Cardiovascular: +s1/s2  Chest: left sided chest pain  Gastrointestinal: Abdomen soft, non-tender, non-distended, no rebound tenderness / guarding  Musculoskeletal: No joint pain, swelling or deformity; no limitation of movement  Vascular: Extremities warm and well perfused.     LABS:                        11.5   9.89  )-----------( 100      ( 25 Jul 2021 06:14 )             35.6     07-25    143  |  104  |  32.9<H>  ----------------------------<  108<H>  4.1   |  31.0<H>  |  0.98    Ca    9.2      25 Jul 2021 06:14  Phos  2.9     07-25  Mg     2.3     07-25      PT/INR - ( 24 Jul 2021 06:51 )   PT: 22.5 sec;   INR: 2.00 ratio         PTT - ( 24 Jul 2021 06:51 )  PTT:34.7 sec      MEDICATIONS  (STANDING):  acetaminophen   Tablet .. 650 milliGRAM(s) Oral every 6 hours  celecoxib 200 milliGRAM(s) Oral two times a day  furosemide    Tablet 40 milliGRAM(s) Oral daily  gabapentin 100 milliGRAM(s) Oral two times a day  lidocaine   4% Patch 1 Patch Transdermal daily  methocarbamol 500 milliGRAM(s) Oral every 6 hours  metoprolol succinate  milliGRAM(s) Oral daily  multivitamin Oral Tab/Cap - Peds 1 Tablet(s) Oral daily  nitrofurantoin monohydrate/macrocrystals (MACROBID) 100 milliGRAM(s) Oral two times a day  pantoprazole    Tablet 40 milliGRAM(s) Oral before breakfast  preservision areds 2 1 Capsule(s) 1 Capsule(s) Oral daily  senna 2 Tablet(s) Oral at bedtime  tamsulosin 0.8 milliGRAM(s) Oral at bedtime  warfarin 3 milliGRAM(s) Oral daily    MEDICATIONS  (PRN):  oxyCODONE    IR 5 milliGRAM(s) Oral every 6 hours PRN Severe Pain (7 - 10)  polyethylene glycol 3350 17 Gram(s) Oral two times a day PRN Constipation  traMADol 50 milliGRAM(s) Oral every 6 hours PRN Moderate Pain (4 - 6)      MICRO:   Cultures     STUDIES:   EKG, CXR, U/S, CT, MRI   
  I refit Александр with an Aspen Multipost cervical brace for use while in bed, and indexed both straps to indicate proper tightness to maintain position under his chin. I also refit an Aspen CTO4 for use OOB, and also indexed the cervical straps for proper tightness. Additional liners were provided and labeled and placed in his bag of belongings.  was labeled and placed on dresser for OOB use.    [facility]   
Patient feels well.  Reports pain is relatively controlled.  Daughter in room.  As per discussion with SW, patient decided to [facility].     REVIEW OF SYSTEMS  Constitutional - No fever,  +fatigue  HEENT - No vertigo, +neck pain  Neurological - +loss of strength, No numbness, No tremors  Musculoskeletal - +joint pain, No joint swelling, +muscle pain  Psychiatric - +depression, No anxiety    FUNCTIONAL PROGRESS    Bed Mobility  Bed Mobility Training Sit-to-Supine: minimum assist (75% patient effort)  Bed Mobility Training Supine-to-Sit: minimum assist (75% patient effort)    Sit-Stand Transfer Training  Transfer Training Sit-to-Stand Transfer: minimum assist (75% patient effort)  Transfer Training Stand-to-Sit Transfer: minimum assist (75% patient effort)  Sit-to-Stand Transfer Training Transfer Safety Analysis: CTLSO    Gait Training  Gait Trainin'x2 RW Brody CTLSO  Gait Analysis: decreased gait velocity and activity tolerance     VITALS  T(C): 37 (21 @ 10:24), Max: 37 (21 @ 10:24)  HR: 85 (21 @ 10:24) (69 - 85)  BP: 116/76 (21 @ 10:24) (108/67 - 136/73)  RR: 17 (21 @ 11:15) (17 - 18)  SpO2: 95% (21 @ 11:15) (94% - 97%)  Wt(kg): --    MEDICATIONS   acetaminophen   Tablet .. 650 milliGRAM(s) every 6 hours  celecoxib 200 milliGRAM(s) two times a day  furosemide    Tablet 40 milliGRAM(s) daily  gabapentin 100 milliGRAM(s) two times a day  lidocaine   4% Patch 1 Patch daily  methocarbamol 500 milliGRAM(s) every 6 hours  metoprolol succinate  milliGRAM(s) daily  multivitamin Oral Tab/Cap - Peds 1 Tablet(s) daily  nitrofurantoin monohydrate/macrocrystals (MACROBID) 100 milliGRAM(s) two times a day  oxyCODONE    IR 5 milliGRAM(s) every 6 hours PRN  pantoprazole    Tablet 40 milliGRAM(s) before breakfast  polyethylene glycol 3350 17 Gram(s) two times a day PRN  preservision areds 2 1 Capsule(s) 1 Capsule(s) daily  senna 2 Tablet(s) at bedtime  tamsulosin 0.8 milliGRAM(s) at bedtime  traMADol 50 milliGRAM(s) every 6 hours PRN  warfarin 3 milliGRAM(s) daily      RECENT LABS/IMAGING            PT/INR - ( 2021 05:36 )   PT: 25.0 sec;   INR: 2.24 ratio         PTT - ( 2021 05:36 )  PTT:36.2 sec            HEAD CT  - No acute intracranial hemorrhage, mass effect, or acute osseous fracture. Mild to moderate chronic ischemic changes in the frontoparietal white matter and old right cerebellar infarct.    CT C SPINE  - Acute nondisplaced fracture of the C5 spinous process. Acute appearing fracture through the T3 vertebral body with minimal bony retropulsion. Chronic compression fractures of T2 and T4. No evidence of traumatic malalignment.    CT CAP, T & L SPINE  - Minimally displaced acute fracture of the left lateral eighth rib. There are deformities of multiple right posterior ribs which were not seen on the previous exam involving the 10th 9th and eighth ribs. These may represent acute nondisplaced fractures. There is multilevel vertebral body height loss in the thorax spine which is stable except for T3 which shows new height loss since 2019. This is of uncertain chronicity. Correlate clinically for pain at this site.    MRI C & T SPINE  - Acute nondisplaced C5 spinous process fracture. Acute mild T1 superior endplate compression fracture. Acute mild to moderate T3 inferior endplate compression fracture.  ----------------------------------------------------------------------------------------  PHYSICAL EXAM  Constitutional - NAD, Comfortable  Neck - C Collar   Extremities - No C/C/E, No calf tenderness   Neurologic Exam -                    Cognitive - AAO to self, place, date, year, situation     Motor -  Right shoulder injury                    LEFT    UE - ShAB 3/5, EF 4/5, EE 4/5,  5/5                    RIGHT UE - ShAB 1/5, EF 3/5, EE 4/5,  5/5                    LEFT    LE - HF 4/5, KE 5/5, DF 5/5, PF 5/5                    RIGHT LE - HF 4/5, KE 5/5, DF 5/5, PF 5/5        Sensory - Intact to LT  Psychiatric - Mood depressed, Affect WNL  ----------------------------------------------------------------------------------------  ASSESSMENT/PLAN  89yMale with functional deficits after a fall sustaining trauma  C5 & T3 fractures - C Collar in bed, CTLSO OOB  Rib Fractures - Consider Duonebs, Recommend Incentive Spirometer  HTN - Toprol, Lasix  UTI - Macrobid  Pain - Tylenol, celebrex, Neurontin, Lidoderm, Robaxin, Oxycodone, Tramadol  DVT PPX - SCDs  Rehab - Patient chose [facility]. Recommend ongoing mobilization by staff to maintain cardiopulmonary function and prevention of secondary complications related to debility. Discussed with rehab team. 
HPI:  89 year old Male w/ PMH of CHF, A.fib (now paced), HTN, and prostate CA s/p radiation who presents after ground level fall. Patient was exercising when his left knee gave out and he fell backwards. Has 24hr assist at home, but they were not able to catch his fall. Did not hit head, no LOC. Pain is mostly at mid upper back. Denies dizziness, headache, nausea, vomiting, fevers, chills, chest pain, or SOB. No numbness or tingling. Patient reports joint pains and tendinopathy since taking levofloxacin for pyelonephritis.  (2021 04:16)    INTERVAL HPI/OVERNIGHT EVENTS:  Patient seen and examined this morning lying comfortably in bed, c collar on.     Vital Signs Last 24 Hrs  T(C): 36.6 (2021 04:42), Max: 36.9 (2021 11:49)  T(F): 97.8 (2021 04:42), Max: 98.4 (2021 11:49)  HR: 70 (2021 04:42) (70 - 81)  BP: 123/73 (2021 04:42) (114/75 - 135/79)  BP(mean): --  RR: 18 (2021 04:42) (18 - 18)  SpO2: 96% (2021 08:10) (91% - 98%)    PHYSICAL EXAM:  GENERAL: NAD, well-groomed, well-developed  HEAD:  Atraumatic, normocephalic  NECK: C collar in place  BOSSMAN COMA SCORE: E-4 V-5 M-6 = 15  MENTAL STATUS: AAO x3; Awake. Opens eyes spontaneously. Appropriately conversant without aphasia. following simple commands  CRANIAL NERVES: Visual acuity normal for age, PERRL. EOMI without nystagmus. Facial sensation intact V1-3 distribution b/l. Face symmetric w/ normal eye closure and smile, tongue midline. Hearing grossly intact. Speech clear.   MOTOR: strength 5/5 b/l upper and lower extremities  Uppers     Delt (C5/6)     Bicep (C5/6)     Wrist Extend (C6)     Tricep (C7)     HG (C8/T1)  R                     5/5                 5/5                         5/5                           5/5                   5/5  L                      5/5                 5/5                         5/5                           5/5                   5/5  Lowers      HF(L1/L2)     KE (L3)     DF (L4)     EHL (L5)     PF (S1)      R                     5/5              5/5           5/5           5/5            5/5  L                     5/5               5/5          5/5            5/5            5/5  SENSATION: grossly intact to light touch all extremities  CHEST/LUNG: Nonlabored breaths    LABS:                        11.5   9.89  )-----------( 100      ( 2021 06:14 )             35.6         143  |  104  |  32.9<H>  ----------------------------<  108<H>  4.1   |  31.0<H>  |  0.98    Ca    9.2      2021 06:14  Phos  2.9       Mg     2.3         TPro  7.0  /  Alb  3.4  /  TBili  0.5  /  DBili  x   /  AST  48<H>  /  ALT  15  /  AlkPhos  96  07-23    PT/INR - ( 2021 06:51 )   PT: 22.5 sec;   INR: 2.00 ratio      PTT - ( 2021 06:51 )  PTT:34.7 sec  Urinalysis Basic - ( 2021 22:49 )    Color: Yellow / Appearance: Slightly Turbid / S.015 / pH: x  Gluc: x / Ketone: Negative  / Bili: Negative / Urobili: Negative   Blood: x / Protein: 30 mg/dL / Nitrite: Positive   Leuk Esterase: Moderate / RBC: 3-5 /HPF / WBC >50   Sq Epi: x / Non Sq Epi: Few / Bacteria: Few     @ 07:  -   @ 07:00  --------------------------------------------------------  IN: 0 mL / OUT: 400 mL / NET: -400 mL     @ 07: @ 11:13  --------------------------------------------------------  IN: 240 mL / OUT: 0 mL / NET: 240 mL        RADIOLOGY & ADDITIONAL TESTS:  CT Lumbar Spine Reform No Cont (21 @ 00:16)   IMPRESSION:  Minimally displaced acute fracture of the left lateral eighth rib.  There are deformities of multiple right posterior ribs which were not seen onthe previous exam involving the 10th 9th and eighth ribs. These may represent acute nondisplaced fractures.  There is multilevel vertebral body height loss in the thorax spine which is stable except for T3 which shows new height loss since 2019. This is of uncertain chronicity. Correlate clinically for pain at this site.    CT Cervical Spine No Cont (21 @ 23:49)   IMPRESSION:  Acute nondisplaced fracture of the C5 spinous process. Acute appearing fracture through the T3 vertebral body with minimal bony retropulsion. Chronic compression fractures of T2 and T4. No evidence of traumatic malalignment.        
INTERVAL HPI/OVERNIGHT EVENTS:    No acute events overnight. TLSO brace delivered to bedside.     MEDICATIONS  (STANDING):  acetaminophen   Tablet .. 650 milliGRAM(s) Oral every 6 hours  celecoxib 200 milliGRAM(s) Oral two times a day  furosemide    Tablet 40 milliGRAM(s) Oral daily  gabapentin 100 milliGRAM(s) Oral two times a day  heparin   Injectable 5000 Unit(s) SubCutaneous every 8 hours  lidocaine   4% Patch 1 Patch Transdermal daily  methocarbamol 500 milliGRAM(s) Oral every 6 hours  metoprolol succinate  milliGRAM(s) Oral daily  multivitamin Oral Tab/Cap - Peds 1 Tablet(s) Oral daily  nitrofurantoin monohydrate/macrocrystals (MACROBID) 100 milliGRAM(s) Oral two times a day  pantoprazole    Tablet 40 milliGRAM(s) Oral before breakfast  tamsulosin 0.8 milliGRAM(s) Oral at bedtime    MEDICATIONS  (PRN):  oxyCODONE    IR 5 milliGRAM(s) Oral every 6 hours PRN Severe Pain (7 - 10)  traMADol 50 milliGRAM(s) Oral every 6 hours PRN Moderate Pain (4 - 6)      Vital Signs Last 24 Hrs  T(C): 36.8 (2021 20:05), Max: 36.9 (2021 08:01)  T(F): 98.3 (2021 20:05), Max: 98.5 (2021 08:01)  HR: 70 (2021 20:05) (70 - 81)  BP: 135/79 (2021 20:05) (114/75 - 135/79)  BP(mean): 89 (2021 05:41) (89 - 89)  RR: 18 (2021 20:05) (18 - 18)  SpO2: 96% (2021 20:05) (91% - 97%)    Constitutional: No acute distress  HEENT: atraumatic  Neck: cervical collar in place, trachea midline  Respiratory: no accessory muscle use, no conversational dyspnea  Cardiovascular: +s1/s2  Chest: left sided chest pain  Gastrointestinal: Abdomen soft      I&O's Detail      LABS:                        11.7   10.73 )-----------( 104      ( 2021 06:50 )             36.9     07-24    140  |  101  |  39.2<H>  ----------------------------<  120<H>  4.2   |  32.0<H>  |  1.09    Ca    9.4      2021 06:50  Phos  2.5       Mg     2.3         TPro  7.0  /  Alb  3.4  /  TBili  0.5  /  DBili  x   /  AST  48<H>  /  ALT  15  /  AlkPhos  96  07-23    PT/INR - ( 2021 06:51 )   PT: 22.5 sec;   INR: 2.00 ratio         PTT - ( 2021 06:51 )  PTT:34.7 sec  Urinalysis Basic - ( 2021 22:49 )    Color: Yellow / Appearance: Slightly Turbid / S.015 / pH: x  Gluc: x / Ketone: Negative  / Bili: Negative / Urobili: Negative   Blood: x / Protein: 30 mg/dL / Nitrite: Positive   Leuk Esterase: Moderate / RBC: 3-5 /HPF / WBC >50   Sq Epi: x / Non Sq Epi: Few / Bacteria: Few  
INTERVAL HPI/OVERNIGHT EVENTS:    SUBJECTIVE: No acute events overnight. Feeling well this AM. Denies N/V/F/C/CP/SOB.       MEDICATIONS  (STANDING):  acetaminophen   Tablet .. 650 milliGRAM(s) Oral every 6 hours  celecoxib 200 milliGRAM(s) Oral two times a day  furosemide    Tablet 40 milliGRAM(s) Oral daily  gabapentin 100 milliGRAM(s) Oral two times a day  lidocaine   4% Patch 1 Patch Transdermal daily  methocarbamol 500 milliGRAM(s) Oral every 6 hours  metoprolol succinate  milliGRAM(s) Oral daily  multivitamin Oral Tab/Cap - Peds 1 Tablet(s) Oral daily  nitrofurantoin monohydrate/macrocrystals (MACROBID) 100 milliGRAM(s) Oral two times a day  pantoprazole    Tablet 40 milliGRAM(s) Oral before breakfast  preservision areds 2 1 Capsule(s) 1 Capsule(s) Oral daily  senna 2 Tablet(s) Oral at bedtime  tamsulosin 0.8 milliGRAM(s) Oral at bedtime  warfarin 3 milliGRAM(s) Oral daily    MEDICATIONS  (PRN):  oxyCODONE    IR 5 milliGRAM(s) Oral every 6 hours PRN Severe Pain (7 - 10)  polyethylene glycol 3350 17 Gram(s) Oral two times a day PRN Constipation  traMADol 50 milliGRAM(s) Oral every 6 hours PRN Moderate Pain (4 - 6)      Vital Signs Last 24 Hrs  T(C): 36.4 (27 Jul 2021 04:54), Max: 36.9 (26 Jul 2021 20:27)  T(F): 97.6 (27 Jul 2021 04:54), Max: 98.4 (26 Jul 2021 20:27)  HR: 70 (27 Jul 2021 04:54) (69 - 76)  BP: 113/72 (27 Jul 2021 04:54) (108/67 - 136/73)  BP(mean): --  RR: 18 (27 Jul 2021 04:54) (16 - 18)  SpO2: 94% (27 Jul 2021 04:54) (93% - 97%)    PE  Gen: NAD  Pulm: No increased WOB  Abd: Soft, NT, ND  Ext: Full ROM  Vasc: 2+ Femoral, Radial pulses  Neuro: No focal neurological deficits      I&O's Detail    25 Jul 2021 07:01  -  26 Jul 2021 07:00  --------------------------------------------------------  IN:    Oral Fluid: 720 mL  Total IN: 720 mL    OUT:    Voided (mL): 550 mL  Total OUT: 550 mL    Total NET: 170 mL      26 Jul 2021 07:01  -  27 Jul 2021 06:48  --------------------------------------------------------  IN:    Oral Fluid: 591 mL  Total IN: 591 mL    OUT:    Voided (mL): 375 mL  Total OUT: 375 mL    Total NET: 216 mL          LABS:          PT/INR - ( 27 Jul 2021 05:36 )   PT: 25.0 sec;   INR: 2.24 ratio         PTT - ( 27 Jul 2021 05:36 )  PTT:36.2 sec      RADIOLOGY & ADDITIONAL STUDIES:
NEUROSURGERY PROGRESS NOTE:    HPI:  89 year old Male w/ PMH of CHF, A.fib (now paced), HTN, and prostate CA s/p radiation who presents after ground level fall. Patient was exercising when his left knee gave out and he fell backwards. Has 24hr assist at home, but they were not able to catch his fall. Did not hit head, no LOC. Pain is mostly at mid upper back. Denies dizziness, headache, nausea, vomiting, fevers, chills, chest pain, or SOB. No numbness or tingling. Patient reports joint pains and tendinopathy since taking levofloxacin for pyelonephritis.  (24 Jul 2021 04:16)    INTERVAL HPI/ OVERNIGHT EVENTS:  Patient seen and examined this morning lying comfortably in bed, c collar w thoracic extension, comfortable in bed, seems to fit a bit loosely.   pt seen and states is at baseline, denied any new or worsening sensorimotor changes, states that he is uncomfortable w the brace       MEDICATIONS  (STANDING):  acetaminophen   Tablet .. 650 milliGRAM(s) Oral every 6 hours  celecoxib 200 milliGRAM(s) Oral two times a day  furosemide    Tablet 40 milliGRAM(s) Oral daily  gabapentin 100 milliGRAM(s) Oral two times a day  lidocaine   4% Patch 1 Patch Transdermal daily  methocarbamol 500 milliGRAM(s) Oral every 6 hours  metoprolol succinate  milliGRAM(s) Oral daily  multivitamin Oral Tab/Cap - Peds 1 Tablet(s) Oral daily  nitrofurantoin monohydrate/macrocrystals (MACROBID) 100 milliGRAM(s) Oral two times a day  pantoprazole    Tablet 40 milliGRAM(s) Oral before breakfast  preservision areds 2 1 Capsule(s) 1 Capsule(s) Oral daily  senna 2 Tablet(s) Oral at bedtime  tamsulosin 0.8 milliGRAM(s) Oral at bedtime  warfarin 3 milliGRAM(s) Oral daily    MEDICATIONS  (PRN):  oxyCODONE    IR 5 milliGRAM(s) Oral every 6 hours PRN Severe Pain (7 - 10)  polyethylene glycol 3350 17 Gram(s) Oral two times a day PRN Constipation  traMADol 50 milliGRAM(s) Oral every 6 hours PRN Moderate Pain (4 - 6)    Allergies    antibiotic (Rash)  tetracycline (Rash)    Intolerances      Vital Signs Last 24 Hrs  T(C): 37 (27 Jul 2021 10:24), Max: 37 (27 Jul 2021 10:24)  T(F): 98.6 (27 Jul 2021 10:24), Max: 98.6 (27 Jul 2021 10:24)  HR: 85 (27 Jul 2021 10:24) (69 - 85)  BP: 116/76 (27 Jul 2021 10:24) (108/67 - 136/73)  BP(mean): --  RR: 18 (27 Jul 2021 10:24) (18 - 18)  SpO2: 94% (27 Jul 2021 10:24) (94% - 97%)    GCS: 4/5/6: 15      PHYSICAL EXAM:  GENERAL: NAD, well-groomed, well-developed, AAOx3 and very cooperative  HEAD: Atraumatic, Normocephalic, no palpable step-off appreciated on palpation  EYES: b/l EOMI, surgical pupils, conjunctiva and sclera clear,   NECK: Supple, nontender to palpation, no collar noted at the time of evaluation   TS: + tenderness over the midline of the upper T2-T4 area on palpation, nontender distally   LS: nontender to palpation midline or paraspinal muscles b/l,  NERVOUS SYSTEM:  Alert & Oriented X3, speech is clear and fluent, no dysarthria appreciated. Good concentration & very cooperative; Motor Strength 5/5 LUE and lower extremities w LROM to RUE due to rotator duff/ligament ruptured?, sensory is at baseline and symmetric b/l; No ankle clonus appreciated b/l,   EXTREMITIES:  2+ Peripheral Pulses, No clubbing, cyanosis, or edema,  SKIN: No rashes or lesions appreciated, stable scabbing at the vertex - pt states no head injury and that those are birthmarks         LABS:  no new labs to review         PT/INR - ( 27 Jul 2021 05:36 )   PT: 25.0 sec;   INR: 2.24 ratio    PTT - ( 27 Jul 2021 05:36 )  PTT:36.2 sec      RADIOLOGY & ADDITIONAL TESTS:  < from: MR Thoracic Spine No Cont (07.26.21 @ 19:28) >  EXAM:  MR SPINE THORACIC                         EXAM:  MR SPINE CERVICAL                          PROCEDURE DATE:  07/26/2021    INTERPRETATION:  CLINICAL HISTORY: Fall from height, T3 fracture, C5 fracture  COMPARISON: CT C-spine dated 7/23/2020  TECHNIQUE: Cervical spine and thoracic MRI: Multiplanar, multisequence MR images of the cervical spine and are obtained without administration of intravenous gadolinium.    FINDINGS:  Acute C5 spinous process fracture is better seen on theprior CT. Acute mild T1 superior endplate compression fracture. Moderate chronic T2 anterior wedge compression fracture deformity. Acute mild to moderate T3 inferiorly endplate compression fracture. Minimal retropulsion of fracture fragments. No evidence of cord compression or cord signal abnormality. Mild chronic T4 superior endplate compression fracture. Moderate chronic T8 and T9 compression fractures. Moderate chronic T11 compression fracture. No significant disc herniation, spinal canal stenosis, foraminal narrowing and thoracic spine.  Large right renal cyst is partially visualized measuring 6.9 cm.    IMPRESSION:  Acute nondisplaced C5 spinous process fracture.  Acute mild T1 superior endplate compression fracture.  Acute mild to moderate T3 inferior endplate compression fracture.  --- End of Report ---    JEFFREY ZHANG MD; Attending Radiologist  This document has been electronically signed. Jul 27 2021  9:16AM  < end of copied text >        Time Spent with patient/ education: > 15 mins

## 2021-07-27 NOTE — PROGRESS NOTE ADULT - ASSESSMENT
89yoM w/ PMH A.fib (now paced) s/p radiation who presents after ground level fall, found with T3 and C5 TP fracture, R 8-10th posterior rib frx, and possible old left 8th rib frx. In no acute distress, HD stable. started coumadin today (7/26)    PLan:  - F/u Coumadin level this AM - 3mg given last night  - Macrobid for UTI x 5 days  - PIC protocol  - C-collar in place  - AICD needs to be turned off by rep before MRI, TLSO brace at bedside, will FU PT  Dispo: Acute rehab vs [facility]

## 2021-07-27 NOTE — PROGRESS NOTE ADULT - ASSESSMENT
full note to follow     < from: MR Cervical & Thoracic Spine No Cont (07.26.21 @ 19:28) >  IMPRESSION:  Acute nondisplaced C5 spinous process fracture.  Acute mild T1 superior endplate compression fracture.  Acute mild to moderate T3 inferior endplate compression fracture.  < end of copied text >      pt seen w Dr Watson/ NSx team this morning   no NSx intervention, f/u OP w Dr Watson in 2-3 weeks   cont w C-collar at all times/ in bed and disconnect Thoracic extension when in bed, reconnect when OOB as per Dr Watson   orthotist consulted to refit pt/ brace and train RN how to disconnect TS extension from collar - orthotist aware   ? UTI - d/w ACS yesterday, management as per them   defer further care and dispo to primary team  Upper back pain after a fall backwards and on CT was found to have multilevel vertebral body height loss in the thorax spine which is stable except for T3 which shows new height loss since 11/1/2019. This is of uncertain chronicity. Pt states he has a TLSO brace at home, however, no one to deliver it to the hospital.    < from: MR Cervical & Thoracic Spine No Cont (07.26.21 @ 19:28) >  IMPRESSION:  Acute nondisplaced C5 spinous process fracture.  Acute mild T1 superior endplate compression fracture.  Acute mild to moderate T3 inferior endplate compression fracture.  < end of copied text >    pt seen w Dr Watson/ NSx team this morning   no NSx intervention, f/u OP w Dr Watson in 2-3 weeks   cont w C-collar at all times/ in bed and disconnect Thoracic extension when in bed, reconnect when OOB as per Dr Watson   orthotist consulted to refit pt/ brace and train RN how to disconnect TS extension from collar - orthotist aware   cervical collar w thoracic extension brace - orthotist to re-fit and deliver   OOB W assist and brace once delivered & PT eval   ? UTI - d/w ACS yesterday, management as per them   defer further care and dispo to primary team

## 2021-09-24 PROBLEM — M84.48XA PATHOLOGIC THORACIC FRACTURE: Status: ACTIVE | Noted: 2021-01-01

## 2021-09-24 PROBLEM — S12.9XXD: Status: ACTIVE | Noted: 2021-01-01

## 2021-09-24 PROBLEM — S12.591S: Status: ACTIVE | Noted: 2021-01-01

## 2021-09-24 PROBLEM — S22.000A THORACIC COMPRESSION FRACTURE: Status: ACTIVE | Noted: 2021-01-01

## 2021-09-24 NOTE — PHYSICAL EXAM
[de-identified] : CONSTITUTIONAL:  Patient is a very pleasant individual who is well-nourished and appears stated age. \par PSYCHIATRIC:  Alert and oriented times three and in no apparent distress, and participates with orthopedic evaluation well.\par HEAD:  Atraumatic and  nonsyndromic in appearance.\par EENT: No thyromegaly, EOMI.\par RESPIRATORY:  Respiratory rate is regular, not dyspneic on examination.\par LYMPHATICS:  There is no cervical or axillary lymphadenopathy.\par INTEGUMENTARY:  Skin is clean, dry, and intact about the bilateral upper extremities and cervical spine. \par VASCULAR:   There is brisk capillary refill about the bilateral upper extremities and radial pulses are 2/4. \par NEUROLOGIC:  Negative L'hirmitte, negative Spurling's sign. There are no pathologic reflexes. There is no decrease in sensation of the bilateral upper extremities on Wartenberg pinwheel/manual examination.  Deep tendon reflexes are well-maintained at +2/4 of the bilateral upper extremities and are symmetric.\par MUSCULOSKELETAL:  There is no visible muscular atrophy.  Manual motor strength is well maintained in the bilateral upper extremities.  Cervical range of motion is well maintained.  The patient ambulates in a non-myelopathic manner. Normal secondary orthopaedic exam of bilateral shoulders, elbows and hands.  Elbow flexion and extension, wrist extension, finger flexion and abduction are well maintained.\par \par  Patient's musculoskeletal examination is essentiallyasymptomatic. Patient has no pain with palpation to the posterior cervical and upper thoracic spines. 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. MRIs have also been reviewed from [facility] day to these exams or July 26. CAT scan and MRIs confirm a spinous process fracture at C5 as well as the superior endplate compression fracture of T1 and T3.

## 2021-09-24 NOTE — HISTORY OF PRESENT ILLNESS
[de-identified] : Patient presents for followup with regards to cervical and thoracic fractures that were sustained in late July patient's been in a collar since late July and he presents for clinical followup. He is accompanied by a family member he states is essentially asymptomatic at this point in time and has no complaints of pain. Patient is in a wheelchair for transportation. Patient is currently in home physical therapy 2 times a week [Improving] : improving [0] : a current pain level of 0/10 [1] : a maximum pain level of 1/10 [None] : No exacerbating factors are noted [Physical Therapy] : relieved by physical therapy [Ataxia] : no ataxia [Incontinence] : no incontinence [Loss of Dexterity] : good dexterity [Urinary Ret.] : no urinary retention

## 2021-10-22 PROBLEM — R60.0 LEG EDEMA: Status: ACTIVE | Noted: 2021-01-01

## 2021-10-22 PROBLEM — Z95.2 S/P AVR (AORTIC VALVE REPLACEMENT): Status: ACTIVE | Noted: 2019-03-06

## 2021-10-22 PROBLEM — I10 BENIGN ESSENTIAL HYPERTENSION: Status: ACTIVE | Noted: 2020-07-14

## 2021-10-22 PROBLEM — I48.19 PERSISTENT ATRIAL FIBRILLATION: Status: ACTIVE | Noted: 2018-05-30

## 2021-10-22 PROBLEM — R06.02 SHORTNESS OF BREATH AT REST: Status: ACTIVE | Noted: 2018-07-10

## 2021-10-22 NOTE — REASON FOR VISIT
[FreeTextEntry1] : MECHE MILLER is being seen for a clinic follow-up of. \par \par The patient is a 89-year-old white male with known history for bio aVR, atrial fibrillation and tachyarrhythmias with AV ton ablation CRT pacemaker insertion for LV dysfunction. Previous LVEF range of 30%, follow up echo in October 2020 showed LVEF of 50 to 55%;\par \par He returns to the office today for general cardiac checkup. He had been evaluated last year with Dr. Davenport, from the EP standpoint for possible DC cardioversion or cardio ablation, for which patient was not interested at this time;\par \par Has a history of severe mitral insufficiency and was evaluated from the surgical intervention standpoint for possible mitral valve clip--for which patient was reluctant to go for at that time;\par \par Additionally, he states he's been fairly stable without significant chest pain, palpitations or dizziness;\par \par He mainly uses wheel chair and sometimes ambulates with walker and get some exertional dyspnea and some mild edema of the lower extremities and ankles-which has been stable;

## 2021-10-22 NOTE — PHYSICAL EXAM
[Normal Conjunctiva] : normal conjunctiva [Normal Venous Pressure] : normal venous pressure [No Carotid Bruit] : no carotid bruit [Clear Lung Fields] : clear lung fields [No Respiratory Distress] : no respiratory distress  [Soft] : abdomen soft [Non Tender] : non-tender [No Masses/organomegaly] : no masses/organomegaly [No Cyanosis] : no cyanosis [No Clubbing] : no clubbing [No Varicosities] : no varicosities [No Rash] : no rash [No Skin Lesions] : no skin lesions [Moves all extremities] : moves all extremities [No Focal Deficits] : no focal deficits [Normal Speech] : normal speech [Alert and Oriented] : alert and oriented [Normal memory] : normal memory [de-identified] : Alert, elderly white male wheel chair bound today- in no acute distress [de-identified] : regular paced rhythm with controlled rate in the 70's, grade I/VI to II/VI systolic murmur [de-identified] : decreased breath sounds bilaterally [de-identified] : wheel chair bound.  Mild kyphoscoliosis of spine [de-identified] : 1+ pretibial edema bilaterally

## 2021-10-22 NOTE — ASSESSMENT
[FreeTextEntry1] : EKG 10/22/2021:  The EKG illustrates electronic ventricular pacemaker, rate of 71 bpm, no significant ST-T wave changes.\par \par Last transthoracic echo from 10/26/20 shows global LV systolic function and EF and low normal 50-55%. Paradoxical septal motion secondary to pacemaker moderate AI severe dilated left atrium and moderate dilated right atrium moderate to severe MR mild to moderate TR enlarged ascending aortic root no pericardial effusion;

## 2021-10-22 NOTE — HISTORY OF PRESENT ILLNESS
[FreeTextEntry1] : He is tolerating his current medical regimen including warfarin and monitoring his INR is PCP (Dr. Reyes);\par \par Pacemaker is being closely monitored by EP Specialist (Dr. Davenport's) team whom had recently decreased his Metoprolol to 75 mg daily;\par \par

## 2021-10-22 NOTE — DISCUSSION/SUMMARY
[FreeTextEntry1] : 1).  Patient will complete a Transthoracic Echocardiogram in the near future to assess overall cardiac function and for signs of worsening valvulopathy.\par \par Discuss possibility of cardiac interventions if necessary or if patient notices significant change in symptoms; to notify us\par \par 2).  Continue current medical regimen.\par \par Patient given blood work script to be drawn at home by [facility], whom come every Thursday for PT-INR checks according to Dr. Reyes's order.\par \par Blood work includes Lipid panel, CMP, CBC, HgA1C, TFT, PSA.\par \par 3).  Reinforced low-sodium diet;\par \par 4).  Follow up with Dr. Stephenson in 3 months or PRN.

## 2021-11-05 NOTE — HISTORY OF PRESENT ILLNESS
[de-identified] : Mr. MECHE MILLER is a 89 year old male presenting for evaluation of left knee pain. He is status post left knee unicompartmental replacement 15-20 years ago. Patient complains of anterior and lateral left knee pain. He denies any injury or trauma related to this.  Patient notes his pain is worse with weightbearing including walking any distance, getting out of a chair, and standing for long periods time. He feels as though the knee olamide at times. Patient has recently completed Pt without relief. He takes Tylenol for pain with minimal relief, he is on Warfarin for a anticoagulation. Patient completed a Euflexxa series to the left knee in January of 2021 with good relief at the time. He is hopeful for more injections today.

## 2021-11-05 NOTE — PROCEDURE
[de-identified] : Allergies: The patient denies allergies to medications and has no allergies to chicken,eggs, or feathers.\par Procedure: The patient has been identified by name and date of birth. Patient confirms that we are treating the left knee today.\par The knee was prepped in the usual sterile fashion. The areas were cleansed with chlorhexadine, then sprayed with ethyl chloride. The patient was then injected with the Euflexxa into the left knee in the anterolateral position. The patient tolerated the procedure well. The medication was delivered aseptically and atraumatically.\par Diagnosis: Osteoarthritis of the left knee\par Treatment: The patient was advised on the activities for today. I gave the patient instructions on postinjection ice and analgesia.\par

## 2021-11-05 NOTE — DISCUSSION/SUMMARY
[de-identified] : The patient is a 89 year old male s/p a partial medial left unicondylar knee replacement by another surgeon with moderate arthritis of the lateral and patellofemoral compartments. Conservative options were discussed. He received the first of three Euflexxa injections to the left knee using sterile technique and tolerated well. He will ice and elevate when home. He will follow up next week for his second injection of Visco Supplementation.

## 2021-11-05 NOTE — PHYSICAL EXAM
[de-identified] : The patient appears well nourished  and in no apparent distress.  The patient is alert and oriented to person, place, and time.   Affect and mood appear normal. The head is normocephalic and atraumatic.  The eyes reveal normal sclera and extra ocular muscles are intact. The tongue is midline with no apparent lesions.  Skin shows 2+ pitting edema to the mid lower legs.  No respiratory distress noted.  Sensation grossly intact.\par   [de-identified] : Exam of the left knee shows a healed prior incision. A small effusion, no warmth no erythema, full extension, flexion of 110 degrees. 5/5 motor strength bilaterally distally. Sensation intact distally.  [de-identified] : Xray- 3 views of the left knee - well aligned and well fixed unicompartmental left knee replacement. There is moderate arthritis of the lateral and patellofemoral compartments. \par

## 2021-11-05 NOTE — REASON FOR VISIT
[Follow-Up Visit] : a follow-up visit for [Other: ____] : [unfilled] [FreeTextEntry2] : Euflexxa #1 left Knee LOT: [id number]  Exp: 2022-10-18

## 2021-11-11 PROBLEM — M17.12 PRIMARY LOCALIZED OSTEOARTHRITIS OF LEFT KNEE: Status: ACTIVE | Noted: 2021-01-14

## 2021-11-11 NOTE — HISTORY OF PRESENT ILLNESS
[de-identified] : The patient is here today for a Euflexxa injection for the left knee. The patient is having osteoarthritic symptoms. \par Allergies: The patient denies allergies to medications and has no allergies to chicken,eggs, or feathers.\par Procedure: The patient has been identified by name and date of birth. Patient confirms that we are treating the left knee today.\par The knee was prepped in the usual sterile fashion. The areas were cleansed with chlorhexadine, then sprayed with ethyl chloride. The patient was then injected with the Euflexxa into the left knee in the anterolateral position. The patient tolerated the procedure well. The medication was delivered aseptically and atraumatically.\par Diagnosis: Osteoarthritis of the left knee\par Treatment: The patient was advised on the activities for today. I gave the patient instructions on postinjection ice and analgesia.\par Follow up recommended in one week.

## 2021-11-17 NOTE — ED ADULT TRIAGE NOTE - CHIEF COMPLAINT QUOTE
Pt.. BIBA s/p fall. Pt. states his knees gave out and he fell onto his back. pt. denies LOC, hitting his head, headache, vision changes. Pt. is on coumadin. No signs of injury. Pt. denies any complaints. GCS 15. A&Ox3.

## 2021-11-17 NOTE — ED PROVIDER NOTE - PROGRESS NOTE DETAILS
Angi RITCHIE for ED attending, Dr. Horan. Spoke to Daughter Jessica who is currently in [place], who states the patient's 24/7 aide went home once the patient came to the ED. Will updated Jessica once the CT results are back, and she will see how soon she can get an aide back to the house for the patient tomorrow.

## 2021-11-17 NOTE — ED PROVIDER NOTE - NSCAREINITIATED _GEN_ER
Eliot Horan(Attending) Electrodesiccation And Curettage Text: The wound bed was treated with electrodesiccation and curettage after the biopsy was performed.

## 2021-11-17 NOTE — ED PROVIDER NOTE - OBJECTIVE STATEMENT
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 Patient reports he slid out of his chair, landing on his buttock. Currently, patient has no complaints.     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 Patient reports he slid out of his chair because there was an extra cushion and he was sitting too close to the edge of the chair, landing on his buttock. Currently, patient has no complaints.     Denies pain, hitting head, chest pain palpitations dizziness or shortness of breath prior to the fall, LOC

## 2021-11-17 NOTE — ED PROVIDER NOTE - CLINICAL SUMMARY MEDICAL DECISION MAKING FREE TEXT BOX
Patient on coumadin s/p fall at home, currently with no complaints, states was in his usual state of health prior to fall and LE edema at baseline. Plan for CT head, neck, and re-assess.

## 2021-11-17 NOTE — ED ADULT NURSE NOTE - NSIMPLEMENTINTERV_GEN_ALL_ED
Implemented All Fall with Harm Risk Interventions:  [place] to call system. Call bell, personal items and telephone within reach. Instruct patient to call for assistance. Room bathroom lighting operational. Non-slip footwear when patient is off stretcher. Physically safe environment: no spills, clutter or unnecessary equipment. Stretcher in lowest position, wheels locked, appropriate side rails in place. Provide visual cue, wrist band, yellow gown, etc. Monitor gait and stability. Monitor for mental status changes and reorient to person, place, and time. Review medications for side effects contributing to fall risk. Reinforce activity limits and safety measures with patient and family. Provide visual clues: red socks.

## 2021-11-17 NOTE — ED PROVIDER NOTE - PHYSICAL EXAMINATION
Gen: Well appearing in NAD  Head: NC/AT  Neck: trachea midline, no TTP  Cardiac: RRR  Resp:  No distress; CTAB  Abd: Soft, NT/ND  Ext: no deformities; (+) LE edema bilaterally, pelvis stable, no bony tenderness, no spinal TTP  Neuro:  A&O appears non focal  Skin:  Warm and dry as visualized  Psych:  Normal affect and mood

## 2021-11-18 NOTE — CHART NOTE - NSCHARTNOTEFT_GEN_A_CORE
CM spoke to Pt's dtr Jessica (who is in [place] taking care of other family members). Per Jessica pt has a 24/7 male aide who will be able to pick pt up this evening at 7pm.  Pt had a fill in aide while primary aide was away - fill in aide didn't do so well w/ pt.  Pt has [facility] for weekly INR's followed by Dr Frank.  CM spoke to Dr Frank and [facility] to inform them of needed Sat draw and perhaps to start PT as well as pt had a fall.  Everyone (Dr Frank and [facility]) in agreement of same. Pt stable for dc from CM standpoint.

## 2021-11-18 NOTE — PHYSICAL THERAPY INITIAL EVALUATION ADULT - DISCHARGE DISPOSITION, PT EVAL
Home with 24/7 assist if family willing and able to provide level of assist, vs [facility]/home w/ assist/rehabilitation facility

## 2021-11-18 NOTE — ED CDU PROVIDER INITIAL DAY NOTE - OBJECTIVE STATEMENT
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 Patient reports he slid out of his chair because there was an extra cushion and he was sitting too close to the edge of the chair, landing on his buttock. Currently, patient has no complaints.     Denies pain, hitting head, chest pain palpitations dizziness or shortness of breath prior to the fall, LOC

## 2021-11-18 NOTE — PROVIDER CONTACT NOTE (OTHER) - ACTION/TREATMENT ORDERED:
PT Goals( to achieve in 2 weeks);Pt min./mod. assist with bed mobility. Pt min./mod. assist with transfers.  Pt min./mod. assist with amb with RW X 10 feet

## 2021-11-18 NOTE — ED CDU PROVIDER INITIAL DAY NOTE - ATTENDING CONTRIBUTION TO CARE
Aung: I performed a face to face bedside interview with patient regarding history of present illness, review of symptoms and past medical history. I completed an independent physical exam.  I have discussed patient's plan of care with advanced care provider.   I agree with note as stated above including HISTORY OF PRESENT ILLNESS, HIV, PAST MEDICAL/SURGICAL/FAMILY/SOCIAL HISTORY, ALLERGIES AND HOME MEDICATIONS, REVIEW OF SYSTEMS, PHYSICAL EXAM, MEDICAL DECISION MAKING and any PROGRESS NOTES during the time I functioned as the attending physician for this patient  unless otherwise noted. My brief assessment is as follows: Patient s/p fall at home, CTH/CT cervical spine without acute findings. INR elevated, Vit K given. HHA went home overnight. Patient placed in CDU for safe discharge in the AM. Warfarin dose held.

## 2021-11-18 NOTE — ED CDU PROVIDER DISPOSITION NOTE - PATIENT PORTAL LINK FT
You can access the FollowMyHealth Patient Portal offered by [facility] by registering at the following website: [email or web address]. By joining [employer]’s FollowMyHealth portal, you will also be able to view your health information using other applications (apps) compatible with our system. You can access the FollowMyHealth Patient Portal offered by [facility] by registering at the following website: [email or web address]. By joining [employer]’s FollowMyHealth portal, you will also be able to view your health information using other applications (apps) compatible with our system.

## 2021-11-18 NOTE — PHYSICAL THERAPY INITIAL EVALUATION ADULT - ADDITIONAL COMMENTS
Pt is a questionable historian, A+OX2.  Reports living in a 1 story house alone, has a HHA, seems to be 24/7, pt unable to confirm.  Reports amb with RW, PTA, requires assist with all ADLs and self care.

## 2021-11-18 NOTE — ED CDU PROVIDER INITIAL DAY NOTE - MEDICAL DECISION MAKING DETAILS
PT eval, safe discharge home, Sw for safe discharge PT eval, safe discharge home, Sw for safe discharge, will hold warfarin at this time due to elevated INR

## 2021-11-18 NOTE — ED CDU PROVIDER DISPOSITION NOTE - CLINICAL COURSE
PT eval, safe discharge home, Sw for safe discharge, vitamin k given, warfarin held due to elevated INR 89yo M presented to ED s/p fall. Kept in obs for PT eval. vitamin k given, warfarin held due to elevated INR. CM involved to ensure safe discharge home, arranged for rehab at home and continued private aide at home. Ambulette scheduled for 6pm.

## 2021-11-18 NOTE — ED CDU PROVIDER DISPOSITION NOTE - ATTENDING CONTRIBUTION TO CARE
I performed a face to face history and physical exam of the patient and discussed their management with the student/resident/ACP. I reviewed the student/resident/ACP's note and agree with the documented findings and plan of care.    Pt aide to meet patient at home.  SW discussed plan with aide and daughter. given return instructions.

## 2021-11-18 NOTE — ED ADULT NURSE REASSESSMENT NOTE - NSIMPLEMENTINTERV_GEN_ALL_ED
Implemented All Fall Risk Interventions:  [place] to call system. Call bell, personal items and telephone within reach. Instruct patient to call for assistance. Room bathroom lighting operational. Non-slip footwear when patient is off stretcher. Physically safe environment: no spills, clutter or unnecessary equipment. Stretcher in lowest position, wheels locked, appropriate side rails in place. Provide visual cue, wrist band, yellow gown, etc. Monitor gait and stability. Monitor for mental status changes and reorient to person, place, and time. Review medications for side effects contributing to fall risk. Reinforce activity limits and safety measures with patient and family.

## 2021-11-19 NOTE — ED ADULT NURSE REASSESSMENT NOTE - NS ED NURSE REASSESS COMMENT FT1
@2155 a tall  male Aide presents   to the unit to  the patient  without the oxygene which he supposed to bring from home when  pt as per previous shift RN .At that time pt was awake alert and ox3 not in distress V/s stable afebrile. alert comfortable  not in distress .Aide left the unit for 5minutes then returned stated that he is not comfortable to take the patient home because the patient " look very sick and lethargy" then left.. tHE PT  was able to identified the AIDE as "his AiDE".MIKE KRAFT informed  and aware Patient daughter called the unit who  spoke with MIKE RAWLS.As per PA ,pt will spent the night in OBS and will be evaluated by SW in AM
Assumed care of the patient @7380. Pt A&O to person. Pt resting comfortably at this time. Primafit in place for incontinence. Comfortable at this time. Awaiting pickup at 7pm. Safety maintained. Will continue to monitor.
Assumed care of the patient @1520. Pt A&O to person. 3L NC in place. Patient in understanding of plan of care. Patient with no further questions for the RN. Resting in comfort. Call bell within reach and encouraged to use when assistance needed. Will continue to monitor.
Received patient from dayshift RN.  Pt A and confused VSS.  Pt denies chest pain/SOB at this time.    IV insertion site  intact-, flushing without difficulty. Inc of urine and feces care provided keep skin dry and dry.Pt is discharged awaiting for ambulance Safety measures taken, bed in low position, call bell within reach, side rails up x2. Safety maintained continue to monitor closely  Will continue to monitor.
Assumed care of the patient at 0730. Verbal report received from Pauline BARRAZA Obs. Patient A&Ox4. Patient received on O2 2L via NC, chronic. Patient in NAD. Voiding in urinal. Frequent T&P. Patient pending PT eval and SW consult. Patient in understanding of plan of care. Fall precautions maintained. Patient with no further questions for the RN. Resting in comfort. Call bell within reach and encouraged to use when assistance needed. Will continue to monitor.

## 2021-11-19 NOTE — ED ADULT NURSE REASSESSMENT NOTE - STATUS
awaiting discharge, no change
SW/awaiting consult
PT eval, SW consult/awaiting consult
Awaiting for  discharge

## 2021-11-19 NOTE — ED ADULT NURSE REASSESSMENT NOTE - COMFORT CARE
meal provided/plan of care explained/po fluids offered/repositioned/wait time explained
meal provided/plan of care explained/repositioned/side rails up
plan of care explained
assisted to bedpan/darkened lights/side rails up
meal provided/plan of care explained/po fluids offered/repositioned/wait time explained

## 2021-11-19 NOTE — ED CDU PROVIDER SUBSEQUENT DAY NOTE - ATTENDING CONTRIBUTION TO CARE
I performed a face to face history and physical exam of the patient and discussed their management with the student/resident/ACP. I reviewed the student/resident/ACP's note and agree with the documented findings and plan of care.    Pt pending NASREEN biswas

## 2021-11-19 NOTE — CHART NOTE - NSCHARTNOTEFT_GEN_A_CORE
After multiple conversations with HCP / Dtr Jessica, Aide  Sheree and pt's 24/7 private hire dimas Avilez, the END RESULT IS HOME W/ RESUMPTION OF PP AIDELLE AVILEZ.  Venkat arranged for 6 pm. After multiple conversations with HCP / Dtr Jessica, Aide  Sheree and pt's 24/7 private hire dimas Mcduffie, the END RESULT IS HOME W/ RESUMPTION OF PP AIDE RAGHAV.  [place] [facility] [place]. Venkat arranged for 6 pm.

## 2021-11-19 NOTE — ED ADULT NURSE REASSESSMENT NOTE - GENERAL PATIENT STATE
comfortable appearance/cooperative

## 2021-11-24 NOTE — HISTORY OF PRESENT ILLNESS
[de-identified] : The patient is here today for a Euflexxa injection for the left knee. The patient is having osteoarthritic symptoms. \par Allergies: The patient denies allergies to medications and has no allergies to chicken,eggs, or feathers.\par Procedure: The patient has been identified by name and date of birth. Patient confirms that we are treating the left knee today.\par The knee was prepped in the usual sterile fashion. The areas were cleansed with chlorhexadine, then sprayed with ethyl chloride. The patient was then injected with the Euflexxa into the left knee in the anterolateral position. The patient tolerated the procedure well. The medication was delivered aseptically and atraumatically.\par Diagnosis: Osteoarthritis of the left knee\par Treatment: The patient was advised on the activities for today. I gave the patient instructions on postinjection ice and analgesia.\par Follow up recommended in 6-8 weeks.

## 2021-11-24 NOTE — REASON FOR VISIT
[Follow-Up Visit] : a follow-up visit for [FreeTextEntry2] : left knee pain. left Knee Euflexxa inj#3 LOT: [id number] Exp: 2022-10-18.

## 2022-01-01 ENCOUNTER — INPATIENT (INPATIENT)
Facility: HOSPITAL | Age: 87
LOS: 3 days | Discharge: HOME | DRG: 189 | End: 2022-03-02
Attending: FAMILY MEDICINE | Admitting: HOSPITALIST
Payer: MEDICARE

## 2022-01-01 ENCOUNTER — NON-APPOINTMENT (OUTPATIENT)
Age: 87
End: 2022-01-01

## 2022-01-01 ENCOUNTER — APPOINTMENT (OUTPATIENT)
Dept: ORTHOPEDIC SURGERY | Facility: CLINIC | Age: 87
End: 2022-01-01
Payer: MEDICARE

## 2022-01-01 ENCOUNTER — EMERGENCY (EMERGENCY)
Facility: HOSPITAL | Age: 87
LOS: 1 days | Discharge: LEFT BEFORE DISCHARGE | End: 2022-01-01
Payer: MEDICARE

## 2022-01-01 ENCOUNTER — APPOINTMENT (OUTPATIENT)
Dept: ELECTROPHYSIOLOGY | Facility: CLINIC | Age: 87
End: 2022-01-01
Payer: MEDICARE

## 2022-01-01 ENCOUNTER — APPOINTMENT (OUTPATIENT)
Dept: CARDIOLOGY | Facility: CLINIC | Age: 87
End: 2022-01-01
Payer: MEDICARE

## 2022-01-01 VITALS
OXYGEN SATURATION: 86 % | TEMPERATURE: 98 F | DIASTOLIC BLOOD PRESSURE: 85 MMHG | RESPIRATION RATE: 30 BRPM | WEIGHT: 145.06 LBS | HEART RATE: 100 BPM | HEIGHT: 65 IN | SYSTOLIC BLOOD PRESSURE: 144 MMHG

## 2022-01-01 VITALS
HEART RATE: 88 BPM | SYSTOLIC BLOOD PRESSURE: 137 MMHG | TEMPERATURE: 98 F | OXYGEN SATURATION: 94 % | WEIGHT: 132.94 LBS | DIASTOLIC BLOOD PRESSURE: 80 MMHG | HEIGHT: 65 IN | RESPIRATION RATE: 18 BRPM

## 2022-01-01 VITALS
HEART RATE: 82 BPM | RESPIRATION RATE: 12 BRPM | DIASTOLIC BLOOD PRESSURE: 62 MMHG | HEIGHT: 65 IN | SYSTOLIC BLOOD PRESSURE: 120 MMHG

## 2022-01-01 DIAGNOSIS — Z96.651 PRESENCE OF RIGHT ARTIFICIAL KNEE JOINT: Chronic | ICD-10-CM

## 2022-01-01 DIAGNOSIS — Z98.890 OTHER SPECIFIED POSTPROCEDURAL STATES: Chronic | ICD-10-CM

## 2022-01-01 DIAGNOSIS — Z95.0 PRESENCE OF CARDIAC PACEMAKER: Chronic | ICD-10-CM

## 2022-01-01 DIAGNOSIS — R94.31 ABNORMAL ELECTROCARDIOGRAM [ECG] [EKG]: ICD-10-CM

## 2022-01-01 DIAGNOSIS — Z95.0 PRESENCE OF CARDIAC PACEMAKER: ICD-10-CM

## 2022-01-01 DIAGNOSIS — Z96.652 PRESENCE OF LEFT ARTIFICIAL KNEE JOINT: Chronic | ICD-10-CM

## 2022-01-01 DIAGNOSIS — Z96.652 PRESENCE OF LEFT ARTIFICIAL KNEE JOINT: ICD-10-CM

## 2022-01-01 DIAGNOSIS — J96.01 ACUTE RESPIRATORY FAILURE WITH HYPOXIA: ICD-10-CM

## 2022-01-01 DIAGNOSIS — M54.16 RADICULOPATHY, LUMBAR REGION: ICD-10-CM

## 2022-01-01 LAB
ALBUMIN SERPL ELPH-MCNC: 3.6 G/DL — SIGNIFICANT CHANGE UP (ref 3.3–5.2)
ALP SERPL-CCNC: 173 U/L — HIGH (ref 40–120)
ALT FLD-CCNC: 40 U/L — SIGNIFICANT CHANGE UP
ANION GAP SERPL CALC-SCNC: 11 MMOL/L — SIGNIFICANT CHANGE UP (ref 5–17)
ANION GAP SERPL CALC-SCNC: 8 MMOL/L — SIGNIFICANT CHANGE UP (ref 5–17)
ANION GAP SERPL CALC-SCNC: 8 MMOL/L — SIGNIFICANT CHANGE UP (ref 5–17)
ANION GAP SERPL CALC-SCNC: 9 MMOL/L — SIGNIFICANT CHANGE UP (ref 5–17)
ANISOCYTOSIS BLD QL: SLIGHT — SIGNIFICANT CHANGE UP
APPEARANCE UR: CLEAR — SIGNIFICANT CHANGE UP
APTT BLD: 27.7 SEC — SIGNIFICANT CHANGE UP (ref 27.5–35.5)
APTT BLD: 35.6 SEC — HIGH (ref 27.5–35.5)
APTT BLD: 41.1 SEC — HIGH (ref 27.5–35.5)
AST SERPL-CCNC: 26 U/L — SIGNIFICANT CHANGE UP
BASE EXCESS BLDV CALC-SCNC: 8.9 MMOL/L — HIGH (ref -2–3)
BASOPHILS # BLD AUTO: 0 K/UL — SIGNIFICANT CHANGE UP (ref 0–0.2)
BASOPHILS # BLD AUTO: 0 K/UL — SIGNIFICANT CHANGE UP (ref 0–0.2)
BASOPHILS # BLD AUTO: 0.02 K/UL — SIGNIFICANT CHANGE UP (ref 0–0.2)
BASOPHILS NFR BLD AUTO: 0 % — SIGNIFICANT CHANGE UP (ref 0–2)
BASOPHILS NFR BLD AUTO: 0 % — SIGNIFICANT CHANGE UP (ref 0–2)
BASOPHILS NFR BLD AUTO: 0.1 % — SIGNIFICANT CHANGE UP (ref 0–2)
BILIRUB SERPL-MCNC: 0.6 MG/DL — SIGNIFICANT CHANGE UP (ref 0.4–2)
BILIRUB UR-MCNC: NEGATIVE — SIGNIFICANT CHANGE UP
BLD GP AB SCN SERPL QL: SIGNIFICANT CHANGE UP
BUN SERPL-MCNC: 33.8 MG/DL — HIGH (ref 8–20)
BUN SERPL-MCNC: 38.7 MG/DL — HIGH (ref 8–20)
BUN SERPL-MCNC: 42 MG/DL — HIGH (ref 8–20)
BUN SERPL-MCNC: 44 MG/DL — HIGH (ref 8–20)
CALCIUM SERPL-MCNC: 8.7 MG/DL — SIGNIFICANT CHANGE UP (ref 8.6–10.2)
CALCIUM SERPL-MCNC: 8.9 MG/DL — SIGNIFICANT CHANGE UP (ref 8.6–10.2)
CALCIUM SERPL-MCNC: 9 MG/DL — SIGNIFICANT CHANGE UP (ref 8.6–10.2)
CALCIUM SERPL-MCNC: 9 MG/DL — SIGNIFICANT CHANGE UP (ref 8.6–10.2)
CHLORIDE SERPL-SCNC: 102 MMOL/L — SIGNIFICANT CHANGE UP (ref 98–107)
CHLORIDE SERPL-SCNC: 104 MMOL/L — SIGNIFICANT CHANGE UP (ref 98–107)
CO2 SERPL-SCNC: 31 MMOL/L — HIGH (ref 22–29)
CO2 SERPL-SCNC: 31 MMOL/L — HIGH (ref 22–29)
CO2 SERPL-SCNC: 32 MMOL/L — HIGH (ref 22–29)
CO2 SERPL-SCNC: 32 MMOL/L — HIGH (ref 22–29)
COLOR SPEC: YELLOW — SIGNIFICANT CHANGE UP
CREAT SERPL-MCNC: 0.71 MG/DL — SIGNIFICANT CHANGE UP (ref 0.5–1.3)
CREAT SERPL-MCNC: 0.79 MG/DL — SIGNIFICANT CHANGE UP (ref 0.5–1.3)
CREAT SERPL-MCNC: 0.84 MG/DL — SIGNIFICANT CHANGE UP (ref 0.5–1.3)
CREAT SERPL-MCNC: 0.95 MG/DL — SIGNIFICANT CHANGE UP (ref 0.5–1.3)
CULTURE RESULTS: SIGNIFICANT CHANGE UP
DIFF PNL FLD: ABNORMAL
EGFR: 82 ML/MIN/1.73M2 — SIGNIFICANT CHANGE UP
EOSINOPHIL # BLD AUTO: 0 K/UL — SIGNIFICANT CHANGE UP (ref 0–0.5)
EOSINOPHIL # BLD AUTO: 0 K/UL — SIGNIFICANT CHANGE UP (ref 0–0.5)
EOSINOPHIL # BLD AUTO: 0.01 K/UL — SIGNIFICANT CHANGE UP (ref 0–0.5)
EOSINOPHIL NFR BLD AUTO: 0 % — SIGNIFICANT CHANGE UP (ref 0–6)
EOSINOPHIL NFR BLD AUTO: 0 % — SIGNIFICANT CHANGE UP (ref 0–6)
EOSINOPHIL NFR BLD AUTO: 0.1 % — SIGNIFICANT CHANGE UP (ref 0–6)
EPI CELLS # UR: SIGNIFICANT CHANGE UP
GAS PNL BLDA: SIGNIFICANT CHANGE UP
GAS PNL BLDA: SIGNIFICANT CHANGE UP
GAS PNL BLDV: SIGNIFICANT CHANGE UP
GLUCOSE SERPL-MCNC: 113 MG/DL — HIGH (ref 70–99)
GLUCOSE SERPL-MCNC: 114 MG/DL — HIGH (ref 70–99)
GLUCOSE SERPL-MCNC: 116 MG/DL — HIGH (ref 70–99)
GLUCOSE SERPL-MCNC: 121 MG/DL — HIGH (ref 70–99)
GLUCOSE UR QL: NEGATIVE MG/DL — SIGNIFICANT CHANGE UP
HCO3 BLDV-SCNC: 35 MMOL/L — HIGH (ref 22–29)
HCT VFR BLD CALC: 34.9 % — LOW (ref 39–50)
HCT VFR BLD CALC: 37 % — LOW (ref 39–50)
HCT VFR BLD CALC: 37.1 % — LOW (ref 39–50)
HCT VFR BLD CALC: 39.7 % — SIGNIFICANT CHANGE UP (ref 39–50)
HGB BLD-MCNC: 10.6 G/DL — LOW (ref 13–17)
HGB BLD-MCNC: 11.3 G/DL — LOW (ref 13–17)
HGB BLD-MCNC: 11.6 G/DL — LOW (ref 13–17)
HGB BLD-MCNC: 12.2 G/DL — LOW (ref 13–17)
HYPOCHROMIA BLD QL: SLIGHT — SIGNIFICANT CHANGE UP
IMM GRANULOCYTES NFR BLD AUTO: 0.2 % — SIGNIFICANT CHANGE UP (ref 0–1.5)
INR BLD: 2.05 RATIO — HIGH (ref 0.88–1.16)
INR BLD: 2.08 RATIO — HIGH (ref 0.88–1.16)
INR BLD: 2.54 RATIO — HIGH (ref 0.88–1.16)
INR BLD: 3.45 RATIO — HIGH (ref 0.88–1.16)
KETONES UR-MCNC: ABNORMAL
LEUKOCYTE ESTERASE UR-ACNC: NEGATIVE — SIGNIFICANT CHANGE UP
LIDOCAIN IGE QN: 37 U/L — SIGNIFICANT CHANGE UP (ref 22–51)
LYMPHOCYTES # BLD AUTO: 11.94 K/UL — HIGH (ref 1–3.3)
LYMPHOCYTES # BLD AUTO: 17.89 K/UL — HIGH (ref 1–3.3)
LYMPHOCYTES # BLD AUTO: 62 % — HIGH (ref 13–44)
LYMPHOCYTES # BLD AUTO: 72.4 % — HIGH (ref 13–44)
LYMPHOCYTES # BLD AUTO: 72.8 % — HIGH (ref 13–44)
LYMPHOCYTES # BLD AUTO: 9.62 K/UL — HIGH (ref 1–3.3)
MACROCYTES BLD QL: SLIGHT — SIGNIFICANT CHANGE UP
MAGNESIUM SERPL-MCNC: 2.1 MG/DL — SIGNIFICANT CHANGE UP (ref 1.6–2.6)
MANUAL SMEAR VERIFICATION: SIGNIFICANT CHANGE UP
MCHC RBC-ENTMCNC: 29.4 PG — SIGNIFICANT CHANGE UP (ref 27–34)
MCHC RBC-ENTMCNC: 29.8 PG — SIGNIFICANT CHANGE UP (ref 27–34)
MCHC RBC-ENTMCNC: 30.1 PG — SIGNIFICANT CHANGE UP (ref 27–34)
MCHC RBC-ENTMCNC: 30.4 GM/DL — LOW (ref 32–36)
MCHC RBC-ENTMCNC: 30.4 PG — SIGNIFICANT CHANGE UP (ref 27–34)
MCHC RBC-ENTMCNC: 30.5 GM/DL — LOW (ref 32–36)
MCHC RBC-ENTMCNC: 30.7 GM/DL — LOW (ref 32–36)
MCHC RBC-ENTMCNC: 31.3 GM/DL — LOW (ref 32–36)
MCV RBC AUTO: 96.7 FL — SIGNIFICANT CHANGE UP (ref 80–100)
MCV RBC AUTO: 97.1 FL — SIGNIFICANT CHANGE UP (ref 80–100)
MCV RBC AUTO: 97.1 FL — SIGNIFICANT CHANGE UP (ref 80–100)
MCV RBC AUTO: 98.7 FL — SIGNIFICANT CHANGE UP (ref 80–100)
MICROCYTES BLD QL: SLIGHT — SIGNIFICANT CHANGE UP
MONOCYTES # BLD AUTO: 0.42 K/UL — SIGNIFICANT CHANGE UP (ref 0–0.9)
MONOCYTES # BLD AUTO: 0.43 K/UL — SIGNIFICANT CHANGE UP (ref 0–0.9)
MONOCYTES # BLD AUTO: 0.47 K/UL — SIGNIFICANT CHANGE UP (ref 0–0.9)
MONOCYTES NFR BLD AUTO: 1.7 % — LOW (ref 2–14)
MONOCYTES NFR BLD AUTO: 2.6 % — SIGNIFICANT CHANGE UP (ref 2–14)
MONOCYTES NFR BLD AUTO: 3 % — SIGNIFICANT CHANGE UP (ref 2–14)
NEUTROPHILS # BLD AUTO: 4.07 K/UL — SIGNIFICANT CHANGE UP (ref 1.8–7.4)
NEUTROPHILS # BLD AUTO: 4.5 K/UL — SIGNIFICANT CHANGE UP (ref 1.8–7.4)
NEUTROPHILS # BLD AUTO: 5.82 K/UL — SIGNIFICANT CHANGE UP (ref 1.8–7.4)
NEUTROPHILS NFR BLD AUTO: 23.7 % — LOW (ref 43–77)
NEUTROPHILS NFR BLD AUTO: 24.6 % — LOW (ref 43–77)
NEUTROPHILS NFR BLD AUTO: 29 % — LOW (ref 43–77)
NITRITE UR-MCNC: NEGATIVE — SIGNIFICANT CHANGE UP
NRBC # BLD: 1 /100 — HIGH (ref 0–0)
OVALOCYTES BLD QL SMEAR: SLIGHT — SIGNIFICANT CHANGE UP
PCO2 BLDV: 68 MMHG — HIGH (ref 42–55)
PH BLDV: 7.32 — SIGNIFICANT CHANGE UP (ref 7.32–7.43)
PH UR: 5 — SIGNIFICANT CHANGE UP (ref 5–8)
PLAT MORPH BLD: NORMAL — SIGNIFICANT CHANGE UP
PLAT MORPH BLD: NORMAL — SIGNIFICANT CHANGE UP
PLATELET # BLD AUTO: 124 K/UL — LOW (ref 150–400)
PLATELET # BLD AUTO: 130 K/UL — LOW (ref 150–400)
PLATELET # BLD AUTO: 131 K/UL — LOW (ref 150–400)
PLATELET # BLD AUTO: 164 K/UL — SIGNIFICANT CHANGE UP (ref 150–400)
PO2 BLDV: 149 MMHG — HIGH (ref 25–45)
POIKILOCYTOSIS BLD QL AUTO: SLIGHT — SIGNIFICANT CHANGE UP
POLYCHROMASIA BLD QL SMEAR: SLIGHT — SIGNIFICANT CHANGE UP
POTASSIUM SERPL-MCNC: 4 MMOL/L — SIGNIFICANT CHANGE UP (ref 3.5–5.3)
POTASSIUM SERPL-MCNC: 4.1 MMOL/L — SIGNIFICANT CHANGE UP (ref 3.5–5.3)
POTASSIUM SERPL-MCNC: 4.5 MMOL/L — SIGNIFICANT CHANGE UP (ref 3.5–5.3)
POTASSIUM SERPL-MCNC: 4.9 MMOL/L — SIGNIFICANT CHANGE UP (ref 3.5–5.3)
POTASSIUM SERPL-SCNC: 4 MMOL/L — SIGNIFICANT CHANGE UP (ref 3.5–5.3)
POTASSIUM SERPL-SCNC: 4.1 MMOL/L — SIGNIFICANT CHANGE UP (ref 3.5–5.3)
POTASSIUM SERPL-SCNC: 4.5 MMOL/L — SIGNIFICANT CHANGE UP (ref 3.5–5.3)
POTASSIUM SERPL-SCNC: 4.9 MMOL/L — SIGNIFICANT CHANGE UP (ref 3.5–5.3)
PROT SERPL-MCNC: 6.5 G/DL — LOW (ref 6.6–8.7)
PROT UR-MCNC: 30 MG/DL
PROTHROM AB SERPL-ACNC: 23.9 SEC — HIGH (ref 10.5–13.4)
PROTHROM AB SERPL-ACNC: 24.3 SEC — HIGH (ref 10.5–13.4)
PROTHROM AB SERPL-ACNC: 29.7 SEC — HIGH (ref 10.5–13.4)
PROTHROM AB SERPL-ACNC: 40.5 SEC — HIGH (ref 10.5–13.4)
RAPID RVP RESULT: SIGNIFICANT CHANGE UP
RBC # BLD: 3.61 M/UL — LOW (ref 4.2–5.8)
RBC # BLD: 3.75 M/UL — LOW (ref 4.2–5.8)
RBC # BLD: 3.82 M/UL — LOW (ref 4.2–5.8)
RBC # BLD: 4.09 M/UL — LOW (ref 4.2–5.8)
RBC # FLD: 15.9 % — HIGH (ref 10.3–14.5)
RBC # FLD: 16 % — HIGH (ref 10.3–14.5)
RBC # FLD: 16.3 % — HIGH (ref 10.3–14.5)
RBC # FLD: 16.4 % — HIGH (ref 10.3–14.5)
RBC BLD AUTO: ABNORMAL
RBC BLD AUTO: NORMAL — SIGNIFICANT CHANGE UP
RBC CASTS # UR COMP ASSIST: ABNORMAL /HPF (ref 0–4)
SAO2 % BLDV: 100 % — SIGNIFICANT CHANGE UP
SARS-COV-2 RNA SPEC QL NAA+PROBE: SIGNIFICANT CHANGE UP
SMUDGE CELLS # BLD: PRESENT — SIGNIFICANT CHANGE UP
SODIUM SERPL-SCNC: 143 MMOL/L — SIGNIFICANT CHANGE UP (ref 135–145)
SODIUM SERPL-SCNC: 144 MMOL/L — SIGNIFICANT CHANGE UP (ref 135–145)
SODIUM SERPL-SCNC: 144 MMOL/L — SIGNIFICANT CHANGE UP (ref 135–145)
SODIUM SERPL-SCNC: 145 MMOL/L — SIGNIFICANT CHANGE UP (ref 135–145)
SP GR SPEC: 1.01 — SIGNIFICANT CHANGE UP (ref 1.01–1.02)
SPECIMEN SOURCE: SIGNIFICANT CHANGE UP
TROPONIN T SERPL-MCNC: 0.02 NG/ML — SIGNIFICANT CHANGE UP (ref 0–0.06)
UROBILINOGEN FLD QL: NEGATIVE MG/DL — SIGNIFICANT CHANGE UP
VARIANT LYMPHS # BLD: 1.8 % — SIGNIFICANT CHANGE UP (ref 0–6)
VARIANT LYMPHS # BLD: 6 % — SIGNIFICANT CHANGE UP (ref 0–6)
WBC # BLD: 15.52 K/UL — HIGH (ref 3.8–10.5)
WBC # BLD: 15.89 K/UL — HIGH (ref 3.8–10.5)
WBC # BLD: 16.5 K/UL — HIGH (ref 3.8–10.5)
WBC # BLD: 24.57 K/UL — HIGH (ref 3.8–10.5)
WBC # FLD AUTO: 15.52 K/UL — HIGH (ref 3.8–10.5)
WBC # FLD AUTO: 15.89 K/UL — HIGH (ref 3.8–10.5)
WBC # FLD AUTO: 16.5 K/UL — HIGH (ref 3.8–10.5)
WBC # FLD AUTO: 24.57 K/UL — HIGH (ref 3.8–10.5)
WBC UR QL: SIGNIFICANT CHANGE UP /HPF (ref 0–5)

## 2022-01-01 PROCEDURE — 93296 REM INTERROG EVL PM/IDS: CPT | Mod: NC

## 2022-01-01 PROCEDURE — 70450 CT HEAD/BRAIN W/O DYE: CPT | Mod: 26,MA

## 2022-01-01 PROCEDURE — 74177 CT ABD & PELVIS W/CONTRAST: CPT | Mod: 26,MA

## 2022-01-01 PROCEDURE — 93000 ELECTROCARDIOGRAM COMPLETE: CPT

## 2022-01-01 PROCEDURE — 72128 CT CHEST SPINE W/O DYE: CPT | Mod: 26,MA

## 2022-01-01 PROCEDURE — 72131 CT LUMBAR SPINE W/O DYE: CPT | Mod: 26,MA

## 2022-01-01 PROCEDURE — 93294 REM INTERROG EVL PM/LDLS PM: CPT | Mod: NC

## 2022-01-01 PROCEDURE — 99291 CRITICAL CARE FIRST HOUR: CPT | Mod: CS

## 2022-01-01 PROCEDURE — 99213 OFFICE O/P EST LOW 20 MIN: CPT | Mod: 25

## 2022-01-01 PROCEDURE — 99233 SBSQ HOSP IP/OBS HIGH 50: CPT

## 2022-01-01 PROCEDURE — 99223 1ST HOSP IP/OBS HIGH 75: CPT

## 2022-01-01 PROCEDURE — 72125 CT NECK SPINE W/O DYE: CPT | Mod: 26,MA

## 2022-01-01 PROCEDURE — L9991: CPT

## 2022-01-01 PROCEDURE — 99214 OFFICE O/P EST MOD 30 MIN: CPT

## 2022-01-01 PROCEDURE — 20610 DRAIN/INJ JOINT/BURSA W/O US: CPT | Mod: LT

## 2022-01-01 PROCEDURE — 99221 1ST HOSP IP/OBS SF/LOW 40: CPT

## 2022-01-01 PROCEDURE — 71275 CT ANGIOGRAPHY CHEST: CPT | Mod: 26,MA

## 2022-01-01 PROCEDURE — 71045 X-RAY EXAM CHEST 1 VIEW: CPT | Mod: 26

## 2022-01-01 RX ORDER — WARFARIN SODIUM 2.5 MG/1
3 TABLET ORAL ONCE
Refills: 0 | Status: COMPLETED | OUTPATIENT
Start: 2022-01-01 | End: 2022-01-01

## 2022-01-01 RX ORDER — POLYETHYLENE GLYCOL 3350 17 G/17G
17 POWDER, FOR SOLUTION ORAL
Refills: 0 | Status: DISCONTINUED | OUTPATIENT
Start: 2022-01-01 | End: 2022-03-02

## 2022-01-01 RX ORDER — METOPROLOL SUCCINATE 25 MG/1
25 TABLET, EXTENDED RELEASE ORAL
Qty: 90 | Refills: 3 | Status: ACTIVE | COMMUNITY
Start: 2022-01-01 | End: 1900-01-01

## 2022-01-01 RX ORDER — LANOLIN ALCOHOL/MO/W.PET/CERES
3 CREAM (GRAM) TOPICAL AT BEDTIME
Refills: 0 | Status: DISCONTINUED | OUTPATIENT
Start: 2022-01-01 | End: 2022-03-02

## 2022-01-01 RX ORDER — ONDANSETRON 8 MG/1
4 TABLET, FILM COATED ORAL EVERY 8 HOURS
Refills: 0 | Status: DISCONTINUED | OUTPATIENT
Start: 2022-01-01 | End: 2022-03-02

## 2022-01-01 RX ORDER — WARFARIN SODIUM 2.5 MG/1
2.5 TABLET ORAL ONCE
Refills: 0 | Status: COMPLETED | OUTPATIENT
Start: 2022-01-01 | End: 2022-01-01

## 2022-01-01 RX ORDER — WARFARIN SODIUM 2.5 MG/1
2 TABLET ORAL ONCE
Refills: 0 | Status: COMPLETED | OUTPATIENT
Start: 2022-01-01 | End: 2022-01-01

## 2022-01-01 RX ORDER — LIDOCAINE 4 G/100G
1 CREAM TOPICAL DAILY
Refills: 0 | Status: DISCONTINUED | OUTPATIENT
Start: 2022-01-01 | End: 2022-03-02

## 2022-01-01 RX ORDER — TAMSULOSIN HYDROCHLORIDE 0.4 MG/1
0.8 CAPSULE ORAL AT BEDTIME
Refills: 0 | Status: DISCONTINUED | OUTPATIENT
Start: 2022-01-01 | End: 2022-03-02

## 2022-01-01 RX ORDER — FUROSEMIDE 40 MG/1
40 TABLET ORAL
Qty: 90 | Refills: 0 | Status: COMPLETED | COMMUNITY

## 2022-01-01 RX ORDER — CHOLECALCIFEROL (VITAMIN D3) 125 MCG
400 CAPSULE ORAL DAILY
Refills: 0 | Status: DISCONTINUED | OUTPATIENT
Start: 2022-01-01 | End: 2022-03-02

## 2022-01-01 RX ORDER — METOPROLOL TARTRATE 50 MG
1 TABLET ORAL
Qty: 0 | Refills: 0 | DISCHARGE

## 2022-01-01 RX ORDER — PANTOPRAZOLE SODIUM 20 MG/1
40 TABLET, DELAYED RELEASE ORAL
Refills: 0 | Status: DISCONTINUED | OUTPATIENT
Start: 2022-01-01 | End: 2022-03-02

## 2022-01-01 RX ORDER — ACETAMINOPHEN 500 MG
650 TABLET ORAL EVERY 6 HOURS
Refills: 0 | Status: DISCONTINUED | OUTPATIENT
Start: 2022-01-01 | End: 2022-03-02

## 2022-01-01 RX ORDER — FUROSEMIDE 40 MG
1 TABLET ORAL
Qty: 0 | Refills: 0 | DISCHARGE

## 2022-01-01 RX ORDER — FENTANYL CITRATE 50 UG/ML
25 INJECTION INTRAVENOUS ONCE
Refills: 0 | Status: DISCONTINUED | OUTPATIENT
Start: 2022-01-01 | End: 2022-01-01

## 2022-01-01 RX ORDER — METOPROLOL SUCCINATE 25 MG/1
25 TABLET, EXTENDED RELEASE ORAL DAILY
Qty: 90 | Refills: 1 | Status: DISCONTINUED | COMMUNITY
Start: 2021-01-01 | End: 2022-01-01

## 2022-01-01 RX ORDER — CEFTRIAXONE 500 MG/1
1000 INJECTION, POWDER, FOR SOLUTION INTRAMUSCULAR; INTRAVENOUS ONCE
Refills: 0 | Status: COMPLETED | OUTPATIENT
Start: 2022-01-01 | End: 2022-01-01

## 2022-01-01 RX ORDER — METOPROLOL SUCCINATE 50 MG/1
50 TABLET, EXTENDED RELEASE ORAL DAILY
Qty: 90 | Refills: 3 | Status: ACTIVE | COMMUNITY
Start: 1900-01-01 | End: 1900-01-01

## 2022-01-01 RX ORDER — SENNA PLUS 8.6 MG/1
1 TABLET ORAL ONCE
Refills: 0 | Status: DISCONTINUED | OUTPATIENT
Start: 2022-01-01 | End: 2022-03-02

## 2022-01-01 RX ORDER — SENNA PLUS 8.6 MG/1
2 TABLET ORAL AT BEDTIME
Refills: 0 | Status: DISCONTINUED | OUTPATIENT
Start: 2022-01-01 | End: 2022-03-02

## 2022-01-01 RX ORDER — PANTOPRAZOLE SODIUM 20 MG/1
1 TABLET, DELAYED RELEASE ORAL
Qty: 0 | Refills: 0 | DISCHARGE

## 2022-01-01 RX ORDER — IPRATROPIUM/ALBUTEROL SULFATE 18-103MCG
3 AEROSOL WITH ADAPTER (GRAM) INHALATION EVERY 6 HOURS
Refills: 0 | Status: DISCONTINUED | OUTPATIENT
Start: 2022-01-01 | End: 2022-03-02

## 2022-01-01 RX ORDER — IPRATROPIUM/ALBUTEROL SULFATE 18-103MCG
3 AEROSOL WITH ADAPTER (GRAM) INHALATION EVERY 6 HOURS
Refills: 0 | Status: DISCONTINUED | OUTPATIENT
Start: 2022-01-01 | End: 2022-01-01

## 2022-01-01 RX ORDER — GABAPENTIN 400 MG/1
1 CAPSULE ORAL
Qty: 0 | Refills: 0 | DISCHARGE

## 2022-01-01 RX ORDER — CHOLECALCIFEROL (VITAMIN D3) 125 MCG
1000 CAPSULE ORAL ONCE
Refills: 0 | Status: COMPLETED | OUTPATIENT
Start: 2022-01-01 | End: 2022-01-01

## 2022-01-01 RX ORDER — METOPROLOL TARTRATE 50 MG
75 TABLET ORAL DAILY
Refills: 0 | Status: DISCONTINUED | OUTPATIENT
Start: 2022-01-01 | End: 2022-03-02

## 2022-01-01 RX ADMIN — Medication 3 MILLILITER(S): at 04:01

## 2022-01-01 RX ADMIN — Medication 3 MILLILITER(S): at 12:23

## 2022-01-01 RX ADMIN — Medication 400 UNIT(S): at 11:58

## 2022-01-01 RX ADMIN — WARFARIN SODIUM 3 MILLIGRAM(S): 2.5 TABLET ORAL at 21:32

## 2022-01-01 RX ADMIN — LIDOCAINE 1 PATCH: 4 CREAM TOPICAL at 00:48

## 2022-01-01 RX ADMIN — TAMSULOSIN HYDROCHLORIDE 0.8 MILLIGRAM(S): 0.4 CAPSULE ORAL at 21:33

## 2022-01-01 RX ADMIN — Medication 400 UNIT(S): at 14:00

## 2022-01-01 RX ADMIN — Medication 1 DROP(S): at 22:32

## 2022-01-01 RX ADMIN — CEFTRIAXONE 100 MILLIGRAM(S): 500 INJECTION, POWDER, FOR SOLUTION INTRAMUSCULAR; INTRAVENOUS at 20:02

## 2022-01-01 RX ADMIN — LIDOCAINE 1 PATCH: 4 CREAM TOPICAL at 18:09

## 2022-01-01 RX ADMIN — LIDOCAINE 1 PATCH: 4 CREAM TOPICAL at 11:58

## 2022-01-01 RX ADMIN — Medication 75 MILLIGRAM(S): at 06:13

## 2022-01-01 RX ADMIN — Medication 1 DROP(S): at 21:32

## 2022-01-01 RX ADMIN — Medication 75 MILLIGRAM(S): at 05:54

## 2022-01-01 RX ADMIN — Medication 75 MILLIGRAM(S): at 06:03

## 2022-01-01 RX ADMIN — WARFARIN SODIUM 2.5 MILLIGRAM(S): 2.5 TABLET ORAL at 21:05

## 2022-01-01 RX ADMIN — Medication 40 MILLIGRAM(S): at 05:59

## 2022-01-01 RX ADMIN — PANTOPRAZOLE SODIUM 40 MILLIGRAM(S): 20 TABLET, DELAYED RELEASE ORAL at 05:54

## 2022-01-01 RX ADMIN — Medication 1 DROP(S): at 05:53

## 2022-01-01 RX ADMIN — Medication 1 DROP(S): at 06:04

## 2022-01-01 RX ADMIN — Medication 3 MILLILITER(S): at 11:11

## 2022-01-01 RX ADMIN — LIDOCAINE 1 PATCH: 4 CREAM TOPICAL at 13:59

## 2022-01-01 RX ADMIN — LIDOCAINE 1 PATCH: 4 CREAM TOPICAL at 13:58

## 2022-01-01 RX ADMIN — TAMSULOSIN HYDROCHLORIDE 0.8 MILLIGRAM(S): 0.4 CAPSULE ORAL at 21:05

## 2022-01-01 RX ADMIN — PANTOPRAZOLE SODIUM 40 MILLIGRAM(S): 20 TABLET, DELAYED RELEASE ORAL at 06:04

## 2022-01-01 RX ADMIN — FENTANYL CITRATE 25 MICROGRAM(S): 50 INJECTION INTRAVENOUS at 15:51

## 2022-01-01 RX ADMIN — Medication 40 MILLIGRAM(S): at 11:58

## 2022-01-01 RX ADMIN — TAMSULOSIN HYDROCHLORIDE 0.8 MILLIGRAM(S): 0.4 CAPSULE ORAL at 22:35

## 2022-01-01 RX ADMIN — Medication 400 UNIT(S): at 15:16

## 2022-01-01 RX ADMIN — LIDOCAINE 1 PATCH: 4 CREAM TOPICAL at 00:49

## 2022-01-01 RX ADMIN — PANTOPRAZOLE SODIUM 40 MILLIGRAM(S): 20 TABLET, DELAYED RELEASE ORAL at 06:00

## 2022-01-01 RX ADMIN — Medication 3 MILLILITER(S): at 09:47

## 2022-01-01 RX ADMIN — TAMSULOSIN HYDROCHLORIDE 0.8 MILLIGRAM(S): 0.4 CAPSULE ORAL at 21:06

## 2022-01-01 RX ADMIN — POLYETHYLENE GLYCOL 3350 17 GRAM(S): 17 POWDER, FOR SOLUTION ORAL at 18:02

## 2022-01-01 RX ADMIN — Medication 1 DROP(S): at 21:36

## 2022-01-01 RX ADMIN — WARFARIN SODIUM 2 MILLIGRAM(S): 2.5 TABLET ORAL at 22:46

## 2022-01-01 RX ADMIN — Medication 1000 UNIT(S): at 21:06

## 2022-01-01 RX ADMIN — LIDOCAINE 1 PATCH: 4 CREAM TOPICAL at 12:00

## 2022-02-11 PROBLEM — M54.16 LUMBAR RADICULOPATHY: Status: ACTIVE | Noted: 2022-01-01

## 2022-02-11 PROBLEM — Z96.652 STATUS POST UNICOMPARTMENTAL KNEE REPLACEMENT, LEFT: Status: ACTIVE | Noted: 2021-01-14

## 2022-02-11 NOTE — PHYSICAL EXAM
[de-identified] : The patient appears well nourished  and in no apparent distress.  The patient is alert and oriented to person, place, and time.   Affect and mood appear normal. The head is normocephalic and atraumatic.  The eyes reveal normal sclera and extra ocular muscles are intact. The tongue is midline with no apparent lesions.  Skin shows 2+ pitting edema to the mid lower legs.  No respiratory distress noted.  Sensation grossly intact.\par   [de-identified] : Exam of the left knee shows a healed prior incision. A small effusion, no warmth no erythema, full extension, flexion of 110 degrees. 5/5 motor strength bilaterally distally. Sensation intact distally.  [de-identified] : Prior Xray- 3 views of the left knee - well aligned and well fixed unicompartmental left knee replacement. There is moderate arthritis of the lateral and patellofemoral compartments. \par

## 2022-02-11 NOTE — PROCEDURE
[de-identified] : Using sterile technique, 2cc of depomedrol 40mg/ml, 4cc of 1% plain lidocaine, and 2 cc 0.25% marcaine was drawn up into a sterile syringe. The left knee was then sterilely prepped with chlorhexidine. Ethyl chloride spray was used to anesthetize the skin and subQ tissue. The depomedrol/lidocaine/marcaine mixture was then injected into the knee joint in the anterolateral position. The patient tolerated the procedure well without difficulty. The patient was given instructions on the use of ice and anti-inflammatories post injection site soreness.

## 2022-02-11 NOTE — HISTORY OF PRESENT ILLNESS
[de-identified] : Mr. MECHE MILLER is a 89 year old male presenting for evaluation of left knee pain. He is status post left knee unicompartmental replacement 15-20 years ago. Patient complains of anterior and lateral left knee pain. He denies any injury or trauma related to this.  Patient notes his pain is worse with weightbearing including walking any distance, getting out of a chair, and standing for long periods time. He feels as though the knee olamide at times. Patient has recently completed Pt without relief. He takes Tylenol for pain with minimal relief, he is on Warfarin for a anticoagulation. Patient recently had Euflexxa injections on 11/24/21. he reports only mild temporary relief with this. HE has not had recent steroid injections. In addition to his knee pain, he has complaints of lowerback/buttock pain and would like to try some therapy.

## 2022-02-11 NOTE — DISCUSSION/SUMMARY
[de-identified] : The patient is a 89 year old male s/p a partial medial left unicondylar knee replacement by another surgeon with moderate arthritis of the lateral and patellofemoral compartments. Conservative options were discussed. He received a depo medrol injection using sterile technique and tolerated well. He will ice and elevate when home. A prescription for in home PT was provided. Follow up in 6-8 weeks.

## 2022-02-17 NOTE — ED ADULT TRIAGE NOTE - CHIEF COMPLAINT QUOTE
pt c/o back pain , fell out of the wheelchair last week, had xray no fractures, but c/o back pain  A&Ox3, resp wnl

## 2022-02-21 PROBLEM — Z95.0 CARDIAC PACEMAKER: Status: ACTIVE | Noted: 2020-03-12

## 2022-02-21 PROBLEM — R94.31 ABNORMAL ELECTROCARDIOGRAPHY: Status: ACTIVE | Noted: 2018-05-30

## 2022-02-21 NOTE — REASON FOR VISIT
[Cardiac Failure] : cardiac failure [Symptom and Test Evaluation] : symptom and test evaluation [Arrhythmia/ECG Abnorrmalities] : arrhythmia/ECG abnormalities [Structural Heart and Valve Disease] : structural heart and valve disease [Carotid, Aortic and Peripheral Vascular Disease] : carotid, aortic and peripheral vascular disease [Other: ____] : [unfilled] [FreeTextEntry3] : MOON BRIAN [FreeTextEntry1] : The patient is a 89-year-old white male with a known history for bioprosthetic aortic valve replacement, chronic/permanent-ablation and AV node ablation/CRT pacemaker for LV dysfunction, who is has had intermittent bouts of congestive heart failure and reduced LV EF to 30% in the past;\par \par More recently, he has shown some improvement in the left ventricular systolic function now up to 50-55% noted on recent echocardiogram;\par \par Patient returns to the office today for general cardiac checkup and is accompanied with a home health aide;\par \par He arrives in a wheelchair and tells me that he fell approximately a week or 2 ago and sustained some injuries but no fractures and was seen in the emergency department;

## 2022-02-21 NOTE — ASSESSMENT
[FreeTextEntry1] : eKG shows ventricular paced rhythm at a rate of 82 sensing occasional intrinsic beats and underlying atrial fibrillation suggested;\par \par Transthoracic echo from October 28, 2021 shows mild LVH with left ventricular systolic ejection fraction in the lower range of normal 50-55%. Some paradoxical septal motion secondary to pacemaker. Severe left atrial enlargement. Moderate to severe MR mild to moderate TR moderate AI;\par Mild elevated PA pressures. Mild enlarged aortic root;\par \par In summary, this 89-year-old gentleman has a history of debilitating arthritis and general weakness who has been frequently wheelchair-bound at this timeand has a history of cardiomyopathy with improvement in LV systolic function after CRT pacing and AV node ablation for chronic atrial fibrillation;\par He has had some ongoing peripheral edema of the lower extremities and has been only taking his diuretics sparingly;\par \par \par \par Plan:\par \par Patient now instructed to increase the Lasix to 40 mg every other day;\par \par Continue other medications the same;\par \par Recommend pacemaker interrogation in the near future or at next visit within 3 months;\par Will change the knees visits from Dr. Davenport to our office)\par \par Patient to report any change in symptoms of shortness of breath, chest pain with dizziness;\par \par Followup visits with primary care for laboratory blood tests as well and regular checkups;\par \par \par \par

## 2022-02-21 NOTE — HISTORY OF PRESENT ILLNESS
[FreeTextEntry1] : He reports some persistent edema of the lower extremities and some bruising, and remains on anticoagulation with warfarin;\par \par patient reports to the hospital emergency his most recent INR equals 2.3;\par \par The fall and injury he sustained was not associated with syncope but states that  "the wheelchair went out from under him";\par \par Patient denies chest pain, PND or orthopnea. He has had no cough or congestion;

## 2022-02-26 NOTE — ED ADULT TRIAGE NOTE - CHIEF COMPLAINT QUOTE
pt BIBA from home with reports of back pain, pt fell 1 week ago, Tylenol taken at home with no relief. pt noted to be tachypneic, hypoxic on arrival. denies chest pain. EMS reports no use of 02 at home.

## 2022-02-26 NOTE — ED PROVIDER NOTE - PROGRESS NOTE DETAILS
ABG with evidence of compensated retention. Given persistent increased wob and tachypena, will start on hfnc d/w pt's live at home aide, states that pt fell 2 weeks ago, came to ED but lwobe due to waiting too long. Went home and today called ems due to worsening pain. Has not witnessed any falls since then. - Gustavo Sinha, PGY-3 wob mildly improved on hfnc, will continue to monitor closely. - Gustavo Sinha, PGY-3

## 2022-02-26 NOTE — ED PROVIDER NOTE - PHYSICAL EXAMINATION
Gen: no acute distress  Head: normocephalic, atraumatic  Lung: poor air movement bilaterally, mild respiratory distress, tachypenic with accessory mm use, no wheezing, rales, rhonchi  CV: normal s1/s2, rrr, tenderness to R lateral and postero-lateral chest wall  Abd: soft, no tenderness, non-distended  MSK: No edema, no visible deformities, full range of motion in all 4 extremities, no midline c/t/l spine tenderness  Neuro: No focal neurologic deficits, 5/5 strength to all extremities, sensation intact  Skin: No rash   Psych: normal affect

## 2022-02-26 NOTE — ED ADULT NURSE NOTE - NSIMPLEMENTINTERV_GEN_ALL_ED
Implemented All Fall with Harm Risk Interventions:  [place] to call system. Call bell, personal items and telephone within reach. Instruct patient to call for assistance. Room bathroom lighting operational. Non-slip footwear when patient is off stretcher. Physically safe environment: no spills, clutter or unnecessary equipment. Stretcher in lowest position, wheels locked, appropriate side rails in place. Provide visual cue, wrist band, yellow gown, etc. Monitor gait and stability. Monitor for mental status changes and reorient to person, place, and time. Review medications for side effects contributing to fall risk. Reinforce activity limits and safety measures with patient and family. Provide visual clues: red socks.

## 2022-02-26 NOTE — ED ADULT NURSE REASSESSMENT NOTE - NS ED NURSE REASSESS COMMENT FT1
pt placed on hi flow NC, tolerating well. pt reports improvement in pain s/p IV fentanyl. pt awaiting Ct.

## 2022-02-26 NOTE — CONSULT NOTE ADULT - ATTENDING COMMENTS
Patient seen and examined with resident. Pt c/o some TTP on right side where rib fracture is. Fracture occurred over a week prior. No need for trauma admission, rec medical admission. No need for further imaging or workup. Please call with questions.

## 2022-02-26 NOTE — ED ADULT NURSE NOTE - OBJECTIVE STATEMENT
pt A&Ox3 presents to ED from home s/p fall last week with upper back pain. pt tachypneic on arrival RR 30. pt states increased WOB for "quite a while". o2 sat 86%RA. placed on 2LNC, o2 sat 99%. denies chest pain. not on home O2. MD Apterback at bedside upon arrival. STAT EKG and chest xray obtained. pt with hx of CHF, Afib on coumadin, frequent falls, HTN, A&Ox3 presents to ED from home s/p fall 2 weeks go with R. upper back pain. pt presented to ED after fall 2 weeks ago but LWOBE due to wait time. pt tachypneic on arrival RR 30. pt states increased WOB for "quite a while". o2 sat 86%RA. placed on 2LNC, o2 sat 99%. denies chest pain. not on home O2. MD Apterback at bedside upon arrival. STAT EKG and chest xray obtained. Old bruising noted to L side of face.

## 2022-02-26 NOTE — ED PROVIDER NOTE - CLINICAL SUMMARY MEDICAL DECISION MAKING FREE TEXT BOX
Pt with hx of frequent falls, on coumadin, presenting today with c/o R posterior chest wall pain, tachypenia, and hypoxia after possible recent fall. Spo2 improved on 2L nc, but tachypeniea persits. WIll pan scan, order labs, abg, pain meds, continue to closely monitor resp status, reeval.

## 2022-02-26 NOTE — ED PROVIDER NOTE - OBJECTIVE STATEMENT
89 y/o M pt with hx of  afib on coumadin, CHF, prostate ca s/p radiation, GERD, HTN, frequent falls presenting today with c/o back pain. States that he has fallen multiple times, today felt severe R upper back pain, called EMS. Found to be hypoxic to 86% on ra, no hx of requiring supplemental o2. Pt denies any fevers, n/v/d, abdominal pain, dysuria, headache, cough, congestion, sore throat, neck pain, numbness, tingling, dizziness, syncope, or other complaint.

## 2022-02-26 NOTE — ED PROVIDER NOTE - ATTENDING CONTRIBUTION TO CARE
Patient seen with resident.  PMH of  afib on coumadin, CHF, prostate ca s/p radiation, GERD, HTN, frequent falls.  Patient states fall about one week ago.  Had xray at [facility] and "they said no fractures."  Patient is tachypneic and hypoxemic at presentation with improvement of O2 to 98% on 2L NC.  Patient speaking in full sentences.  complains of pain in the right upper/lateral back.  Has hx of fall with rib fractures.  no large pneumo on stat CXR upon presentation.  Non toxic.  No aggravating or relieving factors. No other pertinent PMH.  No other pertinent PSH.  No other pertinent FHx.  Patient denies EtOH/tobacco/illicit substance use. No fever/chills, No photophobia/eye pain/changes in vision, No ear pain/sore throat/dysphagia, No chest pain/palpitations, no cough/wheeze/stridor, No abdominal pain, No N/V/D, no dysuria/frequency/discharge, No neck/back pain, no rash, no changes in neurological status/function.     Gen: Alert, NAD  Head: NC, AT, PERRL, EOMI, normal lids/conjunctiva  ENT: normal hearing, patent oropharynx without erythema/exudate, uvula midline  Neck: +supple, no tenderness/meningismus/JVD, +Trachea midline  Pulm: Bilateral BS, normal resp effort, no wheeze/stridor/retractions  CV: RRR, no R/G, +dist pulses  Abd: soft, NT/ND, +BS, no hepatosplenomegaly  Mskel: TTP right posterior/lateral ribs/thorax, no focal spinal TTP, no edema/erythema/cyanosis  Skin: no rash  Neuro: AAOx3, no gross sensory/motor deficits, Patient seen with resident.  PMH of  afib on coumadin, CHF, prostate ca s/p radiation, GERD, HTN, frequent falls.  Patient states fall about one week ago.  Had xray at [facility] and "they said no fractures."  Patient is tachypneic and hypoxemic at presentation with improvement of O2 to 98% on 2L NC.  Patient speaking in full sentences.  complains of pain in the right upper/lateral back.  Has hx of fall with rib fractures.  no large pneumo on stat CXR upon presentation.  Non toxic.  No aggravating or relieving factors. No other pertinent PMH.  No other pertinent PSH.  No other pertinent FHx.  Patient denies EtOH/tobacco/illicit substance use. No fever/chills, No photophobia/eye pain/changes in vision, No ear pain/sore throat/dysphagia, No chest pain/palpitations, no cough/wheeze/stridor, No abdominal pain, No N/V/D, no dysuria/frequency/discharge, No neck/back pain, no rash, no changes in neurological status/function.     Gen: Alert, NAD  Head: NC, AT, PERRL, EOMI, normal lids/conjunctiva  ENT: normal hearing, patent oropharynx without erythema/exudate, uvula midline  Neck: +supple, no tenderness/meningismus/JVD, +Trachea midline  Pulm: Bilateral BS, normal resp effort, no wheeze/stridor/retractions  CV: RRR, no R/G, +dist pulses  Abd: soft, NT/ND, +BS, no hepatosplenomegaly  Mskel: TTP right posterior/lateral ribs/thorax, no focal spinal TTP, no edema/erythema/cyanosis  Skin: no rash  Neuro: AAOx3, no gross sensory/motor deficits,    Patient presents with hypoxemia in setting of recent fall with back/thoracic pain.  Lab values with leukocytosis.  CT with rib and vertebral fracture.  will admit to medicine for definitive care.

## 2022-02-27 NOTE — H&P ADULT - HISTORY OF PRESENT ILLNESS
89M with PMHX AFib s/p Ablation on Warfarin, Aortic Stenosis s/p AVR, CHFrEF c/b ICM, GERD, Osteoarthritis, Frequent Falls, Prostate CA s/p XRT, BPH s/p recurrent falls last one 1 week ago c/o back pain and rib pain. Patient went to ER after fall a week ago but left due to long line and went to [facility] for XR was told no acute fractures. Patient now returns to [facility] ER c/o back/rib pain. Found to have R rib fracture and acute on chronic T4 fracture. Trauma consulted. Patient noted to be significantly hypoxic with tachypnea placed on O2 via NC mild improvement then transitioned to HFNC on 35L now. Tachypneic with RR 30s satting SPO2 86% on RA. No reported Home O2 use. Hypoxia improved on HFNC. Admitted to medicine for further workup. Patient seen/examined. Reports DNR/DNI but doesn't have paperwork says daughter has all paperwork. ROS negative unless mentioned.

## 2022-02-27 NOTE — H&P ADULT - NSHPPHYSICALEXAM_GEN_ALL_CORE
Vital Signs Last 24 Hrs  T(C): 37.1 (26 Feb 2022 23:19), Max: 37.1 (26 Feb 2022 23:19)  T(F): 98.8 (26 Feb 2022 23:19), Max: 98.8 (26 Feb 2022 23:19)  HR: 70 (26 Feb 2022 23:19) (69 - 100)  BP: 110/67 (26 Feb 2022 23:19) (110/67 - 144/85)  BP(mean): --  RR: 30 (26 Feb 2022 23:19) (28 - 30)  SpO2: 99% (26 Feb 2022 23:19) (86% - 100%)    Constitutional: Well-appearing, NAD, VSS  Head: NC/AT  Eyes: PERRL, EOMI, anicteric sclera, conjunctiva WNL  ENT: Normal Pharynx, MMM, No tonsillar exudate/erythema  Neck: Supple, Non-tender  Chest: +R Chest TTP, PPM  Cardio: RRR, s1/s2, no appreciable murmurs/rubs/gallops  Resp: BS CTA bilaterally, no wheezing/rhonchi/rales  Abd: Soft, Non-tender, Non-distended, no rebound/guarding/rigidity  : not examined  Rectal: not examined  MSK: moving all extremities, no motor weakness, full ROM x4  Ext: palpable distal pulses, good capillary refill   Psych: appropriate, cooperative  Neuro: CN II-XII grossly intact, no focal deficits  Skin: Warm/Dry. No rashes.

## 2022-02-27 NOTE — CONSULT NOTE ADULT - SUBJECTIVE AND OBJECTIVE BOX
ACUTE CARE SURGERY CONSULT    HPI:    89 yo male with a PMH of atrial fibrillation (on Coumadin), CHF, prostate cancer (on radiation), HTN, frequent falls who presented to the ED with a chief complaint of pain s/p fall. He states that he fell approximately 1 week ago, and was experiencing severe R upper back pain since the incident. He came to the ED last week right after the fall, but left because of the long wait time.     In the ED, his only active complaint was R upper back pain. He was found to be hypoxic (86%) on RA, and placed on high flow nasal cannula. A full pan scan was completed including CT head, C/T/L spine, chest, abdomen, and pelvis. Findings were notable for an acute fracture of the right 9th rib. There was also worsening anterior compression deformity of the T4 vertebral body.     Trauma surgery was consulted for further management.     PAST MEDICAL HISTORY:  Afib    Tachycardia    GERD (gastroesophageal reflux disease)    Cyst of kidney, acquired    Cyst of pancreas    Prostate CA    Irritable bowel syndrome with diarrhea    VTE (venous thromboembolism)    HTN (hypertension)    CHF with cardiomyopathy        PAST SURGICAL HISTORY:  No significant past surgical history    H/O total knee replacement, right    Cardiac resynchronization therapy pacemaker (CRT-P) in place    S/P bilateral inguinal hernia repair    Status post left partial knee replacement        ALLERGIES:  antibiotic (Rash)  tetracycline (Rash)      FAMILY HISTORY: Noncontributory    SOCIAL HISTORY: Denies tobacco, EtOH, illicit substance use.     HOME MEDICATIONS:    MEDICATIONS  (STANDING):    MEDICATIONS  (PRN):      VITALS & I/Os:  Vital Signs Last 24 Hrs  T(C): 36.7 (26 Feb 2022 15:04), Max: 36.7 (26 Feb 2022 15:04)  T(F): 98 (26 Feb 2022 15:04), Max: 98 (26 Feb 2022 15:04)  HR: 70 (26 Feb 2022 17:40) (70 - 100)  BP: 127/71 (26 Feb 2022 17:40) (127/71 - 144/85)  BP(mean): --  RR: 28 (26 Feb 2022 17:40) (28 - 30)  SpO2: 100% (26 Feb 2022 17:40) (86% - 100%)  CAPILLARY BLOOD GLUCOSE      POCT Blood Glucose.: 114 mg/dL (26 Feb 2022 15:07)      I&O's Summary      GENERAL: Alert, well developed, in no acute distress.  HEENT: PERRLA. EOMI. MMM.  Old bruising noted in R anterior forehead  RESPIRATORY: Nonlabored on HFNC  CARDIOVASCULAR: RRR.   GASTROINTESTINAL: Abdomen soft, NT, ND, -R/-G.  No pulsatile mass, no flank tenderness or suprapubic tenderness. No hepatosplenomegaly.  NEUROLOGIC: Cranial nerves II-XII grossly intact. No focal neurological deficits. Moves all extremities spontaneously. Sensation intact bilaterally.  INTEGUMENTARY: No overt rashes or lesions, petechia or purpura. Good turgor. No edema.  MUSCULOSKELETAL: Old bruising noted on RUE.   LYMPHATIC: Palpation of neck reveals no swelling or tenderness of neck nodes. Palpation of groin reveals no swelling or tenderness of groin nodes.    LABS:                        12.2   24.57 )-----------( 164      ( 26 Feb 2022 15:25 )             39.7     02-26    145  |  102  |  38.7<H>  ----------------------------<  114<H>  4.1   |  32.0<H>  |  0.84    Ca    9.0      26 Feb 2022 15:25    TPro  6.5<L>  /  Alb  3.6  /  TBili  0.6  /  DBili  x   /  AST  26  /  ALT  40  /  AlkPhos  173<H>  02-26    Lactate:    PT/INR - ( 26 Feb 2022 15:25 )   PT: 40.5 sec;   INR: 3.45 ratio         PTT - ( 26 Feb 2022 15:25 )  PTT:41.1 sec  ABG - ( 26 Feb 2022 15:44 )  pH, Arterial: 7.360 pH, Blood: x     /  pCO2: 64    /  pO2: 145   / HCO3: 36    / Base Excess: 10.8  /  SaO2: 99.7        CARDIAC MARKERS ( 26 Feb 2022 15:25 )  x     / 0.02 ng/mL / x     / x     / x          IMAGING:      ACC: [id number] EXAM:  CT ABDOMEN AND PELVIS                         ACC: [id number] EXAM:  CT ANGIO CHEST PULM [facility]                          PROCEDURE DATE:  02/26/2022          INTERPRETATION:  CLINICAL INFORMATION: Hypoxia chest pain, trauma    COMPARISON: CT chest abdomen and pelvis 7/23/2021    CONTRAST/COMPLICATIONS:  IV Contrast: Omnipaque 350  96 cc administered   4 cc discarded  Oral Contrast: NONE  Complications: None reported at time of study completion    PROCEDURE:  CT Angiography of the Chest was performed followed by portal venous phase   imaging of the Abdomen and Pelvis.  Sagittal and coronal reformats were performed as well as 3D (MIP)   reconstructions.    FINDINGS:  CHEST:  LUNGS AND LARGE AIRWAYS: Patent central airways. Stable mild apical   scarrings. Stable mild bibasilar atelectasis. The lungs are otherwise   clear.  PLEURA: No pleural effusion or pneumothorax.  VESSELS: No pulmonary embolus.  HEART: Cardiomegaly. Biventricular pacer in place. No pericardial   effusion.  MEDIASTINUM AND LIU: No lymphadenopathy.  CHEST WALL AND LOWER NECK: Acute fracture of the right 9th rib. Multiple   bilateral chronic/subacute rib fractures. Gynecomastia.    ABDOMEN AND PELVIS:  LIVER: Within normal limits.  BILE DUCTS: Normal caliber.  GALLBLADDER: Cholelithiasis.  SPLEEN: Within normal limits.  PANCREAS: Within normal limits.  ADRENALS: Within normal limits.  KIDNEYS/URETERS: Right renal cyst. No hydronephrosis.    BLADDER: Small diverticula  REPRODUCTIVE ORGANS: Prostate within normal limits.    BOWEL: No bowel obstruction. Sigmoid colon diverticulosis.  PERITONEUM: No ascites.  VESSELS: Arterial calcifications.  RETROPERITONEUM/LYMPH NODES: No lymphadenopathy.  ABDOMINAL WALL: Within normal limits.  BONES: New probable acute on chronic nondisplaced fracture at T4. Chronic   fractures at T1, T2, T3, T8-T9 and T11, L1. Old fracture deformity of   bilateral pubic rami. Old fracture deformity of the left acetabulum.    IMPRESSION:  No pulmonary embolus.  Acute fracture of the right 9th rib. No pleural effusion or pneumothorax.  No acute finding within abdomen or pelvis.    --- End of Report ---            ZEB ANGELES MD; Attending Radiologist  This document has been electronically signed. Feb 26 2022  5:41PM  
INTERVAL HPI/OVERNIGHT EVENTS:  89 Y/O male w/ Hx of AFib s/p Ablation on Warfarin, Aortic Stenosis s/p AVR, CHFrEF c/b ICM, GERD, Osteoarthritis, Frequent Falls, Prostate CA s/p XRT, BPH admitted s/p fall w/ c/o back pain and rib pain. Pt states that he lives at home, has 24 hour aide and can ambulate about 50 feet w/ walker at baseline. Pt states that he has had back pain for about 5 months and has been assessed for pain by outpatient MD. Pt reports that while he was at home he sitting in a chair, the chair "gave out" and he fell backward onto his back. CT T/L spine read as showing anterior T4 vertebral body compression deformity. Neurosurgery called to evaluate.       Vital Signs Last 24 Hrs  T(C): 36.7 (2022 11:00), Max: 37.1 (2022 23:19)  T(F): 98.1 (2022 11:00), Max: 98.8 (2022 23:19)  HR: 59 (2022 11:00) (59 - 100)  BP: 130/80 (2022 11:00) (110/67 - 144/85)  BP(mean): --  RR: 18 (2022 11:00) (18 - 30)  SpO2: 94% (2022 11:00) (83% - 100%)      PHYSICAL EXAM:  GENERAL: NAD, well-groomed  HEAD:  Atraumatic, normocephalic  MENTAL STATUS: AAO x3; Awake; Opens eyes spontaneously; Appropriately conversant without aphasia; following simple commands  CRANIAL NERVES: VAISHNAVI; EOMI; no facial asymmetry; facial sensation grossly intact to light touch b/l  MOTOR: strength 5/5 B/L upper extremities Left H 5/5, K 3/5, D/P/E 5/5, Right H 2/5, K/D/P/E 5/5; sensation grossly intact all extremities  CHEST/LUNG: +breath sounds bilaterally; no rales, rhonchi, wheezing, appreciated  HEART: +S1/+S2; irregular rate and irregular rhythm; no murmurs, rubs, appreciated  ABDOMEN: Soft, nontender, nondistended; bowel sounds present   EXTREMITIES: no clubbing, cyanosis  SKIN: Warm, dry; no rashes or lesions      LABS:                        11.6   15.52 )-----------( 131      ( 2022 06:55 )             37.1         144  |  104  |  33.8<H>  ----------------------------<  113<H>  4.0   |  31.0<H>  |  0.71    Ca    8.7      2022 06:55    TPro  6.5<L>  /  Alb  3.6  /  TBili  0.6  /  DBili  x   /  AST  26  /  ALT  40  /  AlkPhos  173<H>      PT/INR - ( 2022 06:55 )   PT: 29.7 sec;   INR: 2.54 ratio         PTT - ( 2022 06:55 )  PTT:35.6 sec  Urinalysis Basic - ( 2022 02:12 )    Color: Yellow / Appearance: Clear / S.015 / pH: x  Gluc: x / Ketone: Trace  / Bili: Negative / Urobili: Negative mg/dL   Blood: x / Protein: 30 mg/dL / Nitrite: Negative   Leuk Esterase: Negative / RBC: 3-5 /HPF / WBC 0-2 /HPF   Sq Epi: x / Non Sq Epi: Occasional / Bacteria: x      RADIOLOGY & ADDITIONAL TESTS:      ACC: [id number] EXAM:  CT REFORM SPINE L                        ACC: [id number] EXAM:  CT REFORM SPINE T                        PROCEDURE DATE:  2022    IMPRESSION:  *  DIFFUSE OSTEOPOROSIS.  *  EXAGGERATION OF THE NORMAL THORACIC KYPHOSIS AND LUMBAR LORDOSIS. MILD   TO MODERATE THORACIC DEXTROSCOLIOSIS.  *  WORSENING ANTERIOR COMPRESSION DEFORMITY T4 VERTEBRAL BODY.  *  CENTRAL ENDPLATE DEFORMITY SUPERIOR ENDPLATE OF L1 NEW FROM PRIOR   STUDY.  *  MULTIPLE CHRONIC COMPRESSION DEFORMITIES AS DESCRIBED

## 2022-02-27 NOTE — CONSULT NOTE ADULT - ASSESSMENT
89 Y/O male admitted s/p fall w/ c/o back pain and rib pain fount w/ anterior T4 vertebral body compression deformity on CT T/L spine reformats. Neurosurgery called to evaluate.     -Case discussed w/ Dr. Greco  -pain control as needed, avoid over sedation  -no neurosurgical intervention recommended  -follow up w/ Dr. Greco 2 weeks after discharge  -neurosurgery signing off  -reconsult PRN  
89 yo male with a PMH of atrial fibrillation (on Coumadin), CHF, prostate cancer (on radiation), HTN, frequent falls who presented to the ED with a chief complaint of pain s/p fall, found to have an acute fracture of the right 9th rib, and worsening anterior compression deformity of the T4 vertebral body. Trauma surgery was consulted for further management.     - No acute trauma surgical intervention at this time  - Recommend spine consult for management of worsening anterior compression deformity of T4 vertebral body fracture

## 2022-02-27 NOTE — H&P ADULT - ASSESSMENT
ASSESSMENT:  89M with PMHX AFib s/p Ablation on Warfarin, Aortic Stenosis s/p AVR, CHFrEF c/b ICM, GERD, Osteoarthritis, Frequent Falls, Prostate CA s/p XRT, BPH s/p recurrent falls last one 1 week ago c/o back pain and rib pain admitted for acute hypoxic respiratory failure on HFNC and Recurrent Fall, Rib Fracture, Acute on Chronic T4 Fracture.    PLAN:  Acute Hypoxic Respiratory Failure 2/2 Acute R Rib Fx and/or Acute COPD/Asthma Exacerbation  -ABG pH 7.39 pO2 ok on HFNC  -Cont 35L O2 via HFNC Titrate FIO2/LPM as able  -COVID19 Negative  -RVP Negative  -No obvious PNA on imaging  -Incentive Spirometer  -Solumedrol 40mg IV q8  -Duoneb q6 ATC  -VTE PPX SCD/Warfarin    AFib s/p Ablation, CHFrEF, ICM, AS s/p Bioprosthetic AVR  -Most Recent TTE with improved LVEF 50-55%  -Continue Metoprolol Succinate 75mg PO q24  -Supratherapeutic INR 3.45   -Hold Warfarin 2.5mg PO q24  -Recheck INR in AM     Acute on Chronic T4 Fracture, R Rib Fx  -See CTH/CT Neck above  -Bedrest  -Fall Precautions  -Trauma Consulted no acute intervention    Leukocytosis  -Trend CBC  -COVID/RVP Negative  -No obvious PNA on imaging  -Hx Recent Pseudomonas UTI (Treated with Macrobid last admission?)  -Recheck UA + UCX   -Discontinue Ceftriaxone/Hold Further ABX for now pending workup above    Goals of Care  -Patient states he is DNR/DNI but says Daughter has all paperwork  -Please obtain advance directives from Daughter in AM

## 2022-02-27 NOTE — H&P ADULT - NSHPLABSRESULTS_GEN_ALL_CORE
CTH WO/CT Neck WO IMPRESSION:  There is no skull fracture, intracranial hemorrhage or mass effect.  New probable acute on chronic nondisplaced fracture at T4.  Chronic fractures at T1, T2 and T3.    CTA Chest IVC/CT ABD/Pelvis IMPRESSION:  No pulmonary embolus. Acute fracture of the right 9th rib. No pleural effusion or pneumothorax.  No acute finding within abdomen or pelvis.    CT LSPINE/TSPINE IMPRESSION:  *  DIFFUSE OSTEOPOROSIS.  *  EXAGGERATION OF THE NORMAL THORACIC KYPHOSIS AND LUMBAR LORDOSIS. MILD TO MODERATE THORACIC DEXTROSCOLIOSIS.  *  WORSENING ANTERIOR COMPRESSION DEFORMITY T4 VERTEBRAL BODY.  *  CENTRAL ENDPLATE DEFORMITY SUPERIOR ENDPLATE OF L1 NEW FROM PRIOR STUDY.  *  MULTIPLE CHRONIC COMPRESSION DEFORMITIES AS DESCRIBED    EKG SR with CHB V-paced with PVCs Abnormal EKG

## 2022-02-27 NOTE — CHART NOTE - NSCHARTNOTEFT_GEN_A_CORE
Patient was seen and examined at bedside with patients daughter Jessica at bedside. Patient and daughter report that patient is wheel chair bound at rest but has slipped out of his wheel chair and wanted to come in for evaluation. Discussed CT scan finding results with patient and daughter. Patient was placed on HFNC last night but saturating at 100%. Tapered down to 2LNC and will transition to RA. Patient is not a smoker and has no hx of asthma/copd. Changed duonebs to PRN and discontinued steroids as patient has no wheezing and without any respiratory complaints. Neurosurgery was consulted in regards to acute T4 fracture and was verbally informed to obtain MRI. MRI ordered. Discussed GOC with madalyn and her daughter at bedside who confirmed DNR/DNI and MOLST form was filled out. Rest of care as per H&P from earlier in the day. Patient was seen and examined at bedside with patients daughter Jessica at bedside. Patient and daughter report that patient is wheel chair bound at rest but has slipped out of his wheel chair and wanted to come in for evaluation. Discussed CT scan finding results with patient and daughter. Patient was placed on HFNC last night but saturating at 100%. Tapered down to 2LNC and will transition to RA. Patient is not a smoker and has no hx of asthma/copd. Changed duonebs to PRN and discontinued steroids as patient has no wheezing and without any respiratory complaints. Neurosurgery was consulted in regards to acute T4 fracture. Held off MRI as patient with PPM. Leukocytosis noted but no signs or infection. Likely reactive at this point and will continue to monitor. Discussed GOC with madalyn and her daughter at bedside who confirmed DNR/DNI and MOLST form was filled out. Rest of care as per H&P from earlier in the day.

## 2022-02-27 NOTE — PATIENT PROFILE ADULT - FALL HARM RISK - HARM RISK INTERVENTIONS

## 2022-03-01 NOTE — PHYSICAL THERAPY INITIAL EVALUATION ADULT - GENERAL OBSERVATIONS, REHAB EVAL
Pt received on 5TWR, pt ok for PT by CHRISTI Singh. updated orders received that TLSO brace is no longer needed. pt's dtr present at bedside t/o eval. pt observed semi-hernández in bed with IV lock, on room air, pleasant, cooperative, A&O and c/o 0/10 pain t/o eval.

## 2022-03-01 NOTE — PHYSICAL THERAPY INITIAL EVALUATION ADULT - DISCHARGE DISPOSITION, PT EVAL
home with 24/7 assist, WC level/mechanical sebastian lift and home PT pending progress, pending confirmation of family/aide's ability/willingness to provide 24/7 assistance and pending stairs assessment (if applicable), as pt is an increased falls risk, deeming pt unsafe to return home at this time.

## 2022-03-01 NOTE — PHYSICAL THERAPY INITIAL EVALUATION ADULT - MANUAL MUSCLE TESTING RESULTS, REHAB EVAL
bilateral UE's + bilateral LE's grossly assessed via functional assessment of sit to stand transfer, 3-/5

## 2022-03-01 NOTE — PROGRESS NOTE ADULT - REASON FOR ADMISSION
Recurrent Fall, Rib Fracture, Acute on Chronic T4 Fracture, Acute Hypoxic Respiratory Failure
Recurrent Fall, Rib Fracture, Acute on Chronic T4 Fracture, Acute Hypoxic Respiratory Failure

## 2022-03-01 NOTE — PROGRESS NOTE ADULT - ASSESSMENT
89 years old male with history of Persistent A. Fib on Coumadin, Chronic Systolic Heart Failure, Prostate Cancer s/p XRT, GERD, Osteoarthritis and Recurrent Falls presented with back and right sided chest pain after a fall a week ago.     1) Acute Respiratory Failure with Hypoxia   - Likely due to pain from rib fracture and T4 fracture   - No history of Asthma or COPD  - Weaned off HFNC  - Steroid taper   - Nebs PRN  - Incentive spirometry     2) Recurrent Falls   - Right 9th Rib Fracture / Acute on Chronic T4 Fracture   - Pain control  - TLSO Brace  - PT  - Incentive spirometry   - ACS / Neurosurgery Consults noted: no intervention       3) Persistent A. Fib   - Rate controlled   - Continue Metoprolol  - Coumadin 2 mg tonight   - Repeat INR in am     4) Chronic Systolic Heart Failure   - Euvolemic   - Continue Metoprolol     5) Leukocytosis   - Reactive   - Monitor     DVT Prophylaxis -- Patient is on Coumadin     Advance Directives: DNR/I.    Dispo: Likely home in 1-2 days.     
89 years old male with history of Persistent A. Fib on Coumadin, Chronic Systolic Heart Failure, Prostate Cancer s/p XRT, GERD, Osteoarthritis and Recurrent Falls presented with back and right sided chest pain after a fall a week ago.     1) Acute Respiratory Failure with Hypoxia   - Likely due to pain from rib fracture and T4 fracture   - No history of Asthma or COPD  - Weaned off HFNC  - Steroid taper   - Nebs PRN  - Incentive spirometry     2) Recurrent Falls   - Right 9th Rib Fracture / Acute on Chronic T4 Fracture   - Pain control  - TLSO Brace  - PT  - Incentive spirometry   - ACS / Neurosurgery Consults noted: no intervention       3) Persistent A. Fib   - Rate controlled   - Continue Metoprolol  - Coumadin 3 mg tonight   - Repeat INR in am     4) Chronic Systolic Heart Failure   - Euvolemic   - Continue Metoprolol     5) Leukocytosis   - Likely reactive  - Has baseline leukocytosis   - Monitor     6) Anemia / Thrombocytopenia   - Chronic and stable  - Monitor     DVT Prophylaxis -- Patient is on Coumadin     Advance Directives: DNR/I.    Dispo: Home vs [facility] pending PT Eval.

## 2022-03-01 NOTE — PHYSICAL THERAPY INITIAL EVALUATION ADULT - IMPAIRED TRANSFERS: SIT/STAND, REHAB EVAL
wide base of support/impaired balance/cognition/impaired postural control/decreased ROM/decreased strength

## 2022-03-01 NOTE — PHYSICAL THERAPY INITIAL EVALUATION ADULT - PATIENT/FAMILY/SIGNIFICANT OTHER GOALS STATEMENT, PT EVAL
pt states that he doesn't want to go to rehab. pt's dtr stated (privately) that he should really go into a home, but pt doesn't want too. He has enough money to pay for private hire, so if he wants to stay home, then he stays home. family training was set up with private hired aide for 10am 3/2/22 to ensure safety with functional mobility management.

## 2022-03-01 NOTE — PROGRESS NOTE ADULT - SUBJECTIVE AND OBJECTIVE BOX
Acute respiratory failure with hypoxia    HPI:  89M with PMHX AFib s/p Ablation on Warfarin, Aortic Stenosis s/p AVR, CHFrEF c/b ICM, GERD, Osteoarthritis, Frequent Falls, Prostate CA s/p XRT, BPH s/p recurrent falls last one 1 week ago c/o back pain and rib pain. Patient went to ER after fall a week ago but left due to long line and went to [facility] for XR was told no acute fractures. Patient now returns to [facility] ER c/o back/rib pain. Found to have R rib fracture and acute on chronic T4 fracture. Trauma consulted. Patient noted to be significantly hypoxic with tachypnea placed on O2 via NC mild improvement then transitioned to HFNC on 35L now. Tachypneic with RR 30s satting SPO2 86% on RA. No reported Home O2 use. Hypoxia improved on HFNC. Admitted to medicine for further workup. Patient seen/examined. Reports DNR/DNI but doesn't have paperwork says daughter has all paperwork. ROS negative unless mentioned.  (27 Feb 2022 02:15)    Interval History:  Patient was seen and examined at bedside around 8 am.  Chest pain is better controlled.   Denies shortness of breath, cough or palpitations. Feels comfortably with supplemental oxygen.   Denies back pain or paresthesia.     ROS:  As per interval history otherwise unremarkable.    PHYSICAL EXAM:  Vital Signs   T(C): 36.8 (01 Mar 2022 11:14), Max: 36.8 (01 Mar 2022 11:14)  T(F): 98.2 (01 Mar 2022 11:14), Max: 98.2 (01 Mar 2022 11:14)  HR: 135 (01 Mar 2022 11:14) (69 - 135)  BP: 138/84 (01 Mar 2022 11:14) (124/67 - 138/84)  RR: 24 (01 Mar 2022 11:14) (18 - 24)  SpO2: 96% (01 Mar 2022 11:14) (96% - 98%)  General: Elderly male lying in bed comfortably. No acute distress but tachypneic.   HEENT: EOMI. Clear conjunctivae. Moist mucus membrane  Neck: Supple.    Chest: Good air entry. No wheezing, rales or rhonchi. + chest wall tenderness (mild) on right side.   Heart: Normal S1 & S2. RRR.   Abdomen: Soft. Non-tender. Non-distended. + BS  Ext: No pedal edema. No calf tenderness   Neuro: Awake and alert. No focal deficit. No speech disorder  Skin: Warm and Dry  Psychiatry: Normal mood and affect    I&O's Summary    28 Feb 2022 07:01  -  01 Mar 2022 07:00  --------------------------------------------------------  IN: 232 mL / OUT: 100 mL / NET: 132 mL    LABS:                        10.6   15.89 )-----------( 124      ( 01 Mar 2022 05:48 )             34.9     03-01    143  |  104  |  44.0<H>  ----------------------------<  116<H>  4.9   |  31.0<H>  |  0.79    Ca    8.9      01 Mar 2022 05:48  Mg     2.1     03-01      PT/INR - ( 01 Mar 2022 05:48 )   PT: 24.3 sec;   INR: 2.08 ratio       PTT - ( 28 Feb 2022 06:57 )  PTT:27.7 sec    Blood Culture: 02-27 @ 06:35  Organism --  Gram Stain Blood -- Gram Stain --  Specimen Source Clean Catch Clean Catch (Midstream)  Culture-Blood --    RADIOLOGY & ADDITIONAL STUDIES:  Reviewed     MEDICATIONS  (STANDING):  cholecalciferol 400 Unit(s) Oral daily  lidocaine   4% Patch 1 Patch Transdermal daily  lidocaine   4% Patch 1 Patch Transdermal daily  metoprolol succinate ER 75 milliGRAM(s) Oral daily  pantoprazole    Tablet 40 milliGRAM(s) Oral before breakfast  polyethylene glycol 3350 17 Gram(s) Oral two times a day  predniSONE   Tablet 30 milliGRAM(s) Oral once  PreserVision AREDS 2 Formula 1 Capsule(s) 1 Capsule(s) Oral two times a day  senna 1 Tablet(s) Oral once  senna 2 Tablet(s) Oral at bedtime  tamsulosin 0.8 milliGRAM(s) Oral at bedtime    MEDICATIONS  (PRN):  acetaminophen     Tablet .. 650 milliGRAM(s) Oral every 6 hours PRN Temp greater or equal to 38C (100.4F), Mild Pain (1 - 3)  albuterol/ipratropium for Nebulization 3 milliLiter(s) Nebulizer every 6 hours PRN Shortness of Breath  aluminum hydroxide/magnesium hydroxide/simethicone Suspension 30 milliLiter(s) Oral every 4 hours PRN Dyspepsia  artificial  tears Solution 1 Drop(s) Both EYES every 6 hours PRN Dry Eyes  melatonin 3 milliGRAM(s) Oral at bedtime PRN Insomnia  ondansetron Injectable 4 milliGRAM(s) IV Push every 8 hours PRN Nausea and/or Vomiting        
Acute respiratory failure with hypoxia    HPI:  89M with PMHX AFib s/p Ablation on Warfarin, Aortic Stenosis s/p AVR, CHFrEF c/b ICM, GERD, Osteoarthritis, Frequent Falls, Prostate CA s/p XRT, BPH s/p recurrent falls last one 1 week ago c/o back pain and rib pain. Patient went to ER after fall a week ago but left due to long line and went to [facility] for XR was told no acute fractures. Patient now returns to [facility] ER c/o back/rib pain. Found to have R rib fracture and acute on chronic T4 fracture. Trauma consulted. Patient noted to be significantly hypoxic with tachypnea placed on O2 via NC mild improvement then transitioned to HFNC on 35L now. Tachypneic with RR 30s satting SPO2 86% on RA. No reported Home O2 use. Hypoxia improved on HFNC. Admitted to medicine for further workup. Patient seen/examined. Reports DNR/DNI but doesn't have paperwork says daughter has all paperwork. ROS negative unless mentioned.  (2022 02:15)    Interval History:  Patient was seen and examined at bedside around 10:15 am.  Has some right sided chest pain.   Denies shortness of breath, cough or palpitations.   Denies back pain or paresthesia.     ROS:  As per interval history otherwise unremarkable.    PHYSICAL EXAM:  Vital Signs   T(C): 36.3 (2022 10:58), Max: 36.8 (2022 04:47)  T(F): 97.3 (2022 10:58), Max: 98.3 (2022 04:47)  HR: 69 (2022 12:23) (69 - 89)  BP: 117/71 (2022 10:58) (117/71 - 149/80)  RR: 16 (2022 10:58) (16 - 19)  SpO2: 97% (2022 12:23) (97% - 100%)  General: Elderly male lying in bed comfortably. No acute distress but tachypneic.   HEENT: EOMI. Clear conjunctivae. Moist mucus membrane  Neck: Supple.    Chest: Good air entry. No wheezing, rales or rhonchi. + chest wall tenderness on right side.   Heart: Normal S1 & S2. RRR.   Abdomen: Soft. Non-tender. Non-distended. + BS  Ext: No pedal edema. No calf tenderness   Neuro: Awake and alert. No focal deficit. No speech disorder  Skin: Warm and Dry  Psychiatry: Normal mood and affect    I&O's Summary    2022 07:01  -  2022 15:31  --------------------------------------------------------  IN: 232 mL / OUT: 0 mL / NET: 232 mL    LABS:                        11.3   16.50 )-----------( 130      ( 2022 06:57 )             37.0         144  |  104  |  42.0<H>  ----------------------------<  121<H>  4.5   |  32.0<H>  |  0.95    Ca    9.0      2022 06:57    PT/INR - ( 2022 06:57 )   PT: 23.9 sec;   INR: 2.05 ratio       PTT - ( 2022 06:57 )  PTT:27.7 sec  Urinalysis Basic - ( 2022 02:12 )    Color: Yellow / Appearance: Clear / S.015 / pH: x  Gluc: x / Ketone: Trace  / Bili: Negative / Urobili: Negative mg/dL   Blood: x / Protein: 30 mg/dL / Nitrite: Negative   Leuk Esterase: Negative / RBC: 3-5 /HPF / WBC 0-2 /HPF   Sq Epi: x / Non Sq Epi: Occasional / Bacteria: x    RADIOLOGY & ADDITIONAL STUDIES:  Reviewed     MEDICATIONS  (STANDING):  cholecalciferol 400 Unit(s) Oral daily  lidocaine   4% Patch 1 Patch Transdermal daily  lidocaine   4% Patch 1 Patch Transdermal daily  metoprolol succinate ER 75 milliGRAM(s) Oral daily  pantoprazole    Tablet 40 milliGRAM(s) Oral before breakfast  PreserVision AREDS 2 Formula 1 Capsule(s) 1 Capsule(s) Oral two times a day  tamsulosin 0.8 milliGRAM(s) Oral at bedtime    MEDICATIONS  (PRN):  acetaminophen     Tablet .. 650 milliGRAM(s) Oral every 6 hours PRN Temp greater or equal to 38C (100.4F), Mild Pain (1 - 3)  albuterol/ipratropium for Nebulization 3 milliLiter(s) Nebulizer every 6 hours PRN Shortness of Breath  aluminum hydroxide/magnesium hydroxide/simethicone Suspension 30 milliLiter(s) Oral every 4 hours PRN Dyspepsia  artificial  tears Solution 1 Drop(s) Both EYES every 6 hours PRN Dry Eyes  melatonin 3 milliGRAM(s) Oral at bedtime PRN Insomnia  ondansetron Injectable 4 milliGRAM(s) IV Push every 8 hours PRN Nausea and/or Vomiting

## 2022-03-01 NOTE — PHYSICAL THERAPY INITIAL EVALUATION ADULT - PHYSICAL ASSIST/NONPHYSICAL ASSIST: SIT/SUPINE, REHAB EVAL
required increased assistance  to help get bilateral LE's into bed/assume proper semi-hernández position + verbal cues for proper hand placement./1 person assist

## 2022-03-01 NOTE — PHYSICAL THERAPY INITIAL EVALUATION ADULT - PERTINENT HX OF CURRENT PROBLEM, REHAB EVAL
pt was BIBA 2/2 back pain and s/p mechanical fall at home x 1 week PTA. scans revealed an acute on chronic displaced T4 fx, chronic T1-T3 fx, acute fx of right anterior 9th rib and central endplate deformity superior endplate of L1.

## 2022-03-01 NOTE — CHART NOTE - NSCHARTNOTEFT_GEN_A_CORE
Neurosurgery Note    Pt seen in consultation on Sunday 2/27 acute on chronic T4 fracture for recurrent falls.     No neurosurgical intervention needed. No brace needed. Ok to get OOB with physical therapy     D/w Dr Greco again today who reviewed imaging.

## 2022-03-01 NOTE — CHART NOTE - NSCHARTNOTEFT_GEN_A_CORE
This patient will require a hospital bed due to his history of heart failure. Patient requires frequent and immediate position changes that are not feasible in a regular bed with pillows. Pt needs head elevated greater than 30 degrees due to heart failure. Patient is bed bound and requires a sebastian lift for transfers. This patient will require a hospital bed due to his history of heart failure and thoracic spine fx. Patient requires frequent and immediate position changes that are not feasible in a regular bed with pillows. Patient will need gel overlay to prevent skin breakdown. Pt needs head elevated greater than 30 degrees due to heart failure, thoracic fx. Patient is bed bound and requires a marni lift for transfer between bed and wheelchair/commode. Marni lift is required to prevent patient from being confined to one area.

## 2022-03-01 NOTE — PHYSICAL THERAPY INITIAL EVALUATION ADULT - ADDITIONAL COMMENTS
Pt is a questionable historian however, as per pt, pt lives in a private residence (unclear if stairs are present) with a private hired 24/7 aide. pt's dtr confirmed that pt ambulates with RW stand by assist and requires assistance for transfers. pt has RW, transport WC, commode and shower chair. NORIS Avila stated that she is putting in paper work for hospital bed with gel overlay mattress and mechanical sebastian lift.

## 2022-03-01 NOTE — PHYSICAL THERAPY INITIAL EVALUATION ADULT - IMPAIRMENTS FOUND, PT EVAL
aerobic capacity/endurance/arousal, attention, and cognition/gait, locomotion, and balance/gross motor/muscle strength/ROM

## 2022-03-02 ENCOUNTER — TRANSCRIPTION ENCOUNTER (OUTPATIENT)
Age: 87
End: 2022-03-02

## 2022-03-02 VITALS
RESPIRATION RATE: 18 BRPM | TEMPERATURE: 97 F | OXYGEN SATURATION: 92 % | DIASTOLIC BLOOD PRESSURE: 97 MMHG | HEART RATE: 98 BPM | SYSTOLIC BLOOD PRESSURE: 152 MMHG

## 2022-03-02 LAB
HCT VFR BLD CALC: 34.7 % — LOW (ref 39–50)
HGB BLD-MCNC: 10.8 G/DL — LOW (ref 13–17)
INR BLD: 1.82 RATIO — HIGH (ref 0.88–1.16)
MCHC RBC-ENTMCNC: 30.3 PG — SIGNIFICANT CHANGE UP (ref 27–34)
MCHC RBC-ENTMCNC: 31.1 GM/DL — LOW (ref 32–36)
MCV RBC AUTO: 97.2 FL — SIGNIFICANT CHANGE UP (ref 80–100)
PLATELET # BLD AUTO: 121 K/UL — LOW (ref 150–400)
PROTHROM AB SERPL-ACNC: 21.2 SEC — HIGH (ref 10.5–13.4)
RBC # BLD: 3.57 M/UL — LOW (ref 4.2–5.8)
RBC # FLD: 15.9 % — HIGH (ref 10.3–14.5)
WBC # BLD: 15.84 K/UL — HIGH (ref 3.8–10.5)
WBC # FLD AUTO: 15.84 K/UL — HIGH (ref 3.8–10.5)

## 2022-03-02 PROCEDURE — 85610 PROTHROMBIN TIME: CPT

## 2022-03-02 PROCEDURE — 82947 ASSAY GLUCOSE BLOOD QUANT: CPT

## 2022-03-02 PROCEDURE — 80053 COMPREHEN METABOLIC PANEL: CPT

## 2022-03-02 PROCEDURE — 86901 BLOOD TYPING SEROLOGIC RH(D): CPT

## 2022-03-02 PROCEDURE — 71045 X-RAY EXAM CHEST 1 VIEW: CPT

## 2022-03-02 PROCEDURE — 84295 ASSAY OF SERUM SODIUM: CPT

## 2022-03-02 PROCEDURE — 72125 CT NECK SPINE W/O DYE: CPT | Mod: MA

## 2022-03-02 PROCEDURE — 82330 ASSAY OF CALCIUM: CPT

## 2022-03-02 PROCEDURE — 83690 ASSAY OF LIPASE: CPT

## 2022-03-02 PROCEDURE — 85018 HEMOGLOBIN: CPT

## 2022-03-02 PROCEDURE — 85730 THROMBOPLASTIN TIME PARTIAL: CPT

## 2022-03-02 PROCEDURE — 82435 ASSAY OF BLOOD CHLORIDE: CPT

## 2022-03-02 PROCEDURE — 84484 ASSAY OF TROPONIN QUANT: CPT

## 2022-03-02 PROCEDURE — 87086 URINE CULTURE/COLONY COUNT: CPT

## 2022-03-02 PROCEDURE — 84132 ASSAY OF SERUM POTASSIUM: CPT

## 2022-03-02 PROCEDURE — 82962 GLUCOSE BLOOD TEST: CPT

## 2022-03-02 PROCEDURE — 96375 TX/PRO/DX INJ NEW DRUG ADDON: CPT

## 2022-03-02 PROCEDURE — 99291 CRITICAL CARE FIRST HOUR: CPT

## 2022-03-02 PROCEDURE — 96374 THER/PROPH/DIAG INJ IV PUSH: CPT

## 2022-03-02 PROCEDURE — 86850 RBC ANTIBODY SCREEN: CPT

## 2022-03-02 PROCEDURE — 83605 ASSAY OF LACTIC ACID: CPT

## 2022-03-02 PROCEDURE — 74177 CT ABD & PELVIS W/CONTRAST: CPT | Mod: MA

## 2022-03-02 PROCEDURE — 85014 HEMATOCRIT: CPT

## 2022-03-02 PROCEDURE — 81001 URINALYSIS AUTO W/SCOPE: CPT

## 2022-03-02 PROCEDURE — 85025 COMPLETE CBC W/AUTO DIFF WBC: CPT

## 2022-03-02 PROCEDURE — 71275 CT ANGIOGRAPHY CHEST: CPT | Mod: MA

## 2022-03-02 PROCEDURE — 94760 N-INVAS EAR/PLS OXIMETRY 1: CPT

## 2022-03-02 PROCEDURE — 85027 COMPLETE CBC AUTOMATED: CPT

## 2022-03-02 PROCEDURE — 0225U NFCT DS DNA&RNA 21 SARSCOV2: CPT

## 2022-03-02 PROCEDURE — 83735 ASSAY OF MAGNESIUM: CPT

## 2022-03-02 PROCEDURE — 86900 BLOOD TYPING SEROLOGIC ABO: CPT

## 2022-03-02 PROCEDURE — 36415 COLL VENOUS BLD VENIPUNCTURE: CPT

## 2022-03-02 PROCEDURE — 99239 HOSP IP/OBS DSCHRG MGMT >30: CPT

## 2022-03-02 PROCEDURE — 70450 CT HEAD/BRAIN W/O DYE: CPT | Mod: MA

## 2022-03-02 PROCEDURE — 80048 BASIC METABOLIC PNL TOTAL CA: CPT

## 2022-03-02 PROCEDURE — 82803 BLOOD GASES ANY COMBINATION: CPT

## 2022-03-02 PROCEDURE — 94640 AIRWAY INHALATION TREATMENT: CPT

## 2022-03-02 RX ORDER — FUROSEMIDE 40 MG
20 TABLET ORAL ONCE
Refills: 0 | Status: COMPLETED | OUTPATIENT
Start: 2022-03-02 | End: 2022-03-02

## 2022-03-02 RX ORDER — LIDOCAINE 4 G/100G
2 CREAM TOPICAL
Qty: 30 | Refills: 0
Start: 2022-03-02 | End: 2022-03-16

## 2022-03-02 RX ORDER — WARFARIN SODIUM 2.5 MG/1
1 TABLET ORAL
Qty: 0 | Refills: 0 | DISCHARGE

## 2022-03-02 RX ORDER — MORPHINE SULFATE 50 MG/1
1 CAPSULE, EXTENDED RELEASE ORAL ONCE
Refills: 0 | Status: DISCONTINUED | OUTPATIENT
Start: 2022-03-02 | End: 2022-03-02

## 2022-03-02 RX ADMIN — Medication 20 MILLIGRAM(S): at 14:41

## 2022-03-02 RX ADMIN — MORPHINE SULFATE 1 MILLIGRAM(S): 50 CAPSULE, EXTENDED RELEASE ORAL at 12:01

## 2022-03-02 RX ADMIN — LIDOCAINE 1 PATCH: 4 CREAM TOPICAL at 12:06

## 2022-03-02 RX ADMIN — Medication 400 UNIT(S): at 12:06

## 2022-03-02 RX ADMIN — Medication 75 MILLIGRAM(S): at 05:10

## 2022-03-02 RX ADMIN — Medication 1 DROP(S): at 05:10

## 2022-03-02 RX ADMIN — PANTOPRAZOLE SODIUM 40 MILLIGRAM(S): 20 TABLET, DELAYED RELEASE ORAL at 05:09

## 2022-03-02 NOTE — DISCHARGE NOTE NURSING/CASE MANAGEMENT/SOCIAL WORK - NSDCVIVACCINE_GEN_ALL_CORE_FT
influenza, injectable, quadrivalent, preservative free; 21-Sep-2016 06:34; Cynthia James (RN); [employer]; [id number]; IntraMuscular; Deltoid Left.; 0.5 milliLiter(s); VIS (VIS Published: 07-Aug-2015, VIS Presented: 21-Sep-2016);   influenza, injectable, quadrivalent, preservative free; 02-Nov-2019 14:11; Morena Steele (RN); [employer]; [id number] (Exp. Date: 30-Jun-2020); IntraMuscular; Deltoid Left.; 0.5 milliLiter(s); VIS (VIS Published: 15-Aug-2019, VIS Presented: 02-Nov-2019);   Tdap; 19-May-2021 15:47; Elodia Herbert (RN); [employer]; [id number] (Exp. Date: 31-Jul-2022); IntraMuscular; Deltoid Right.; 0.5 milliLiter(s); VIS (VIS Published: 09-May-2013, VIS Presented: 19-May-2021);

## 2022-03-02 NOTE — DISCHARGE NOTE PROVIDER - NSDCMRMEDTOKEN_GEN_ALL_CORE_FT
Imodium 2 mg oral capsule: 1 cap(s) orally every 4 hours, As Needed  lidocaine 4% topical film: Apply topically to affected area (right chest wall and mid back) once a day   Metamucil 3.4 g/11 g oral powder for reconstitution:   Metoprolol Succinate ER 25 mg oral tablet, extended release: 3 tab(s) orally once a day  pantoprazole 40 mg oral delayed release tablet: 1 tab(s) orally once a day  predniSONE 10 mg oral tablet: 1 tab(s) orally once  PreserVision AREDS Lutein oral capsule: 1 dose(s) orally once a day  tamsulosin 0.4 mg oral capsule: 1 cap(s) orally 2 times a day  Tylenol 8 HR Arthritis Pain 650 mg oral tablet, extended release: 2 tab(s) orally every 8 hours  Vitamin D3 400 intl units (10 mcg) oral capsule: 1 cap(s) orally once a day  warfarin 2.5 mg oral tablet: 1 tab(s) orally once a day.    Take 2 tablets tonight and then continue 1 tab daily.   INR check on 3/4/22.   Imodium 2 mg oral capsule: 1 cap(s) orally every 4 hours, As Needed  lidocaine 4% topical film: Apply topically to affected area (right chest wall and mid back) once a day   Metamucil 3.4 g/11 g oral powder for reconstitution:   Metoprolol Succinate ER 25 mg oral tablet, extended release: 3 tab(s) orally once a day  pantoprazole 40 mg oral delayed release tablet: 1 tab(s) orally once a day  predniSONE 10 mg oral tablet: 1 tab(s) orally once a day   PreserVision AREDS Lutein oral capsule: 1 dose(s) orally once a day  tamsulosin 0.4 mg oral capsule: 1 cap(s) orally 2 times a day  Tylenol 8 HR Arthritis Pain 650 mg oral tablet, extended release: 2 tab(s) orally every 8 hours  Vitamin D3 400 intl units (10 mcg) oral capsule: 1 cap(s) orally once a day  warfarin 2.5 mg oral tablet: 1 tab(s) orally once a day.    Take 2 tablets tonight and then continue 1 tab daily.   INR check on 3/4/22.

## 2022-03-02 NOTE — DISCHARGE NOTE PROVIDER - NSDCFUSCHEDAPPT_GEN_ALL_CORE_FT
MCEHE MILLER ; 04/04/2022 ; [facility] Cardio Electro [address]  MECHE MILLER ; 05/12/2022 ; [facility] [address] [facility]  MECHE MILLER ; 05/20/2022 ; [facility] Cardiology [address] [facility]  MECHE MILLER ; 05/20/2022 ; NPP Cardiology [address]

## 2022-03-02 NOTE — DISCHARGE NOTE PROVIDER - PROVIDER TOKENS
PROVIDER:[TOKEN:[[id number]],FOLLOWUP:[1 week],ESTABLISHEDPATIENT:[T]],PROVIDER:[TOKEN:[[id number]],FOLLOWUP:[1-3 days],ESTABLISHEDPATIENT:[T]],PROVIDER:[TOKEN:[[id number]],FOLLOWUP:[2 weeks]]

## 2022-03-02 NOTE — DISCHARGE NOTE PROVIDER - HOSPITAL COURSE
89 years old male with history of Persistent A. Fib on Coumadin, Chronic Systolic Heart Failure, Prostate Cancer s/p XRT, GERD, Osteoarthritis and Recurrent Falls presented with back and right sided chest pain after a fall a week ago. Found to have Right Rib Fracture and Acute on Chronic T4 Fracture. He was in respiratory distress on admission. Admitted with Acute Respiratory Failure with Hypoxia likely due to pain from Rib / T4 fractures. He was initially placed on HFNC, which was weaned off. Started on steroid taper. Seen by Trauma / Neurosurgery and no intervention was recommended. His symptoms have improved and he is stable for discharge.    PHYSICAL EXAM:  Vital Signs   T(C): 36.6 (02 Mar 2022 11:19), Max: 36.7 (01 Mar 2022 16:55)  T(F): 97.8 (02 Mar 2022 11:19), Max: 98.1 (01 Mar 2022 16:55)  HR: 71 (02 Mar 2022 11:19) (64 - 75)  BP: 130/74 (02 Mar 2022 11:19) (121/78 - 137/77)  RR: 30 (02 Mar 2022 11:29) (18 - 40)  SpO2: 95% (02 Mar 2022 04:34) (94% - 95%)  General: Elderly male lying in bed comfortably. No acute distress.  HEENT: EOMI. Clear conjunctivae. Moist mucus membrane  Neck: Supple.    Chest: Good air entry. No wheezing, rales or rhonchi. + chest wall tenderness (mild) on right side.   Heart: S1 & S2 with irregular rhythm.   Abdomen: Soft. Non-tender. Non-distended. + BS  Ext: No pedal edema. No calf tenderness   Neuro: Awake and alert. No focal deficit. No speech disorder  Skin: Warm and Dry  Psychiatry: Normal mood and affect

## 2022-03-02 NOTE — DISCHARGE NOTE PROVIDER - NSDCFUADDAPPT_GEN_ALL_CORE_FT
Take 2 tablets of Warfarin 2.5 mg tonight and then continue 1 tablet daily from tomorrow.  INR check on 3/4/22.

## 2022-03-02 NOTE — DISCHARGE NOTE PROVIDER - CARE PROVIDERS DIRECT ADDRESSES
[email or web address],DirectAddress_Unknown,DirectAddress_Unknown

## 2022-03-02 NOTE — DISCHARGE NOTE PROVIDER - NSDCCPCAREPLAN_GEN_ALL_CORE_FT
PRINCIPAL DISCHARGE DIAGNOSIS  Diagnosis: Acute respiratory failure with hypoxia  Assessment and Plan of Treatment: Likely due to pain from Rib / T4 fractures.  Pain control.  Steroid taper.   Weaned off HFNC.   Fall precautions.  Follow up with PMD in 1 week.

## 2022-03-02 NOTE — DISCHARGE NOTE NURSING/CASE MANAGEMENT/SOCIAL WORK - NSDCPEFALRISK_GEN_ALL_CORE
For information on Fall & Injury Prevention, visit: [email or web address] OR  [email or web address] OR  https://www.cdc.gov/steadi/patient.html

## 2022-03-02 NOTE — DISCHARGE NOTE NURSING/CASE MANAGEMENT/SOCIAL WORK - PATIENT PORTAL LINK FT
You can access the FollowMyHealth Patient Portal offered by [facility] by registering at the following website: [email or web address]. By joining [employer] [facility] [employer]’s FollowMyHealth portal, you will also be able to view your health information using other applications (apps) compatible with our system.

## 2022-03-02 NOTE — DISCHARGE NOTE PROVIDER - CARE PROVIDER_API CALL
Rush Stephenson)  Internal Medicine  [address] [place] [address]  Phone: [phone]  Fax: [phone]  Established Patient  Follow Up Time: 1 week    STEVE BRIAN  Family Medicine  [address] [place] [address]  Phone: [phone]  Fax: [phone]  Established Patient  Follow Up Time: 1-3 days    Abbe Greco; PhD)  Neurosurgery  [address] [place] [address]  Phone: [phone]  Fax: [phone]  Follow Up Time: 2 weeks

## 2022-03-09 ENCOUNTER — NON-APPOINTMENT (OUTPATIENT)
Age: 87
End: 2022-03-09

## 2022-03-09 ENCOUNTER — INPATIENT (INPATIENT)
Facility: HOSPITAL | Age: 87
LOS: 2 days | Discharge: HOME | DRG: 177 | End: 2022-03-12
Attending: STUDENT IN AN ORGANIZED HEALTH CARE EDUCATION/TRAINING PROGRAM | Admitting: FAMILY MEDICINE
Payer: MEDICARE

## 2022-03-09 VITALS
DIASTOLIC BLOOD PRESSURE: 97 MMHG | RESPIRATION RATE: 18 BRPM | SYSTOLIC BLOOD PRESSURE: 144 MMHG | HEART RATE: 86 BPM | TEMPERATURE: 98 F | OXYGEN SATURATION: 100 % | HEIGHT: 65 IN | WEIGHT: 138.89 LBS

## 2022-03-09 DIAGNOSIS — Z98.890 OTHER SPECIFIED POSTPROCEDURAL STATES: Chronic | ICD-10-CM

## 2022-03-09 DIAGNOSIS — J69.0 PNEUMONITIS DUE TO INHALATION OF FOOD AND VOMIT: ICD-10-CM

## 2022-03-09 DIAGNOSIS — K21.9 GASTRO-ESOPHAGEAL REFLUX DISEASE WITHOUT ESOPHAGITIS: ICD-10-CM

## 2022-03-09 DIAGNOSIS — Z96.651 PRESENCE OF RIGHT ARTIFICIAL KNEE JOINT: Chronic | ICD-10-CM

## 2022-03-09 DIAGNOSIS — I48.20 CHRONIC ATRIAL FIBRILLATION, UNSPECIFIED: ICD-10-CM

## 2022-03-09 DIAGNOSIS — R74.01 ELEVATION OF LEVELS OF LIVER TRANSAMINASE LEVELS: ICD-10-CM

## 2022-03-09 DIAGNOSIS — Z95.0 PRESENCE OF CARDIAC PACEMAKER: Chronic | ICD-10-CM

## 2022-03-09 DIAGNOSIS — I50.43 ACUTE ON CHRONIC COMBINED SYSTOLIC (CONGESTIVE) AND DIASTOLIC (CONGESTIVE) HEART FAILURE: ICD-10-CM

## 2022-03-09 DIAGNOSIS — Z96.652 PRESENCE OF LEFT ARTIFICIAL KNEE JOINT: Chronic | ICD-10-CM

## 2022-03-09 DIAGNOSIS — I10 ESSENTIAL (PRIMARY) HYPERTENSION: ICD-10-CM

## 2022-03-09 DIAGNOSIS — I50.9 HEART FAILURE, UNSPECIFIED: ICD-10-CM

## 2022-03-09 LAB
ALBUMIN SERPL ELPH-MCNC: 3 G/DL — LOW (ref 3.3–5.2)
ALP SERPL-CCNC: 167 U/L — HIGH (ref 40–120)
ALT FLD-CCNC: 91 U/L — HIGH
ANION GAP SERPL CALC-SCNC: 9 MMOL/L — SIGNIFICANT CHANGE UP (ref 5–17)
APTT BLD: 38.6 SEC — HIGH (ref 27.5–35.5)
AST SERPL-CCNC: 87 U/L — HIGH
BASOPHILS # BLD AUTO: 0.21 K/UL — HIGH (ref 0–0.2)
BASOPHILS NFR BLD AUTO: 0.8 % — SIGNIFICANT CHANGE UP (ref 0–2)
BILIRUB SERPL-MCNC: 0.4 MG/DL — SIGNIFICANT CHANGE UP (ref 0.4–2)
BUN SERPL-MCNC: 38.2 MG/DL — HIGH (ref 8–20)
CALCIUM SERPL-MCNC: 8.7 MG/DL — SIGNIFICANT CHANGE UP (ref 8.6–10.2)
CHLORIDE SERPL-SCNC: 101 MMOL/L — SIGNIFICANT CHANGE UP (ref 98–107)
CO2 SERPL-SCNC: 34 MMOL/L — HIGH (ref 22–29)
CREAT SERPL-MCNC: 0.72 MG/DL — SIGNIFICANT CHANGE UP (ref 0.5–1.3)
EGFR: 84 ML/MIN/1.73M2 — SIGNIFICANT CHANGE UP
EOSINOPHIL # BLD AUTO: 0.21 K/UL — SIGNIFICANT CHANGE UP (ref 0–0.5)
EOSINOPHIL NFR BLD AUTO: 0.8 % — SIGNIFICANT CHANGE UP (ref 0–6)
GLUCOSE SERPL-MCNC: 119 MG/DL — HIGH (ref 70–99)
HCT VFR BLD CALC: 41.2 % — SIGNIFICANT CHANGE UP (ref 39–50)
HGB BLD-MCNC: 12.4 G/DL — LOW (ref 13–17)
INR BLD: 3.46 RATIO — HIGH (ref 0.88–1.16)
LYMPHOCYTES # BLD AUTO: 17.35 K/UL — HIGH (ref 1–3.3)
LYMPHOCYTES # BLD AUTO: 66.7 % — HIGH (ref 13–44)
MAGNESIUM SERPL-MCNC: 2 MG/DL — SIGNIFICANT CHANGE UP (ref 1.8–2.6)
MANUAL SMEAR VERIFICATION: SIGNIFICANT CHANGE UP
MCHC RBC-ENTMCNC: 30.1 GM/DL — LOW (ref 32–36)
MCHC RBC-ENTMCNC: 30.3 PG — SIGNIFICANT CHANGE UP (ref 27–34)
MCV RBC AUTO: 100.7 FL — HIGH (ref 80–100)
MONOCYTES # BLD AUTO: 0.44 K/UL — SIGNIFICANT CHANGE UP (ref 0–0.9)
MONOCYTES NFR BLD AUTO: 1.7 % — LOW (ref 2–14)
NEUTROPHILS # BLD AUTO: 7.57 K/UL — HIGH (ref 1.8–7.4)
NEUTROPHILS NFR BLD AUTO: 29.1 % — LOW (ref 43–77)
NT-PROBNP SERPL-SCNC: 6169 PG/ML — HIGH (ref 0–300)
PLAT MORPH BLD: NORMAL — SIGNIFICANT CHANGE UP
PLATELET # BLD AUTO: 150 K/UL — SIGNIFICANT CHANGE UP (ref 150–400)
POTASSIUM SERPL-MCNC: 5.2 MMOL/L — SIGNIFICANT CHANGE UP (ref 3.5–5.3)
POTASSIUM SERPL-SCNC: 5.2 MMOL/L — SIGNIFICANT CHANGE UP (ref 3.5–5.3)
PROT SERPL-MCNC: 6.7 G/DL — SIGNIFICANT CHANGE UP (ref 6.6–8.7)
PROTHROM AB SERPL-ACNC: 40.6 SEC — HIGH (ref 10.5–13.4)
RAPID RVP RESULT: SIGNIFICANT CHANGE UP
RBC # BLD: 4.09 M/UL — LOW (ref 4.2–5.8)
RBC # FLD: 15.9 % — HIGH (ref 10.3–14.5)
RBC BLD AUTO: NORMAL — SIGNIFICANT CHANGE UP
SARS-COV-2 RNA SPEC QL NAA+PROBE: SIGNIFICANT CHANGE UP
SMUDGE CELLS # BLD: PRESENT — SIGNIFICANT CHANGE UP
SODIUM SERPL-SCNC: 144 MMOL/L — SIGNIFICANT CHANGE UP (ref 135–145)
TROPONIN T SERPL-MCNC: 0.03 NG/ML — SIGNIFICANT CHANGE UP (ref 0–0.06)
VARIANT LYMPHS # BLD: 0.9 % — SIGNIFICANT CHANGE UP (ref 0–6)
WBC # BLD: 26.01 K/UL — HIGH (ref 3.8–10.5)
WBC # FLD AUTO: 26.01 K/UL — HIGH (ref 3.8–10.5)

## 2022-03-09 PROCEDURE — 99223 1ST HOSP IP/OBS HIGH 75: CPT

## 2022-03-09 PROCEDURE — 93010 ELECTROCARDIOGRAM REPORT: CPT

## 2022-03-09 PROCEDURE — 93306 TTE W/DOPPLER COMPLETE: CPT | Mod: 26

## 2022-03-09 PROCEDURE — 71045 X-RAY EXAM CHEST 1 VIEW: CPT | Mod: 26

## 2022-03-09 PROCEDURE — 99285 EMERGENCY DEPT VISIT HI MDM: CPT

## 2022-03-09 RX ORDER — FUROSEMIDE 40 MG
20 TABLET ORAL ONCE
Refills: 0 | Status: COMPLETED | OUTPATIENT
Start: 2022-03-09 | End: 2022-03-09

## 2022-03-09 RX ORDER — LOPERAMIDE HCL 2 MG
2 TABLET ORAL EVERY 4 HOURS
Refills: 0 | Status: DISCONTINUED | OUTPATIENT
Start: 2022-03-09 | End: 2022-03-12

## 2022-03-09 RX ORDER — IPRATROPIUM/ALBUTEROL SULFATE 18-103MCG
3 AEROSOL WITH ADAPTER (GRAM) INHALATION EVERY 6 HOURS
Refills: 0 | Status: DISCONTINUED | OUTPATIENT
Start: 2022-03-09 | End: 2022-03-12

## 2022-03-09 RX ORDER — ALBUTEROL 90 UG/1
2.5 AEROSOL, METERED ORAL
Refills: 0 | Status: DISCONTINUED | OUTPATIENT
Start: 2022-03-09 | End: 2022-03-12

## 2022-03-09 RX ORDER — ACETAMINOPHEN 500 MG
650 TABLET ORAL EVERY 6 HOURS
Refills: 0 | Status: DISCONTINUED | OUTPATIENT
Start: 2022-03-09 | End: 2022-03-12

## 2022-03-09 RX ORDER — PIPERACILLIN AND TAZOBACTAM 4; .5 G/20ML; G/20ML
3.38 INJECTION, POWDER, LYOPHILIZED, FOR SOLUTION INTRAVENOUS EVERY 8 HOURS
Refills: 0 | Status: DISCONTINUED | OUTPATIENT
Start: 2022-03-09 | End: 2022-03-12

## 2022-03-09 RX ORDER — LIDOCAINE 4 G/100G
1 CREAM TOPICAL DAILY
Refills: 0 | Status: DISCONTINUED | OUTPATIENT
Start: 2022-03-09 | End: 2022-03-12

## 2022-03-09 RX ORDER — METOPROLOL TARTRATE 50 MG
25 TABLET ORAL DAILY
Refills: 0 | Status: DISCONTINUED | OUTPATIENT
Start: 2022-03-09 | End: 2022-03-12

## 2022-03-09 RX ORDER — FUROSEMIDE 40 MG
20 TABLET ORAL EVERY 12 HOURS
Refills: 0 | Status: DISCONTINUED | OUTPATIENT
Start: 2022-03-10 | End: 2022-03-10

## 2022-03-09 RX ORDER — CHOLECALCIFEROL (VITAMIN D3) 125 MCG
400 CAPSULE ORAL DAILY
Refills: 0 | Status: DISCONTINUED | OUTPATIENT
Start: 2022-03-09 | End: 2022-03-12

## 2022-03-09 RX ORDER — TAMSULOSIN HYDROCHLORIDE 0.4 MG/1
0.4 CAPSULE ORAL
Refills: 0 | Status: DISCONTINUED | OUTPATIENT
Start: 2022-03-09 | End: 2022-03-12

## 2022-03-09 RX ORDER — PANTOPRAZOLE SODIUM 20 MG/1
40 TABLET, DELAYED RELEASE ORAL
Refills: 0 | Status: DISCONTINUED | OUTPATIENT
Start: 2022-03-09 | End: 2022-03-12

## 2022-03-09 RX ORDER — AZITHROMYCIN 500 MG/1
500 TABLET, FILM COATED ORAL ONCE
Refills: 0 | Status: COMPLETED | OUTPATIENT
Start: 2022-03-09 | End: 2022-03-09

## 2022-03-09 RX ORDER — PIPERACILLIN AND TAZOBACTAM 4; .5 G/20ML; G/20ML
3.38 INJECTION, POWDER, LYOPHILIZED, FOR SOLUTION INTRAVENOUS ONCE
Refills: 0 | Status: COMPLETED | OUTPATIENT
Start: 2022-03-09 | End: 2022-03-09

## 2022-03-09 RX ADMIN — AZITHROMYCIN 255 MILLIGRAM(S): 500 TABLET, FILM COATED ORAL at 18:43

## 2022-03-09 RX ADMIN — Medication 40 MILLIGRAM(S): at 20:59

## 2022-03-09 RX ADMIN — Medication 20 MILLIGRAM(S): at 18:28

## 2022-03-09 RX ADMIN — Medication 3 MILLILITER(S): at 18:58

## 2022-03-09 RX ADMIN — Medication 20 MILLIGRAM(S): at 19:17

## 2022-03-09 RX ADMIN — PIPERACILLIN AND TAZOBACTAM 200 GRAM(S): 4; .5 INJECTION, POWDER, LYOPHILIZED, FOR SOLUTION INTRAVENOUS at 19:06

## 2022-03-09 NOTE — H&P ADULT - PROBLEM SELECTOR PLAN 2
concern for aspiration as per history, will keep on zosyn, swallow eval, follow all cultures, puree diet, aspiration precautions.

## 2022-03-09 NOTE — ED PROVIDER NOTE - CLINICAL SUMMARY MEDICAL DECISION MAKING FREE TEXT BOX
patient with increasing sob, increasing vascular congestion on xray, elevated bnp, elevated wbc.  will tx for possible aspiration pna as well.

## 2022-03-09 NOTE — ED PROVIDER NOTE - CARE PLAN
Principal Discharge DX:	Acute on chronic combined systolic and diastolic congestive heart failure   1

## 2022-03-09 NOTE — H&P ADULT - NSICDXFAMHXNEG_GEN_ALL
Refill request  LOV 4/18/19 HTN  NOV not scheduled  CMP 4/14/19  buPROPion (WELLBUTRIN SR) 150 MG 12 hr tablet 60 tablet 0 9/13/2019     Sig: TAKE 1 TABLET BY MOUTH TWICE DAILY      losartan (COZAAR) 50 MG tablet 60 tablet 1 8/5/2019     Sig - Route: TAKE 2 TABLETS BY MOUTH DAILY - Oral      Refilled as requested       heart disease/hypertension

## 2022-03-09 NOTE — H&P ADULT - PROBLEM SELECTOR PLAN 1
pt. has  leg edema and cxr suggestive of chf, iv lasix 40 mg x 1 given in the ER, will keep on 20 mg iv lasix bid and adjust as per response. pt's cardiologist Sachin Villegas called, wt. daily, I/O. follow echo. will add low dose enalapril to pt's b b.blk.  code status dnr/dni. pt. has  leg edema and cxr suggestive of chf, iv lasix 40 mg x 1 given in the ER, will keep on 20 mg iv lasix bid and adjust as per response. pt's cardiologist Sachin Villegas called, wt. daily, I/O. follow echo. will add low dose enalapril to pt's b b.blk. pt's o2 sat 99 % on 3 L NC.  code status dnr/dni.

## 2022-03-09 NOTE — ED PROVIDER NOTE - PHYSICAL EXAMINATION
General:  mild resp distress  Head:     NC/AT, EOMI, oral mucosa moist  Neck:     trachea midline  Lungs:   bibasilar crackles  CVS:     S1S2, RRR, no m/g/r  Abd:     +BS, s/nt/nd, no organomegaly  Ext:    2+ radial and pedal pulses, bilateral pedal edema  Neuro: AAOx3, valero x4

## 2022-03-09 NOTE — ED PROVIDER NOTE - NS ED ROS FT
Constitutional: (-) fever  (-)chills  (-)sweats  Eyes/ENT: (-)   Cardiovascular: (+) chest pain, (-) palpitations (-) edema   Respiratory: (+) cough, (+) shortness of breath   Gastrointestinal: (-)nausea  (-)vomiting, (-) diarrhea  (-) abdominal pain   :  (-)dysuria, (-)frequency, (-)urgency, (-)hematuria  Musculoskeletal: (-) neck pain, (-) back pain, (-) joint pain  Integumentary: (-) rash, (-) edema  Neurological: (-) headache, (-) altered mental status  (-)LOC

## 2022-03-09 NOTE — ED PROVIDER NOTE - OBJECTIVE STATEMENT
89 years old male with history of Persistent A. Fib on Coumadin, Chronic Systolic Heart Failure, Prostate Cancer s/p XRT, GERD, Osteoarthritis and Recurrent Falls, recent fall with rib fractures; now p/w  sob. patient with increasing sob over past 3-4 days. Aide states patient was eating and choked with subsequent increasing sob.  denies f/c/s. c/o mild chest discomfort with swallowing.   PMH:  A. Fib on Coumadin, Chronic Systolic Heart Failure, Prostate Cancer s/p XRT, GERD, Osteoarthritis  SOCIAL: no substance abuse

## 2022-03-09 NOTE — ED ADULT NURSE NOTE - OBJECTIVE STATEMENT
Patient recently discharged from [facility], presented to ED from home  with difficulty breathing. Patient AXOX3, on 3L N/C Sating 99%. Patient on home oxygen and blood thinners. LLe edema noted. Patient recently discharged from [facility], presented to ED from home  with difficulty breathing. Patient AXOX3, on 3L N/C Sating 99%. Patient on home oxygen and blood thinners. LLe edema noted. Cardiac monitor in place with cpox. Aide at bedside No further distress noted at this time.

## 2022-03-09 NOTE — ED ADULT NURSE NOTE - NSIMPLEMENTINTERV_GEN_ALL_ED
Implemented All Fall with Harm Risk Interventions:  [place] to call system. Call bell, personal items and telephone within reach. Instruct patient to call for assistance. Room bathroom lighting operational. Non-slip footwear when patient is off stretcher. Physically safe environment: no spills, clutter or unnecessary equipment. Stretcher in lowest position, wheels locked, appropriate side rails in place. Provide visual cue, wrist band, yellow gown, etc. Monitor gait and stability. Monitor for mental status changes and reorient to person, place, and time. Review medications for side effects contributing to fall risk. Reinforce activity limits and safety measures with patient and family. Provide visual clues: red socks.

## 2022-03-09 NOTE — H&P ADULT - ASSESSMENT
pt. is a 89 years old male with history of Persistent A. Fib on Coumadin, Chronic Systolic Heart Failure, Prostate Cancer s/p XRT, GERD, Osteoarthritis and Recurrent Falls was admitted at [facility] on 02/26/22 for Right Rib Fracture, Acute on Chronic T4 Fracture , respiratory failure , discharged on 03/02/22 , lives with an aide was brought in as he was having difficulty swallowing, somewhat choked on food, thew up some food and then was in respiratory distress. pt. was brought to the ER, no fever, no cp, no abd. pain. no diarrhea. pt. was discharged on home o2 and prednisone for 2 more days which he has finished. pt. is aaox 3 and stated that he is dnr/dni, his MOLST is scanned .

## 2022-03-09 NOTE — H&P ADULT - HISTORY OF PRESENT ILLNESS
pt. is a 89 years old male with history of Persistent A. Fib on Coumadin, Chronic Systolic Heart Failure, Prostate Cancer s/p XRT, GERD, Osteoarthritis and Recurrent Falls was admitted at [facility] on 02/26/22 for Right Rib Fracture, Acute on Chronic T4 Fracture , respiratory failure , discharged on 03/02/22 , lives with an aide was brought in as he was having difficulty swallowing, somewhat choked on food, thew up some food and then was in respiratory distress. pt. was brought to the ER, no fever, no cp, no abd. pain. no diarrhea. pt. was discharged on home o2 and prednisone for 2 more days which he has finished. pt. is aaox 3 and stated that he is dnr/dni, his MOLST is scanned .

## 2022-03-09 NOTE — H&P ADULT - NSHPPHYSICALEXAM_GEN_ALL_CORE
Vital Signs Last 24 Hrs  T(C): 37.1 (09 Mar 2022 19:14), Max: 37.1 (09 Mar 2022 19:14)  T(F): 98.7 (09 Mar 2022 19:14), Max: 98.7 (09 Mar 2022 19:14)  HR: 69 (09 Mar 2022 19:14) (67 - 86)  BP: 135/64 (09 Mar 2022 19:14) (135/64 - 149/-)  BP(mean): 79 (09 Mar 2022 16:18) (79 - 79)  RR: 20 (09 Mar 2022 19:14) (18 - 20)  SpO2: 99% (09 Mar 2022 19:14) (99% - 100%)    General: pt. sitting up in bed not in distress  HEENT: AT, NC. PERRL. intact EOM. no throat erythema or exudate.   Neck: supple. no JVD.   Chest: scattered rhonchi with basal crackles bilaterally.  Heart: S1,S2. RRR. no heart murmur. 2 + edema of B/L LE.   Abdomen: soft. non-tender. non-distended. + BS.   Ext: no calf tenderness. moves all ext. independently.  lymphatic system : no lymphadenopathy.  Neuro: AAO x3. no focal weakness. cns intact.  Skin: scattered ecchymotic spots over upper ext, as per aide gets on and off.  psych : no agitation, no si/hi.

## 2022-03-09 NOTE — ED ADULT TRIAGE NOTE - CHIEF COMPLAINT QUOTE
Pt BIBA from home for difficulty breathing, recently d/c from hospital, started on home O2 after visit, pt denies pain, resp even and unlabored

## 2022-03-10 LAB
ALBUMIN SERPL ELPH-MCNC: 3.1 G/DL — LOW (ref 3.3–5.2)
ALP SERPL-CCNC: 154 U/L — HIGH (ref 40–120)
ALT FLD-CCNC: 90 U/L — HIGH
ANION GAP SERPL CALC-SCNC: 12 MMOL/L — SIGNIFICANT CHANGE UP (ref 5–17)
APPEARANCE UR: CLEAR — SIGNIFICANT CHANGE UP
APTT BLD: 40.2 SEC — HIGH (ref 27.5–35.5)
AST SERPL-CCNC: 64 U/L — HIGH
BACTERIA # UR AUTO: ABNORMAL
BASOPHILS # BLD AUTO: 0 K/UL — SIGNIFICANT CHANGE UP (ref 0–0.2)
BASOPHILS NFR BLD AUTO: 0 % — SIGNIFICANT CHANGE UP (ref 0–2)
BILIRUB SERPL-MCNC: 0.6 MG/DL — SIGNIFICANT CHANGE UP (ref 0.4–2)
BILIRUB UR-MCNC: NEGATIVE — SIGNIFICANT CHANGE UP
BUN SERPL-MCNC: 39.5 MG/DL — HIGH (ref 8–20)
BURR CELLS BLD QL SMEAR: PRESENT — SIGNIFICANT CHANGE UP
CALCIUM SERPL-MCNC: 8.7 MG/DL — SIGNIFICANT CHANGE UP (ref 8.6–10.2)
CHLORIDE SERPL-SCNC: 98 MMOL/L — SIGNIFICANT CHANGE UP (ref 98–107)
CO2 SERPL-SCNC: 37 MMOL/L — HIGH (ref 22–29)
COLOR SPEC: YELLOW — SIGNIFICANT CHANGE UP
CREAT SERPL-MCNC: 0.9 MG/DL — SIGNIFICANT CHANGE UP (ref 0.5–1.3)
DIFF PNL FLD: ABNORMAL
EGFR: 79 ML/MIN/1.73M2 — SIGNIFICANT CHANGE UP
EOSINOPHIL # BLD AUTO: 0 K/UL — SIGNIFICANT CHANGE UP (ref 0–0.5)
EOSINOPHIL NFR BLD AUTO: 0 % — SIGNIFICANT CHANGE UP (ref 0–6)
EPI CELLS # UR: ABNORMAL
GLUCOSE SERPL-MCNC: 184 MG/DL — HIGH (ref 70–99)
GLUCOSE UR QL: NEGATIVE MG/DL — SIGNIFICANT CHANGE UP
HCT VFR BLD CALC: 37.9 % — LOW (ref 39–50)
HGB BLD-MCNC: 11.5 G/DL — LOW (ref 13–17)
HYALINE CASTS # UR AUTO: ABNORMAL /LPF
HYPOCHROMIA BLD QL: SLIGHT — SIGNIFICANT CHANGE UP
INR BLD: 3.57 RATIO — HIGH (ref 0.88–1.16)
KETONES UR-MCNC: NEGATIVE — SIGNIFICANT CHANGE UP
LEUKOCYTE ESTERASE UR-ACNC: ABNORMAL
LYMPHOCYTES # BLD AUTO: 13.48 K/UL — HIGH (ref 1–3.3)
LYMPHOCYTES # BLD AUTO: 73.9 % — HIGH (ref 13–44)
MANUAL SMEAR VERIFICATION: SIGNIFICANT CHANGE UP
MCHC RBC-ENTMCNC: 30.3 GM/DL — LOW (ref 32–36)
MCHC RBC-ENTMCNC: 30.3 PG — SIGNIFICANT CHANGE UP (ref 27–34)
MCV RBC AUTO: 100 FL — SIGNIFICANT CHANGE UP (ref 80–100)
MONOCYTES # BLD AUTO: 0.07 K/UL — SIGNIFICANT CHANGE UP (ref 0–0.9)
MONOCYTES NFR BLD AUTO: 0.4 % — LOW (ref 2–14)
NEUTROPHILS # BLD AUTO: 4.69 K/UL — SIGNIFICANT CHANGE UP (ref 1.8–7.4)
NEUTROPHILS NFR BLD AUTO: 25.7 % — LOW (ref 43–77)
NITRITE UR-MCNC: NEGATIVE — SIGNIFICANT CHANGE UP
OVALOCYTES BLD QL SMEAR: SLIGHT — SIGNIFICANT CHANGE UP
PH UR: 6 — SIGNIFICANT CHANGE UP (ref 5–8)
PLAT MORPH BLD: NORMAL — SIGNIFICANT CHANGE UP
PLATELET # BLD AUTO: 122 K/UL — LOW (ref 150–400)
POIKILOCYTOSIS BLD QL AUTO: SLIGHT — SIGNIFICANT CHANGE UP
POLYCHROMASIA BLD QL SMEAR: SLIGHT — SIGNIFICANT CHANGE UP
POTASSIUM SERPL-MCNC: 4.1 MMOL/L — SIGNIFICANT CHANGE UP (ref 3.5–5.3)
POTASSIUM SERPL-SCNC: 4.1 MMOL/L — SIGNIFICANT CHANGE UP (ref 3.5–5.3)
PROT SERPL-MCNC: 6 G/DL — LOW (ref 6.6–8.7)
PROT UR-MCNC: NEGATIVE — SIGNIFICANT CHANGE UP
PROTHROM AB SERPL-ACNC: 42 SEC — HIGH (ref 10.5–13.4)
RBC # BLD: 3.79 M/UL — LOW (ref 4.2–5.8)
RBC # FLD: 15.8 % — HIGH (ref 10.3–14.5)
RBC BLD AUTO: ABNORMAL
RBC CASTS # UR COMP ASSIST: SIGNIFICANT CHANGE UP /HPF (ref 0–4)
SMUDGE CELLS # BLD: PRESENT — SIGNIFICANT CHANGE UP
SODIUM SERPL-SCNC: 147 MMOL/L — HIGH (ref 135–145)
SP GR SPEC: 1.01 — SIGNIFICANT CHANGE UP (ref 1.01–1.02)
UROBILINOGEN FLD QL: NEGATIVE MG/DL — SIGNIFICANT CHANGE UP
WBC # BLD: 18.24 K/UL — HIGH (ref 3.8–10.5)
WBC # FLD AUTO: 18.24 K/UL — HIGH (ref 3.8–10.5)
WBC UR QL: SIGNIFICANT CHANGE UP /HPF (ref 0–5)

## 2022-03-10 PROCEDURE — 99233 SBSQ HOSP IP/OBS HIGH 50: CPT

## 2022-03-10 PROCEDURE — ZZZZZ: CPT

## 2022-03-10 PROCEDURE — 99223 1ST HOSP IP/OBS HIGH 75: CPT

## 2022-03-10 RX ORDER — FUROSEMIDE 40 MG
20 TABLET ORAL DAILY
Refills: 0 | Status: DISCONTINUED | OUTPATIENT
Start: 2022-03-11 | End: 2022-03-12

## 2022-03-10 RX ORDER — FUROSEMIDE 40 MG
20 TABLET ORAL ONCE
Refills: 0 | Status: COMPLETED | OUTPATIENT
Start: 2022-03-10 | End: 2022-03-10

## 2022-03-10 RX ADMIN — Medication 40 MILLIGRAM(S): at 05:12

## 2022-03-10 RX ADMIN — Medication 3 MILLILITER(S): at 05:00

## 2022-03-10 RX ADMIN — LIDOCAINE 1 PATCH: 4 CREAM TOPICAL at 19:30

## 2022-03-10 RX ADMIN — Medication 20 MILLIGRAM(S): at 05:12

## 2022-03-10 RX ADMIN — PIPERACILLIN AND TAZOBACTAM 25 GRAM(S): 4; .5 INJECTION, POWDER, LYOPHILIZED, FOR SOLUTION INTRAVENOUS at 12:40

## 2022-03-10 RX ADMIN — Medication 40 MILLIGRAM(S): at 17:48

## 2022-03-10 RX ADMIN — TAMSULOSIN HYDROCHLORIDE 0.4 MILLIGRAM(S): 0.4 CAPSULE ORAL at 05:11

## 2022-03-10 RX ADMIN — Medication 3 MILLILITER(S): at 20:28

## 2022-03-10 RX ADMIN — PIPERACILLIN AND TAZOBACTAM 25 GRAM(S): 4; .5 INJECTION, POWDER, LYOPHILIZED, FOR SOLUTION INTRAVENOUS at 03:38

## 2022-03-10 RX ADMIN — Medication 3 MILLILITER(S): at 16:23

## 2022-03-10 RX ADMIN — LIDOCAINE 1 PATCH: 4 CREAM TOPICAL at 12:40

## 2022-03-10 RX ADMIN — Medication 650 MILLIGRAM(S): at 23:18

## 2022-03-10 RX ADMIN — TAMSULOSIN HYDROCHLORIDE 0.4 MILLIGRAM(S): 0.4 CAPSULE ORAL at 17:48

## 2022-03-10 RX ADMIN — Medication 650 MILLIGRAM(S): at 22:48

## 2022-03-10 RX ADMIN — Medication 400 UNIT(S): at 12:41

## 2022-03-10 RX ADMIN — PIPERACILLIN AND TAZOBACTAM 25 GRAM(S): 4; .5 INJECTION, POWDER, LYOPHILIZED, FOR SOLUTION INTRAVENOUS at 20:55

## 2022-03-10 RX ADMIN — PANTOPRAZOLE SODIUM 40 MILLIGRAM(S): 20 TABLET, DELAYED RELEASE ORAL at 05:11

## 2022-03-10 RX ADMIN — Medication 25 MILLIGRAM(S): at 05:11

## 2022-03-10 RX ADMIN — Medication 20 MILLIGRAM(S): at 17:47

## 2022-03-10 RX ADMIN — Medication 3 MILLILITER(S): at 09:26

## 2022-03-10 RX ADMIN — Medication 2.5 MILLIGRAM(S): at 05:12

## 2022-03-10 NOTE — SWALLOW BEDSIDE ASSESSMENT ADULT - ORAL PHASE
Within functional limits Decreased anterior-posterior movement of the bolus/Delayed oral transit time Delayed oral transit time

## 2022-03-10 NOTE — PROGRESS NOTE ADULT - ASSESSMENT
89y/oM PMH prostate CA s/p XRT, GERD, bioAVR, permanent AF s/p AVN ablation and PPM, NICM with chronic HFrEF (recent improvement in EF), presenting with SOB admitted with aspiration pna and acute chf. Of note, pt recently admitted to [facility] 2/26/22-3/2/22 with R rib fx, acute on chronic T4 fx, resp failure.      89y/oM PMH prostate CA s/p XRT, GERD, bioAVR, permanent AF s/p AVN ablation and PPM, NICM with chronic HFrEF (recent improvement in EF), presenting with SOB admitted with aspiration pna and acute chf. Of note, pt recently admitted to [facility] 2/26/22-3/2/22 with R rib fx, acute on chronic T4 fx, resp failure.       Acute on chronic systolic HFrEF   aspiration pnuemonia   - 89y/oM PMH prostate CA s/p XRT, GERD, bioAVR, permanent AF s/p AVN ablation and PPM, NICM with chronic HFrEF (recent improvement in EF), presenting with SOB admitted with aspiration pna and acute chf. Of note, pt recently admitted to [facility] 2/26/22-3/2/22 with R rib fx, acute on chronic T4 fx, resp failure.       Acute on chronic systolic HFrEF   aspiration pnuemonia   -swallow eval appreciated: easy to chew, thin liquids   -f/u cultures   -f/u TTE   -IV lasix   -metoprolol  -cont abx     permanent afib   -holding coumadin   -monitor INR     HTN  -cont bb, enalapril     Transaminitis   -monitor     GERD   -ppi    code status: DNR/DNI

## 2022-03-10 NOTE — CONSULT NOTE ADULT - SUBJECTIVE AND OBJECTIVE BOX
CHIEF COMPLAINT: shortness of breath    HPI: Patient is a  89y Male with h/o Bio AVR, permanent AF s/p AVN ablation and PPM, NICM with chronic HFrEF and more recent improvement in EF here with SOB. Lives at home with an aide. Was noted to be choking on some food then went into respiratory distress and SOB. Recently put on home O2. Started on antibiotics and given lasix. Currently he states he feels well without complaints. No PND/orthopnea/palps/syncope/edema. No fevers/chills/sweats.     PAST MEDICAL & SURGICAL HISTORY:  Afib  with RVR    GERD (gastroesophageal reflux disease)    Cyst of kidney, acquired    Cyst of pancreas    Prostate CA  radiation    Irritable bowel syndrome with diarrhea    VTE (venous thromboembolism)  RLE    HTN (hypertension)    CHF with cardiomyopathy  EF 30%    H/O total knee replacement, right  b/l    Cardiac resynchronization therapy pacemaker (CRT-P) in place  MDT doi 1/28/20-Dr Marcus    S/P bilateral inguinal hernia repair    Status post left partial knee replacement        MEDICATIONS:  MEDICATIONS  (STANDING):  albuterol/ipratropium for Nebulization 3 milliLiter(s) Nebulizer every 6 hours  cholecalciferol 400 Unit(s) Oral daily  enalapril 2.5 milliGRAM(s) Oral daily  furosemide   Injectable 20 milliGRAM(s) IV Push every 12 hours  lidocaine   4% Patch 1 Patch Transdermal daily  methylPREDNISolone sodium succinate Injectable 40 milliGRAM(s) IV Push every 12 hours  metoprolol succinate ER 25 milliGRAM(s) Oral daily  pantoprazole    Tablet 40 milliGRAM(s) Oral before breakfast  piperacillin/tazobactam IVPB.. 3.375 Gram(s) IV Intermittent every 8 hours  tamsulosin 0.4 milliGRAM(s) Oral two times a day    MEDICATIONS  (PRN):  acetaminophen     Tablet .. 650 milliGRAM(s) Oral every 6 hours PRN Temp greater or equal to 38C (100.4F), Mild Pain (1 - 3), Moderate Pain (4 - 6)  ALBUTerol    0.083% 2.5 milliGRAM(s) Nebulizer every 2 hours PRN Shortness of Breath and/or Wheezing  loperamide 2 milliGRAM(s) Oral every 4 hours PRN Diarrhea    acetaminophen     Tablet .. 650 milliGRAM(s) Oral every 6 hours PRN  ALBUTerol    0.083% 2.5 milliGRAM(s) Nebulizer every 2 hours PRN  albuterol/ipratropium for Nebulization 3 milliLiter(s) Nebulizer every 6 hours  cholecalciferol 400 Unit(s) Oral daily  enalapril 2.5 milliGRAM(s) Oral daily  furosemide   Injectable 20 milliGRAM(s) IV Push every 12 hours  lidocaine   4% Patch 1 Patch Transdermal daily  loperamide 2 milliGRAM(s) Oral every 4 hours PRN  methylPREDNISolone sodium succinate Injectable 40 milliGRAM(s) IV Push every 12 hours  metoprolol succinate ER 25 milliGRAM(s) Oral daily  pantoprazole    Tablet 40 milliGRAM(s) Oral before breakfast  piperacillin/tazobactam IVPB.. 3.375 Gram(s) IV Intermittent every 8 hours  tamsulosin 0.4 milliGRAM(s) Oral two times a day      FAMILY HISTORY:  FAMILY HISTORY:  No pertinent family history in first degree relatives of CV disease        SOCIAL HISTORY: no EtOH, drugs or tobacco    ROS: GI negative,  All others negative    PHYSCIAL EXAM:  Vital Signs Last 24 Hrs  T(C): 36.6 (10 Mar 2022 04:50), Max: 37.1 (09 Mar 2022 19:14)  T(F): 97.9 (10 Mar 2022 04:50), Max: 98.8 (09 Mar 2022 23:29)  HR: 66 (10 Mar 2022 09:30) (66 - 86)  BP: 119/72 (10 Mar 2022 04:50) (119/72 - 149/-)  BP(mean): 79 (09 Mar 2022 16:18) (79 - 79)  RR: 16 (10 Mar 2022 09:29) (16 - 20)  SpO2: 97% (10 Mar 2022 09:30) (97% - 100%)  I&O's Summary    09 Mar 2022 07:01  -  10 Mar 2022 07:00  --------------------------------------------------------  IN: 0 mL / OUT: 150 mL / NET: -150 mL      GEN: elderly frail appearing M in NAD  HEENT: MMM, sclera anicteric, on nasal canula   RESP: crackles at left base  CVS: S1S2, RRR, mild JVD no M/R/G  GI: Soft, NT, ND  EXT: no C/C/E  NEURO: AAOX3  PSYCH: Normal affect    LABS:                        11.5   18.24 )-----------( 122      ( 10 Mar 2022 06:35 )             37.9     03-10    147<H>  |  98  |  39.5<H>  ----------------------------<  184<H>  4.1   |  37.0<H>  |  0.90    Ca    8.7      10 Mar 2022 06:35  Mg     2.0     03-09    TPro  6.0<L>  /  Alb  3.1<L>  /  TBili  0.6  /  DBili  x   /  AST  64<H>  /  ALT  90<H>  /  AlkPhos  154<H>  03-10    CARDIAC MARKERS ( 09 Mar 2022 17:35 )  x     / 0.03 ng/mL / x     / x     / x          PT/INR - ( 10 Mar 2022 06:35 )   PT: 42.0 sec;   INR: 3.57 ratio         PTT - ( 10 Mar 2022 06:35 )  PTT:40.2 sec      CXR (personally reviewed)  1. Poor inspiratory effort, evidence of vacular congestion  2. Worse when compared to prior     ECG: AF, V-paced    HOME MEDS:  1. Metop ER 75mg daily  2. Coumadin    ECHO 10/2021:  1. Low normal LV function, EF 50-55%  2. Normal RV  3. Severe LAE  4. Moderate TYLER  5. Moderate to severe MR, moderate AI, mild to moderate TR. RVSP 41mmHg  6. Mildly dilated aortic root     Assessment:    Patient is a  89y Male with h/o Bio AVR, permanent AF s/p AVN ablation and PPM, NICM with chronic HFrEF and more recent improvement in EF. mitral regurgitation here with SOB due to aspiration PNA and component acute on chronic diasotlic CHF (last EF 50-55% fall 2021)   -Suspect significant aspiration PNA as contributing factor but also CHF  -Echo pending  -On IV lasix, mild increase in sodium now and need to monitor closely as diuresis maybe leading to free water deficit  -Hemodynamically stable  -Agree with addition of ACEI and continue beta blocker     Plan:  1. Continue Metoprolol ER daily and enalapril. On lower dose metop than outpatient and continue at present dose  2. IV lasix today, cut to po starting tomorrow  3. Follow up echo  4. Antibiotics  5. O2 and supportive care  6. Patient DNR/DNI  7. will follow, thank you       Juan Shaikh MD

## 2022-03-10 NOTE — SWALLOW BEDSIDE ASSESSMENT ADULT - ADDITIONAL RECOMMENDATIONS
No further follow-up warranted at bedside. If there are concerns with silent aspiration, consider MBS, to objectively assess swallow

## 2022-03-10 NOTE — SWALLOW BEDSIDE ASSESSMENT ADULT - COMMENTS
As per MD note: "pt. is a 89 years old male with history of Persistent A. Fib on Coumadin, Chronic Systolic Heart Failure, Prostate Cancer s/p XRT, GERD, Osteoarthritis and Recurrent Falls was admitted at [facility] on 02/26/22 for Right Rib Fracture, Acute on Chronic T4 Fracture , respiratory failure , discharged on 03/02/22 , lives with an aide was brought in as he was having difficulty swallowing, somewhat choked on food, thew up some food and then was in respiratory distress. pt. was brought to the ER, no fever, no cp, no abd. pain. no diarrhea. pt. was discharged on home o2 and prednisone for 2 more days which he has finished. pt. is aaox 3 and stated that he is dnr/dni, his MOLST is scanned ."

## 2022-03-11 ENCOUNTER — TRANSCRIPTION ENCOUNTER (OUTPATIENT)
Age: 87
End: 2022-03-11

## 2022-03-11 LAB
ALBUMIN SERPL ELPH-MCNC: 3 G/DL — LOW (ref 3.3–5.2)
ALP SERPL-CCNC: 132 U/L — HIGH (ref 40–120)
ALT FLD-CCNC: 79 U/L — HIGH
ANION GAP SERPL CALC-SCNC: 7 MMOL/L — SIGNIFICANT CHANGE UP (ref 5–17)
AST SERPL-CCNC: 41 U/L — HIGH
BILIRUB DIRECT SERPL-MCNC: 0.1 MG/DL — SIGNIFICANT CHANGE UP (ref 0–0.3)
BILIRUB INDIRECT FLD-MCNC: 0.3 MG/DL — SIGNIFICANT CHANGE UP (ref 0.2–1)
BILIRUB SERPL-MCNC: 0.4 MG/DL — SIGNIFICANT CHANGE UP (ref 0.4–2)
BUN SERPL-MCNC: 48 MG/DL — HIGH (ref 8–20)
CALCIUM SERPL-MCNC: 8.6 MG/DL — SIGNIFICANT CHANGE UP (ref 8.6–10.2)
CHLORIDE SERPL-SCNC: 95 MMOL/L — LOW (ref 98–107)
CO2 SERPL-SCNC: 39 MMOL/L — HIGH (ref 22–29)
CREAT SERPL-MCNC: 1.14 MG/DL — SIGNIFICANT CHANGE UP (ref 0.5–1.3)
EGFR: 59 ML/MIN/1.73M2 — LOW
GLUCOSE SERPL-MCNC: 161 MG/DL — HIGH (ref 70–99)
GRAM STN FLD: SIGNIFICANT CHANGE UP
HCT VFR BLD CALC: 35.9 % — LOW (ref 39–50)
HGB BLD-MCNC: 10.9 G/DL — LOW (ref 13–17)
INR BLD: 2.94 RATIO — HIGH (ref 0.88–1.16)
MAGNESIUM SERPL-MCNC: 1.7 MG/DL — SIGNIFICANT CHANGE UP (ref 1.6–2.6)
MCHC RBC-ENTMCNC: 30 PG — SIGNIFICANT CHANGE UP (ref 27–34)
MCHC RBC-ENTMCNC: 30.4 GM/DL — LOW (ref 32–36)
MCV RBC AUTO: 98.9 FL — SIGNIFICANT CHANGE UP (ref 80–100)
PHOSPHATE SERPL-MCNC: 3.2 MG/DL — SIGNIFICANT CHANGE UP (ref 2.4–4.7)
PLATELET # BLD AUTO: 130 K/UL — LOW (ref 150–400)
POTASSIUM SERPL-MCNC: 4.7 MMOL/L — SIGNIFICANT CHANGE UP (ref 3.5–5.3)
POTASSIUM SERPL-SCNC: 4.7 MMOL/L — SIGNIFICANT CHANGE UP (ref 3.5–5.3)
PROT SERPL-MCNC: 5.6 G/DL — LOW (ref 6.6–8.7)
PROTHROM AB SERPL-ACNC: 34.5 SEC — HIGH (ref 10.5–13.4)
RBC # BLD: 3.63 M/UL — LOW (ref 4.2–5.8)
RBC # FLD: 16 % — HIGH (ref 10.3–14.5)
SODIUM SERPL-SCNC: 141 MMOL/L — SIGNIFICANT CHANGE UP (ref 135–145)
SPECIMEN SOURCE: SIGNIFICANT CHANGE UP
WBC # BLD: 20.74 K/UL — HIGH (ref 3.8–10.5)
WBC # FLD AUTO: 20.74 K/UL — HIGH (ref 3.8–10.5)

## 2022-03-11 PROCEDURE — 71045 X-RAY EXAM CHEST 1 VIEW: CPT | Mod: 26

## 2022-03-11 PROCEDURE — 99232 SBSQ HOSP IP/OBS MODERATE 35: CPT

## 2022-03-11 RX ORDER — WARFARIN SODIUM 2.5 MG/1
2.5 TABLET ORAL ONCE
Refills: 0 | Status: COMPLETED | OUTPATIENT
Start: 2022-03-11 | End: 2022-03-11

## 2022-03-11 RX ORDER — MAGNESIUM SULFATE 500 MG/ML
1 VIAL (ML) INJECTION ONCE
Refills: 0 | Status: COMPLETED | OUTPATIENT
Start: 2022-03-11 | End: 2022-03-11

## 2022-03-11 RX ADMIN — Medication 3 MILLILITER(S): at 08:05

## 2022-03-11 RX ADMIN — TAMSULOSIN HYDROCHLORIDE 0.4 MILLIGRAM(S): 0.4 CAPSULE ORAL at 05:32

## 2022-03-11 RX ADMIN — Medication 40 MILLIGRAM(S): at 05:32

## 2022-03-11 RX ADMIN — Medication 400 UNIT(S): at 11:16

## 2022-03-11 RX ADMIN — LIDOCAINE 1 PATCH: 4 CREAM TOPICAL at 11:16

## 2022-03-11 RX ADMIN — PIPERACILLIN AND TAZOBACTAM 25 GRAM(S): 4; .5 INJECTION, POWDER, LYOPHILIZED, FOR SOLUTION INTRAVENOUS at 19:45

## 2022-03-11 RX ADMIN — Medication 20 MILLIGRAM(S): at 05:32

## 2022-03-11 RX ADMIN — Medication 650 MILLIGRAM(S): at 20:50

## 2022-03-11 RX ADMIN — Medication 25 MILLIGRAM(S): at 05:32

## 2022-03-11 RX ADMIN — PIPERACILLIN AND TAZOBACTAM 25 GRAM(S): 4; .5 INJECTION, POWDER, LYOPHILIZED, FOR SOLUTION INTRAVENOUS at 03:03

## 2022-03-11 RX ADMIN — Medication 3 MILLILITER(S): at 16:34

## 2022-03-11 RX ADMIN — Medication 2.5 MILLIGRAM(S): at 05:32

## 2022-03-11 RX ADMIN — Medication 100 GRAM(S): at 11:16

## 2022-03-11 RX ADMIN — Medication 3 MILLILITER(S): at 04:46

## 2022-03-11 RX ADMIN — LIDOCAINE 1 PATCH: 4 CREAM TOPICAL at 00:21

## 2022-03-11 RX ADMIN — TAMSULOSIN HYDROCHLORIDE 0.4 MILLIGRAM(S): 0.4 CAPSULE ORAL at 17:52

## 2022-03-11 RX ADMIN — Medication 650 MILLIGRAM(S): at 20:12

## 2022-03-11 RX ADMIN — Medication 3 MILLILITER(S): at 21:30

## 2022-03-11 RX ADMIN — WARFARIN SODIUM 2.5 MILLIGRAM(S): 2.5 TABLET ORAL at 21:59

## 2022-03-11 RX ADMIN — PANTOPRAZOLE SODIUM 40 MILLIGRAM(S): 20 TABLET, DELAYED RELEASE ORAL at 05:32

## 2022-03-11 RX ADMIN — PIPERACILLIN AND TAZOBACTAM 25 GRAM(S): 4; .5 INJECTION, POWDER, LYOPHILIZED, FOR SOLUTION INTRAVENOUS at 12:30

## 2022-03-11 NOTE — DISCHARGE NOTE PROVIDER - CARE PROVIDER_API CALL
Brandon Stephenson)  Cardiovascular Disease; Internal Medicine  [address] [place] [address]  Phone: [phone]  Fax: [phone]  Follow Up Time:    Brandon Stephenson)  Cardiovascular Disease; Internal Medicine  [address] [place] [address]  Phone: [phone]  Fax: [phone]  Follow Up Time:     PMD,   Phone: (   )    -  Fax: (   )    -  Follow Up Time:

## 2022-03-11 NOTE — DISCHARGE NOTE PROVIDER - PROVIDER TOKENS
PROVIDER:[TOKEN:[[id number]]] PROVIDER:[TOKEN:[[id number]]],FREE:[LAST:[PMD],PHONE:[(   )    -],FAX:[(   )    -]]

## 2022-03-11 NOTE — DISCHARGE NOTE NURSING/CASE MANAGEMENT/SOCIAL WORK - NSDCFUADDAPPT_GEN_ALL_CORE_FT
[facility] pt. - Cardio appt. with Dr Amalia Shaikh is requested.     Pt. and caregiver has no concerns related to medications. Any new meds will be sent to [facility]- caregiver agrees to Meds to bed.     [facility] will follow up pt. for homecare.    Yellow folder packet provided to pt. at bedside.  [facility] pt. - Cardio appt. with Dr. Stephenson is on  3/21/22 at  9:00 am . If you  need to change it please call [phone]. It is important that you do not miss this follow up appt.     Pt. and caregiver has no concerns related to medications. Any new meds will be sent to [facility]- caregiver agrees to Meds to bed.     [facility] will follow up pt. for homecare.    Yellow folder packet provided to pt. at bedside.  [facility] pt. - Cardio appt. with Dr. Stephenson -Address: [address], [place] [address] on  3/21/22 at  9:00 am . If you  need to change it please call [phone]. It is important that you do not miss this follow up appt.     Pt. and caregiver has no concerns related to medications. Any new meds will be sent to [facility]- caregiver agrees to Meds to bed.     [facility] will follow up pt. for homecare.    Yellow folder packet provided to pt. at bedside.

## 2022-03-11 NOTE — DISCHARGE NOTE NURSING/CASE MANAGEMENT/SOCIAL WORK - NSDCVIVACCINE_GEN_ALL_CORE_FT
influenza, injectable, quadrivalent, preservative free; 21-Sep-2016 06:34; Cynthia James (RN); [employer]; [id number]; IntraMuscular; Deltoid Left.; 0.5 milliLiter(s); VIS (VIS Published: 07-Aug-2015, VIS Presented: 21-Sep-2016);   influenza, injectable, quadrivalent, preservative free; 02-Nov-2019 14:11; Morena Steele (RN); [employer]; [id number] (Exp. Date: 30-Jun-2020); IntraMuscular; Deltoid Left.; 0.5 milliLiter(s); VIS (VIS Published: 15-Aug-2019, VIS Presented: 02-Nov-2019);   Tdap; 19-May-2021 15:47; Elodia Herbert (RN); [employer]; [id number] (Exp. Date: 31-Jul-2022); IntraMuscular; Deltoid Right.; 0.5 milliLiter(s); VIS (VIS Published: 09-May-2013, VIS Presented: 19-May-2021);

## 2022-03-11 NOTE — DISCHARGE NOTE PROVIDER - HOSPITAL COURSE
89y/oM PMH prostate CA s/p XRT, GERD, bioAVR, permanent AF s/p AVN ablation and PPM, NICM with chronic HFrEF (recent improvement in EF), presenting with SOB admitted with aspiration pna and acute chf. Of note, pt recently admitted to [facility] 2/26/22-3/2/22 with R rib fx, acute on chronic T4 fx, resp failure. 89y/oM PMH prostate CA s/p XRT, GERD, bioAVR, permanent AF s/p AVN ablation and PPM, NICM with chronic HFrEF (recent improvement in EF), presenting with SOB admitted with aspiration pna and acute chf. Of note, pt recently admitted to [facility] 2/26/22-3/2/22 with R rib fx, acute on chronic T4 fx, resp failure. Treated with IV lasix, now back on PO. cardio recs appreciated. TTE reviewed. pt to dc home with home aides resumed 89y/oM PMH prostate CA s/p XRT, GERD, bioAVR, permanent AF s/p AVN ablation and PPM, NICM with chronic HFrEF (recent improvement in EF), presenting with SOB admitted with aspiration pna and acute chf. Of note, pt recently admitted to [facility] 2/26/22-3/2/22 with R rib fx, acute on chronic T4 fx, resp failure. Treated with IV lasix, now back on PO. cardio recs appreciated. TTE reviewed. Patient to be treated for aspiration pneumonia. pt to dc home with home aides resumed

## 2022-03-11 NOTE — PROGRESS NOTE ADULT - ASSESSMENT
89y/oM PMH prostate CA s/p XRT, GERD, bioAVR, permanent AF s/p AVN ablation and PPM, NICM with chronic HFrEF (recent improvement in EF), presenting with SOB admitted with aspiration pna and acute chf. Of note, pt recently admitted to [facility] 2/26/22-3/2/22 with R rib fx, acute on chronic T4 fx, resp failure.       Acute on chronic systolic HFrEF   aspiration pnuemonia   -swallow eval appreciated: easy to chew, thin liquids   -f/u cultures   -TTE: lvef 35-40%, elevated LAP   -s/p IV lasix, now on PO   -metoprolol  -cont abx     permanent afib   -resume coumadin   -monitor INR     HTN  -cont bb, enalapril     Transaminitis   -monitor     GERD   -ppi    code status: DNR/DNI

## 2022-03-11 NOTE — DISCHARGE NOTE NURSING/CASE MANAGEMENT/SOCIAL WORK - PATIENT PORTAL LINK FT
You can access the FollowMyHealth Patient Portal offered by [facility] by registering at the following website: [email or web address]. By joining [employer]’s FollowMyHealth portal, you will also be able to view your health information using other applications (apps) compatible with our system.

## 2022-03-11 NOTE — DISCHARGE NOTE PROVIDER - NSDCFUADDAPPT_GEN_ALL_CORE_FT
[facility] pt. - Cardio appt. with Dr. Stephenson -Address: [address], [place] [address] on  3/21/22 at  9:00 am . If you  need to change it please call [phone]. It is important that you do not miss this follow up appt.     Pt. and caregiver has no concerns related to medications. Any new meds will be sent to [facility]- caregiver agrees to Meds to bed.     [facility] will follow up pt. for homecare.    Yellow folder packet provided to pt. at bedside.

## 2022-03-11 NOTE — DISCHARGE NOTE PROVIDER - NSDCFUSCHEDAPPT_GEN_ALL_CORE_FT
MECHE MILLER ; 03/21/2022 ; [facility] [address] [facility] ; 04/04/2022 ; NPP Cardio [facility] [address]  MECHE MILLER ; 05/12/2022 ; [facility] [address] [facility] ; 05/20/2022 ; [facility] [address] [facility] ; 05/20/2022 ; NPP Cardiology [address] [place] [address]

## 2022-03-11 NOTE — DISCHARGE NOTE NURSING/CASE MANAGEMENT/SOCIAL WORK - NSDCPEFALRISK_GEN_ALL_CORE
For information on Fall & Injury Prevention, visit: [email or web address] OR  [email or web address] OR  https://www.cdc.gov/steadi/patient.html

## 2022-03-11 NOTE — DISCHARGE NOTE PROVIDER - NSDCCPCAREPLAN_GEN_ALL_CORE_FT
PRINCIPAL DISCHARGE DIAGNOSIS  Diagnosis: Acute on chronic combined systolic and diastolic congestive heart failure  Assessment and Plan of Treatment:        PRINCIPAL DISCHARGE DIAGNOSIS  Diagnosis: Acute on chronic combined systolic and diastolic congestive heart failure  Assessment and Plan of Treatment: Please continue medications as prescribed. Please follow up with cardiology outpatient.      SECONDARY DISCHARGE DIAGNOSES  Diagnosis: Leukocytosis  Assessment and Plan of Treatment: You have had leukocytosis. You were given steroids which can cause an elevation but should follow up with an hematologist outpatient.    Diagnosis: Pneumonia, aspiration  Assessment and Plan of Treatment: Please continue with antibiotics as prescribed.

## 2022-03-11 NOTE — DISCHARGE NOTE PROVIDER - NSDCMRMEDTOKEN_GEN_ALL_CORE_FT
Imodium 2 mg oral capsule: 1 cap(s) orally every 4 hours, As Needed  lidocaine 4% topical film: Apply topically to affected area (right chest wall and mid back) once a day   Metamucil 3.4 g/11 g oral powder for reconstitution:   Metoprolol Succinate ER 25 mg oral tablet, extended release: 3 tab(s) orally once a day  pantoprazole 40 mg oral delayed release tablet: 1 tab(s) orally once a day  PreserVision AREDS Lutein oral capsule: 1 dose(s) orally once a day  tamsulosin 0.4 mg oral capsule: 1 cap(s) orally 2 times a day  Tylenol 8 HR Arthritis Pain 650 mg oral tablet, extended release: 2 tab(s) orally every 8 hours  Vitamin D3 400 intl units (10 mcg) oral capsule: 1 cap(s) orally once a day  warfarin 2.5 mg oral tablet: 1 tab(s) orally once a day.    Take 2 tablets tonight and then continue 1 tab daily.   INR check on 3/4/22.   amoxicillin-clavulanate 875 mg-125 mg oral tablet: 1 tab(s) orally every 12 hours   enalapril 2.5 mg oral tablet: 1 tab(s) orally once a day  furosemide 20 mg oral tablet: 1 tab(s) orally once a day  Imodium 2 mg oral capsule: 1 cap(s) orally every 4 hours, As Needed  Metamucil 3.4 g/11 g oral powder for reconstitution:   Metoprolol Succinate ER 25 mg oral tablet, extended release: 3 tab(s) orally once a day  pantoprazole 40 mg oral delayed release tablet: 1 tab(s) orally once a day  PreserVision AREDS Lutein oral capsule: 1 dose(s) orally once a day  tamsulosin 0.4 mg oral capsule: 1 cap(s) orally 2 times a day  Tylenol 8 HR Arthritis Pain 650 mg oral tablet, extended release: 2 tab(s) orally every 8 hours  Vitamin D3 400 intl units (10 mcg) oral capsule: 1 cap(s) orally once a day  warfarin 2.5 mg oral tablet: 1 tab(s) orally once a day.

## 2022-03-11 NOTE — CHART NOTE - NSCHARTNOTEFT_GEN_A_CORE
Asked to evaluate pt. with the skin tear on the right forearm.  Skin tear has been there for few days. Pt.'s skin is thin and fragile.  Skin tear looks well demarcated, nice clean borders, mild erythema looks resolving, no swelling, no induration, no pus, no foul odor, no ecchymosis, no tenderness, no fluid or blood, or increased warmth    A/P: Skin tear  keep the wound dry for the first few days.  Change DSD dressings as per protocol. Protect the injured area until it has healed.  Check the wound every day for signs of infection, more redness,  red streak, swelling, or pain around your wound, Pus or a bad smell.

## 2022-03-12 VITALS
TEMPERATURE: 98 F | HEART RATE: 74 BPM | RESPIRATION RATE: 18 BRPM | OXYGEN SATURATION: 96 % | SYSTOLIC BLOOD PRESSURE: 99 MMHG | DIASTOLIC BLOOD PRESSURE: 56 MMHG

## 2022-03-12 LAB
ANION GAP SERPL CALC-SCNC: 7 MMOL/L — SIGNIFICANT CHANGE UP (ref 5–17)
BUN SERPL-MCNC: 44.9 MG/DL — HIGH (ref 8–20)
CALCIUM SERPL-MCNC: 8.3 MG/DL — LOW (ref 8.6–10.2)
CHLORIDE SERPL-SCNC: 94 MMOL/L — LOW (ref 98–107)
CO2 SERPL-SCNC: 39 MMOL/L — HIGH (ref 22–29)
CREAT SERPL-MCNC: 1.05 MG/DL — SIGNIFICANT CHANGE UP (ref 0.5–1.3)
EGFR: 65 ML/MIN/1.73M2 — SIGNIFICANT CHANGE UP
GLUCOSE SERPL-MCNC: 106 MG/DL — HIGH (ref 70–99)
HCT VFR BLD CALC: 35.5 % — LOW (ref 39–50)
HGB BLD-MCNC: 10.7 G/DL — LOW (ref 13–17)
INR BLD: 2.55 RATIO — HIGH (ref 0.88–1.16)
MAGNESIUM SERPL-MCNC: 2 MG/DL — SIGNIFICANT CHANGE UP (ref 1.6–2.6)
MCHC RBC-ENTMCNC: 29.6 PG — SIGNIFICANT CHANGE UP (ref 27–34)
MCHC RBC-ENTMCNC: 30.1 GM/DL — LOW (ref 32–36)
MCV RBC AUTO: 98.1 FL — SIGNIFICANT CHANGE UP (ref 80–100)
PLATELET # BLD AUTO: 124 K/UL — LOW (ref 150–400)
POTASSIUM SERPL-MCNC: 4.7 MMOL/L — SIGNIFICANT CHANGE UP (ref 3.5–5.3)
POTASSIUM SERPL-SCNC: 4.7 MMOL/L — SIGNIFICANT CHANGE UP (ref 3.5–5.3)
PROTHROM AB SERPL-ACNC: 29.9 SEC — HIGH (ref 10.5–13.4)
RBC # BLD: 3.62 M/UL — LOW (ref 4.2–5.8)
RBC # FLD: 16.1 % — HIGH (ref 10.3–14.5)
SODIUM SERPL-SCNC: 139 MMOL/L — SIGNIFICANT CHANGE UP (ref 135–145)
WBC # BLD: 18.82 K/UL — HIGH (ref 3.8–10.5)
WBC # FLD AUTO: 18.82 K/UL — HIGH (ref 3.8–10.5)

## 2022-03-12 PROCEDURE — 80048 BASIC METABOLIC PNL TOTAL CA: CPT

## 2022-03-12 PROCEDURE — 99239 HOSP IP/OBS DSCHRG MGMT >30: CPT

## 2022-03-12 PROCEDURE — 87077 CULTURE AEROBIC IDENTIFY: CPT

## 2022-03-12 PROCEDURE — 84100 ASSAY OF PHOSPHORUS: CPT

## 2022-03-12 PROCEDURE — 93306 TTE W/DOPPLER COMPLETE: CPT

## 2022-03-12 PROCEDURE — 80076 HEPATIC FUNCTION PANEL: CPT

## 2022-03-12 PROCEDURE — 96375 TX/PRO/DX INJ NEW DRUG ADDON: CPT

## 2022-03-12 PROCEDURE — 94640 AIRWAY INHALATION TREATMENT: CPT

## 2022-03-12 PROCEDURE — 87070 CULTURE OTHR SPECIMN AEROBIC: CPT

## 2022-03-12 PROCEDURE — 80053 COMPREHEN METABOLIC PANEL: CPT

## 2022-03-12 PROCEDURE — 92610 EVALUATE SWALLOWING FUNCTION: CPT

## 2022-03-12 PROCEDURE — 85027 COMPLETE CBC AUTOMATED: CPT

## 2022-03-12 PROCEDURE — 96374 THER/PROPH/DIAG INJ IV PUSH: CPT

## 2022-03-12 PROCEDURE — 81001 URINALYSIS AUTO W/SCOPE: CPT

## 2022-03-12 PROCEDURE — 36415 COLL VENOUS BLD VENIPUNCTURE: CPT

## 2022-03-12 PROCEDURE — 85610 PROTHROMBIN TIME: CPT

## 2022-03-12 PROCEDURE — 83880 ASSAY OF NATRIURETIC PEPTIDE: CPT

## 2022-03-12 PROCEDURE — 85025 COMPLETE CBC W/AUTO DIFF WBC: CPT

## 2022-03-12 PROCEDURE — 71045 X-RAY EXAM CHEST 1 VIEW: CPT

## 2022-03-12 PROCEDURE — 84484 ASSAY OF TROPONIN QUANT: CPT

## 2022-03-12 PROCEDURE — 87186 SC STD MICRODIL/AGAR DIL: CPT

## 2022-03-12 PROCEDURE — 85730 THROMBOPLASTIN TIME PARTIAL: CPT

## 2022-03-12 PROCEDURE — 87040 BLOOD CULTURE FOR BACTERIA: CPT

## 2022-03-12 PROCEDURE — 83735 ASSAY OF MAGNESIUM: CPT

## 2022-03-12 PROCEDURE — 93005 ELECTROCARDIOGRAM TRACING: CPT

## 2022-03-12 PROCEDURE — 0225U NFCT DS DNA&RNA 21 SARSCOV2: CPT

## 2022-03-12 PROCEDURE — 99285 EMERGENCY DEPT VISIT HI MDM: CPT

## 2022-03-12 RX ORDER — WARFARIN SODIUM 2.5 MG/1
1 TABLET ORAL
Qty: 0 | Refills: 0 | DISCHARGE

## 2022-03-12 RX ORDER — METOPROLOL TARTRATE 50 MG
1 TABLET ORAL
Qty: 30 | Refills: 0
Start: 2022-03-12 | End: 2022-04-10

## 2022-03-12 RX ORDER — FUROSEMIDE 40 MG
1 TABLET ORAL
Qty: 30 | Refills: 0
Start: 2022-03-12 | End: 2022-04-10

## 2022-03-12 RX ORDER — METOPROLOL TARTRATE 50 MG
3 TABLET ORAL
Qty: 0 | Refills: 0 | DISCHARGE

## 2022-03-12 RX ADMIN — PIPERACILLIN AND TAZOBACTAM 25 GRAM(S): 4; .5 INJECTION, POWDER, LYOPHILIZED, FOR SOLUTION INTRAVENOUS at 04:57

## 2022-03-12 RX ADMIN — Medication 20 MILLIGRAM(S): at 05:01

## 2022-03-12 RX ADMIN — Medication 3 MILLILITER(S): at 04:25

## 2022-03-12 RX ADMIN — TAMSULOSIN HYDROCHLORIDE 0.4 MILLIGRAM(S): 0.4 CAPSULE ORAL at 05:01

## 2022-03-12 RX ADMIN — PANTOPRAZOLE SODIUM 40 MILLIGRAM(S): 20 TABLET, DELAYED RELEASE ORAL at 05:01

## 2022-03-12 RX ADMIN — Medication 3 MILLILITER(S): at 09:19

## 2022-03-12 NOTE — PROGRESS NOTE ADULT - SUBJECTIVE AND OBJECTIVE BOX
Hospitalist Daily Progress Note    Chief Complaint:  Patient is a 89y old  Male who presents with a chief complaint of Acute respiratory distress / aspiration pneumonia ? (11 Mar 2022 15:23)      SUBJECTIVE / OVERNIGHT EVENTS:  Patient was seen and examined at bedside. No active complaints offered. Feels well. No overnight events.   Patient denies chest pain, SOB, abd pain, N/V, fever, chills, dysuria or any other complaints. All remainder ROS negative.     MEDICATIONS  (STANDING):  albuterol/ipratropium for Nebulization 3 milliLiter(s) Nebulizer every 6 hours  cholecalciferol 400 Unit(s) Oral daily  enalapril 2.5 milliGRAM(s) Oral daily  furosemide    Tablet 20 milliGRAM(s) Oral daily  lidocaine   4% Patch 1 Patch Transdermal daily  metoprolol succinate ER 25 milliGRAM(s) Oral daily  pantoprazole    Tablet 40 milliGRAM(s) Oral before breakfast  piperacillin/tazobactam IVPB.. 3.375 Gram(s) IV Intermittent every 8 hours  tamsulosin 0.4 milliGRAM(s) Oral two times a day    MEDICATIONS  (PRN):  acetaminophen     Tablet .. 650 milliGRAM(s) Oral every 6 hours PRN Temp greater or equal to 38C (100.4F), Mild Pain (1 - 3), Moderate Pain (4 - 6)  ALBUTerol    0.083% 2.5 milliGRAM(s) Nebulizer every 2 hours PRN Shortness of Breath and/or Wheezing  loperamide 2 milliGRAM(s) Oral every 4 hours PRN Diarrhea        I&O's Summary    11 Mar 2022 07:01  -  12 Mar 2022 07:00  --------------------------------------------------------  IN: 120 mL / OUT: 0 mL / NET: 120 mL        PHYSICAL EXAM:  Vital Signs Last 24 Hrs  T(C): 36.7 (12 Mar 2022 10:07), Max: 36.7 (12 Mar 2022 10:07)  T(F): 98 (12 Mar 2022 10:07), Max: 98 (12 Mar 2022 10:07)  HR: 86 (12 Mar 2022 10:07) (70 - 86)  BP: 98/53 (12 Mar 2022 10:07) (95/53 - 100/64)  BP(mean): --  RR: 18 (12 Mar 2022 10:07) (18 - 18)  SpO2: 98% (12 Mar 2022 10:07) (97% - 100%)      Constitutional: NAD, Resting, On NC  ENT: Supple, No JVD  Lungs: CTA B/L, Non-labored breathing  Cardio: RRR, S1/S2, No murmur  Abdomen: Soft, Nontender, Nondistended; Bowel sounds present  Extremities: No calf tenderness, No pitting edema  Musculoskeletal:   No clubbing or cyanosis of digits; no joint swelling or tenderness to palpation  Psych: Calm, cooperative affect appropriate  Neuro: Awake and alert, oriented to name, locations, year  Skin: No rashes; no palpable lesions    LABS:                        10.7   18.82 )-----------( 124      ( 12 Mar 2022 06:46 )             35.5     03-12    139  |  94<L>  |  44.9<H>  ----------------------------<  106<H>  4.7   |  39.0<H>  |  1.05    Ca    8.3<L>      12 Mar 2022 06:46  Phos  3.2     03-11  Mg     2.0     03-12    TPro  5.6<L>  /  Alb  3.0<L>  /  TBili  0.4  /  DBili  0.1  /  AST  41<H>  /  ALT  79<H>  /  AlkPhos  132<H>  03-11    PT/INR - ( 12 Mar 2022 06:46 )   PT: 29.9 sec;   INR: 2.55 ratio                   Culture - Sputum (collected 11 Mar 2022 03:36)  Source: .Sputum Sputum  Gram Stain (11 Mar 2022 06:54):    Moderate polymorphonuclear leukocytes per low power field    Rare Squamous epithelial cells per low power field    Moderate Gram Negative Rods seen per oil power field    Few Yeast like cells seen per oil power field  Preliminary Report (12 Mar 2022 10:46):    Moderate Gram Negative Rods    Normal Respiratory Eileen present    Culture - Blood (collected 09 Mar 2022 19:21)  Source: .Blood Blood-Peripheral  Preliminary Report (11 Mar 2022 21:01):    No growth at 48 hours    Culture - Blood (collected 09 Mar 2022 19:19)  Source: .Blood Blood-Peripheral  Preliminary Report (11 Mar 2022 21:01):    No growth at 48 hours      CAPILLARY BLOOD GLUCOSE            RADIOLOGY REVIEWED  
MECHE MILLER    [id number]    89y      Male    CC: chf    INTERVAL HPI/OVERNIGHT EVENTS: pt seen and examined. no acute events o/n.     REVIEW OF SYSTEMS:    CONSTITUTIONAL: No fever, weight loss  RESPIRATORY: No cough, wheezing, hemoptysis  CARDIOVASCULAR: No chest pain, palpitations  GASTROINTESTINAL: No abdominal or epigastric pain. No nausea, vomiting  NEUROLOGICAL: No headaches    Vital Signs Last 24 Hrs  T(C): 36.7 (11 Mar 2022 05:03), Max: 36.7 (11 Mar 2022 05:03)  T(F): 98.1 (11 Mar 2022 05:03), Max: 98.1 (11 Mar 2022 05:03)  HR: 76 (11 Mar 2022 05:27) (69 - 76)  BP: 116/65 (11 Mar 2022 05:27) (96/56 - 116/65)  BP(mean): --  RR: 19 (11 Mar 2022 05:03) (18 - 19)  SpO2: 100% (11 Mar 2022 05:03) (95% - 100%)    PHYSICAL EXAM:    GENERAL: NAD  HEENT: PERRL  NECK: soft, supple  CHEST/LUNG: bibasilar crackles; respirations unlabored on 3LNC   HEART: S1S2+, Regular rate and rhythm  ABDOMEN: Soft, Nontender, Nondistended; Bowel sounds present  SKIN: warm, dry  NEURO: Awake, alert; grossly non-focal  PSYCH: normal affect    LABS:                        10.9   20.74 )-----------( 130      ( 11 Mar 2022 05:17 )             35.9     03-11    141  |  95<L>  |  48.0<H>  ----------------------------<  161<H>  4.7   |  39.0<H>  |  1.14    Ca    8.6      11 Mar 2022 05:17  Phos  3.2     03-11  Mg     1.7     03-11    TPro  5.6<L>  /  Alb  3.0<L>  /  TBili  0.4  /  DBili  0.1  /  AST  41<H>  /  ALT  79<H>  /  AlkPhos  132<H>  03-11    PT/INR - ( 11 Mar 2022 05:17 )   PT: 34.5 sec;   INR: 2.94 ratio         PTT - ( 10 Mar 2022 06:35 )  PTT:40.2 sec  Urinalysis Basic - ( 10 Mar 2022 06:49 )    Color: Yellow / Appearance: Clear / S.010 / pH: x  Gluc: x / Ketone: Negative  / Bili: Negative / Urobili: Negative mg/dL   Blood: x / Protein: Negative / Nitrite: Negative   Leuk Esterase: Trace / RBC: 0-2 /HPF / WBC 3-5 /HPF   Sq Epi: x / Non Sq Epi: Moderate / Bacteria: Occasional          MEDICATIONS  (STANDING):  albuterol/ipratropium for Nebulization 3 milliLiter(s) Nebulizer every 6 hours  cholecalciferol 400 Unit(s) Oral daily  enalapril 2.5 milliGRAM(s) Oral daily  furosemide    Tablet 20 milliGRAM(s) Oral daily  lidocaine   4% Patch 1 Patch Transdermal daily  metoprolol succinate ER 25 milliGRAM(s) Oral daily  pantoprazole    Tablet 40 milliGRAM(s) Oral before breakfast  piperacillin/tazobactam IVPB.. 3.375 Gram(s) IV Intermittent every 8 hours  tamsulosin 0.4 milliGRAM(s) Oral two times a day    MEDICATIONS  (PRN):  acetaminophen     Tablet .. 650 milliGRAM(s) Oral every 6 hours PRN Temp greater or equal to 38C (100.4F), Mild Pain (1 - 3), Moderate Pain (4 - 6)  ALBUTerol    0.083% 2.5 milliGRAM(s) Nebulizer every 2 hours PRN Shortness of Breath and/or Wheezing  loperamide 2 milliGRAM(s) Oral every 4 hours PRN Diarrhea      RADIOLOGY & ADDITIONAL TESTS:  < from: TTE Echo Complete w/o Contrast w/ Doppler (22 @ 21:54) >  Summary:   1. Mildly enlarged left atrium.   2. Left ventricular ejection fraction, by visual estimation, is 35 to   40%.   3. Elevated mean left atrial pressure. (LAP = 23 mm Hg)   4. Normal right atrial size.   5. Mildly enlarged right ventricle. Moderately reduced RV systolic   function.   6. Moderate mitral valve regurgitation.   7. Mild aortic regurgitation.   8. Mild tricuspid regurgitation.   9.Estimated pulmonary artery systolic pressure is 40.7 mmHg -mild   pulmonary hypertension.  10. There is no evidence of pericardial effusion.  11. Dilated Ao root at 4.1cm.  12. Pacemaker wire/lead appears malpositioned. Kindly correlated with   chestxray.  13. Dr. Carrillo informed of the finding.    < end of copied text >  
                                                                                   Shelby Joseph M.D., F.A.C.C.                                                                                            [facility]                                                                                                [facility] [place] [address]                                                                                                     [phone]    COVERING FOR MECHE KAPADIA is a 89yMale who presented with SOB /CHF/pneumonia  .   PMH prostate CA s/p XRT, GERD, bioAVR, permanent AF s/p AVN ablation and PPM, NICM with chronic HFrEF (recent improvement in EF), presenting with SOB admitted with aspiration pneumonia and acute CHF   Of note, pt recently admitted to [facility] 2/26/22-3/2/22 with R rib fx, acute on chronic T4 fx, resp failure.         MEDICATIONS  (STANDING):  albuterol/ipratropium for Nebulization 3 milliLiter(s) Nebulizer every 6 hours  cholecalciferol 400 Unit(s) Oral daily  enalapril 2.5 milliGRAM(s) Oral daily  furosemide    Tablet 20 milliGRAM(s) Oral daily  lidocaine   4% Patch 1 Patch Transdermal daily  metoprolol succinate ER 25 milliGRAM(s) Oral daily  pantoprazole    Tablet 40 milliGRAM(s) Oral before breakfast  piperacillin/tazobactam IVPB.. 3.375 Gram(s) IV Intermittent every 8 hours  tamsulosin 0.4 milliGRAM(s) Oral two times a day    MEDICATIONS  (PRN):  acetaminophen     Tablet .. 650 milliGRAM(s) Oral every 6 hours PRN Temp greater or equal to 38C (100.4F), Mild Pain (1 - 3), Moderate Pain (4 - 6)  ALBUTerol    0.083% 2.5 milliGRAM(s) Nebulizer every 2 hours PRN Shortness of Breath and/or Wheezing  loperamide 2 milliGRAM(s) Oral every 4 hours PRN Diarrhea      Vital Signs Last 24 Hrs  T(C): 36.7 (12 Mar 2022 10:07), Max: 36.7 (12 Mar 2022 10:07)  T(F): 98 (12 Mar 2022 10:07), Max: 98 (12 Mar 2022 10:07)  HR: 86 (12 Mar 2022 10:07) (70 - 86)  BP: 98/53 (12 Mar 2022 10:07) (95/53 - 100/64)  BP(mean): --  RR: 18 (12 Mar 2022 10:07) (18 - 18)  SpO2: 98% (12 Mar 2022 10:07) (97% - 100%)    I&O's Detail    11 Mar 2022 07:01  -  12 Mar 2022 07:00  --------------------------------------------------------  IN:    Oral Fluid: 120 mL  Total IN: 120 mL    OUT:  Total OUT: 0 mL    Total NET: 120 mL          Constitutional:    NC/AT: Normal.     HEENT: Normal     Neck:  No JVD, bruits or thyromegaly    Respiratory:  Clear without rales or rhonchi    Cardiovascular:  RR without murmur, rub or gallop.    Gastrointestinal: Soft without hepatosplenomegaly.    Extremities: without cyanosis, clubbing or edema.    Neurological:  Oriented   x   ?2    . No gross sensory or motor defects.    Skin: Multiple excoriations     Psychiatric: Normal affect.                              10.7   18.82 )-----------( 124      ( 12 Mar 2022 06:46 )             35.5     03-12    139  |  94<L>  |  44.9<H>  ----------------------------<  106<H>  4.7   |  39.0<H>  |  1.05    Ca    8.3<L>      12 Mar 2022 06:46  Phos  3.2     03-11  Mg     2.0     03-12    TPro  5.6<L>  /  Alb  3.0<L>  /  TBili  0.4  /  DBili  0.1  /  AST  41<H>  /  ALT  79<H>  /  AlkPhos  132<H>  03-11    PT/INR - ( 12 Mar 2022 06:46 )   PT: 29.9 sec;   INR: 2.55 ratio           CXR (personally reviewed)  1. Poor inspiratory effort, evidence of vacular congestion  2. Worse when compared to prior     ECG: AF, V-paced    HOME MEDS:  1. Metop ER 75mg daily  2. Coumadin    ECHO 10/2021:  1. Low normal LV function, EF 50-55%  2. Normal RV  3. Severe LAE  4. Moderate TYLER  5. Moderate to severe MR, moderate AI, mild to moderate TR. RVSP 41mmHg  6. Mildly dilated aortic root     ECHO 3/10/2022:   1. Mildly enlarged left atrium.   2. Left ventricular ejection fraction, by visual estimation, is 35 to 40%.   3. Elevated mean left atrial pressure. (LAP = 23 mm Hg)   4. Normal right atrial size.   5. Mildly enlarged right ventricle. Moderately reduced RV systolic  function.   6. Moderate mitral valve regurgitation.   7. Mild aortic regurgitation.   8. Mild tricuspid regurgitation.   9.Estimated pulmonary artery systolic pressure is 40.7 mmHg -mild   pulmonary hypertension.  10. There is no evidence of pericardial effusion.  11. Dilated Ao root at 4.1cm.  12. Pacemaker wire/lead appears malpositioned. Kindly correlated with chestxray.          IMPRESSION    Patient is a  89y Male with h/o Bio AVR, permanent AF s/p AVN ablation and PPM, NICM with chronic HFrEF and more recent improvement in EF. mitral regurgitation here with SOB due to aspiration PNA and component acute on chronic diasotlic CHF (last EF 50-55% fall 2021)   Being treated for  aspiration PNA as contributing factor but also CHF  -Echo with moderate LV dysfunction  --Hemodynamically stable  Adjust INR  D/C as per hospitalist.       
MECHE MILLER  [id number]        Chief Complaint: follow up PNA/HFrEF      Subjective: no pain/SOB today      24 hour Tele: AF and V-paced        acetaminophen     Tablet .. 650 milliGRAM(s) Oral every 6 hours PRN  ALBUTerol    0.083% 2.5 milliGRAM(s) Nebulizer every 2 hours PRN  albuterol/ipratropium for Nebulization 3 milliLiter(s) Nebulizer every 6 hours  cholecalciferol 400 Unit(s) Oral daily  enalapril 2.5 milliGRAM(s) Oral daily  furosemide    Tablet 20 milliGRAM(s) Oral daily  lidocaine   4% Patch 1 Patch Transdermal daily  loperamide 2 milliGRAM(s) Oral every 4 hours PRN  magnesium sulfate  IVPB 1 Gram(s) IV Intermittent once  metoprolol succinate ER 25 milliGRAM(s) Oral daily  pantoprazole    Tablet 40 milliGRAM(s) Oral before breakfast  piperacillin/tazobactam IVPB.. 3.375 Gram(s) IV Intermittent every 8 hours  tamsulosin 0.4 milliGRAM(s) Oral two times a day          Physical Exam:  T(C): 36.7 (03-11-22 @ 05:03), Max: 36.7 (03-11-22 @ 05:03)  HR: 76 (03-11-22 @ 05:27) (66 - 79)  BP: 116/65 (03-11-22 @ 05:27) (96/56 - 116/65)  RR: 19 (03-11-22 @ 05:03) (16 - 19)  SpO2: 100% (03-11-22 @ 05:03) (95% - 100%)  Wt(kg): --  GEN: elderly frail appearing M in NAD  HEENT: MMM, sclera anicteric, on nasal canula   RESP: reduced air movement, no rales  CVS: S1S2, RRR, mild JVD no M/R/G  GI: Soft, NT, ND  EXT: no C/C/E  NEURO: AAOX3  PSYCH: Normal affect      I&O's Summary    10 Mar 2022 07:01  -  11 Mar 2022 07:00  --------------------------------------------------------  IN: 100 mL / OUT: 1200 mL / NET: -1100 mL          Labs:   11 Mar 2022 05:17    141    |  95     |  48.0   ----------------------------<  161    4.7     |  39.0   |  1.14     Ca    8.6        11 Mar 2022 05:17  Phos  3.2       11 Mar 2022 05:17  Mg     1.7       11 Mar 2022 05:17    TPro  5.6    /  Alb  3.0    /  TBili  0.4    /  DBili  0.1    /  AST  41     /  ALT  79     /  AlkPhos  132    11 Mar 2022 05:17                          10.9   20.74 )-----------( 130      ( 11 Mar 2022 05:17 )             35.9     PT/INR - ( 11 Mar 2022 05:17 )   PT: 34.5 sec;   INR: 2.94 ratio         PTT - ( 10 Mar 2022 06:35 )  PTT:40.2 sec  CARDIAC MARKERS ( 09 Mar 2022 17:35 )  x     / 0.03 ng/mL / x     / x     / x          CXR (personally reviewed)  1. Poor inspiratory effort, evidence of vacular congestion  2. Worse when compared to prior     ECG: AF, V-paced    HOME MEDS:  1. Metop ER 75mg daily  2. Coumadin    ECHO 10/2021:  1. Low normal LV function, EF 50-55%  2. Normal RV  3. Severe LAE  4. Moderate TYLER  5. Moderate to severe MR, moderate AI, mild to moderate TR. RVSP 41mmHg  6. Mildly dilated aortic root     ECHO 3/10/2022:   1. Mildly enlarged left atrium.   2. Left ventricular ejection fraction, by visual estimation, is 35 to 40%.   3. Elevated mean left atrial pressure. (LAP = 23 mm Hg)   4. Normal right atrial size.   5. Mildly enlarged right ventricle. Moderately reduced RV systolic  function.   6. Moderate mitral valve regurgitation.   7. Mild aortic regurgitation.   8. Mild tricuspid regurgitation.   9.Estimated pulmonary artery systolic pressure is 40.7 mmHg -mild   pulmonary hypertension.  10. There is no evidence of pericardial effusion.  11. Dilated Ao root at 4.1cm.  12. Pacemaker wire/lead appears malpositioned. Kindly correlated with chestxray.  13. Dr. Shaikh informed of the finding.        Assessment:    Patient is a  89y Male with h/o Bio AVR, permanent AF s/p AVN ablation and PPM, NICM with chronic HFrEF and more recent improvement in EF. mitral regurgitation here with SOB due to aspiration PNA and component acute on chronic diasotlic CHF (last EF 50-55% fall 2021)   -Suspect significant aspiration PNA as contributing factor but also CHF  -Echo with moderate LV dysfunction  -Device interrogated, bried run NSVT and normal function. 85% time in AF. will continue DDIR and consider change to VVIR given more persistent AF. Can follow up with EP as outpatient. Despite echo does not appear malpositioned on CXR  -Hemodynamically stable  -Agree with addition of ACEI and continue beta blocker given LV dysfunction    Plan:  1. Continue Metoprolol ER daily and enalapril. On lower dose metop than outpatient and continue at present dose  2. Continue PO lasix , appears contracted on BW today but likely increased LAP as seen on echo.   3. COumadin held due to supratherapeutic INR, restart for goal 2-3.   4. Antibiotics per primary team  5. O2 and supportive care  6. Patient DNR/DNI            Juan Shaikh MD
MECHE MILLER    [id number]    89y      Male    CC: chf    INTERVAL HPI/OVERNIGHT EVENTS: pt seen and examined. no complaints.     REVIEW OF SYSTEMS:    CONSTITUTIONAL: No fever, weight loss  RESPIRATORY: No wheezing, hemoptysis  CARDIOVASCULAR: No chest pain, palpitations  GASTROINTESTINAL: No abdominal or epigastric pain. No nausea, vomiting  NEUROLOGICAL: No headaches    Vital Signs Last 24 Hrs  T(C): 36.3 (10 Mar 2022 11:38), Max: 37.1 (09 Mar 2022 19:14)  T(F): 97.4 (10 Mar 2022 11:38), Max: 98.8 (09 Mar 2022 23:29)  HR: 79 (10 Mar 2022 11:38) (66 - 86)  BP: 110/60 (10 Mar 2022 11:38) (110/60 - 149/-)  BP(mean): 97 (10 Mar 2022 11:38) (79 - 97)  RR: 16 (10 Mar 2022 09:29) (16 - 20)  SpO2: 97% (10 Mar 2022 11:38) (97% - 100%)    PHYSICAL EXAM:    GENERAL: NAD  HEENT: PERRL  NECK: +jvd  CHEST/LUNG: bibasilar crackles; respirations unlabored on 3LNC   HEART: S1S2+, Regular rate and rhythm  ABDOMEN: Soft, Nontender, Nondistended; Bowel sounds present  SKIN: warm, dry  NEURO: Awake, alert; grossly non-focal  PSYCH: normal affect    LABS:                        11.5   18.24 )-----------( 122      ( 10 Mar 2022 06:35 )             37.9     03-10    147<H>  |  98  |  39.5<H>  ----------------------------<  184<H>  4.1   |  37.0<H>  |  0.90    Ca    8.7      10 Mar 2022 06:35  Mg     2.0     03-09    TPro  6.0<L>  /  Alb  3.1<L>  /  TBili  0.6  /  DBili  x   /  AST  64<H>  /  ALT  90<H>  /  AlkPhos  154<H>  03-10    PT/INR - ( 10 Mar 2022 06:35 )   PT: 42.0 sec;   INR: 3.57 ratio         PTT - ( 10 Mar 2022 06:35 )  PTT:40.2 sec  Urinalysis Basic - ( 10 Mar 2022 06:49 )    Color: Yellow / Appearance: Clear / S.010 / pH: x  Gluc: x / Ketone: Negative  / Bili: Negative / Urobili: Negative mg/dL   Blood: x / Protein: Negative / Nitrite: Negative   Leuk Esterase: Trace / RBC: 0-2 /HPF / WBC 3-5 /HPF   Sq Epi: x / Non Sq Epi: Moderate / Bacteria: Occasional          MEDICATIONS  (STANDING):  albuterol/ipratropium for Nebulization 3 milliLiter(s) Nebulizer every 6 hours  cholecalciferol 400 Unit(s) Oral daily  enalapril 2.5 milliGRAM(s) Oral daily  furosemide   Injectable 20 milliGRAM(s) IV Push once  lidocaine   4% Patch 1 Patch Transdermal daily  methylPREDNISolone sodium succinate Injectable 40 milliGRAM(s) IV Push every 12 hours  metoprolol succinate ER 25 milliGRAM(s) Oral daily  pantoprazole    Tablet 40 milliGRAM(s) Oral before breakfast  piperacillin/tazobactam IVPB.. 3.375 Gram(s) IV Intermittent every 8 hours  tamsulosin 0.4 milliGRAM(s) Oral two times a day    MEDICATIONS  (PRN):  acetaminophen     Tablet .. 650 milliGRAM(s) Oral every 6 hours PRN Temp greater or equal to 38C (100.4F), Mild Pain (1 - 3), Moderate Pain (4 - 6)  ALBUTerol    0.083% 2.5 milliGRAM(s) Nebulizer every 2 hours PRN Shortness of Breath and/or Wheezing  loperamide 2 milliGRAM(s) Oral every 4 hours PRN Diarrhea      RADIOLOGY & ADDITIONAL TESTS:  < from: Xray Chest 1 View- PORTABLE-Urgent (22 @ 17:39) >  IMPRESSION: CHF at this time.    < end of copied text >

## 2022-03-12 NOTE — PROGRESS NOTE ADULT - REASON FOR ADMISSION
Acute respiratory distress / aspiration pneumonia ?

## 2022-03-12 NOTE — PROGRESS NOTE ADULT - ASSESSMENT
89y/oM PMH prostate CA s/p XRT, GERD, bioAVR, permanent AF s/p AVN ablation and PPM, NICM with chronic HFrEF (recent improvement in EF), presenting with SOB admitted with aspiration pna and acute chf. Of note, pt recently admitted to [facility] 2/26/22-3/2/22 with R rib fx, acute on chronic T4 fx, resp failure.       Acute on chronic systolic HFrEF   aspiration pnuemonia   -swallow eval appreciated: easy to chew, thin liquids   -Cx NGTD  -TTE: lvef 35-40%, elevated LAP   -s/p IV lasix, now on PO   -metoprolol, Enalapril  -cont abx - Change to Augmentin for D c    permanent afib   -resume coumadin   -monitor INR     HTN  -cont bb, enalapril     Transaminitis   -monitor     GERD   -ppi    Leukocytosis  - Mildly elevated  - Was given steroids  - Outpatient hematology follow up    code status: DNR/DNI    Dispo: DC home  Discussed with patients daughter and updated

## 2022-03-13 LAB
-  AMIKACIN: SIGNIFICANT CHANGE UP
-  AMOXICILLIN/CLAVULANIC ACID: SIGNIFICANT CHANGE UP
-  AMPICILLIN/SULBACTAM: SIGNIFICANT CHANGE UP
-  AMPICILLIN: SIGNIFICANT CHANGE UP
-  AZTREONAM: SIGNIFICANT CHANGE UP
-  CEFAZOLIN: SIGNIFICANT CHANGE UP
-  CEFEPIME: SIGNIFICANT CHANGE UP
-  CEFOXITIN: SIGNIFICANT CHANGE UP
-  CEFTRIAXONE: SIGNIFICANT CHANGE UP
-  CIPROFLOXACIN: SIGNIFICANT CHANGE UP
-  ERTAPENEM: SIGNIFICANT CHANGE UP
-  GENTAMICIN: SIGNIFICANT CHANGE UP
-  IMIPENEM: SIGNIFICANT CHANGE UP
-  LEVOFLOXACIN: SIGNIFICANT CHANGE UP
-  MEROPENEM: SIGNIFICANT CHANGE UP
-  PIPERACILLIN/TAZOBACTAM: SIGNIFICANT CHANGE UP
-  TOBRAMYCIN: SIGNIFICANT CHANGE UP
-  TRIMETHOPRIM/SULFAMETHOXAZOLE: SIGNIFICANT CHANGE UP
CULTURE RESULTS: SIGNIFICANT CHANGE UP
METHOD TYPE: SIGNIFICANT CHANGE UP
ORGANISM # SPEC MICROSCOPIC CNT: SIGNIFICANT CHANGE UP
ORGANISM # SPEC MICROSCOPIC CNT: SIGNIFICANT CHANGE UP
SPECIMEN SOURCE: SIGNIFICANT CHANGE UP

## 2022-03-14 LAB
CULTURE RESULTS: SIGNIFICANT CHANGE UP
CULTURE RESULTS: SIGNIFICANT CHANGE UP
SPECIMEN SOURCE: SIGNIFICANT CHANGE UP
SPECIMEN SOURCE: SIGNIFICANT CHANGE UP

## 2022-03-17 ENCOUNTER — NON-APPOINTMENT (OUTPATIENT)
Age: 87
End: 2022-03-17

## 2022-03-17 ENCOUNTER — INPATIENT (INPATIENT)
Facility: HOSPITAL | Age: 87
LOS: 8 days | Discharge: HOME | DRG: 177 | End: 2022-03-26
Attending: INTERNAL MEDICINE | Admitting: STUDENT IN AN ORGANIZED HEALTH CARE EDUCATION/TRAINING PROGRAM
Payer: MEDICARE

## 2022-03-17 VITALS
HEIGHT: 65 IN | DIASTOLIC BLOOD PRESSURE: 63 MMHG | OXYGEN SATURATION: 99 % | SYSTOLIC BLOOD PRESSURE: 114 MMHG | RESPIRATION RATE: 48 BRPM | HEART RATE: 76 BPM | TEMPERATURE: 98 F

## 2022-03-17 DIAGNOSIS — Z96.651 PRESENCE OF RIGHT ARTIFICIAL KNEE JOINT: Chronic | ICD-10-CM

## 2022-03-17 DIAGNOSIS — I50.23 ACUTE ON CHRONIC SYSTOLIC (CONGESTIVE) HEART FAILURE: ICD-10-CM

## 2022-03-17 DIAGNOSIS — J96.01 ACUTE RESPIRATORY FAILURE WITH HYPOXIA: ICD-10-CM

## 2022-03-17 DIAGNOSIS — I48.0 PAROXYSMAL ATRIAL FIBRILLATION: ICD-10-CM

## 2022-03-17 DIAGNOSIS — Z96.652 PRESENCE OF LEFT ARTIFICIAL KNEE JOINT: Chronic | ICD-10-CM

## 2022-03-17 DIAGNOSIS — Z02.9 ENCOUNTER FOR ADMINISTRATIVE EXAMINATIONS, UNSPECIFIED: ICD-10-CM

## 2022-03-17 DIAGNOSIS — J15.6 PNEUMONIA DUE TO OTHER GRAM-NEGATIVE BACTERIA: ICD-10-CM

## 2022-03-17 DIAGNOSIS — Z95.0 PRESENCE OF CARDIAC PACEMAKER: Chronic | ICD-10-CM

## 2022-03-17 DIAGNOSIS — Z98.890 OTHER SPECIFIED POSTPROCEDURAL STATES: Chronic | ICD-10-CM

## 2022-03-17 DIAGNOSIS — I10 ESSENTIAL (PRIMARY) HYPERTENSION: ICD-10-CM

## 2022-03-17 LAB
ALBUMIN SERPL ELPH-MCNC: 3 G/DL — LOW (ref 3.3–5.2)
ALP SERPL-CCNC: 123 U/L — HIGH (ref 40–120)
ALT FLD-CCNC: 38 U/L — SIGNIFICANT CHANGE UP
ANION GAP SERPL CALC-SCNC: 10 MMOL/L — SIGNIFICANT CHANGE UP (ref 5–17)
APPEARANCE UR: CLEAR — SIGNIFICANT CHANGE UP
APTT BLD: 22 SEC — LOW (ref 27.5–35.5)
AST SERPL-CCNC: 33 U/L — SIGNIFICANT CHANGE UP
BACTERIA # UR AUTO: ABNORMAL
BASE EXCESS BLDV CALC-SCNC: 16.8 MMOL/L — HIGH (ref -2–3)
BASOPHILS # BLD AUTO: 0 K/UL — SIGNIFICANT CHANGE UP (ref 0–0.2)
BASOPHILS NFR BLD AUTO: 0 % — SIGNIFICANT CHANGE UP (ref 0–2)
BILIRUB SERPL-MCNC: 0.5 MG/DL — SIGNIFICANT CHANGE UP (ref 0.4–2)
BILIRUB UR-MCNC: NEGATIVE — SIGNIFICANT CHANGE UP
BUN SERPL-MCNC: 43.3 MG/DL — HIGH (ref 8–20)
CA-I SERPL-SCNC: 1.05 MMOL/L — LOW (ref 1.15–1.33)
CALCIUM SERPL-MCNC: 8.8 MG/DL — SIGNIFICANT CHANGE UP (ref 8.6–10.2)
CHLORIDE BLDV-SCNC: 99 MMOL/L — SIGNIFICANT CHANGE UP (ref 98–107)
CHLORIDE SERPL-SCNC: 99 MMOL/L — SIGNIFICANT CHANGE UP (ref 98–107)
CO2 SERPL-SCNC: 36 MMOL/L — HIGH (ref 22–29)
COLOR SPEC: YELLOW — SIGNIFICANT CHANGE UP
CREAT SERPL-MCNC: 0.76 MG/DL — SIGNIFICANT CHANGE UP (ref 0.5–1.3)
DIFF PNL FLD: NEGATIVE — SIGNIFICANT CHANGE UP
EGFR: 83 ML/MIN/1.73M2 — SIGNIFICANT CHANGE UP
EOSINOPHIL # BLD AUTO: 0 K/UL — SIGNIFICANT CHANGE UP (ref 0–0.5)
EOSINOPHIL NFR BLD AUTO: 0 % — SIGNIFICANT CHANGE UP (ref 0–6)
EPI CELLS # UR: SIGNIFICANT CHANGE UP
GAS PNL BLDV: 137 MMOL/L — SIGNIFICANT CHANGE UP (ref 136–145)
GAS PNL BLDV: SIGNIFICANT CHANGE UP
GIANT PLATELETS BLD QL SMEAR: PRESENT — SIGNIFICANT CHANGE UP
GLUCOSE BLDV-MCNC: 138 MG/DL — HIGH (ref 70–99)
GLUCOSE SERPL-MCNC: 132 MG/DL — HIGH (ref 70–99)
GLUCOSE UR QL: NEGATIVE MG/DL — SIGNIFICANT CHANGE UP
HCO3 BLDV-SCNC: 42 MMOL/L — HIGH (ref 22–29)
HCT VFR BLD CALC: 37.5 % — LOW (ref 39–50)
HCT VFR BLDA CALC: 35 % — SIGNIFICANT CHANGE UP
HGB BLD CALC-MCNC: 11.8 G/DL — LOW (ref 12.6–17.4)
HGB BLD-MCNC: 11.3 G/DL — LOW (ref 13–17)
INR BLD: 1.09 RATIO — SIGNIFICANT CHANGE UP (ref 0.88–1.16)
KETONES UR-MCNC: NEGATIVE — SIGNIFICANT CHANGE UP
LACTATE BLDV-MCNC: 2.7 MMOL/L — HIGH (ref 0.5–2)
LEUKOCYTE ESTERASE UR-ACNC: NEGATIVE — SIGNIFICANT CHANGE UP
LYMPHOCYTES # BLD AUTO: 14.92 K/UL — HIGH (ref 1–3.3)
LYMPHOCYTES # BLD AUTO: 70.4 % — HIGH (ref 13–44)
MANUAL SMEAR VERIFICATION: SIGNIFICANT CHANGE UP
MCHC RBC-ENTMCNC: 30.1 GM/DL — LOW (ref 32–36)
MCHC RBC-ENTMCNC: 30.4 PG — SIGNIFICANT CHANGE UP (ref 27–34)
MCV RBC AUTO: 100.8 FL — HIGH (ref 80–100)
MONOCYTES # BLD AUTO: 0 K/UL — SIGNIFICANT CHANGE UP (ref 0–0.9)
MONOCYTES NFR BLD AUTO: 0 % — LOW (ref 2–14)
NEUTROPHILS # BLD AUTO: 6.28 K/UL — SIGNIFICANT CHANGE UP (ref 1.8–7.4)
NEUTROPHILS NFR BLD AUTO: 29.6 % — LOW (ref 43–77)
NITRITE UR-MCNC: NEGATIVE — SIGNIFICANT CHANGE UP
NT-PROBNP SERPL-SCNC: 3061 PG/ML — HIGH (ref 0–300)
PCO2 BLDV: 69 MMHG — HIGH (ref 42–55)
PH BLDV: 7.39 — SIGNIFICANT CHANGE UP (ref 7.32–7.43)
PH UR: 6 — SIGNIFICANT CHANGE UP (ref 5–8)
PLAT MORPH BLD: NORMAL — SIGNIFICANT CHANGE UP
PLATELET # BLD AUTO: 134 K/UL — LOW (ref 150–400)
PO2 BLDV: 118 MMHG — HIGH (ref 25–45)
POTASSIUM BLDV-SCNC: 5.3 MMOL/L — HIGH (ref 3.5–5.1)
POTASSIUM SERPL-MCNC: 5.2 MMOL/L — SIGNIFICANT CHANGE UP (ref 3.5–5.3)
POTASSIUM SERPL-SCNC: 5.2 MMOL/L — SIGNIFICANT CHANGE UP (ref 3.5–5.3)
PROT SERPL-MCNC: 6.2 G/DL — LOW (ref 6.6–8.7)
PROT UR-MCNC: NEGATIVE — SIGNIFICANT CHANGE UP
PROTHROM AB SERPL-ACNC: 12.7 SEC — SIGNIFICANT CHANGE UP (ref 10.5–13.4)
RAPID RVP RESULT: SIGNIFICANT CHANGE UP
RBC # BLD: 3.72 M/UL — LOW (ref 4.2–5.8)
RBC # FLD: 15.9 % — HIGH (ref 10.3–14.5)
RBC BLD AUTO: NORMAL — SIGNIFICANT CHANGE UP
RBC CASTS # UR COMP ASSIST: SIGNIFICANT CHANGE UP /HPF (ref 0–4)
SAO2 % BLDV: 99.6 % — SIGNIFICANT CHANGE UP
SARS-COV-2 RNA SPEC QL NAA+PROBE: SIGNIFICANT CHANGE UP
SMUDGE CELLS # BLD: PRESENT — SIGNIFICANT CHANGE UP
SODIUM SERPL-SCNC: 145 MMOL/L — SIGNIFICANT CHANGE UP (ref 135–145)
SP GR SPEC: 1.01 — SIGNIFICANT CHANGE UP (ref 1.01–1.02)
TROPONIN T SERPL-MCNC: 0.03 NG/ML — SIGNIFICANT CHANGE UP (ref 0–0.06)
UROBILINOGEN FLD QL: NEGATIVE MG/DL — SIGNIFICANT CHANGE UP
WBC # BLD: 21.2 K/UL — HIGH (ref 3.8–10.5)
WBC # FLD AUTO: 21.2 K/UL — HIGH (ref 3.8–10.5)
WBC UR QL: SIGNIFICANT CHANGE UP /HPF (ref 0–5)

## 2022-03-17 PROCEDURE — 71045 X-RAY EXAM CHEST 1 VIEW: CPT | Mod: 26

## 2022-03-17 PROCEDURE — 93010 ELECTROCARDIOGRAM REPORT: CPT

## 2022-03-17 PROCEDURE — 99497 ADVNCD CARE PLAN 30 MIN: CPT | Mod: 25

## 2022-03-17 PROCEDURE — 99285 EMERGENCY DEPT VISIT HI MDM: CPT | Mod: CS

## 2022-03-17 PROCEDURE — 99223 1ST HOSP IP/OBS HIGH 75: CPT

## 2022-03-17 RX ORDER — VANCOMYCIN HCL 1 G
1000 VIAL (EA) INTRAVENOUS ONCE
Refills: 0 | Status: COMPLETED | OUTPATIENT
Start: 2022-03-17 | End: 2022-03-17

## 2022-03-17 RX ORDER — CHOLECALCIFEROL (VITAMIN D3) 125 MCG
1 CAPSULE ORAL
Qty: 0 | Refills: 0 | DISCHARGE

## 2022-03-17 RX ORDER — ACETAMINOPHEN 500 MG
2 TABLET ORAL
Qty: 0 | Refills: 0 | DISCHARGE

## 2022-03-17 RX ORDER — PIPERACILLIN AND TAZOBACTAM 4; .5 G/20ML; G/20ML
3.38 INJECTION, POWDER, LYOPHILIZED, FOR SOLUTION INTRAVENOUS EVERY 8 HOURS
Refills: 0 | Status: COMPLETED | OUTPATIENT
Start: 2022-03-17 | End: 2022-03-21

## 2022-03-17 RX ORDER — FUROSEMIDE 40 MG
40 TABLET ORAL ONCE
Refills: 0 | Status: COMPLETED | OUTPATIENT
Start: 2022-03-17 | End: 2022-03-17

## 2022-03-17 RX ORDER — PIPERACILLIN AND TAZOBACTAM 4; .5 G/20ML; G/20ML
3.38 INJECTION, POWDER, LYOPHILIZED, FOR SOLUTION INTRAVENOUS ONCE
Refills: 0 | Status: COMPLETED | OUTPATIENT
Start: 2022-03-17 | End: 2022-03-17

## 2022-03-17 RX ORDER — ENOXAPARIN SODIUM 100 MG/ML
60 INJECTION SUBCUTANEOUS EVERY 12 HOURS
Refills: 0 | Status: DISCONTINUED | OUTPATIENT
Start: 2022-03-17 | End: 2022-03-26

## 2022-03-17 RX ORDER — TAMSULOSIN HYDROCHLORIDE 0.4 MG/1
1 CAPSULE ORAL
Qty: 0 | Refills: 0 | DISCHARGE

## 2022-03-17 RX ORDER — PANTOPRAZOLE SODIUM 20 MG/1
1 TABLET, DELAYED RELEASE ORAL
Qty: 0 | Refills: 0 | DISCHARGE

## 2022-03-17 RX ORDER — TAMSULOSIN HYDROCHLORIDE 0.4 MG/1
2 CAPSULE ORAL
Qty: 0 | Refills: 0 | DISCHARGE

## 2022-03-17 RX ORDER — FUROSEMIDE 40 MG
40 TABLET ORAL DAILY
Refills: 0 | Status: DISCONTINUED | OUTPATIENT
Start: 2022-03-17 | End: 2022-03-18

## 2022-03-17 RX ORDER — VANCOMYCIN HCL 1 G
750 VIAL (EA) INTRAVENOUS EVERY 12 HOURS
Refills: 0 | Status: DISCONTINUED | OUTPATIENT
Start: 2022-03-18 | End: 2022-03-18

## 2022-03-17 RX ORDER — IPRATROPIUM/ALBUTEROL SULFATE 18-103MCG
3 AEROSOL WITH ADAPTER (GRAM) INHALATION EVERY 6 HOURS
Refills: 0 | Status: DISCONTINUED | OUTPATIENT
Start: 2022-03-17 | End: 2022-03-26

## 2022-03-17 RX ADMIN — PIPERACILLIN AND TAZOBACTAM 200 GRAM(S): 4; .5 INJECTION, POWDER, LYOPHILIZED, FOR SOLUTION INTRAVENOUS at 16:54

## 2022-03-17 RX ADMIN — Medication 40 MILLIGRAM(S): at 16:31

## 2022-03-17 RX ADMIN — Medication 250 MILLIGRAM(S): at 19:12

## 2022-03-17 NOTE — ED ADULT TRIAGE NOTE - CHIEF COMPLAINT QUOTE
pt presents to ED c/o shortness of breath and low oxygen levels at home.  pt tachypneic, MD called to triage.

## 2022-03-17 NOTE — ED PROVIDER NOTE - CLINICAL SUMMARY MEDICAL DECISION MAKING FREE TEXT BOX
Patient is a 89 yo male with PMHx prostate CA s/p XRT, GERD, bioAVR, permanent AF s/p AVN ablation and PPM, NICM with chronic HFrEF (30%) on 20 mg lasix PO, recent admission for aspiration PNA and acute CHF discharged with abx BIBEMS after nurse aide found him to be coughing, short of breath, hypoxic to 70s-80s. Patient is DNR/DNI. Presents to the  ED satting 88-90% on 5L NC placed by EMS, patient not on home O2, tachypneic, AAOX3, complaining of dry cough and SOB. He finished his amoxicillin course this AM. Moving all extremities, pressure 114/66, moving all extremities equally. Bipap, respiratory therapy consulted for hypoxic respiratory failure likely due to HF vs. aspiration PNA with bilateral crackles and 1+ pitting edema. ECG, troponin to r/o ACS. CXR, UA, bcx, ucx to r/o infection. BNP to r/o HF. CBC, cmp, COAGS to r/o electrolyte abnormalities. Will continue to monitor.

## 2022-03-17 NOTE — H&P ADULT - NSHPLABSRESULTS_GEN_ALL_CORE
Personally reviewed available labs, imaging and ekg     Labs  CBC Full  -  ( 17 Mar 2022 16:35 )  WBC Count : 21.20 K/uL  RBC Count : 3.72 M/uL  Hemoglobin : 11.3 g/dL  Hematocrit : 37.5 %  Platelet Count - Automated : 134 K/uL  Mean Cell Volume : 100.8 fl  Mean Cell Hemoglobin : 30.4 pg  Mean Cell Hemoglobin Concentration : 30.1 gm/dL  Auto Neutrophil # : 6.28 K/uL  Auto Lymphocyte # : 14.92 K/uL  Auto Monocyte # : 0.00 K/uL  Auto Eosinophil # : 0.00 K/uL  Auto Basophil # : 0.00 K/uL  Auto Neutrophil % : 29.6 %  Auto Lymphocyte % : 70.4 %  Auto Monocyte % : 0.0 %  Auto Eosinophil % : 0.0 %  Auto Basophil % : 0.0 %    03-17    145  |  99  |  43.3<H>  ----------------------------<  132<H>  5.2   |  36.0<H>  |  0.76    Ca    8.8      17 Mar 2022 16:35    TPro  6.2<L>  /  Alb  3.0<L>  /  TBili  0.5  /  DBili  x   /  AST  33  /  ALT  38  /  AlkPhos  123<H>  03-17    PT/INR - ( 17 Mar 2022 16:35 )   PT: 12.7 sec;   INR: 1.09 ratio         PTT - ( 17 Mar 2022 16:35 )  PTT:22.0 sec    CAPILLARY BLOOD GLUCOSE        CARDIAC MARKERS ( 17 Mar 2022 16:35 )  x     / 0.03 ng/mL / x     / x     / x                Imaging  < from: Xray Chest 1 View- PORTABLE-Routine (Xray Chest 1 View- PORTABLE-Routine .) (03.11.22 @ 09:17) >    IMPRESSION:   No radiographic evidence of active chest disease.  Cardiac device wire leads are within right atrium and right ventricle.    --- End of Report ---            LINDY PHAM MD; Attending Radiologist  This document has been electronically signed. Mar 11 2022  2:49PM    < end of copied text >        EKG

## 2022-03-17 NOTE — H&P ADULT - NSHPPHYSICALEXAM_GEN_ALL_CORE
Vital Signs Last 24 Hrs  T(F): 98 (17 Mar 2022 15:45), Max: 98 (17 Mar 2022 15:45)  HR: 70 (17 Mar 2022 16:56) (69 - 76)  BP: 99/54 (17 Mar 2022 16:56) (99/54 - 114/63)  RR: 18 (17 Mar 2022 16:56) (18 - 48)  SpO2: 98% (17 Mar 2022 16:56) (90% - 98%)    Physical Exam:  Constitutional: NAD, awake and alert, well-developed  Neck: Soft and supple, No LAD, No JVD  Respiratory: cta b/l no wheezing no rhonchi  Cardiovascular: +s1/s2 no edema b/l le  Gastrointestinal: soft nt nd bs+  Vascular: 2+ peripheral pulses  Neurological: A/O x 3, no focal deficits  Musculoskeletal: 5/5 strength b/l upper and lower extremities  Skin:  No obvious pathologic skin lesions   Hematologic / Lymph / Immunologic: No bleeding from IV sites or wounds, No lymphadenopathy, No Hives or allergic type skin lesions

## 2022-03-17 NOTE — CHART NOTE - NSCHARTNOTEFT_GEN_A_CORE
Cardiac Consult was called for Александр White by MD Barraza, after reviewed it was noted that patient under the care of [facility] services.   I notified MD Barraza and MD BOWIE attending Sajan and they will re consult with [facility].

## 2022-03-17 NOTE — H&P ADULT - PROBLEM SELECTOR PLAN 4
- will hold anti-HTN given soft BP - will c/w coumadin 2.5 mg QD  - c/w tele monitoring  - monitor PT/INR daily - will switch to lovenox given NPO status  - c/w tele monitoring  - monitor PT/INR daily

## 2022-03-17 NOTE — H&P ADULT - HISTORY OF PRESENT ILLNESS
Patient is a 89 yo male with PMHx prostate CA s/p XRT, GERD, bioAVR, permanent AF s/p AVN ablation and PPM on warfarin, NICM with chronic HFrEF (30%) on 20 mg lasix PO, recent admission for aspiration PNA and acute CHF BIBEMS w/ hypoxia to 70s-80s. Per nurse aide, patient was coughing and endorsing SOB. He denies f/c/h/d/n/v, chest pain and abdominal pain. Of note, he recently completed course of amoxicillin.      In the ED, his vitals were notable for RR- 48 and required NC- 4L. He was subsequently transitioned to Bipap. Labs notable for leukocytosis and proBNP- 3061. Patient received IV lasix and Vanc/Zosyn.

## 2022-03-17 NOTE — ED PROVIDER NOTE - ATTENDING CONTRIBUTION TO CARE
I, Pako Moreno, personally saw the patient with the resident, and completed the key components of the history and physical exam. I then discussed the management plan with the resident.    89 yo M hx of XRT, GERD, bioAVR, permanent AF s/p AVN ablation and PPM on warfarin, NICM with chronic HFrEF (30%) p/w sob and hypoxia 88-90% on 5 L, tachpnic. patient placed on BIPAP with improvement. wbc 21. abx given. patient seen by ICU, stable for floor. patient admitted.

## 2022-03-17 NOTE — H&P ADULT - PROBLEM SELECTOR PLAN 3
- will hold BB and enalapril given soft BP - will hold BB and enalapril given soft BP  - will treat with IV lasix 20 given soft BP  - F/u repeat TTE - will hold BB and enalapril given soft BP  - c/w Lasix  - F/u repeat TTE - will hold BB and enalapril given soft BP  - c/w Lasix  - F/u repeat TTE  - Cards c/s placed (Dr. Shaikh)

## 2022-03-17 NOTE — H&P ADULT - PROBLEM SELECTOR PLAN 6
- DVt ppx- Warfarin  - Diet- NPO while on Bipap  - Dispo- pending clinical improvement    CODE STATUS- DNR/DNI - DVt ppx- Lovenox  - Diet- NPO while on Bipap. Will obtain bedside S/S evaluation  - Dispo- pending clinical improvement. Patient has home O2. PT evaluation    CODE STATUS- DNR/DNI. MOLST filled out.   Updated patient's daughter, Jessica ([phone]) on 3/17

## 2022-03-17 NOTE — ED ADULT NURSE NOTE - NSFALLRSKASSISTTYPE_ED_ALL_ED
[Post-Operative Visit] : a post-operative visit [FreeTextEntry2] : packing removal [FreeTextEntry1] : s/p septoplasty and turbinectomy and s/p Endoscopic Sinus Surgery Standing/Walking/Toileting

## 2022-03-17 NOTE — CONSULT NOTE ADULT - SUBJECTIVE AND OBJECTIVE BOX
Patient is a 89y old  Male who presents with a chief complaint of     BRIEF HOSPITAL COURSE:   "Patient is a 89 yo male with PMHx prostate CA s/p XRT, GERD, bioAVR, permanent AF s/p AVN ablation and PPM on warfarin, NICM with chronic HFrEF (30%) on 20 mg lasix PO, recent admission for aspiration PNA and acute CHF discharged with abx BIBEMS after nurse aide found him to be coughing, short of breath, hypoxic to 70s-80s. Patient is DNR/DNI. Presents to the  ED satting 88-90% on 5L NC placed by EMS, patient not on home O2, tachypneic, AAOX3, complaining of dry cough and SOB. He finished his amoxicillin course this AM. Moving all extremities, pressure 114/66, moving all extremities equally. Denies fevers, chills, dizziness, lightheadedness, dysphagia, dysarthria, diplopia, photophobia, syncope, CP, abdominal pain, neck pain, back pain, diarrhea, dysuria, hematuria, hematochezia, hematemesis, n/v, recent travel, sick contacts."    Events last 24 hours:   - Presented to ED w/SOB and hypoxia  - Placed on NIPPV   - MICU consult placed for NIPPV   - DNR/DNI     Review of Systems:  CONSTITUTIONAL: No fever, chills, or fatigue  EYES: No eye pain, visual disturbances, or discharge  ENMT:  No difficulty hearing, tinnitus, vertigo; No sinus or throat pain  NECK: No pain or stiffness  RESPIRATORY: No cough, wheezing, chills or hemoptysis; No shortness of breath  CARDIOVASCULAR: No chest pain, palpitations, dizziness, or leg swelling  GASTROINTESTINAL: No abdominal or epigastric pain. No nausea, vomiting, or hematemesis; No diarrhea or constipation. No melena or hematochezia.  GENITOURINARY: No dysuria, frequency, hematuria, or incontinence  NEUROLOGICAL: No headaches, memory loss, loss of strength, numbness, or tremors  SKIN: No itching, burning, rashes, or lesions   MUSCULOSKELETAL: No joint pain or swelling; No muscle, back, or extremity pain  PSYCHIATRIC: No depression, anxiety, mood swings, or difficulty sleeping    PAST MEDICAL & SURGICAL HISTORY:  Afib  with RVR  GERD (gastroesophageal reflux disease)  Cyst of kidney, acquired  Cyst of pancreas  Prostate CA  radiation  Irritable bowel syndrome with diarrhea  VTE (venous thromboembolism)  RLE  HTN (hypertension)  CHF with cardiomyopathy  EF 30%  H/O total knee replacement, right  b/l  Cardiac resynchronization therapy pacemaker (CRT-P) in place  MDT doi 1/28/20-Dr Marcus  S/P bilateral inguinal hernia repair  Status post left partial knee replacement    Medications:  vancomycin  IVPB. 1000 milliGRAM(s) IV Intermittent once    ICU Vital Signs Last 24 Hrs  T(C): 36.7 (17 Mar 2022 15:45), Max: 36.7 (17 Mar 2022 15:45)  T(F): 98 (17 Mar 2022 15:45), Max: 98 (17 Mar 2022 15:45)  HR: 70 (17 Mar 2022 16:56) (69 - 76)  BP: 99/54 (17 Mar 2022 16:56) (99/54 - 114/63)  RR: 48 (17 Mar 2022 15:45) (48 - 48)  SpO2: 98% (17 Mar 2022 16:56) (90% - 98%)    I&O's Detail    LABS:                        11.3   21.20 )-----------( 134      ( 17 Mar 2022 16:35 )             37.5     CULTURES:  Culture Results:   Moderate Klebsiella pneumoniae  Normal Respiratory Eileen present (03-11 @ 03:36)    Physical Examination:  General: No acute distress.    HEENT: Pupils equal, reactive to light.  Symmetric.  PULM: Diminished breathe sounds b/l  NECK: Supple, no lymphadenopathy, trachea midline  CVS: Regular rate and rhythm, no murmurs, rubs, or gallops  ABD: Soft, nondistended, nontender, normoactive bowel sounds, no masses  EXT: No edema, nontender  SKIN: Warm and well perfused, no rashes noted.  NEURO: Alert, oriented, interactive, nonfocal  RADIOLOGY:   < from: Xray Chest 1 View- PORTABLE-Routine (Xray Chest 1 View- PORTABLE-Routine .) (03.11.22 @ 09:17) >    ACC: [id number] EXAM:  XR CHEST PORTABLE ROUTINE 1V                          PROCEDURE DATE:  03/11/2022      INTERPRETATION:  Portable chest radiograph    CLINICAL INFORMATION: Status post PPM placement    TECHNIQUE:  Portable  AP chest radiograph.    COMPARISON: 3/9/2022 chest .    FINDINGS:  CATHETERS AND TUBES: None    PULMONARY: The visualized lungs are clear of airspace consolidations or   effusions.   No pneumothorax.    HEART/VASCULAR: The heart is mildly enlarged in transverse diameter.  .    Cardiac device wire leads are within right atrium and right ventricle.    BONES: Visualized osseous structures are intact.    IMPRESSION:   No radiographic evidence of active chest disease.  Cardiac device wire leads are within right atrium and right ventricle.    --- End of Report ---    LINDY PHAM MD; Attending Radiologist  This document has been electronically signed. Mar 11 2022  2:49PM       Retention Suture Bite Size: 3 mm

## 2022-03-17 NOTE — ED PROVIDER NOTE - PHYSICAL EXAMINATION
Constitutional: Well appearing, awake, alert, oriented to person, place, and time/situation and in no apparent distress  ENMT: Airway patent nasal mucosa clear. Mouth with normal mucosa. Throat has no vesicles no oropharyngeal exudates and uvula is midline. No blood in the oropharynx  EYES: clear bilaterally, pupils equal, round and reactive to light  Cardiac: Regular rate, regular rhythm. Heart sounds S1, S2. No murmurs, rubs or gallops. Good capillary refill, 2+ pulses, 1+ pitting edema  Respiratory: Bilateral mild crackles, tachypneic, using accessory muscles, satting 90% on 5 L NC, no JVD and breath sounds equal  Gastrointestinal: Abdomen nondistended, non-tender, no rebound guarding or peritoneal signs  Genitourinary: No CVA tenderness, pelvis nontender, bladder nondistended  Musculoskeletal: Spine appears normal, range of motion is not limited, no muscle or joint tenderness  Neurological: Alert and oriented, no focal deficits, no motor or sensory deficits. CN 2-12 intact, PERRLA, EOMI, No FND, moving all extremities equally, sensation intact, No dysmetria, negative heel-shin, 5/5 strength   Skin: Skin normal color for race, warm, dry and intact, No evidence of rash

## 2022-03-17 NOTE — ED PROVIDER NOTE - OBJECTIVE STATEMENT
Patient is a 89 yo male with PMHx prostate CA s/p XRT, GERD, bioAVR, permanent AF s/p AVN ablation and PPM on warfarin, NICM with chronic HFrEF (30%) on 20 mg lasix PO, recent admission for aspiration PNA and acute CHF discharged with abx BIBEMS after nurse aide found him to be coughing, short of breath, hypoxic to 70s-80s. Patient is DNR/DNI. Presents to the  ED satting 88-90% on 5L NC placed by EMS, patient not on home O2, tachypneic, AAOX3, complaining of dry cough and SOB. He finished his amoxicillin course this AM. Moving all extremities, pressure 114/66, moving all extremities equally. Denies fevers, chills, dizziness, lightheadedness, dysphagia, dysarthria, diplopia, photophobia, syncope, CP, abdominal pain, neck pain, back pain, diarrhea, dysuria, hematuria, hematochezia, hematemesis, n/v, recent travel, sick contacts.

## 2022-03-17 NOTE — ED ADULT NURSE NOTE - OBJECTIVE STATEMENT
Received patient @ 1620. Patient is A&Ox3 and is comfortable on BiPAP. Patient BIBEMS after being found tachypneic and hypoxic at home in the 70's-80's. Patient was saturating @ 90% on 5L NC upon arrival. Received patient @ 1620. Patient is A&Ox3 and is comfortable on BiPAP. Patient BIBEMS after being found tachypneic and hypoxic at home in the 70's-80's. Patient was saturating @ 90% on 5L NC upon arrival. Patient finished his last dose of abx this morning after recently being admitted for pneumonia. Wheezes auscultated bilaterally on auscultation. Patient's skin is warm, dry and ecchymotic. Patient is resting comfortably at this time. Will continue to monitor.

## 2022-03-17 NOTE — H&P ADULT - PROBLEM SELECTOR PLAN 5
- DVt ppx- Warfarin  - Diet- NPO while on Bipap  - Dispo- pending clinical improvement - DVt ppx- Warfarin  - Diet- NPO while on Bipap  - Dispo- pending clinical improvement    CODE STATUS- DNR/DNI - will hold anti-HTN given soft BP - will hold anti-HTN given soft BP. Family agreeable to pressors, if necessary.   - will start midodrine once able to tolerate PO

## 2022-03-17 NOTE — ED PROVIDER NOTE - CARE PLAN
Principal Discharge DX:	Acute respiratory failure with hypoxia   1 Principal Discharge DX:	Acute respiratory failure with hypoxia  Secondary Diagnosis:	Aspiration pneumonia

## 2022-03-17 NOTE — H&P ADULT - NSHPREVIEWOFSYSTEMS_GEN_ALL_CORE
CONSTITUTIONAL: No fever, no chills  EYES: No eye pain, no visual disturbance  Mouth: no pain in mouth, no cuts  RESPIRATORY: + cough, + sob  CARDIOVASCULAR: No CP, no palpitations  GASTROINTESTINAL: no abdominal pain, no n/v/d  GENITOURINARY: No dysuria, no hematuria  NEUROLOGICAL: No headaches, no weakness  SKIN: No itching, no rashes  MUSCULOSKELETAL: No joint pain, no joint swelling  PSYCHIATRY: no insomnia, no irritability

## 2022-03-17 NOTE — ED CLERICAL - NS ED CLERK NOTE PRE-ARRIVAL INFORMATION; ADDITIONAL PRE-ARRIVAL INFORMATION
This patient is enrolled in a readmission reduction program and has active care navigation. This patient can be followed up by the care navigation team within 24 hours. To arrange close follow-up or to obtain additional clinical information about this patient, please call the contact number above. Please speak with the ED Case Manager for assistance with discharge planning

## 2022-03-17 NOTE — ED ADULT NURSE REASSESSMENT NOTE - NS ED NURSE REASSESS COMMENT FT1
Patient placed back on BiPAP after desaturating in the 70's on 6L NC. Patient is saturating @ 100% in no acute distress at this time. Daughter at bedside. Will continue to monitor.

## 2022-03-17 NOTE — H&P ADULT - PROBLEM SELECTOR PLAN 1
- Patient presenting with cough and SOB. Found to have hypoxia to 70s-80s. Now requiring BiPaP. Patient with recent admission for CHF exacerbation and aspiration pneumonia.   - will treat with broad spectrum IV abx- Vanc/Zosyn  - F/u blood cx and Ucx  - Appreciate ICU recs- will c/w NIPPV to maintain O2 sat> 90% and will wean as tolerated  - ID c/s placed - Patient presenting with cough and SOB. Found to have hypoxia to 70s-80s. Now requiring BiPaP. Patient with recent admission for CHF exacerbation and aspiration pneumonia.   - will treat with broad spectrum IV abx- Vanc/Zosyn  - Will c/w IV lasix 20 mg QD  - F/u CTA chest non-con to r/o PE  -/u blood cx and Ucx  - Appreciate ICU recs- will c/w NIPPV to maintain O2 sat> 90% and will wean as tolerated  - ID c/s placed - Patient presenting with cough and SOB. Found to have hypoxia to 70s-80s. Now requiring BiPaP. Patient with recent admission for CHF exacerbation and aspiration pneumonia.   - will treat with broad spectrum IV abx- Vanc/Zosyn, duoneb Q6H PRN  - Will c/w IV lasix 40 mg QD  - F/u CTA chest non-con to r/o PNA  - Low suspicion for PE. F/u D-dimer in AM  - F/u blood cx  - Appreciate ICU recs- will c/w NIPPV to maintain O2 sat> 90% and will wean as tolerated  - ID c/s placed - Patient presenting with cough and SOB. Found to have hypoxia to 70s-80s. Now requiring BiPaP. Patient with recent admission for CHF exacerbation and aspiration pneumonia.   - will treat with broad spectrum IV abx- Vanc/Zosyn, Duoneb Q6H PRN  - Will c/w IV lasix 40 mg QD  - F/u CTA chest non-con to r/o PNA  - Low suspicion for PE. F/u D-dimer in AM  - F/u blood cx  - Appreciate ICU recs- will c/w NIPPV to maintain O2 sat> 90% and will wean as tolerated  - ID c/s placed

## 2022-03-17 NOTE — ED PROVIDER NOTE - PROGRESS NOTE DETAILS
JK - CXR showing worsening L sided infiltrates, c/f recurrent/persistent aspiration PNA. WBC elevated from previous admission. CMP WNL, BNP 3061, IV lasix given. ECG, Troponin WNL. MICU consulted, recommended admission to stepdown as patient can be weaned off bipap as tolerated. IV abx given for aspiration PNA. Patient stable, satting 99% on Bipap currently. Ready and agreeable to admission for hypoxic respiratory failure. Currently stable.

## 2022-03-17 NOTE — H&P ADULT - ASSESSMENT
Patient is a 89 yo male with PMHx prostate CA s/p XRT, GERD, bioAVR, permanent AF s/p AVN ablation and PPM on warfarin, NICM with chronic HFrEF (30%) on 20 mg lasix PO, recent admission for aspiration PNA and acute CHF discharged with abx presenting with SOB found to have acute hypoxic respiratory failure likely 2/2 PNA vs CHF exacerbation.

## 2022-03-18 LAB
ALBUMIN SERPL ELPH-MCNC: 2.6 G/DL — LOW (ref 3.3–5.2)
ALP SERPL-CCNC: 110 U/L — SIGNIFICANT CHANGE UP (ref 40–120)
ALT FLD-CCNC: 32 U/L — SIGNIFICANT CHANGE UP
ANION GAP SERPL CALC-SCNC: 6 MMOL/L — SIGNIFICANT CHANGE UP (ref 5–17)
AST SERPL-CCNC: 20 U/L — SIGNIFICANT CHANGE UP
BASE EXCESS BLDA CALC-SCNC: 27.9 MMOL/L — HIGH (ref -2–3)
BILIRUB SERPL-MCNC: 0.6 MG/DL — SIGNIFICANT CHANGE UP (ref 0.4–2)
BLOOD GAS COMMENTS ARTERIAL: SIGNIFICANT CHANGE UP
BUN SERPL-MCNC: 39.1 MG/DL — HIGH (ref 8–20)
CALCIUM SERPL-MCNC: 8.5 MG/DL — LOW (ref 8.6–10.2)
CHLORIDE SERPL-SCNC: 96 MMOL/L — LOW (ref 98–107)
CO2 SERPL-SCNC: 41 MMOL/L — HIGH (ref 22–29)
CREAT SERPL-MCNC: 0.79 MG/DL — SIGNIFICANT CHANGE UP (ref 0.5–1.3)
CULTURE RESULTS: SIGNIFICANT CHANGE UP
D DIMER BLD IA.RAPID-MCNC: 455 NG/ML DDU — HIGH
EGFR: 82 ML/MIN/1.73M2 — SIGNIFICANT CHANGE UP
GLUCOSE SERPL-MCNC: 106 MG/DL — HIGH (ref 70–99)
HCO3 BLDA-SCNC: 51 MMOL/L — CRITICAL HIGH (ref 21–28)
HCT VFR BLD CALC: 32.8 % — LOW (ref 39–50)
HGB BLD-MCNC: 9.9 G/DL — LOW (ref 13–17)
HOROWITZ INDEX BLDA+IHG-RTO: 0.45 — SIGNIFICANT CHANGE UP
LEGIONELLA AG UR QL: NEGATIVE — SIGNIFICANT CHANGE UP
MAGNESIUM SERPL-MCNC: 1.7 MG/DL — SIGNIFICANT CHANGE UP (ref 1.6–2.6)
MCHC RBC-ENTMCNC: 30.2 GM/DL — LOW (ref 32–36)
MCHC RBC-ENTMCNC: 30.6 PG — SIGNIFICANT CHANGE UP (ref 27–34)
MCV RBC AUTO: 101.2 FL — HIGH (ref 80–100)
PCO2 BLDA: 69 MMHG — HIGH (ref 35–48)
PH BLDA: 7.48 — HIGH (ref 7.35–7.45)
PHOSPHATE SERPL-MCNC: 2.7 MG/DL — SIGNIFICANT CHANGE UP (ref 2.4–4.7)
PLATELET # BLD AUTO: 121 K/UL — LOW (ref 150–400)
PO2 BLDA: 98 MMHG — SIGNIFICANT CHANGE UP (ref 83–108)
POTASSIUM SERPL-MCNC: 4.4 MMOL/L — SIGNIFICANT CHANGE UP (ref 3.5–5.3)
POTASSIUM SERPL-SCNC: 4.4 MMOL/L — SIGNIFICANT CHANGE UP (ref 3.5–5.3)
PROT SERPL-MCNC: 5.4 G/DL — LOW (ref 6.6–8.7)
RBC # BLD: 3.24 M/UL — LOW (ref 4.2–5.8)
RBC # FLD: 15.9 % — HIGH (ref 10.3–14.5)
SAO2 % BLDA: 99 % — HIGH (ref 94–98)
SODIUM SERPL-SCNC: 143 MMOL/L — SIGNIFICANT CHANGE UP (ref 135–145)
SPECIMEN SOURCE: SIGNIFICANT CHANGE UP
WBC # BLD: 17.18 K/UL — HIGH (ref 3.8–10.5)
WBC # FLD AUTO: 17.18 K/UL — HIGH (ref 3.8–10.5)

## 2022-03-18 PROCEDURE — 99222 1ST HOSP IP/OBS MODERATE 55: CPT

## 2022-03-18 PROCEDURE — 71275 CT ANGIOGRAPHY CHEST: CPT | Mod: 26

## 2022-03-18 PROCEDURE — 99223 1ST HOSP IP/OBS HIGH 75: CPT

## 2022-03-18 PROCEDURE — 99233 SBSQ HOSP IP/OBS HIGH 50: CPT

## 2022-03-18 RX ORDER — LIDOCAINE 4 G/100G
1 CREAM TOPICAL ONCE
Refills: 0 | Status: COMPLETED | OUTPATIENT
Start: 2022-03-18 | End: 2022-03-18

## 2022-03-18 RX ADMIN — ENOXAPARIN SODIUM 60 MILLIGRAM(S): 100 INJECTION SUBCUTANEOUS at 06:04

## 2022-03-18 RX ADMIN — LIDOCAINE 1 PATCH: 4 CREAM TOPICAL at 17:21

## 2022-03-18 RX ADMIN — Medication 250 MILLIGRAM(S): at 06:04

## 2022-03-18 RX ADMIN — PIPERACILLIN AND TAZOBACTAM 25 GRAM(S): 4; .5 INJECTION, POWDER, LYOPHILIZED, FOR SOLUTION INTRAVENOUS at 01:50

## 2022-03-18 RX ADMIN — ENOXAPARIN SODIUM 60 MILLIGRAM(S): 100 INJECTION SUBCUTANEOUS at 17:30

## 2022-03-18 RX ADMIN — PIPERACILLIN AND TAZOBACTAM 25 GRAM(S): 4; .5 INJECTION, POWDER, LYOPHILIZED, FOR SOLUTION INTRAVENOUS at 20:46

## 2022-03-18 RX ADMIN — LIDOCAINE 1 PATCH: 4 CREAM TOPICAL at 19:00

## 2022-03-18 RX ADMIN — PIPERACILLIN AND TAZOBACTAM 25 GRAM(S): 4; .5 INJECTION, POWDER, LYOPHILIZED, FOR SOLUTION INTRAVENOUS at 11:30

## 2022-03-18 RX ADMIN — Medication 40 MILLIGRAM(S): at 05:53

## 2022-03-18 RX ADMIN — Medication 3 MILLILITER(S): at 09:20

## 2022-03-18 NOTE — PROGRESS NOTE ADULT - PROBLEM SELECTOR PLAN 4
- will switch to Lovenox given NPO status  - c/w tele monitoring - c/w lovenox given NPO status  - c/w tele monitoring

## 2022-03-18 NOTE — SWALLOW BEDSIDE ASSESSMENT ADULT - SWALLOW EVAL: RECOMMENDED DIET
NPO with consideration for short-term non-oral means of nutrition/ hydration as per pt family wishes

## 2022-03-18 NOTE — SWALLOW BEDSIDE ASSESSMENT ADULT - SWALLOW EVAL: STRUCTURAL ABNORMALITIES
Correction, PA approved for Doxycyline 150mg. Pt's mother informed.   [facility] from 5/4/2020 - 5/4/2021.  [phone] none present

## 2022-03-18 NOTE — SWALLOW BEDSIDE ASSESSMENT ADULT - SLP GENERAL OBSERVATIONS
Pt received A&A in bed Ox3, +4L O2 via NC SpO2 <90% throughout eval Pt received A&A in bed Ox3, +4L O2 via NC SpO2 <90% throughout eval, 0/10 pain pre & post eval

## 2022-03-18 NOTE — SWALLOW BEDSIDE ASSESSMENT ADULT - SWALLOW EVAL: DIAGNOSIS
Oral stage grossly functional with presented consistencies. Suspect pharyngeal dysphagia with puree, minced & moist and all liquid consistencies due to overt s/s aspiration across all trials with increased WOB over the course of the eval. Oral stage grossly functional with presented consistencies. Suspect pharyngeal dysphagia with puree, minced & moist and all liquid consistencies due to overt s/s aspiration across all trials with at least mild increased WOB over the course of the eval.

## 2022-03-18 NOTE — PROGRESS NOTE ADULT - PROBLEM SELECTOR PLAN 6
- DVT ppx- Lovenox  - Diet- NPO while on Bipap. Will obtain bedside S/S evaluation  - Dispo- pending clinical improvement. Patient has home O2. PT evaluation    CODE STATUS- DNR/DNI. MOLST filled out.   Unable to reach patient's daughterJessica ([phone]) on 3/18 - DVT ppx- Lovenox  - Diet- Patient failed beside S/S evaluation. Will obtain repeat evaluation.   - Dispo- pending clinical improvement. Patient has home O2. PT evaluation    CODE STATUS- DNR/DNI. MOLST filled out.   Unable to reach patient's daughterJessica ([phone]) on 3/18

## 2022-03-18 NOTE — SWALLOW BEDSIDE ASSESSMENT ADULT - SLP PERTINENT HISTORY OF CURRENT PROBLEM
As per charting, "Patient is a 89 yo male with PMHx prostate CA s/p XRT, GERD, bioAVR, permanent AF s/p AVN ablation and PPM on warfarin, NICM with chronic HFrEF (30%) on 20 mg lasix PO, recent admission for aspiration PNA and acute CHF discharged with abx presenting with SOB found to have acute hypoxic respiratory failure likely 2/2 PNA vs CHF exacerbation. Patient was transitioned from Bipap to NC. However, he desatted to 70s while on 6L NC. He was transitioned back to bipap. Today morning, he denies SOB, chest pain, n/v/f/c/h/d. Pt now on 4L via NC and breathing comfortably."

## 2022-03-18 NOTE — SWALLOW BEDSIDE ASSESSMENT ADULT - COMMENTS
Pt known to this dept, last seen on prior admission on 3/10/22 for a bedside swallow eval with rx for: "easy to chew solids, thin fluids  VFSS/MBS; if there are concerns with silent aspiration"

## 2022-03-18 NOTE — PROGRESS NOTE ADULT - SUBJECTIVE AND OBJECTIVE BOX
[facility] Division of Hospital Medicine    Chief Complaint:  SOB    SUBJECTIVE / OVERNIGHT EVENTS: Patient was transitioned from Bipap to NC. However, he desatted to 70s while on 6L NC. He was transitioned back to bipap. Today morning, he denies SOB, chest pain, n/v/f/c/h/d.     Patient denies chest pain, SOB, abd pain, N/V, fever, chills, dysuria or any other complaints. All remainder ROS negative.     MEDICATIONS  (STANDING):  enoxaparin Injectable 60 milliGRAM(s) SubCutaneous every 12 hours  furosemide   Injectable 40 milliGRAM(s) IV Push daily  piperacillin/tazobactam IVPB.. 3.375 Gram(s) IV Intermittent every 8 hours    MEDICATIONS  (PRN):  albuterol/ipratropium for Nebulization 3 milliLiter(s) Nebulizer every 6 hours PRN Shortness of Breath and/or Wheezing        I&O's Summary      PHYSICAL EXAM:  Vital Signs Last 24 Hrs  T(C): 36.4 (18 Mar 2022 11:10), Max: 36.7 (17 Mar 2022 15:45)  T(F): 97.6 (18 Mar 2022 11:10), Max: 98 (17 Mar 2022 15:45)  HR: 79 (18 Mar 2022 11:10) (68 - 79)  BP: 121/60 (18 Mar 2022 11:10) (99/54 - 121/60)  BP(mean): --  RR: 18 (18 Mar 2022 11:10) (16 - 48)  SpO2: 100% (18 Mar 2022 11:10) (90% - 100%)      Constitutional: NAD, awake and alert  Respiratory: cta b/l no wheezing no rhonchi. On Bipap  Cardiovascular: +s1/s2 no edema b/l le  Gastrointestinal: soft nt nd bs+  Vascular: 2+ peripheral pulses  Neurological: A/O x 3, no focal deficits  Musculoskeletal: 5/5 strength b/l upper and lower extremities    LABS:                        9.9    17.18 )-----------( 121      ( 18 Mar 2022 02:35 )             32.8     03-18    143  |  96<L>  |  39.1<H>  ----------------------------<  106<H>  4.4   |  41.0<H>  |  0.79    Ca    8.5<L>      18 Mar 2022 02:35  Phos  2.7     03-18  Mg     1.7     03-18    TPro  5.4<L>  /  Alb  2.6<L>  /  TBili  0.6  /  DBili  x   /  AST  20  /  ALT  32  /  AlkPhos  110  03-18    PT/INR - ( 17 Mar 2022 16:35 )   PT: 12.7 sec;   INR: 1.09 ratio         PTT - ( 17 Mar 2022 16:35 )  PTT:22.0 sec  CARDIAC MARKERS ( 17 Mar 2022 16:35 )  x     / 0.03 ng/mL / x     / x     / x          Urinalysis Basic - ( 17 Mar 2022 21:15 )    Color: Yellow / Appearance: Clear / S.010 / pH: x  Gluc: x / Ketone: Negative  / Bili: Negative / Urobili: Negative mg/dL   Blood: x / Protein: Negative / Nitrite: Negative   Leuk Esterase: Negative / RBC: 0-2 /HPF / WBC 0-2 /HPF   Sq Epi: x / Non Sq Epi: Occasional / Bacteria: Occasional        CAPILLARY BLOOD GLUCOSE            RADIOLOGY & ADDITIONAL TESTS:  Results Reviewed:   Imaging Personally Reviewed:  Electrocardiogram Personally Reviewed:

## 2022-03-18 NOTE — PROGRESS NOTE ADULT - PROBLEM SELECTOR PLAN 3
- will hold BB and enalapril given soft BP  - c/w Lasix  - F/u repeat TTE  - Cards c/s placed (Dr. Shaikh)

## 2022-03-18 NOTE — CONSULT NOTE ADULT - SUBJECTIVE AND OBJECTIVE BOX
[facility]                                                INFECTIOUS DISEASES  =======================================================                               Jose Gloria MD#  Odilon Mares MD*                                     Gray Kwan MD*    Karlene Buenrostro MD*            Diplomates American Board of Internal Medicine & Infectious Diseases                  # [place] Office - Appt - Tel  [phone] Fax [phone]                * [place] Office - Appt - Tel [phone] Fax [phone]                                  Hospital Consult line:  [phone]  =======================================================      [id number]  MECHE MILLER   HPI: This 89 yo male with prostate CA s/p XRT, GERD, bioAVR, permanent AF s/p AVN ablation and PPM on warfarin, NICM with chronic HFrEF (30%) on 20 mg lasix PO, recent admission for aspiration PNA and acute CHF BIBEMS w/ hypoxia to 70s-80s. Per nurse aide, patient was coughing and endorsing SOB. He denies f/c/h/d/n/v, chest pain and abdominal pain. Of note, he recently completed course of PIPERCILLIN/TAZOBACTAM from 3/9/22- 3/12-22.   In the ED, his vitals were notable for RR- 48 and required NC- 4L. He was subsequently transitioned to Bipap. Labs notable for leukocytosis and proBNP- 3061. Patient received IV lasix and Vanc/Zosyn.    (17 Mar 2022 17:42)    patient seen and examined.  CXR shows clear lungs.  BNP on this admission is elevated at 3056. WBC is elevated from 21K to 17K.   no fevers noted.       I have personally reviewed the labs and data; pertinent labs and data are listed in this note; please see below.   =======================================================  Past Medical & Surgical Hx:  =====================  PAST MEDICAL & SURGICAL HISTORY:  Afib with RVR  GERD (gastroesophageal reflux disease)  Cyst of kidney, acquired  Cyst of pancreas  Prostate CA radiation  Irritable bowel syndrome with diarrhea  VTE (venous thromboembolism) RLE  HTN (hypertension)  CHF with cardiomyopathy  EF 30%  H/O total knee replacement, right  b/l  Cardiac resynchronization therapy pacemaker (CRT-P) in place  MDT doi 1/28/20-Dr Marcus  S/P bilateral inguinal hernia repair  Status post left partial knee replacement    Problem List:  ==========  HEALTH ISSUES - PROBLEM Dx:  Acute respiratory failure with hypoxia  Gram-negative pneumonia  Discharge planning issues  Acute on chronic HFrEF (heart failure with reduced ejection fraction)  Hypertension  Paroxysmal atrial fibrillation      Social Hx:  =======  no toxic habits currently    FAMILY HISTORY:  no significant family history of immunosuppressive disorders in mother or father   =======================================================    REVIEW OF SYSTEMS:  CONSTITUTIONAL:  No Fever or chills  HEENT:  No diplopia or blurred vision.  No earache, sore throat or runny nose.  CARDIOVASCULAR:  No pressure, squeezing, strangling, tightness, heaviness or aching about the chest, neck, axilla or epigastrium.  RESPIRATORY:   + SOB  GASTROINTESTINAL:  No nausea, vomiting or diarrhea.  GENITOURINARY:  No dysuria, frequency or urgency. No Blood in urine  MUSCULOSKELETAL:  no joint aches, no muscle pain  SKIN:  No change in skin, hair or nails.  NEUROLOGIC:  No Headaches, seizures or weakness.  PSYCHIATRIC:  No disorder of thought or mood.  ENDOCRINE:  No heat or cold intolerance  HEMATOLOGICAL:  No easy bruising or bleeding.    =======================================================  Allergies  antibiotic (Rash)  tetracycline (Rash)     Antibiotics:  piperacillin/tazobactam IVPB.. 3.375 Gram(s) IV Intermittent every 8 hours      Other medications:  enoxaparin Injectable 60 milliGRAM(s) SubCutaneous every 12 hours  furosemide   Injectable 40 milliGRAM(s) IV Push daily     azithromycin  IVPB   255 mL/Hr IV Intermittent (03-09-22 @ 18:43)  cefTRIAXone   IVPB   100 mL/Hr IV Intermittent (02-26-22 @ 20:02)    piperacillin/tazobactam IVPB..   25 mL/Hr IV Intermittent (03-18-22 @ 01:50)   200 mL/Hr IV Intermittent (03-17-22 @ 16:54)    vancomycin  IVPB   250 mL/Hr IV Intermittent (03-18-22 @ 06:04)   250 mL/Hr IV Intermittent (03-17-22 @ 19:12)    piperacillin/tazobactam IVPB.   200 mL/Hr IV Intermittent (03-09-22 @ 19:06)   25 mL/Hr IV Intermittent (03-10-22 @ 03:38)   25 mL/Hr IV Intermittent (03-10-22 @ 12:40)   25 mL/Hr IV Intermittent (03-10-22 @ 20:55)   25 mL/Hr IV Intermittent (03-11-22 @ 03:03)   25 mL/Hr IV Intermittent (03-11-22 @ 12:30)   25 mL/Hr IV Intermittent (03-11-22 @ 19:45)   25 mL/Hr IV Intermittent (03-12-22 @ 04:57)    ======================================================  Physical Exam:  ============  T(F): 97.6 (18 Mar 2022 11:10), Max: 98 (17 Mar 2022 15:45)  HR: 79 (18 Mar 2022 11:10)  BP: 121/60 (18 Mar 2022 11:10)  RR: 18 (18 Mar 2022 11:10)  SpO2: 100% (18 Mar 2022 11:10) (90% - 100%)  temp max in last 48H T(F): , Max: 98 (03-17-22 @ 15:45)Height (cm): 165.1 (03-17-22 @ 15:45)  Weight (kg): 62 (03-17-22 @ 18:56)  BMI (kg/m2): 22.7 (03-17-22 @ 18:56)  BSA (m2): 1.68 (03-17-22 @ 18:56)    General:  No acute distress.  ON BIPAP  Eye: Pupils are equal, round and reactive to light, Extraocular movements are intact, Normal conjunctiva.  HENT: Normocephalic, Oral mucosa is moist, No pharyngeal erythema, No sinus tenderness.  Neck: Supple, No lymphadenopathy.  Respiratory: Lungs with rales bilaterally at bases  Cardiovascular: Normal rate, Regular rhythm,  PACED rhythm  + EDEMA bilaterally.  Gastrointestinal: Soft, Non-tender, Non-distended, Normal bowel sounds.  Genitourinary:  + condom catheter with clear dilute urine  Lymphatics: No lymphadenopathy neck,   Musculoskeletal: Normal range of motion, Normal strength.  Integumentary: No rash.  Neurologic: Alert, Oriented, No focal deficits, Cranial Nerves II-XII are grossly intact.  Psychiatric: Appropriate mood & affect.    =======================================================  Labs:                        9.9    17.18 )-----------( 121      ( 18 Mar 2022 02:35 )             32.8     03-18    143  |  96<L>  |  39.1<H>  ----------------------------<  106<H>  4.4   |  41.0<H>  |  0.79    Ca    8.5<L>      18 Mar 2022 02:35  Phos  2.7     03-18  Mg     1.7     03-18    TPro  5.4<L>  /  Alb  2.6<L>  /  TBili  0.6  /  DBili  x   /  AST  20  /  ALT  32  /  AlkPhos  110  03-18      Culture - Sputum (collected 03-11-22 @ 03:36)  Source: .Sputum Sputum  Gram Stain (03-11-22 @ 06:54):    Moderate polymorphonuclear leukocytes per low power field    Rare Squamous epithelial cells per low power field    Moderate Gram Negative Rods seen per oil power field    Few Yeast like cells seen per oil power field  Final Report (03-13-22 @ 13:21):    Moderate Klebsiella pneumoniae    Normal Respiratory Eileen present  Organism: Klebsiella pneumoniae (03-13-22 @ 13:21)  Organism: Klebsiella pneumoniae (03-13-22 @ 13:21)    Sensitivities:      -  Amikacin: S <=16      -  Amoxicillin/Clavulanic Acid: S <=8/4      -  Ampicillin: R 16 These ampicillin results predict results for amoxicillin      -  Ampicillin/Sulbactam: S <=4/2 Enterobacter, Klebsiella aerogenes, Citrobacter, and Serratia may develop resistance during prolonged therapy (3-4 days)      -  Aztreonam: S <=4      -  Cefazolin: S <=2 Enterobacter, Klebsiella aerogenes, Citrobacter, and Serratia may develop resistance during prolonged therapy (3-4 days)      -  Cefepime: S <=2      -  Cefoxitin: S <=8      -  Ceftriaxone: S <=1 Enterobacter, Klebsiella aerogenes, Citrobacter, and Serratia may develop resistance during prolonged therapy      -  Ciprofloxacin: S <=0.25      -  Ertapenem: S <=0.5      -  Gentamicin: S <=2      -  Imipenem: S <=1      -  Levofloxacin: S <=0.5      -  Meropenem: S <=1      -  Piperacillin/Tazobactam: S <=8      -  Tobramycin: S <=2      -  Trimethoprim/Sulfamethoxazole: S <=0.5/9.5      Method Type: SHAYNE    Culture - Blood (collected 03-09-22 @ 19:21)  Source: .Blood Blood-Peripheral  Final Report (03-14-22 @ 21:00):    No growth at 5 days.    Culture - Blood (collected 03-09-22 @ 19:19)  Source: .Blood Blood-Peripheral  Final Report (03-14-22 @ 21:00):    No growth at 5 days.      Creatinine, Serum: 0.79 mg/dL (03-18-22 @ 02:35)  Creatinine, Serum: 0.76 mg/dL (03-17-22 @ 16:35)    SARS-CoV-2: NotDetec (03-17-22 @ 16:36)  SARS-CoV-2: NotDetec (03-09-22 @ 18:36)  SARS-CoV-2: NotDetec (02-26-22 @ 15:27)    Serum Pro-Brain Natriuretic Peptide: 3061 pg/mL (03-17-22 @ 16:35)  Serum Pro-Brain Natriuretic Peptide: 6169 pg/mL (03-09-22 @ 17:35)  Serum Pro-Brain Natriuretic Peptide: 2297 pg/mL (01-16-20 @ 20:27)  Serum Pro-Brain Natriuretic Peptide: 929 pg/mL (10-31-19 @ 23:17)        < from: Xray Chest 1 View- PORTABLE-Urgent (03.17.22 @ 16:36) >    ACC: [id number] EXAM:  XR CHEST PORTABLE URGENT 1V                          PROCEDURE DATE:  03/17/2022          INTERPRETATION:  EXAMINATION: XR CHEST URGENT    CLINICAL INDICATION: Chest Pain    TECHNIQUE: Frontal radiograph of the chest was obtained.    COMPARISON: 3/11/2022.    FINDINGS:    Cardiac silhouette not well evaluated. Lungs are clear. No pleural   effusion or pneumothorax. Left-sided pacemaker    IMPRESSION:  Clear lungs.    --- End of Report ---         LAURA DE LOS SANTOS MD; Attending Radiologist  This document has been electronically signed. Mar 17 2022  6:58PM    < end of copied text >

## 2022-03-18 NOTE — PATIENT PROFILE ADULT - FALL HARM RISK - HARM RISK INTERVENTIONS

## 2022-03-18 NOTE — SWALLOW BEDSIDE ASSESSMENT ADULT - MODE OF PRESENTATION
spoon/fed by clinician pt initially declined cup sips; however, accepted after poor tolerance with small sip via straw/cup/straw/fed by clinician cup/fed by clinician pt initially declined cup sips; however, accepted after poor tolerance with small sips via straw/cup/straw/fed by clinician

## 2022-03-18 NOTE — PROGRESS NOTE ADULT - PROBLEM SELECTOR PLAN 5
- will hold anti-HTN given soft BP. Family agreeable to pressors, if necessary.   - will start midodrine once able to tolerate PO

## 2022-03-18 NOTE — CONSULT NOTE ADULT - SUBJECTIVE AND OBJECTIVE BOX
MECHE ALONZOOVER  [id number]    CC:  Patient is a 89y old  Male who presents with a chief complaint of SOB     HPI:  Patient is a 89 yo male with PMHx prostate CA s/p XRT, GERD, bioAVR, permanent AF s/p AVN ablation and PPM on warfarin, NICM with chronic HFrEF (30%), BIBEMS w/ hypoxia to 70s-80s. According to daughter, pt was recently admitted for PNA and discharged with amoxicillin which he completed. Per pt and daughter, pt had SOB and cough at home. Pt denies any associated chest pain, orthopnea, dizziness or fevers. Pt c/o tenderness to his right chest wall (mid axillary line). Pt states he has hx of falls and rib fx in the past, denies recent falls. In ED, pt was tachypneic requiring BIPAP. Pt now on 4L via NC and breathing comfortably.           ALLERGIES:    antibiotic (Rash)  tetracycline (Rash)      PAST MEDICAL & SURGICAL HISTORY:  Afib  with RVR    GERD (gastroesophageal reflux disease)    Cyst of kidney, acquired    Cyst of pancreas    Prostate CA  radiation    Irritable bowel syndrome with diarrhea    VTE (venous thromboembolism)  RLE    HTN (hypertension)    CHF with cardiomyopathy  EF 30%    H/O total knee replacement, right  b/l    Cardiac resynchronization therapy pacemaker (CRT-P) in place  MDT doi 1/28/20-Dr Marcus    S/P bilateral inguinal hernia repair    Status post left partial knee replacement        MEDICATIONS:  MEDICATIONS  (STANDING):  enoxaparin Injectable 60 milliGRAM(s) SubCutaneous every 12 hours  furosemide   Injectable 40 milliGRAM(s) IV Push daily  piperacillin/tazobactam IVPB.. 3.375 Gram(s) IV Intermittent every 8 hours    MEDICATIONS  (PRN):  albuterol/ipratropium for Nebulization 3 milliLiter(s) Nebulizer every 6 hours PRN Shortness of Breath and/or Wheezing    albuterol/ipratropium for Nebulization 3 milliLiter(s) Nebulizer every 6 hours PRN  enoxaparin Injectable 60 milliGRAM(s) SubCutaneous every 12 hours  furosemide   Injectable 40 milliGRAM(s) IV Push daily  piperacillin/tazobactam IVPB.. 3.375 Gram(s) IV Intermittent every 8 hours      SOCIAL HISTORY:  Patient denies alcohol, tobacco, drug use    FAMILY HISTORY:      ROS:  Patient denies headache, and other than noted above full ROS is unremarkable      PHYSICAL EXAM:  Vital Signs Last 24 Hrs  T(C): 36.4 (18 Mar 2022 11:10), Max: 36.7 (17 Mar 2022 15:45)  T(F): 97.6 (18 Mar 2022 11:10), Max: 98 (17 Mar 2022 15:45)  HR: 79 (18 Mar 2022 11:10) (68 - 79)  BP: 121/60 (18 Mar 2022 11:10) (99/54 - 121/60)  BP(mean): --  RR: 18 (18 Mar 2022 11:10) (16 - 48)  SpO2: 100% (18 Mar 2022 11:10) (90% - 100%)  General: Patient comfortable in NAD  HEENT: NCAT, mm dry , EOMI  Neck: no JVD, no carotid bruits  CVS: nl s1, split s2, no s3, no s4, no murmur or rubs, RRR  Chest: diminished b/l, right mid axillary  to palpation  Abdomen: soft, nt/nd  Extremities: No c/c/ 2+ BLE edema  Neuro: A&O x3  Psych: Normal affect      ECG:  ventricular paced with PVC, underlying afib       LABS:                        9.9    17.18 )-----------( 121      ( 18 Mar 2022 02:35 )             32.8     03-18    143  |  96<L>  |  39.1<H>  ----------------------------<  106<H>  4.4   |  41.0<H>  |  0.79    Ca    8.5<L>      18 Mar 2022 02:35  Phos  2.7     03-18  Mg     1.7     03-18    TPro  5.4<L>  /  Alb  2.6<L>  /  TBili  0.6  /  DBili  x   /  AST  20  /  ALT  32  /  AlkPhos  110  03-18    CARDIAC MARKERS ( 17 Mar 2022 16:35 )  x     / 0.03 ng/mL / x     / x     / x          PT/INR - ( 17 Mar 2022 16:35 )   PT: 12.7 sec;   INR: 1.09 ratio         PTT - ( 17 Mar 2022 16:35 )  PTT:22.0 sec      RADIOLOGY:        Assessmwent:  89yMale with PMHx p/w prostate CA s/p XRT, GERD, bioAVR, permanent AF s/p AVN ablation and PPM on warfarin, NICM with chronic HFrEF (30%) presented with cough, SOB. Recently admitted with PNA. Pt was hypoxic and tachypneic, requiring BIPAP. Pt now on O2 4LPM via NC, breathing comfortably saturating >90%. CXR with mild pulmonary infiltrates. BNP mildly elevated and much lower from last admission. Pt appears clinically dry and with contraction alkalosis. SOB likely due to aspiration PNA. Pt failed dysphagia screen and NPO.     Plan:  1. Hold Lasix at this time, will dose daily for now.   2. Continue full dose Lovenox, give Coumadin 2.5 mg tonight.   3. Follow-up CTA chest. No need for TTE at this time. Pt had TTE on 3/9.   4. Hold BB and ACEi for now.   5. Continue telemetry.  6. Continue supplemental O2 as needed.    Will follow.  Thanks!         MECHE ALONZOOVER  [id number]    CC:  Patient is a 89y old  Male who presents with a chief complaint of SOB     HPI:  Patient is a 89 yo male with PMHx prostate CA s/p XRT, GERD, bioAVR, permanent AF s/p AVN ablation and PPM on warfarin, NICM with chronic HFrEF (30%), BIBEMS w/ hypoxia to 70s-80s. According to daughter, pt was recently admitted for PNA and discharged with amoxicillin which he completed. Per pt and daughter, pt had SOB and cough at home. Pt denies any associated chest pain, orthopnea, dizziness or fevers. Pt c/o tenderness to his right chest wall (mid axillary line). Pt states he has hx of falls and rib fx in the past, denies recent falls. In ED, pt was tachypneic requiring BIPAP. Pt now on 4L via NC and breathing comfortably.           ALLERGIES:    antibiotic (Rash)  tetracycline (Rash)      PAST MEDICAL & SURGICAL HISTORY:  Afib  with RVR    GERD (gastroesophageal reflux disease)    Cyst of kidney, acquired    Cyst of pancreas    Prostate CA  radiation    Irritable bowel syndrome with diarrhea    VTE (venous thromboembolism)  RLE    HTN (hypertension)    CHF with cardiomyopathy  EF 30%    H/O total knee replacement, right  b/l    Cardiac resynchronization therapy pacemaker (CRT-P) in place  MDT doi 1/28/20-Dr Marcus    S/P bilateral inguinal hernia repair    Status post left partial knee replacement        MEDICATIONS:  MEDICATIONS  (STANDING):  enoxaparin Injectable 60 milliGRAM(s) SubCutaneous every 12 hours  furosemide   Injectable 40 milliGRAM(s) IV Push daily  piperacillin/tazobactam IVPB.. 3.375 Gram(s) IV Intermittent every 8 hours    MEDICATIONS  (PRN):  albuterol/ipratropium for Nebulization 3 milliLiter(s) Nebulizer every 6 hours PRN Shortness of Breath and/or Wheezing    albuterol/ipratropium for Nebulization 3 milliLiter(s) Nebulizer every 6 hours PRN  enoxaparin Injectable 60 milliGRAM(s) SubCutaneous every 12 hours  furosemide   Injectable 40 milliGRAM(s) IV Push daily  piperacillin/tazobactam IVPB.. 3.375 Gram(s) IV Intermittent every 8 hours      SOCIAL HISTORY:  Patient denies alcohol, tobacco, drug use    FAMILY HISTORY:      ROS:  Patient denies headache, and other than noted above full ROS is unremarkable      PHYSICAL EXAM:  Vital Signs Last 24 Hrs  T(C): 36.4 (18 Mar 2022 11:10), Max: 36.7 (17 Mar 2022 15:45)  T(F): 97.6 (18 Mar 2022 11:10), Max: 98 (17 Mar 2022 15:45)  HR: 79 (18 Mar 2022 11:10) (68 - 79)  BP: 121/60 (18 Mar 2022 11:10) (99/54 - 121/60)  BP(mean): --  RR: 18 (18 Mar 2022 11:10) (16 - 48)  SpO2: 100% (18 Mar 2022 11:10) (90% - 100%)  General: Patient comfortable in NAD  HEENT: NCAT, mm dry , EOMI  Neck: no JVD, no carotid bruits  CVS: nl s1, split s2, no s3, no s4, no murmur or rubs, RRR  Chest: diminished b/l, right mid axillary  to palpation  Abdomen: soft, nt/nd  Extremities: No c/c/ 2+ BLE edema  Neuro: A&O x3  Psych: Normal affect      ECG:  ventricular paced with PVC, underlying afib       LABS:                        9.9    17.18 )-----------( 121      ( 18 Mar 2022 02:35 )             32.8     03-18    143  |  96<L>  |  39.1<H>  ----------------------------<  106<H>  4.4   |  41.0<H>  |  0.79    Ca    8.5<L>      18 Mar 2022 02:35  Phos  2.7     03-18  Mg     1.7     03-18    TPro  5.4<L>  /  Alb  2.6<L>  /  TBili  0.6  /  DBili  x   /  AST  20  /  ALT  32  /  AlkPhos  110  03-18    CARDIAC MARKERS ( 17 Mar 2022 16:35 )  x     / 0.03 ng/mL / x     / x     / x          PT/INR - ( 17 Mar 2022 16:35 )   PT: 12.7 sec;   INR: 1.09 ratio         PTT - ( 17 Mar 2022 16:35 )  PTT:22.0 sec      RADIOLOGY:        Assessmwent:  89yMale with PMHx p/w prostate CA s/p XRT, GERD, bioAVR, permanent AF s/p AVN ablation and PPM on warfarin, NICM with chronic HFrEF (30%) presented with cough, SOB. Recently admitted with PNA. Pt was hypoxic and tachypneic, requiring BIPAP. Pt now on O2 4LPM via NC, breathing comfortably saturating >90%. CXR with mild pulmonary infiltrates. BNP mildly elevated and much lower from last admission. Pt appears clinically dry and with contraction alkalosis. SOB likely due to aspiration PNA. Pt failed dysphagia screen and NPO.     Plan:  1. Hold Lasix at this time, will dose daily for now.   2. Continue full dose Lovenox while NPO. Resume coumadin when able to take PO.   3. Follow-up CTA chest. No need for TTE at this time. Pt had TTE on 3/9.   4. Hold BB and ACEi for now.   5. Continue telemetry.  6. Continue supplemental O2 as needed.    Will follow.  Thanks!         MECHE ALONZOOVER  [id number]    CC:  Patient is a 89y old  Male who presents with a chief complaint of SOB     HPI:  Patient is a 89 yo male with PMHx prostate CA s/p XRT, GERD, bioAVR, permanent AF s/p AVN ablation and PPM on warfarin, NICM with chronic HFrEF (30%), BIBEMS w/ hypoxia to 70s-80s. According to daughter, pt was recently admitted for PNA and discharged with amoxicillin which he completed. Per pt and daughter, pt had SOB and cough at home. Pt denies any associated chest pain, orthopnea, dizziness or fevers. Pt c/o tenderness to his right chest wall (mid axillary line). Pt states he has hx of falls and rib fx in the past, denies recent falls. In ED, pt was tachypneic requiring BIPAP. Pt now on 4L via NC and breathing comfortably.           ALLERGIES:    antibiotic (Rash)  tetracycline (Rash)      PAST MEDICAL & SURGICAL HISTORY:  Afib  with RVR    GERD (gastroesophageal reflux disease)    Cyst of kidney, acquired    Cyst of pancreas    Prostate CA  radiation    Irritable bowel syndrome with diarrhea    VTE (venous thromboembolism)  RLE    HTN (hypertension)    CHF with cardiomyopathy  EF 30%    H/O total knee replacement, right  b/l    Cardiac resynchronization therapy pacemaker (CRT-P) in place  MDT doi 1/28/20-Dr Marcus    S/P bilateral inguinal hernia repair    Status post left partial knee replacement        MEDICATIONS:  MEDICATIONS  (STANDING):  enoxaparin Injectable 60 milliGRAM(s) SubCutaneous every 12 hours  furosemide   Injectable 40 milliGRAM(s) IV Push daily  piperacillin/tazobactam IVPB.. 3.375 Gram(s) IV Intermittent every 8 hours    MEDICATIONS  (PRN):  albuterol/ipratropium for Nebulization 3 milliLiter(s) Nebulizer every 6 hours PRN Shortness of Breath and/or Wheezing    albuterol/ipratropium for Nebulization 3 milliLiter(s) Nebulizer every 6 hours PRN  enoxaparin Injectable 60 milliGRAM(s) SubCutaneous every 12 hours  furosemide   Injectable 40 milliGRAM(s) IV Push daily  piperacillin/tazobactam IVPB.. 3.375 Gram(s) IV Intermittent every 8 hours      SOCIAL HISTORY:  Patient denies alcohol, tobacco, drug use    FAMILY HISTORY:      ROS:  Patient denies headache, and other than noted above full ROS is unremarkable      PHYSICAL EXAM:  Vital Signs Last 24 Hrs  T(C): 36.4 (18 Mar 2022 11:10), Max: 36.7 (17 Mar 2022 15:45)  T(F): 97.6 (18 Mar 2022 11:10), Max: 98 (17 Mar 2022 15:45)  HR: 79 (18 Mar 2022 11:10) (68 - 79)  BP: 121/60 (18 Mar 2022 11:10) (99/54 - 121/60)  BP(mean): --  RR: 18 (18 Mar 2022 11:10) (16 - 48)  SpO2: 100% (18 Mar 2022 11:10) (90% - 100%)  General: Patient comfortable in NAD  HEENT: NCAT, mm dry , EOMI  Neck: no JVD, no carotid bruits  CVS: nl s1, split s2, no s3, no s4, no murmur or rubs, RRR  Chest: diminished b/l, right mid axillary  to palpation  Abdomen: soft, nt/nd  Extremities: No c/c/ 2+ BLE edema  Neuro: A&O x3  Psych: Normal affect      ECG:  ventricular paced with PVC, underlying afib       LABS:                        9.9    17.18 )-----------( 121      ( 18 Mar 2022 02:35 )             32.8     03-18    143  |  96<L>  |  39.1<H>  ----------------------------<  106<H>  4.4   |  41.0<H>  |  0.79    Ca    8.5<L>      18 Mar 2022 02:35  Phos  2.7     03-18  Mg     1.7     03-18    TPro  5.4<L>  /  Alb  2.6<L>  /  TBili  0.6  /  DBili  x   /  AST  20  /  ALT  32  /  AlkPhos  110  03-18    CARDIAC MARKERS ( 17 Mar 2022 16:35 )  x     / 0.03 ng/mL / x     / x     / x          PT/INR - ( 17 Mar 2022 16:35 )   PT: 12.7 sec;   INR: 1.09 ratio         PTT - ( 17 Mar 2022 16:35 )  PTT:22.0 sec      RADIOLOGY:        Assessmwent:  89yMale with PMHx p/w prostate CA s/p XRT, GERD, bioAVR, permanent AF s/p AVN ablation and PPM on warfarin, NICM with chronic HFrEF (30%) presented with cough, SOB. Recently admitted with PNA. Pt was hypoxic and tachypneic, requiring BIPAP. Pt now on O2 4LPM via NC, breathing comfortably saturating >90%. CXR with mild pulmonary infiltrates. BNP mildly elevated and much lower from last admission. Pt appears clinically dry and with contraction alkalosis. SOB likely due to aspiration PNA. Pt failed dysphagia screen and NPO.     Plan:  1. Hold Lasix at this time, will dose daily for now.   2. Continue full dose Lovenox while NPO. Resume coumadin when able to take PO.   3. Follow-up CTA chest. No need for TTE at this time. Pt had TTE on 3/9.   4. Hold BB and ACEi for now.   5. Continue telemetry.  6. Continue supplemental O2 as needed.  7. Abx per primary team.     Will follow.  Thanks!

## 2022-03-19 DIAGNOSIS — R13.10 DYSPHAGIA, UNSPECIFIED: ICD-10-CM

## 2022-03-19 LAB
ALBUMIN SERPL ELPH-MCNC: 2.7 G/DL — LOW (ref 3.3–5.2)
ALP SERPL-CCNC: 106 U/L — SIGNIFICANT CHANGE UP (ref 40–120)
ALT FLD-CCNC: 26 U/L — SIGNIFICANT CHANGE UP
ANION GAP SERPL CALC-SCNC: 9 MMOL/L — SIGNIFICANT CHANGE UP (ref 5–17)
AST SERPL-CCNC: 21 U/L — SIGNIFICANT CHANGE UP
BILIRUB SERPL-MCNC: 0.7 MG/DL — SIGNIFICANT CHANGE UP (ref 0.4–2)
BUN SERPL-MCNC: 29.7 MG/DL — HIGH (ref 8–20)
CALCIUM SERPL-MCNC: 8.7 MG/DL — SIGNIFICANT CHANGE UP (ref 8.6–10.2)
CHLORIDE SERPL-SCNC: 96 MMOL/L — LOW (ref 98–107)
CO2 SERPL-SCNC: 39 MMOL/L — HIGH (ref 22–29)
CREAT SERPL-MCNC: 0.72 MG/DL — SIGNIFICANT CHANGE UP (ref 0.5–1.3)
EGFR: 84 ML/MIN/1.73M2 — SIGNIFICANT CHANGE UP
GLUCOSE SERPL-MCNC: 91 MG/DL — SIGNIFICANT CHANGE UP (ref 70–99)
HCT VFR BLD CALC: 34.4 % — LOW (ref 39–50)
HGB BLD-MCNC: 10.4 G/DL — LOW (ref 13–17)
INR BLD: 1.39 RATIO — HIGH (ref 0.88–1.16)
MCHC RBC-ENTMCNC: 30.1 PG — SIGNIFICANT CHANGE UP (ref 27–34)
MCHC RBC-ENTMCNC: 30.2 GM/DL — LOW (ref 32–36)
MCV RBC AUTO: 99.7 FL — SIGNIFICANT CHANGE UP (ref 80–100)
PLATELET # BLD AUTO: 128 K/UL — LOW (ref 150–400)
POTASSIUM SERPL-MCNC: 3.9 MMOL/L — SIGNIFICANT CHANGE UP (ref 3.5–5.3)
POTASSIUM SERPL-SCNC: 3.9 MMOL/L — SIGNIFICANT CHANGE UP (ref 3.5–5.3)
PROT SERPL-MCNC: 5.5 G/DL — LOW (ref 6.6–8.7)
PROTHROM AB SERPL-ACNC: 16.2 SEC — HIGH (ref 10.5–13.4)
RBC # BLD: 3.45 M/UL — LOW (ref 4.2–5.8)
RBC # FLD: 15.8 % — HIGH (ref 10.3–14.5)
SODIUM SERPL-SCNC: 144 MMOL/L — SIGNIFICANT CHANGE UP (ref 135–145)
WBC # BLD: 17.37 K/UL — HIGH (ref 3.8–10.5)
WBC # FLD AUTO: 17.37 K/UL — HIGH (ref 3.8–10.5)

## 2022-03-19 PROCEDURE — 99232 SBSQ HOSP IP/OBS MODERATE 35: CPT

## 2022-03-19 RX ORDER — WARFARIN SODIUM 2.5 MG/1
2 TABLET ORAL ONCE
Refills: 0 | Status: COMPLETED | OUTPATIENT
Start: 2022-03-19 | End: 2022-03-20

## 2022-03-19 RX ORDER — WARFARIN SODIUM 2.5 MG/1
5 TABLET ORAL ONCE
Refills: 0 | Status: COMPLETED | OUTPATIENT
Start: 2022-03-19 | End: 2022-03-19

## 2022-03-19 RX ADMIN — PIPERACILLIN AND TAZOBACTAM 25 GRAM(S): 4; .5 INJECTION, POWDER, LYOPHILIZED, FOR SOLUTION INTRAVENOUS at 05:17

## 2022-03-19 RX ADMIN — ENOXAPARIN SODIUM 60 MILLIGRAM(S): 100 INJECTION SUBCUTANEOUS at 05:17

## 2022-03-19 RX ADMIN — PIPERACILLIN AND TAZOBACTAM 25 GRAM(S): 4; .5 INJECTION, POWDER, LYOPHILIZED, FOR SOLUTION INTRAVENOUS at 14:02

## 2022-03-19 RX ADMIN — PIPERACILLIN AND TAZOBACTAM 25 GRAM(S): 4; .5 INJECTION, POWDER, LYOPHILIZED, FOR SOLUTION INTRAVENOUS at 22:53

## 2022-03-19 NOTE — PROGRESS NOTE ADULT - PROBLEM SELECTOR PLAN 6
- DVT ppx- Lovenox  - Dispo- pending clinical improvement. Patient has home O2. PT evaluation    CODE STATUS- DNR/DNI. MOLST filled out.   Unable to reach patient's daughterJessica ([phone]) on 3/18

## 2022-03-19 NOTE — CHART NOTE - NSCHARTNOTEFT_GEN_A_CORE
PA NOTE-MEDICINE    Called by RN due to pt Refusing to Take Coumadin 5 mg Dose.  Pt with AFib on Coumadin At home Refuses to Take 5 mg dose because he thinks dose is too high  States he only takes 2 mg Coumadin at home   Pt A @ O x 3  was explained why 1st dose Coumadin higher than usual home dose  Pt repeated understanding but still refused 5 mg Dose  Pt agreeable to 2 mg Dose Coumadin which I will order     Will sign out to AM Team

## 2022-03-19 NOTE — PROGRESS NOTE ADULT - SUBJECTIVE AND OBJECTIVE BOX
seen for afib, hypoxia  ros negative  wants food       MEDICATIONS  (STANDING):  enoxaparin Injectable 60 milliGRAM(s) SubCutaneous every 12 hours  piperacillin/tazobactam IVPB.. 3.375 Gram(s) IV Intermittent every 8 hours  warfarin 5 milliGRAM(s) Oral once    MEDICATIONS  (PRN):  albuterol/ipratropium for Nebulization 3 milliLiter(s) Nebulizer every 6 hours PRN Shortness of Breath and/or Wheezing      Allergies    antibiotic (Rash)  tetracycline (Rash)      Vital Signs Last 24 Hrs  T(C): 36.7 (19 Mar 2022 10:22), Max: 36.7 (19 Mar 2022 06:45)  T(F): 98 (19 Mar 2022 10:22), Max: 98 (19 Mar 2022 06:45)  HR: 84 (19 Mar 2022 10:22) (74 - 106)  BP: 109/55 (19 Mar 2022 10:22) (109/55 - 117/69)  BP(mean): --  RR: 18 (19 Mar 2022 10:22) (18 - 20)  SpO2: 99% (19 Mar 2022 10:22) (93% - 99%)    PHYSICAL EXAM:    GENERAL: NAD frail   CHEST/LUNG: clear bs   HEART: Regular rate and rhythm; S1 S2  ABDOMEN: Soft, Nondistended; Bowel sounds present  EXTREMITIES:  no edema   NERVOUS SYSTEM:  Alert & Oriented X3 gen weakness     LABS:                        10.4   17.37 )-----------( 128      ( 19 Mar 2022 08:06 )             34.4     03-19    144  |  96<L>  |  29.7<H>  ----------------------------<  91  3.9   |  39.0<H>  |  0.72    Ca    8.7      19 Mar 2022 08:06  Phos  2.7     03-18  Mg     1.7     03-18    TPro  5.5<L>  /  Alb  2.7<L>  /  TBili  0.7  /  DBili  x   /  AST  21  /  ALT  26  /  AlkPhos  106  03-19    PT/INR - ( 17 Mar 2022 16:35 )   PT: 12.7 sec;   INR: 1.09 ratio         PTT - ( 17 Mar 2022 16:35 )  PTT:22.0 sec  Urinalysis Basic - ( 17 Mar 2022 21:15 )    Color: Yellow / Appearance: Clear / S.010 / pH: x  Gluc: x / Ketone: Negative  / Bili: Negative / Urobili: Negative mg/dL   Blood: x / Protein: Negative / Nitrite: Negative   Leuk Esterase: Negative / RBC: 0-2 /HPF / WBC 0-2 /HPF   Sq Epi: x / Non Sq Epi: Occasional / Bacteria: Occasional        CAPILLARY BLOOD GLUCOSE            RADIOLOGY & ADDITIONAL TESTS:

## 2022-03-19 NOTE — PROGRESS NOTE ADULT - SUBJECTIVE AND OBJECTIVE BOX
MECHE MILLER  [id number]      Chief Complaint:  SOB/PNA/CHF    Interval History:  Patient offers no c/o.  Denies SOB.  No CP, palps.    Tele:  No events      albuterol/ipratropium for Nebulization 3 milliLiter(s) Nebulizer every 6 hours PRN  enoxaparin Injectable 60 milliGRAM(s) SubCutaneous every 12 hours  piperacillin/tazobactam IVPB.. 3.375 Gram(s) IV Intermittent every 8 hours          Physical Exam:  T(C): 36.7 (03-19-22 @ 06:45), Max: 36.7 (03-19-22 @ 06:45)  HR: 74 (03-19-22 @ 06:45) (74 - 106)  BP: 113/59 (03-19-22 @ 06:45) (113/59 - 121/60)  RR: 18 (03-19-22 @ 06:45) (18 - 20)  SpO2: 93% (03-19-22 @ 06:45) (93% - 100%)  Wt(kg): --  General: Comfortable in NAD  Neck: No JVD  CVS: nl s1s2, no s3  Pulm: Diminished but generally clear b/l  Abd: soft, non-tender  Ext: 1-2+ b/l LE edema  Neuro A&O x3  Psych: Normal affect      Labs:   19 Mar 2022 08:06    144    |  96     |  29.7   ----------------------------<  91     3.9     |  39.0   |  0.72     Ca    8.7        19 Mar 2022 08:06  Phos  2.7       18 Mar 2022 02:35  Mg     1.7       18 Mar 2022 02:35    TPro  5.5    /  Alb  2.7    /  TBili  0.7    /  DBili  x      /  AST  21     /  ALT  26     /  AlkPhos  106    19 Mar 2022 08:06                          10.4   17.37 )-----------( 128      ( 19 Mar 2022 08:06 )             34.4     PT/INR - ( 17 Mar 2022 16:35 )   PT: 12.7 sec;   INR: 1.09 ratio         PTT - ( 17 Mar 2022 16:35 )  PTT:22.0 sec  CARDIAC MARKERS ( 17 Mar 2022 16:35 )  x     / 0.03 ng/mL / x     / x     / x          Echo (3/9/22):   1. Mildly enlarged left atrium.   2. Left ventricular ejection fraction, by visual estimation, is 35 to 40%.   3. Elevated mean left atrial pressure. (LAP = 23 mm Hg)   4. Normal right atrial size.   5. Mildly enlarged right ventricle. Moderately reduced RV systolic function.   6. Moderate mitral valve regurgitation.   7. Mild aortic regurgitation.   8. Mild tricuspid regurgitation.   9. Estimated pulmonary artery systolic pressure is 40.7 mmHg -mild pulmonary hypertension.  10. There is no evidence of pericardial effusion.  11. Dilated Ao root at 4.1cm.  12. Pacemaker wire/lead appears malpositioned.       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 Recently admitted with PNA. Pt was hypoxic and tachypneic, requiring BIPAP. Pt now on NC, breathing comfortably saturating >90%. CXR with mild pulmonary infiltrates. BNP mildly elevated and much lower from last admission. Pt appears clinically dry and with contraction alkalosis. SOB likely due to aspiration PNA. Pt failed dysphagia screen and NPO.   -CTA with no PE and no significant edema or effusion.    Plan:  1. Hold Lasix at this time, will dose daily for now and hold today.   2. Continue full dose Lovenox while NPO. Resume coumadin when able to take PO.   3. No need for TTE at this time. Pt had TTE on 3/9.   4. Hold BB and ACEi for now.   5. Continue telemetry.  6. Continue supplemental O2 as needed.  7. Abx per primary team.

## 2022-03-19 NOTE — PROGRESS NOTE ADULT - ASSESSMENT
89 yo male with PMHx prostate CA s/p XRT, GERD, bioAVR, permanent AF s/p AVN ablation and PPM on warfarin, NICM with chronic HFrEF (30%) on 20 mg lasix PO, recent admission for aspiration PNA and acute CHF discharged with abx presenting with SOB found to have acute hypoxic respiratory failure

## 2022-03-20 DIAGNOSIS — D72.829 ELEVATED WHITE BLOOD CELL COUNT, UNSPECIFIED: ICD-10-CM

## 2022-03-20 LAB
INR BLD: 1.25 RATIO — HIGH (ref 0.88–1.16)
PROTHROM AB SERPL-ACNC: 14.5 SEC — HIGH (ref 10.5–13.4)

## 2022-03-20 PROCEDURE — 99232 SBSQ HOSP IP/OBS MODERATE 35: CPT

## 2022-03-20 RX ORDER — WARFARIN SODIUM 2.5 MG/1
2 TABLET ORAL AT BEDTIME
Refills: 0 | Status: COMPLETED | OUTPATIENT
Start: 2022-03-20 | End: 2022-03-20

## 2022-03-20 RX ORDER — FUROSEMIDE 40 MG
20 TABLET ORAL DAILY
Refills: 0 | Status: DISCONTINUED | OUTPATIENT
Start: 2022-03-21 | End: 2022-03-26

## 2022-03-20 RX ADMIN — WARFARIN SODIUM 2 MILLIGRAM(S): 2.5 TABLET ORAL at 01:04

## 2022-03-20 RX ADMIN — PIPERACILLIN AND TAZOBACTAM 25 GRAM(S): 4; .5 INJECTION, POWDER, LYOPHILIZED, FOR SOLUTION INTRAVENOUS at 13:32

## 2022-03-20 RX ADMIN — PIPERACILLIN AND TAZOBACTAM 25 GRAM(S): 4; .5 INJECTION, POWDER, LYOPHILIZED, FOR SOLUTION INTRAVENOUS at 05:15

## 2022-03-20 RX ADMIN — PIPERACILLIN AND TAZOBACTAM 25 GRAM(S): 4; .5 INJECTION, POWDER, LYOPHILIZED, FOR SOLUTION INTRAVENOUS at 23:10

## 2022-03-20 RX ADMIN — WARFARIN SODIUM 2 MILLIGRAM(S): 2.5 TABLET ORAL at 23:09

## 2022-03-20 RX ADMIN — ENOXAPARIN SODIUM 60 MILLIGRAM(S): 100 INJECTION SUBCUTANEOUS at 17:07

## 2022-03-20 RX ADMIN — ENOXAPARIN SODIUM 60 MILLIGRAM(S): 100 INJECTION SUBCUTANEOUS at 05:15

## 2022-03-20 NOTE — DIETITIAN INITIAL EVALUATION ADULT. - OTHER INFO
89 yo male with PMHx prostate CA s/p XRT, GERD, bioAVR, permanent AF s/p AVN ablation and PPM on warfarin, NICM with chronic HFrEF (30%) on 20 mg lasix PO, recent admission for aspiration PNA and acute CHF discharged with abx presenting with SOB found to have acute hypoxic respiratory failure

## 2022-03-20 NOTE — PROGRESS NOTE ADULT - SUBJECTIVE AND OBJECTIVE BOX
MECHE MILLER  [id number]          Chief Complaint:  follow up SOB/PNA/CHF    Interval History:  Patient offers no c/o.  Denies SOB.  No CP, palps.    Tele:  Af, V-paced          albuterol/ipratropium for Nebulization 3 milliLiter(s) Nebulizer every 6 hours PRN  enoxaparin Injectable 60 milliGRAM(s) SubCutaneous every 12 hours  piperacillin/tazobactam IVPB.. 3.375 Gram(s) IV Intermittent every 8 hours  warfarin 2 milliGRAM(s) Oral at bedtime          Physical Exam:  T(C): 36.7 (03-20-22 @ 06:34), Max: 36.8 (03-19-22 @ 22:13)  HR: 77 (03-20-22 @ 06:34) (75 - 85)  BP: 138/77 (03-20-22 @ 06:34) (132/64 - 147/68)  RR: 18 (03-20-22 @ 06:34) (18 - 18)  SpO2: 99% (03-20-22 @ 06:34) (94% - 99%)  Wt(kg): --  General: elderly frail patient, Comfortable in NAD  HEENT: dry mm, sclera anicteric  CVS: nl s1s2, rrr, no s3/JVD  Pulm: Diminished but generally clear b/l  Abd: soft, non-tender  Ext: 1 b/l LE edema  Neuro A&O x3  Psych: Normal affect      I&O's Summary        Labs:   19 Mar 2022 08:06    144    |  96     |  29.7   ----------------------------<  91     3.9     |  39.0   |  0.72     Ca    8.7        19 Mar 2022 08:06    TPro  5.5    /  Alb  2.7    /  TBili  0.7    /  DBili  x      /  AST  21     /  ALT  26     /  AlkPhos  106    19 Mar 2022 08:06                          10.4   17.37 )-----------( 128      ( 19 Mar 2022 08:06 )             34.4     PT/INR - ( 20 Mar 2022 06:31 )   PT: 14.5 sec;   INR: 1.25 ratio               Echo (3/9/22):   1. Mildly enlarged left atrium.   2. Left ventricular ejection fraction, by visual estimation, is 35 to 40%.   3. Elevated mean left atrial pressure. (LAP = 23 mm Hg)   4. Normal right atrial size.   5. Mildly enlarged right ventricle. Moderately reduced RV systolic function.   6. Moderate mitral valve regurgitation.   7. Mild aortic regurgitation.   8. Mild tricuspid regurgitation.   9. Estimated pulmonary artery systolic pressure is 40.7 mmHg -mild pulmonary hypertension.  10. There is no evidence of pericardial effusion.  11. Dilated Ao root at 4.1cm.  12. Pacemaker wire/lead appears malpositioned.       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 Recently admitted with PNA. Pt was hypoxic and tachypneic, requiring BIPAP. Pt now on NC, breathing comfortably saturating >90%. CXR with mild pulmonary infiltrates. BNP mildly elevated and much lower from last admission. Pt appears clinically dry and with contraction alkalosis. SOB likely due to aspiration PNA. Pt failed dysphagia screen and NPO.   -CTA with no PE and no significant edema or effusion.    Plan:  1. Will restart po lasix tomorrow, no decompensated CHF at this time  2. Continue full dose Lovenox while NPO. Coumadin now restarted with goal INR 2-3   3. No need for repeat TTE at this time.  4. Hold BB and ACEi for now.   5. DC planning, CV stable for discharge. Likely to Juan Quiles MD

## 2022-03-20 NOTE — DIETITIAN INITIAL EVALUATION ADULT. - PROBLEM SELECTOR PLAN 1
- Patient presenting with cough and SOB. Found to have hypoxia to 70s-80s. Now requiring BiPaP. Patient with recent admission for CHF exacerbation and aspiration pneumonia.   - will treat with broad spectrum IV abx- Vanc/Zosyn, Duoneb Q6H PRN  - Will c/w IV lasix 40 mg QD  - F/u CTA chest non-con to r/o PNA  - Low suspicion for PE. F/u D-dimer in AM  - F/u blood cx  - Appreciate ICU recs- will c/w NIPPV to maintain O2 sat> 90% and will wean as tolerated  - ID c/s placed

## 2022-03-20 NOTE — DIETITIAN INITIAL EVALUATION ADULT. - ETIOLOGY
related to inadequate protein calorie intake in the setting of debility, swallowing difficulty, acute respiratory failure

## 2022-03-20 NOTE — PROGRESS NOTE ADULT - SUBJECTIVE AND OBJECTIVE BOX
seen for hypoxia    no acute complaints  ros negative    MEDICATIONS  (STANDING):  enoxaparin Injectable 60 milliGRAM(s) SubCutaneous every 12 hours  piperacillin/tazobactam IVPB.. 3.375 Gram(s) IV Intermittent every 8 hours  warfarin 2 milliGRAM(s) Oral at bedtime    MEDICATIONS  (PRN):  albuterol/ipratropium for Nebulization 3 milliLiter(s) Nebulizer every 6 hours PRN Shortness of Breath and/or Wheezing      Allergies    antibiotic (Rash)  tetracycline (Rash)    Vital Signs Last 24 Hrs  T(C): 36.7 (20 Mar 2022 06:34), Max: 36.8 (19 Mar 2022 22:13)  T(F): 98.1 (20 Mar 2022 06:34), Max: 98.3 (19 Mar 2022 22:13)  HR: 77 (20 Mar 2022 06:34) (75 - 85)  BP: 138/77 (20 Mar 2022 06:34) (132/64 - 147/68)  BP(mean): --  RR: 18 (20 Mar 2022 06:34) (18 - 18)  SpO2: 99% (20 Mar 2022 06:34) (94% - 99%)    PHYSICAL EXAM:    GENERAL: NAD frail   CHEST/LUNG: Clear to ausculation bilaterally  HEART: Regular rate and rhythm; S1 S2  ABDOMEN: Soft, Nontender, Bowel sounds present  EXTREMITIES: no edema   NERVOUS SYSTEM:  Alert & Oriented X3, gen weakness     LABS:                        10.4   17.37 )-----------( 128      ( 19 Mar 2022 08:06 )             34.4     03-19    144  |  96<L>  |  29.7<H>  ----------------------------<  91  3.9   |  39.0<H>  |  0.72    Ca    8.7      19 Mar 2022 08:06    TPro  5.5<L>  /  Alb  2.7<L>  /  TBili  0.7  /  DBili  x   /  AST  21  /  ALT  26  /  AlkPhos  106  03-19    PT/INR - ( 20 Mar 2022 06:31 )   PT: 14.5 sec;   INR: 1.25 ratio               CAPILLARY BLOOD GLUCOSE            RADIOLOGY & ADDITIONAL TESTS:

## 2022-03-20 NOTE — DIETITIAN NUTRITION RISK NOTIFICATION - TREATMENT: THE FOLLOWING DIET HAS BEEN RECOMMENDED
Diet, Soft and Bite Sized:   Moderately Thick Liquids (MODTHICKLIQS) (03-19-22 @ 11:47) [Active]

## 2022-03-20 NOTE — DIETITIAN INITIAL EVALUATION ADULT. - PERTINENT LABORATORY DATA
03-19 Na144 mmol/L Glu 91 mg/dL K+ 3.9 mmol/L Cr  0.72 mg/dL BUN 29.7 mg/dL<H> Phos n/a   Alb 2.7 g/dL<L> PAB n/a

## 2022-03-20 NOTE — DIETITIAN INITIAL EVALUATION ADULT. - WEIGHT FOR BMI (LBS)
[No Acute Distress] : no acute distress [Well Nourished] : well nourished [Well Developed] : well developed 136 [Well-Appearing] : well-appearing [Normal Sclera/Conjunctiva] : normal sclera/conjunctiva [PERRL] : pupils equal round and reactive to light [EOMI] : extraocular movements intact [Normal Outer Ear/Nose] : the outer ears and nose were normal in appearance [Normal Oropharynx] : the oropharynx was normal [No JVD] : no jugular venous distention [Supple] : supple [No Lymphadenopathy] : no lymphadenopathy [Thyroid Normal, No Nodules] : the thyroid was normal and there were no nodules present [No Respiratory Distress] : no respiratory distress  [Clear to Auscultation] : lungs were clear to auscultation bilaterally [No Accessory Muscle Use] : no accessory muscle use [Normal Rate] : normal rate  [Regular Rhythm] : with a regular rhythm [Normal S1, S2] : normal S1 and S2 [No Murmur] : no murmur heard [No Carotid Bruits] : no carotid bruits [No Abdominal Bruit] : a ~M bruit was not heard ~T in the abdomen [No Varicosities] : no varicosities [Pedal Pulses Present] : the pedal pulses are present [No Edema] : there was no peripheral edema [No Extremity Clubbing/Cyanosis] : no extremity clubbing/cyanosis [No Palpable Aorta] : no palpable aorta [Soft] : abdomen soft [Non Tender] : non-tender [Non-distended] : non-distended [No Masses] : no abdominal mass palpated [No HSM] : no HSM [Normal Bowel Sounds] : normal bowel sounds [Normal Posterior Cervical Nodes] : no posterior cervical lymphadenopathy [Normal Anterior Cervical Nodes] : no anterior cervical lymphadenopathy [No CVA Tenderness] : no CVA  tenderness [No Spinal Tenderness] : no spinal tenderness [No Joint Swelling] : no joint swelling [Grossly Normal Strength/Tone] : grossly normal strength/tone [No Rash] : no rash [Normal Gait] : normal gait [Coordination Grossly Intact] : coordination grossly intact [No Focal Deficits] : no focal deficits [Deep Tendon Reflexes (DTR)] : deep tendon reflexes were 2+ and symmetric [Normal Affect] : the affect was normal [Normal Insight/Judgement] : insight and judgment were intact

## 2022-03-20 NOTE — DIETITIAN INITIAL EVALUATION ADULT. - PROBLEM SELECTOR PLAN 6
- DVt ppx- Lovenox  - Diet- NPO while on Bipap. Will obtain bedside S/S evaluation  - Dispo- pending clinical improvement. Patient has home O2. PT evaluation    CODE STATUS- DNR/DNI. MOLST filled out.   Updated patient's daughter, Jessica ([phone]) on 3/17

## 2022-03-20 NOTE — DIETITIAN INITIAL EVALUATION ADULT. - PERTINENT MEDS FT
MEDICATIONS  (STANDING):  enoxaparin Injectable 60 milliGRAM(s) SubCutaneous every 12 hours  piperacillin/tazobactam IVPB.. 3.375 Gram(s) IV Intermittent every 8 hours  warfarin 2 milliGRAM(s) Oral at bedtime    MEDICATIONS  (PRN):  albuterol/ipratropium for Nebulization 3 milliLiter(s) Nebulizer every 6 hours PRN Shortness of Breath and/or Wheezing

## 2022-03-20 NOTE — DIETITIAN INITIAL EVALUATION ADULT. - ORAL INTAKE PTA/DIET HISTORY
pt found asleep, did not arouse to verbal stimuli, spoke to RN who states pt eats well, complete feed.   Per Critical Care Note: previous RD note ( where pt met criteria for malnutrition) pt reported UBW to be 155lbs and had 15 weight loss at that time, pt now with additional 4 lb weight loss.

## 2022-03-20 NOTE — PROGRESS NOTE ADULT - PROBLEM SELECTOR PLAN 6
- DVT ppx- Lovenox  - Dispo- pending clinical improvement. Patient has home O2. PT evaluation    CODE STATUS- DNR/DNI. MOLST filled out.   Unable to reach patient's daughterJessica ([phone]) on 3/18

## 2022-03-21 ENCOUNTER — APPOINTMENT (OUTPATIENT)
Dept: CARDIOLOGY | Facility: CLINIC | Age: 87
End: 2022-03-21

## 2022-03-21 LAB
ANION GAP SERPL CALC-SCNC: 4 MMOL/L — LOW (ref 5–17)
BASOPHILS # BLD AUTO: 0 K/UL — SIGNIFICANT CHANGE UP (ref 0–0.2)
BASOPHILS NFR BLD AUTO: 0 % — SIGNIFICANT CHANGE UP (ref 0–2)
BUN SERPL-MCNC: 19 MG/DL — SIGNIFICANT CHANGE UP (ref 8–20)
BURR CELLS BLD QL SMEAR: PRESENT — SIGNIFICANT CHANGE UP
CALCIUM SERPL-MCNC: 8.4 MG/DL — LOW (ref 8.6–10.2)
CHLORIDE SERPL-SCNC: 96 MMOL/L — LOW (ref 98–107)
CO2 SERPL-SCNC: 42 MMOL/L — HIGH (ref 22–29)
CREAT SERPL-MCNC: 0.61 MG/DL — SIGNIFICANT CHANGE UP (ref 0.5–1.3)
EGFR: 88 ML/MIN/1.73M2 — SIGNIFICANT CHANGE UP
EOSINOPHIL # BLD AUTO: 0 K/UL — SIGNIFICANT CHANGE UP (ref 0–0.5)
EOSINOPHIL NFR BLD AUTO: 0 % — SIGNIFICANT CHANGE UP (ref 0–6)
GIANT PLATELETS BLD QL SMEAR: PRESENT — SIGNIFICANT CHANGE UP
GLUCOSE SERPL-MCNC: 112 MG/DL — HIGH (ref 70–99)
HCT VFR BLD CALC: 34.4 % — LOW (ref 39–50)
HGB BLD-MCNC: 10.6 G/DL — LOW (ref 13–17)
INR BLD: 1.39 RATIO — HIGH (ref 0.88–1.16)
LYMPHOCYTES # BLD AUTO: 60.9 % — HIGH (ref 13–44)
LYMPHOCYTES # BLD AUTO: 9.87 K/UL — HIGH (ref 1–3.3)
MAGNESIUM SERPL-MCNC: 1.8 MG/DL — SIGNIFICANT CHANGE UP (ref 1.6–2.6)
MANUAL SMEAR VERIFICATION: SIGNIFICANT CHANGE UP
MCHC RBC-ENTMCNC: 30.5 PG — SIGNIFICANT CHANGE UP (ref 27–34)
MCHC RBC-ENTMCNC: 30.8 GM/DL — LOW (ref 32–36)
MCV RBC AUTO: 98.9 FL — SIGNIFICANT CHANGE UP (ref 80–100)
MONOCYTES # BLD AUTO: 0.15 K/UL — SIGNIFICANT CHANGE UP (ref 0–0.9)
MONOCYTES NFR BLD AUTO: 0.9 % — LOW (ref 2–14)
MYELOCYTES NFR BLD: 1.7 % — HIGH (ref 0–0)
NEUTROPHILS # BLD AUTO: 5.77 K/UL — SIGNIFICANT CHANGE UP (ref 1.8–7.4)
NEUTROPHILS NFR BLD AUTO: 35.6 % — LOW (ref 43–77)
NT-PROBNP SERPL-SCNC: 4862 PG/ML — HIGH (ref 0–300)
OVALOCYTES BLD QL SMEAR: SLIGHT — SIGNIFICANT CHANGE UP
PHOSPHATE SERPL-MCNC: 2.2 MG/DL — LOW (ref 2.4–4.7)
PLAT MORPH BLD: NORMAL — SIGNIFICANT CHANGE UP
PLATELET # BLD AUTO: 131 K/UL — LOW (ref 150–400)
POLYCHROMASIA BLD QL SMEAR: SIGNIFICANT CHANGE UP
POTASSIUM SERPL-MCNC: 3.9 MMOL/L — SIGNIFICANT CHANGE UP (ref 3.5–5.3)
POTASSIUM SERPL-SCNC: 3.9 MMOL/L — SIGNIFICANT CHANGE UP (ref 3.5–5.3)
PROTHROM AB SERPL-ACNC: 16.2 SEC — HIGH (ref 10.5–13.4)
RBC # BLD: 3.48 M/UL — LOW (ref 4.2–5.8)
RBC # FLD: 15.6 % — HIGH (ref 10.3–14.5)
RBC BLD AUTO: ABNORMAL
SMUDGE CELLS # BLD: PRESENT — SIGNIFICANT CHANGE UP
SODIUM SERPL-SCNC: 142 MMOL/L — SIGNIFICANT CHANGE UP (ref 135–145)
VARIANT LYMPHS # BLD: 0.9 % — SIGNIFICANT CHANGE UP (ref 0–6)
WBC # BLD: 16.2 K/UL — HIGH (ref 3.8–10.5)
WBC # FLD AUTO: 16.2 K/UL — HIGH (ref 3.8–10.5)

## 2022-03-21 PROCEDURE — 99232 SBSQ HOSP IP/OBS MODERATE 35: CPT

## 2022-03-21 PROCEDURE — 99222 1ST HOSP IP/OBS MODERATE 55: CPT

## 2022-03-21 PROCEDURE — 74018 RADEX ABDOMEN 1 VIEW: CPT | Mod: 26

## 2022-03-21 PROCEDURE — 99497 ADVNCD CARE PLAN 30 MIN: CPT | Mod: 25

## 2022-03-21 PROCEDURE — 99233 SBSQ HOSP IP/OBS HIGH 50: CPT

## 2022-03-21 RX ORDER — POLYETHYLENE GLYCOL 3350 17 G/17G
17 POWDER, FOR SOLUTION ORAL DAILY
Refills: 0 | Status: DISCONTINUED | OUTPATIENT
Start: 2022-03-21 | End: 2022-03-21

## 2022-03-21 RX ORDER — ACETAMINOPHEN 500 MG
650 TABLET ORAL EVERY 8 HOURS
Refills: 0 | Status: DISCONTINUED | OUTPATIENT
Start: 2022-03-21 | End: 2022-03-26

## 2022-03-21 RX ORDER — CELECOXIB 200 MG/1
200 CAPSULE ORAL ONCE
Refills: 0 | Status: DISCONTINUED | OUTPATIENT
Start: 2022-03-21 | End: 2022-03-26

## 2022-03-21 RX ORDER — POLYETHYLENE GLYCOL 3350 17 G/17G
17 POWDER, FOR SOLUTION ORAL AT BEDTIME
Refills: 0 | Status: DISCONTINUED | OUTPATIENT
Start: 2022-03-21 | End: 2022-03-26

## 2022-03-21 RX ORDER — CELECOXIB 200 MG/1
200 CAPSULE ORAL DAILY
Refills: 0 | Status: DISCONTINUED | OUTPATIENT
Start: 2022-03-21 | End: 2022-03-26

## 2022-03-21 RX ORDER — METOPROLOL TARTRATE 50 MG
25 TABLET ORAL DAILY
Refills: 0 | Status: DISCONTINUED | OUTPATIENT
Start: 2022-03-21 | End: 2022-03-26

## 2022-03-21 RX ORDER — WARFARIN SODIUM 2.5 MG/1
2 TABLET ORAL AT BEDTIME
Refills: 0 | Status: COMPLETED | OUTPATIENT
Start: 2022-03-21 | End: 2022-03-21

## 2022-03-21 RX ADMIN — CELECOXIB 200 MILLIGRAM(S): 200 CAPSULE ORAL at 21:36

## 2022-03-21 RX ADMIN — ENOXAPARIN SODIUM 60 MILLIGRAM(S): 100 INJECTION SUBCUTANEOUS at 16:59

## 2022-03-21 RX ADMIN — PIPERACILLIN AND TAZOBACTAM 25 GRAM(S): 4; .5 INJECTION, POWDER, LYOPHILIZED, FOR SOLUTION INTRAVENOUS at 14:02

## 2022-03-21 RX ADMIN — POLYETHYLENE GLYCOL 3350 17 GRAM(S): 17 POWDER, FOR SOLUTION ORAL at 21:35

## 2022-03-21 RX ADMIN — PIPERACILLIN AND TAZOBACTAM 25 GRAM(S): 4; .5 INJECTION, POWDER, LYOPHILIZED, FOR SOLUTION INTRAVENOUS at 21:35

## 2022-03-21 RX ADMIN — PIPERACILLIN AND TAZOBACTAM 25 GRAM(S): 4; .5 INJECTION, POWDER, LYOPHILIZED, FOR SOLUTION INTRAVENOUS at 05:31

## 2022-03-21 RX ADMIN — Medication 85 MILLIMOLE(S): at 16:58

## 2022-03-21 RX ADMIN — LIDOCAINE 1 PATCH: 4 CREAM TOPICAL at 05:35

## 2022-03-21 RX ADMIN — CELECOXIB 200 MILLIGRAM(S): 200 CAPSULE ORAL at 21:44

## 2022-03-21 RX ADMIN — Medication 25 MILLIGRAM(S): at 21:39

## 2022-03-21 RX ADMIN — ENOXAPARIN SODIUM 60 MILLIGRAM(S): 100 INJECTION SUBCUTANEOUS at 05:31

## 2022-03-21 RX ADMIN — Medication 20 MILLIGRAM(S): at 05:32

## 2022-03-21 RX ADMIN — WARFARIN SODIUM 2 MILLIGRAM(S): 2.5 TABLET ORAL at 21:35

## 2022-03-21 NOTE — CONSULT NOTE ADULT - ASSESSMENT
89 yo Frail elderly Male readmitted with Acute Respiratory Failure with Hypoxia and Aspiration PNA    Acute Hypoxic and Hypercapnic Respiratory Failure  Aspiration PNA Gram Neg  Transitioned from BIPAP to NC O2 NC 6L > from home O2  CO2 42 today  Albuterol nebs Q6  Blood Urine and Sputum cultures  SOB -very weak not able to get OOB    CHF with Cardiomyopathy   BNP 4,862  HFrEF soft BP holding BB and enalapril  IV Lasix switched to PO at 20 mg/d  Repeat TTE  Paroxysmal AFIB- continue Lovenox>Coumadin    Aspiration PNA  Dysphagia failed swallow eval-limited to Puree thickened liquids  Zosyn IV Q8 x 7 days until3/24  Elevated D Dimer CT-No PE  WBC 16.20    Back Pain  B/L remote rib fractures  Vertebral compression  May have Fx from falling on Sacrum  added Lidoderm patch and tyl;enol ATC  celebrex 200 daily    Constipation  Encourage puree diet  Miralax ordered for laxative effect  additional order for dulcolax suppository if no BM tonight    GOC- 20 min  Met with daughter Jessica Saul-outside of patient's room  Invited Dr. Reese to speak with us.  Daughter realizes her Dad is declining, she does not want him to return home, before going to [facility]  She is hoping to improve his strength and mobility  She admitted he has had several readmissions lately for same problem  Dr. Reese and myself tried to explain, that her Father will not be able to improve his functional status, and will be readmitted again  She wants [facility] to explore the possibility of his being discharged to [facility], with stipulation that he be transitioned to Hospice care in the facility   if he cannot participate or make any significant gains.     Reviewed page 2 of MOLST Directive-MOLST done in ED in [facility]  Completed page 2 , No peg tube DNR/DNI Do not return t9 hospital only if pain or symptoms might otherwise not be controlled  Daughter does not want him to continue to be readmitted to hospital      
This 89 yo male with prostate CA s/p XRT, GERD, bioAVR, permanent AF s/p AVN ablation and PPM on warfarin, NICM with chronic HFrEF (30%) on 20 mg lasix PO, recent admission for aspiration PNA and acute CHF BIBEMS w/ hypoxia to 70s-80s. Per nurse aide, patient was coughing and endorsing SOB. He denies f/c/h/d/n/v, chest pain and abdominal pain. Of note, he recently completed course of PIPERCILLIN/TAZOBACTAM from 3/9/22- 3/12-22.   In the ED, his vitals were notable for RR- 48 and required NC- 4L. He was subsequently transitioned to Bipap. Labs notable for leukocytosis and proBNP- 3061. Patient received IV lasix and Vanc/Zosyn.    (17 Mar 2022 17:42)    patient seen and examined.  CXR shows clear lungs.  BNP on this admission is elevated at 3056. WBC is elevated from 21K to 17K.   no fevers noted.     Impression:  SOB  WBC elevation  hx of PNA  NICM with chronic HFrEF (30%)        Plan:  - SOB  - lung are clear on the CXR reading but clinical evidence of CHF exacerbation  - on Zosyn for now.  Stopped vancomycin  prior sputum cx with a Klebsiella pneumoniae treated with Piperacillin Tazobactam (Not amoxicillin as mentioned on medicine H&P)  - follow up on blood, urine cx. Follow up on legionella Ag.   - CHECK SPUTUM cx  please    - diuresis as per medical team  - consider repeat BNP in next 48 hours.     WBC elevation is reactive  - will follow and trend  
Assessment:  Patient is a 89 yo male with PMHx prostate CA s/p XRT, GERD, bioAVR, permanent AF s/p AVN ablation and PPM on warfarin, NICM with chronic HFrEF (30%) on 20 mg lasix PO, recent admission for aspiration PNA and acute CHF discharged with abx BIBEMS after nurse aide found him to be coughing, short of breath, hypoxic to 70s-80s. Patient is DNR/DNI. Patient w/acute hypoxic respiratory failure 2/2 pna vs chf exacerbation     Plan:  - Sepsis w/u --> recent stay w/klebsiella pna   - pulmonary toilet   - Chest PT  - NIPPV to maintain O2 sat>90%  - Abx for HCAP   - Aggressive diuresis as tolerated   - judicious fluid replenishment     At this time, patient does not require MICU care. Please reconsult if patient's condition worsens     Discussed case w/MICU attending, Dr. Rosen

## 2022-03-21 NOTE — PROGRESS NOTE ADULT - ASSESSMENT
This 89 yo male with prostate CA s/p XRT, GERD, bioAVR, permanent AF s/p AVN ablation and PPM on warfarin, NICM with chronic HFrEF (30%) on 20 mg lasix PO, recent admission for aspiration PNA and acute CHF BIBEMS w/ hypoxia to 70s-80s. Per nurse aide, patient was coughing and endorsing SOB. He denies f/c/h/d/n/v, chest pain and abdominal pain. Of note, he recently completed course of PIPERCILLIN/TAZOBACTAM from 3/9/22- 3/12-22.   In the ED, his vitals were notable for RR- 48 and required NC- 4L. He was subsequently transitioned to Bipap. Labs notable for leukocytosis and proBNP- 3061. Patient received IV lasix and Vanc/Zosyn.    (17 Mar 2022 17:42)    patient seen and examined.  CXR shows clear lungs.  BNP on this admission is elevated at 3056. WBC is elevated from 21K to 17K.   no fevers noted.     Impression:  SOB  WBC elevation  hx of PNA  NICM with chronic HFrEF (30%)        Plan:  - SOB  - Blood cultures negative so far.   - continue zosyn for a 5 day course.   then stop and watch    prior sputum cx with a Klebsiella pneumoniae treated with Piperacillin Tazobactam (Not amoxicillin as mentioned on medicine H&P)    - diuresis as per medical team  - consider repeat BNP in next 48 hours.     WBC elevation is reactive  improving  - will follow and trend

## 2022-03-21 NOTE — PROGRESS NOTE ADULT - SUBJECTIVE AND OBJECTIVE BOX
[facility]                                                INFECTIOUS DISEASES  =======================================================                               Jose Gloria MD#  Odilon Mares MD*                                     Gray Kwan MD*    Karlene Buenrostro MD*            Diplomates American Board of Internal Medicine & Infectious Diseases                  # [place] Office - Appt - Tel  [phone] Fax [phone]                * [place] Office - Appt - Tel [phone] Fax [phone]                                  Hospital Consult line:  [phone]  =======================================================      [facility]-[id number]  MECHE MILLER  follow up: SOB    pt seen and examined.   remains with some SOB    I have personally reviewed the labs and data; pertinent labs and data are listed in this note; please see below.   =======================================================  Past Medical & Surgical Hx:  =====================  PAST MEDICAL & SURGICAL HISTORY:  Afib with RVR  GERD (gastroesophageal reflux disease)  Cyst of kidney, acquired  Cyst of pancreas  Prostate CA radiation  Irritable bowel syndrome with diarrhea  VTE (venous thromboembolism) RLE  HTN (hypertension)  CHF with cardiomyopathy  EF 30%  H/O total knee replacement, right  b/l  Cardiac resynchronization therapy pacemaker (CRT-P) in place  MDT doi 1/28/20-Dr Marcus  S/P bilateral inguinal hernia repair  Status post left partial knee replacement    Problem List:  ==========  HEALTH ISSUES - PROBLEM Dx:  Acute respiratory failure with hypoxia  Gram-negative pneumonia  Discharge planning issues  Acute on chronic HFrEF (heart failure with reduced ejection fraction)  Hypertension  Paroxysmal atrial fibrillation    Social Hx:  =======  no toxic habits currently    FAMILY HISTORY:  no significant family history of immunosuppressive disorders in mother or father   =======================================================    REVIEW OF SYSTEMS:  CONSTITUTIONAL:  No Fever or chills  HEENT:  No diplopia or blurred vision.  No earache, sore throat or runny nose.  CARDIOVASCULAR:  No pressure, squeezing, strangling, tightness, heaviness or aching about the chest, neck, axilla or epigastrium.  RESPIRATORY:   + SOB  GASTROINTESTINAL:  No nausea, vomiting or diarrhea.  GENITOURINARY:  No dysuria, frequency or urgency. No Blood in urine  MUSCULOSKELETAL:  no joint aches, no muscle pain  SKIN:  No change in skin, hair or nails.  NEUROLOGIC:  No Headaches, seizures or weakness.  PSYCHIATRIC:  No disorder of thought or mood.  ENDOCRINE:  No heat or cold intolerance  HEMATOLOGICAL:  No easy bruising or bleeding.    =======================================================  Allergies  antibiotic (Rash)  tetracycline (Rash)        ======================================================  Physical Exam:  ============     General:  No acute distress.  ON nasal cannula  Eye: Pupils are equal, round and reactive to light, Extraocular movements are intact, Normal conjunctiva.  HENT: Normocephalic, Oral mucosa is moist, No pharyngeal erythema, No sinus tenderness.  Neck: Supple, No lymphadenopathy.  Respiratory: Lungs with rales bilaterally at bases  Cardiovascular: Normal rate, Regular rhythm,  PACED rhythm  + EDEMA bilaterally.  Gastrointestinal: Soft, Non-tender, Non-distended, Normal bowel sounds.  Genitourinary:  no CVA tenderness  Lymphatics: No lymphadenopathy neck,   Musculoskeletal: Normal range of motion, Normal strength.  KYPHOTIC  Integumentary: No rash.  Neurologic: Alert, Oriented, No focal deficits, Cranial Nerves II-XII are grossly intact.  Psychiatric: Appropriate mood & affect.    =======================================================  Vitals:  ============  T(F): 98.5 (21 Mar 2022 11:27), Max: 98.5 (21 Mar 2022 11:27)  HR: 73 (21 Mar 2022 11:27)  BP: 130/73 (21 Mar 2022 11:27)  RR: 18 (21 Mar 2022 11:27)  SpO2: 97% (21 Mar 2022 11:27) (97% - 98%)  temp max in last 48H T(F): , Max: 98.5 (03-21-22 @ 11:27)    =======================================================  Current Antibiotics:  piperacillin/tazobactam IVPB.. 3.375 Gram(s) IV Intermittent every 8 hours    Other medications:  enoxaparin Injectable 60 milliGRAM(s) SubCutaneous every 12 hours  furosemide    Tablet 20 milliGRAM(s) Oral daily  metoprolol succinate ER 25 milliGRAM(s) Oral daily  sodium phosphate IVPB 30 milliMole(s) IV Intermittent once      =======================================================  Labs:                        10.6   16.20 )-----------( 131      ( 21 Mar 2022 07:16 )             34.4     03-21    142  |  96<L>  |  19.0  ----------------------------<  112<H>  3.9   |  42.0<H>  |  0.61    Ca    8.4<L>      21 Mar 2022 07:16  Phos  2.2     03-21  Mg     1.8     03-21        Culture - Urine (collected 03-18-22 @ 02:05)  Source: Clean Catch Clean Catch (Midstream)  Final Report (03-18-22 @ 21:54):    <10,000 CFU/mL Normal Urogenital Eileen    Culture - Blood (collected 03-17-22 @ 16:37)  Source: .Blood Blood    Culture - Blood (collected 03-17-22 @ 16:37)  Source: .Blood Blood    Culture - Sputum (collected 03-11-22 @ 03:36)  Source: .Sputum Sputum  Gram Stain (03-11-22 @ 06:54):    Moderate polymorphonuclear leukocytes per low power field    Rare Squamous epithelial cells per low power field    Moderate Gram Negative Rods seen per oil power field    Few Yeast like cells seen per oil power field  Final Report (03-13-22 @ 13:21):    Moderate Klebsiella pneumoniae    Normal Respiratory Eileen present  Organism: Klebsiella pneumoniae (03-13-22 @ 13:21)  Organism: Klebsiella pneumoniae (03-13-22 @ 13:21)    Sensitivities:      -  Amikacin: S <=16      -  Amoxicillin/Clavulanic Acid: S <=8/4      -  Ampicillin: R 16 These ampicillin results predict results for amoxicillin      -  Ampicillin/Sulbactam: S <=4/2 Enterobacter, Klebsiella aerogenes, Citrobacter, and Serratia may develop resistance during prolonged therapy (3-4 days)      -  Aztreonam: S <=4      -  Cefazolin: S <=2 Enterobacter, Klebsiella aerogenes, Citrobacter, and Serratia may develop resistance during prolonged therapy (3-4 days)      -  Cefepime: S <=2      -  Cefoxitin: S <=8      -  Ceftriaxone: S <=1 Enterobacter, Klebsiella aerogenes, Citrobacter, and Serratia may develop resistance during prolonged therapy      -  Ciprofloxacin: S <=0.25      -  Ertapenem: S <=0.5      -  Gentamicin: S <=2      -  Imipenem: S <=1      -  Levofloxacin: S <=0.5      -  Meropenem: S <=1      -  Piperacillin/Tazobactam: S <=8      -  Tobramycin: S <=2      -  Trimethoprim/Sulfamethoxazole: S <=0.5/9.5      Method Type: SHAYNE    Culture - Blood (collected 03-09-22 @ 19:21)  Source: .Blood Blood-Peripheral  Final Report (03-14-22 @ 21:00):    No growth at 5 days.    Culture - Blood (collected 03-09-22 @ 19:19)  Source: .Blood Blood-Peripheral  Final Report (03-14-22 @ 21:00):    No growth at 5 days.              SARS-CoV-2: NotDetec (03-17-22 @ 16:36)  SARS-CoV-2: NotDetec (03-09-22 @ 18:36)  SARS-CoV-2: NotDetec (02-26-22 @ 15:27)      =======================================================        < from: Xray Chest 1 View- PORTABLE-Urgent (03.17.22 @ 16:36) >    ACC: [id number] EXAM:  XR CHEST PORTABLE URGENT 1V                          PROCEDURE DATE:  03/17/2022          INTERPRETATION:  EXAMINATION: XR CHEST URGENT    CLINICAL INDICATION: Chest Pain    TECHNIQUE: Frontal radiograph of the chest was obtained.    COMPARISON: 3/11/2022.    FINDINGS:    Cardiac silhouette not well evaluated. Lungs are clear. No pleural   effusion or pneumothorax. Left-sided pacemaker    IMPRESSION:  Clear lungs.    --- End of Report ---         LAURA DE LOS SANTOS MD; Attending Radiologist  This document has been electronically signed. Mar 17 2022  6:58PM    < end of copied text >

## 2022-03-21 NOTE — PROGRESS NOTE ADULT - PROBLEM SELECTOR PLAN 6
- DVT ppx- Lovenox  - Dispo- PT --home with services    spoke to daughter at bedside along with palliative care np.  shes aware of patients decline and wants MONSERRAT on discharge. open to hospice services to prevent recurrent hospitalizations.     CODE STATUS- DNR/DNI. MOLST filled out.    patient's daughterJessica ([phone])

## 2022-03-21 NOTE — PROGRESS NOTE ADULT - SUBJECTIVE AND OBJECTIVE BOX
seen for asp pna    no acute complaints  in chair  ros negative  tolerating diet  gen weakness     MEDICATIONS  (STANDING):  enoxaparin Injectable 60 milliGRAM(s) SubCutaneous every 12 hours  furosemide    Tablet 20 milliGRAM(s) Oral daily  metoprolol succinate ER 25 milliGRAM(s) Oral daily  piperacillin/tazobactam IVPB.. 3.375 Gram(s) IV Intermittent every 8 hours  sodium phosphate IVPB 30 milliMole(s) IV Intermittent once  warfarin 2 milliGRAM(s) Oral at bedtime    MEDICATIONS  (PRN):  albuterol/ipratropium for Nebulization 3 milliLiter(s) Nebulizer every 6 hours PRN Shortness of Breath and/or Wheezing      Allergies    antibiotic (Rash)  tetracycline (Rash)      Vital Signs Last 24 Hrs  T(C): 36.9 (21 Mar 2022 11:27), Max: 36.9 (21 Mar 2022 11:27)  T(F): 98.5 (21 Mar 2022 11:27), Max: 98.5 (21 Mar 2022 11:27)  HR: 73 (21 Mar 2022 11:27) (73 - 74)  BP: 130/73 (21 Mar 2022 11:27) (130/73 - 152/79)  BP(mean): --  RR: 18 (21 Mar 2022 11:27) (17 - 18)  SpO2: 97% (21 Mar 2022 11:27) (97% - 98%)    PHYSICAL EXAM:    GENERAL: NAD frail   CHEST/LUNG: Clear to ausculation bilaterally  HEART: Regular rate and rhythm; S1 S2  ABDOMEN: Soft, Bowel sounds present  EXTREMITIES: no edema   NERVOUS SYSTEM:  nonfocal  gen weakness    LABS:                        10.6   16.20 )-----------( 131      ( 21 Mar 2022 07:16 )             34.4     03-21    142  |  96<L>  |  19.0  ----------------------------<  112<H>  3.9   |  42.0<H>  |  0.61    Ca    8.4<L>      21 Mar 2022 07:16  Phos  2.2     03-21  Mg     1.8     03-21      PT/INR - ( 21 Mar 2022 07:16 )   PT: 16.2 sec;   INR: 1.39 ratio               CAPILLARY BLOOD GLUCOSE            RADIOLOGY & ADDITIONAL TESTS:

## 2022-03-21 NOTE — PROGRESS NOTE ADULT - SUBJECTIVE AND OBJECTIVE BOX
MECHE MILLER  [id number]        Chief Complaint:  follow up SOB/PNA/CHF    Interval History:  Patient offers no c/o.  Denies SOB.  No CP, palps.    Tele:  Af, V-paced          albuterol/ipratropium for Nebulization 3 milliLiter(s) Nebulizer every 6 hours PRN  enoxaparin Injectable 60 milliGRAM(s) SubCutaneous every 12 hours  furosemide    Tablet 20 milliGRAM(s) Oral daily  piperacillin/tazobactam IVPB.. 3.375 Gram(s) IV Intermittent every 8 hours  sodium phosphate IVPB 30 milliMole(s) IV Intermittent once          Physical Exam:  T(C): 36.9 (03-21-22 @ 11:27), Max: 36.9 (03-21-22 @ 11:27)  HR: 73 (03-21-22 @ 11:27) (73 - 74)  BP: 130/73 (03-21-22 @ 11:27) (130/73 - 152/79)  RR: 18 (03-21-22 @ 11:27) (17 - 18)  SpO2: 97% (03-21-22 @ 11:27) (97% - 98%)  Wt(kg): --  General: elderly frail patient, Comfortable in NAD  HEENT: dry mm, sclera anicteric  CVS: nl s1s2, rrr, no s3/JVD  Pulm: Diminished, faint crackles   Abd: soft, non-tender  Ext: 1 b/l LE edema  Neuro A&O x3  Psych: Normal affect      I&O's Summary        Labs:   21 Mar 2022 07:16    142    |  96     |  19.0   ----------------------------<  112    3.9     |  42.0   |  0.61     Ca    8.4        21 Mar 2022 07:16  Phos  2.2       21 Mar 2022 07:16  Mg     1.8       21 Mar 2022 07:16                            10.6   16.20 )-----------( 131      ( 21 Mar 2022 07:16 )             34.4     PT/INR - ( 21 Mar 2022 07:16 )   PT: 16.2 sec;   INR: 1.39 ratio                 Echo (3/9/22):   1. Mildly enlarged left atrium.   2. Left ventricular ejection fraction, by visual estimation, is 35 to 40%.   3. Elevated mean left atrial pressure. (LAP = 23 mm Hg)   4. Normal right atrial size.   5. Mildly enlarged right ventricle. Moderately reduced RV systolic function.   6. Moderate mitral valve regurgitation.   7. Mild aortic regurgitation.   8. Mild tricuspid regurgitation.   9. Estimated pulmonary artery systolic pressure is 40.7 mmHg -mild pulmonary hypertension.  10. There is no evidence of pericardial effusion.  11. Dilated Ao root at 4.1cm.  12. Pacemaker wire/lead appears malpositioned.       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 Recently admitted with PNA. Pt was hypoxic and tachypneic, requiring BIPAP. Pt now on NC, breathing comfortably saturating >90%. CXR with mild pulmonary infiltrates. BNP mildly elevated and much lower from last admission. Pt appears clinically dry and with contraction alkalosis. SOB likely due to aspiration PNA. Pt failed dysphagia screen and NPO.   -CTA with no PE and no significant edema or effusion.    Plan:  1. Will restart po lasix today, no decompensated CHF at this time. Restart po metoprolol ER 25mg daily  2. Continue full dose Lovenox while NPO. Coumadin now restarted with goal INR 2-3   3. No need for repeat TTE at this time.   4. Hold BB and ACEi for now.   5. DC planning, CV stable for discharge. Likely to MONSERRAT . Will not actively follow, call if questions.                 Juan Shaikh MD

## 2022-03-21 NOTE — CONSULT NOTE ADULT - SUBJECTIVE AND OBJECTIVE BOX
This is a frail elderly 89 yo M PMH of Prostate Ca treated with XRT, GERD, Bio-AVR, Chronic AFIB, PPM NICM, Chronic HFrEF (30%). In addition, Prostate Ca Frequent readmissions 3rd in 3 weeks.  Admitted in Acute Hypoxic/Hypercapnic respiratory Failure, Aspiration PNA,Leukocytosis Heart failure, Zosyn IV Q8 x until 3/24   He was easy to arouse, alert oriented to self, Maintained on 6L O2 NC. He admits to being constipated and has back pain. Poorly nourished, did not eat his lunch at all today.  No fever or chills. SOB using O2 6L NC No N/V/D, CP Palpitations, abdominal pain   HPI:  Patient is a 89 yo male with PMHx prostate CA s/p XRT, GERD, bioAVR, permanent AF s/p AVN ablation and PPM on warfarin, NICM with chronic HFrEF (30%) on 20 mg lasix PO, recent admission for aspiration PNA and acute CHF BIBEMS w/ hypoxia to 70s-80s. Per nurse aide, patient was coughing and endorsing SOB. He denies f/c/h/d/n/v, chest pain and abdominal pain. Of note, he recently completed course of amoxicillin.      In the ED, his vitals were notable for RR- 48 and required NC- 4L. He was subsequently transitioned to Bipap. Labs notable for leukocytosis and proBNP- 3061. Patient received IV lasix and Vanc/Zosyn.    (17 Mar 2022 17:42)    PERTINENT PMH REVIEWED: Yes     PAST MEDICAL & SURGICAL HISTORY:  Afib  with RVR    GERD (gastroesophageal reflux disease)    Cyst of kidney, acquired    Cyst of pancreas    Prostate CA  radiation    Irritable bowel syndrome with diarrhea    VTE (venous thromboembolism)  RLE    HTN (hypertension)    CHF with cardiomyopathy  EF 30%    H/O total knee replacement, right  b/l    Cardiac resynchronization therapy pacemaker (CRT-P) in place  MDT doi 1/28/20-Dr Marcus    S/P bilateral inguinal hernia repair    Status post left partial knee replacement    SOCIAL HISTORY:                      Substance history:  none                    Admitted from:  home                      Mandaen/spirituality: PRS                    Cultural concerns: None                      HCP: Phone#: Arpan Saul  [phone]    FAMILY HISTORY:    Allergies  antibiotic (Rash)  tetracycline (Rash)    ADVANCE DIRECTIVES/TREATMENT PREFERENCES:   DNR/DNI - MOLST, continue all other medical treatments- in ED   DNR/DNI - MOLST, comfort measures only  New MOLST today Comfort measures    Baseline ADLs (prior to admission):  Dependent      Karnofsky/Palliative Performance Status Version 20:  %    Present Symptoms:     Dyspnea Moderate O2 NC 6L   Nausea/Vomiting:  No  Anxiety:   No  Depression:  No  Fatigue: Yes   Loss of appetite: Yes   Constipation: Yes    Pain: vague complaints of low  back pain after a fall            Character-            Duration-            Effect-            Factors-            Frequency-            Location-            Severity-moderate when moving in bed, changing positions    Pain AD Score:  4-6/10    Review of Systems: Reviewed                      All others negative    MEDICATIONS  (STANDING):  enoxaparin Injectable 60 milliGRAM(s) SubCutaneous every 12 hours  furosemide    Tablet 20 milliGRAM(s) Oral daily  metoprolol succinate ER 25 milliGRAM(s) Oral daily  piperacillin/tazobactam IVPB.. 3.375 Gram(s) IV Intermittent every 8 hours  sodium phosphate IVPB 30 milliMole(s) IV Intermittent once  warfarin 2 milliGRAM(s) Oral at bedtime    MEDICATIONS  (PRN):  albuterol/ipratropium for Nebulization 3 milliLiter(s) Nebulizer every 6 hours PRN Shortness of Breath and/or Wheezing    PHYSICAL EXAM:    Vital Signs Last 24 Hrs  T(C): 36.9 (21 Mar 2022 11:27), Max: 36.9 (21 Mar 2022 11:27)  T(F): 98.5 (21 Mar 2022 11:27), Max: 98.5 (21 Mar 2022 11:27)  HR: 73 (21 Mar 2022 11:27) (73 - 74)  BP: 130/73 (21 Mar 2022 11:27) (130/73 - 152/79)  BP(mean): --  RR: 18 (21 Mar 2022 11:27) (17 - 18)  SpO2: 97% (21 Mar 2022 11:27) (97% - 98%)    General: alert  oriented x 2____ lethargic                   cachexia  verbal      HEENT: dry mouth  thickened liquids    Lungs: comfortable    CV: normal     GI:   distended  tender             constipation  last BM: yes days    : normal  incontinent  oliguria/anuria  shearer    MSK:  weakness  edema          bedbound/wheelchair bound    Neuro: no focal deficits dysphagia     Skin: __  no rash    LABS:                        10.6   16.20 )-----------( 131      ( 21 Mar 2022 07:16 )             34.4     03-21    142  |  96<L>  |  19.0  ----------------------------<  112<H>  3.9   |  42.0<H>  |  0.61    Ca    8.4<L>      21 Mar 2022 07:16  Phos  2.2     03-21  Mg     1.8     03-21      PT/INR - ( 21 Mar 2022 07:16 )   PT: 16.2 sec;   INR: 1.39 ratio       I&O's Summary      RADIOLOGY & ADDITIONAL STUDIES:  < from: CT Angio Chest PE Protocol w/ IV Cont (03.18.22 @ 14:17) >    LUNGS/AIRWAYS/PLEURA: Patent trachea and bronchi. Mild atelectasis in the   lower lobes. Trace left pleural effusion.    LYMPH NODES/MEDIASTINUM: Unremarkable.    HEART/VASCULATURE: Enlarged heart. No pericardial effusion. Leads within   the coronary sinus, right ventricle, and right atrium. Coronary artery   calcified plaque. No aortic aneurysm.    UPPER ABDOMEN: Cholelithiasis. Right kidney cyst.    BONES/SOFT TISSUES: Bilateral remote rib fractures. Degenerative changes   of the spine and glenohumeral joints. Scoliosis. Stable loss of height of   multiple vertebral bodies.      IMPRESSION:    No pulmonary embolism.      < end of copied text >

## 2022-03-21 NOTE — CONSULT NOTE ADULT - NSCONSULTADDITIONALINFOA_GEN_ALL_CORE
Total Time Spent__50__ minutes  This includes chart review, patient assessment, discussion and collaboration with interdisciplinary team members, ACP planning    COUNSELING:  Face to face meeting to discuss Advanced Care Planning - Time Spent 20______Minutes.      Thank you for the opportunity to assist with the care of this patient.   [facility] Palliative Medicine Consult Service [phone].

## 2022-03-21 NOTE — CHART NOTE - NSCHARTNOTEFT_GEN_A_CORE
Contacted by RN for pt reporting constipation   Patient seen and examined at bedside.  Reports that he hasn't moved his bowels in two days  Denies flatus, denies nausea/vomiting   No other complaints at this time     Vital Signs  T(C): 36.9 (03-21-22 @ 19:03), Max: 36.9 (03-21-22 @ 11:27)  HR: 58 (03-21-22 @ 19:03) (58 - 74)  BP: 131/73 (03-21-22 @ 19:03) (130/73 - 152/79)  RR: 18 (03-21-22 @ 19:03) (17 - 18)  SpO2: 96% (03-21-22 @ 19:03) (96% - 98%)    Physical Exam:  Gen: AOx3, laying in bed comfortably in NAD  Abd: +bowel sounds - hyperactive, soft/nt/nd    A/P: constipation   KUB performed to r/o ileus - large stool burden on my read (official read pending)  will start with miralax, may need enema/additional agents  RN to monitor, assess and escalate to PA PRN.  Will continue to monitor.

## 2022-03-21 NOTE — PROGRESS NOTE ADULT - PROBLEM SELECTOR PLAN 4
- c/w lovenox   restart coumadin, INR checks daily   - c/w tele monitoring  patient states his home dose is 2mg and does not want to increase it

## 2022-03-21 NOTE — PHYSICAL THERAPY INITIAL EVALUATION ADULT - ACTIVE RANGE OF MOTION EXAMINATION, REHAB EVAL
bl shoulder flexion extremely limited./bilateral  lower extremity Active ROM was WFL (within functional limits)/deficits as listed below

## 2022-03-21 NOTE — PHYSICAL THERAPY INITIAL EVALUATION ADULT - ADDITIONAL COMMENTS
Pt lives in a house with a ramp and 24/7 live-in aide. He has a w/c and a RW. States he can take a few small steps to w/c or at times walk household distances.

## 2022-03-22 LAB
ANION GAP SERPL CALC-SCNC: 9 MMOL/L — SIGNIFICANT CHANGE UP (ref 5–17)
BASOPHILS # BLD AUTO: 0.04 K/UL — SIGNIFICANT CHANGE UP (ref 0–0.2)
BASOPHILS NFR BLD AUTO: 0.3 % — SIGNIFICANT CHANGE UP (ref 0–2)
BUN SERPL-MCNC: 18.4 MG/DL — SIGNIFICANT CHANGE UP (ref 8–20)
CALCIUM SERPL-MCNC: 8.1 MG/DL — LOW (ref 8.6–10.2)
CHLORIDE SERPL-SCNC: 92 MMOL/L — LOW (ref 98–107)
CO2 SERPL-SCNC: 39 MMOL/L — HIGH (ref 22–29)
CREAT SERPL-MCNC: 0.68 MG/DL — SIGNIFICANT CHANGE UP (ref 0.5–1.3)
CULTURE RESULTS: SIGNIFICANT CHANGE UP
CULTURE RESULTS: SIGNIFICANT CHANGE UP
EGFR: 86 ML/MIN/1.73M2 — SIGNIFICANT CHANGE UP
EOSINOPHIL # BLD AUTO: 0.09 K/UL — SIGNIFICANT CHANGE UP (ref 0–0.5)
EOSINOPHIL NFR BLD AUTO: 0.6 % — SIGNIFICANT CHANGE UP (ref 0–6)
GLUCOSE SERPL-MCNC: 102 MG/DL — HIGH (ref 70–99)
HCT VFR BLD CALC: 34.4 % — LOW (ref 39–50)
HGB BLD-MCNC: 10.5 G/DL — LOW (ref 13–17)
IMM GRANULOCYTES NFR BLD AUTO: 0.6 % — SIGNIFICANT CHANGE UP (ref 0–1.5)
INR BLD: 1.53 RATIO — HIGH (ref 0.88–1.16)
LYMPHOCYTES # BLD AUTO: 10.55 K/UL — HIGH (ref 1–3.3)
LYMPHOCYTES # BLD AUTO: 69.7 % — HIGH (ref 13–44)
MAGNESIUM SERPL-MCNC: 1.7 MG/DL — SIGNIFICANT CHANGE UP (ref 1.6–2.6)
MCHC RBC-ENTMCNC: 30.1 PG — SIGNIFICANT CHANGE UP (ref 27–34)
MCHC RBC-ENTMCNC: 30.5 GM/DL — LOW (ref 32–36)
MCV RBC AUTO: 98.6 FL — SIGNIFICANT CHANGE UP (ref 80–100)
MONOCYTES # BLD AUTO: 0.29 K/UL — SIGNIFICANT CHANGE UP (ref 0–0.9)
MONOCYTES NFR BLD AUTO: 1.9 % — LOW (ref 2–14)
NEUTROPHILS # BLD AUTO: 4.08 K/UL — SIGNIFICANT CHANGE UP (ref 1.8–7.4)
NEUTROPHILS NFR BLD AUTO: 26.9 % — LOW (ref 43–77)
PHOSPHATE SERPL-MCNC: 4.2 MG/DL — SIGNIFICANT CHANGE UP (ref 2.4–4.7)
PLATELET # BLD AUTO: 128 K/UL — LOW (ref 150–400)
POTASSIUM SERPL-MCNC: 4 MMOL/L — SIGNIFICANT CHANGE UP (ref 3.5–5.3)
POTASSIUM SERPL-SCNC: 4 MMOL/L — SIGNIFICANT CHANGE UP (ref 3.5–5.3)
PROTHROM AB SERPL-ACNC: 17.8 SEC — HIGH (ref 10.5–13.4)
RBC # BLD: 3.49 M/UL — LOW (ref 4.2–5.8)
RBC # FLD: 15.4 % — HIGH (ref 10.3–14.5)
SARS-COV-2 RNA SPEC QL NAA+PROBE: SIGNIFICANT CHANGE UP
SODIUM SERPL-SCNC: 140 MMOL/L — SIGNIFICANT CHANGE UP (ref 135–145)
SPECIMEN SOURCE: SIGNIFICANT CHANGE UP
SPECIMEN SOURCE: SIGNIFICANT CHANGE UP
WBC # BLD: 15.14 K/UL — HIGH (ref 3.8–10.5)
WBC # FLD AUTO: 15.14 K/UL — HIGH (ref 3.8–10.5)

## 2022-03-22 PROCEDURE — 99232 SBSQ HOSP IP/OBS MODERATE 35: CPT

## 2022-03-22 PROCEDURE — 71045 X-RAY EXAM CHEST 1 VIEW: CPT | Mod: 26

## 2022-03-22 RX ORDER — LACTULOSE 10 G/15ML
20 SOLUTION ORAL ONCE
Refills: 0 | Status: COMPLETED | OUTPATIENT
Start: 2022-03-22 | End: 2022-03-22

## 2022-03-22 RX ORDER — MAGNESIUM SULFATE 500 MG/ML
2 VIAL (ML) INJECTION ONCE
Refills: 0 | Status: COMPLETED | OUTPATIENT
Start: 2022-03-22 | End: 2022-03-22

## 2022-03-22 RX ORDER — MAGNESIUM OXIDE 400 MG ORAL TABLET 241.3 MG
400 TABLET ORAL
Refills: 0 | Status: COMPLETED | OUTPATIENT
Start: 2022-03-22 | End: 2022-03-24

## 2022-03-22 RX ORDER — WARFARIN SODIUM 2.5 MG/1
4 TABLET ORAL ONCE
Refills: 0 | Status: COMPLETED | OUTPATIENT
Start: 2022-03-22 | End: 2022-03-22

## 2022-03-22 RX ADMIN — Medication 3 MILLILITER(S): at 21:06

## 2022-03-22 RX ADMIN — Medication 10 MILLIGRAM(S): at 11:32

## 2022-03-22 RX ADMIN — Medication 25 GRAM(S): at 12:10

## 2022-03-22 RX ADMIN — Medication 20 MILLIGRAM(S): at 05:42

## 2022-03-22 RX ADMIN — CELECOXIB 200 MILLIGRAM(S): 200 CAPSULE ORAL at 11:57

## 2022-03-22 RX ADMIN — ENOXAPARIN SODIUM 60 MILLIGRAM(S): 100 INJECTION SUBCUTANEOUS at 17:09

## 2022-03-22 RX ADMIN — POLYETHYLENE GLYCOL 3350 17 GRAM(S): 17 POWDER, FOR SOLUTION ORAL at 22:56

## 2022-03-22 RX ADMIN — MAGNESIUM OXIDE 400 MG ORAL TABLET 400 MILLIGRAM(S): 241.3 TABLET ORAL at 17:09

## 2022-03-22 RX ADMIN — WARFARIN SODIUM 4 MILLIGRAM(S): 2.5 TABLET ORAL at 22:56

## 2022-03-22 RX ADMIN — LACTULOSE 20 GRAM(S): 10 SOLUTION ORAL at 17:38

## 2022-03-22 RX ADMIN — CELECOXIB 200 MILLIGRAM(S): 200 CAPSULE ORAL at 11:32

## 2022-03-22 RX ADMIN — ENOXAPARIN SODIUM 60 MILLIGRAM(S): 100 INJECTION SUBCUTANEOUS at 05:42

## 2022-03-22 RX ADMIN — Medication 25 MILLIGRAM(S): at 05:42

## 2022-03-22 NOTE — PROGRESS NOTE ADULT - ASSESSMENT
This 89 yo male with prostate CA s/p XRT, GERD, bioAVR, permanent AF s/p AVN ablation and PPM on warfarin, NICM with chronic HFrEF (30%) on 20 mg lasix PO, recent admission for aspiration PNA and acute CHF BIBEMS w/ hypoxia to 70s-80s. Per nurse aide, patient was coughing and endorsing SOB. He denies f/c/h/d/n/v, chest pain and abdominal pain. Of note, he recently completed course of PIPERCILLIN/TAZOBACTAM from 3/9/22- 3/12-22.   In the ED, his vitals were notable for RR- 48 and required NC- 4L. He was subsequently transitioned to Bipap. Labs notable for leukocytosis and proBNP- 3061. Patient received IV lasix and Vanc/Zosyn.    (17 Mar 2022 17:42)    patient seen and examined.  CXR shows clear lungs.  BNP on this admission is elevated at 3056. WBC is elevated from 21K to 17K.   no fevers noted.     Impression:  SOB  WBC elevation  hx of PNA  NICM with chronic HFrEF (30%)        Plan:  - SOB  - Blood cultures negative so far.   - he completed a 5 day course of zosyn   - continue to watch thereafter    prior sputum cx with a Klebsiella pneumoniae treated with Piperacillin Tazobactam   current cultures remain negative    - diuresis as per medical team  - consider repeat BNP in next 48 hours.       WBC elevation  slowly coming down.   - over the last 2 years, he had 2 normal WBC counts in July 2021, and November 2021  - if not completely normalized, consider outpatient hematology input.         Please call me back if I may be of further assistance.   Thank you.

## 2022-03-22 NOTE — CHART NOTE - NSCHARTNOTEFT_GEN_A_CORE
Palliative Care NP Note:  Patient past 24hrs reviewed, no new events or changes in general condition. GOC have been decided, Short stay at [facility] to possibly improve his strength and stability   before returning home with Private HHA's.  MOL completed-discharge plan in place. Signing off at this time, please reconsult if goals or condition change.

## 2022-03-22 NOTE — PROGRESS NOTE ADULT - PROBLEM SELECTOR PLAN 6
- DVT ppx- on Lovenox        CODE STATUS- DNR/DNI. MOLST filled out.    patient's daughter, Jessica ([phone])

## 2022-03-22 NOTE — PROGRESS NOTE ADULT - SUBJECTIVE AND OBJECTIVE BOX
seen for aspiration  pna  seen in am , c/o constipation   no abd pain , lying in the bed   no SOB     no overnight events reported       MEDICATIONS  (STANDING):  bisacodyl Suppository 10 milliGRAM(s) Rectal daily  celecoxib 200 milliGRAM(s) Oral once  celecoxib 200 milliGRAM(s) Oral daily  enoxaparin Injectable 60 milliGRAM(s) SubCutaneous every 12 hours  furosemide    Tablet 20 milliGRAM(s) Oral daily  metoprolol succinate ER 25 milliGRAM(s) Oral daily  polyethylene glycol 3350 17 Gram(s) Oral at bedtime  warfarin 4 milliGRAM(s) Oral once      Vital Signs Last 24 Hrs  T(C): 36.4 (22 Mar 2022 11:12), Max: 36.9 (21 Mar 2022 19:03)  T(F): 97.5 (22 Mar 2022 11:12), Max: 98.4 (21 Mar 2022 19:03)  HR: 78 (22 Mar 2022 11:12) (58 - 78)  BP: 121/72 (22 Mar 2022 11:12) (121/72 - 131/73)  BP(mean): --  RR: 18 (22 Mar 2022 11:12) (18 - 18)  SpO2: 100% (22 Mar 2022 11:12) (96% - 100%)    PHYSICAL EXAM:    GENERAL: NAD frail elder pt   CHEST/LUNG: Clear to ausculation bilaterally  HEART: Regular rate and rhythm; S1 S2  ABDOMEN: Soft, Bowel sounds present, no distention   EXTREMITIES: no pretibial edema   Skin : warm normal color                             10.5   15.14 )-----------( 128      ( 22 Mar 2022 06:41 )             34.4   03-22    140  |  92<L>  |  18.4  ----------------------------<  102<H>  4.0   |  39.0<H>  |  0.68    Ca    8.1<L>      22 Mar 2022 06:41  Phos  4.2     03-22  Mg     1.7     03-22      LABS:                        10.6   16.20 )-----------( 131      ( 21 Mar 2022 07:16 )             34.4     03-21    142  |  96<L>  |  19.0  ----------------------------<  112<H>  3.9   |  42.0<H>  |  0.61    Ca    8.4<L>      21 Mar 2022 07:16  Phos  2.2     03-21  Mg     1.8     03-21      PT/INR - ( 21 Mar 2022 07:16 )   PT: 16.2 sec;   INR: 1.39 ratio           RADIOLOGY & ADDITIONAL TESTS:

## 2022-03-22 NOTE — PROGRESS NOTE ADULT - SUBJECTIVE AND OBJECTIVE BOX
[facility]                                                INFECTIOUS DISEASES  =======================================================                               Jose Gloria MD#  Odilon Mares MD*                                     Gray Kwan MD*    Karlene Buenrostro MD*            Diplomates American Board of Internal Medicine & Infectious Diseases                  # [place] Office - Appt - Tel  [phone] Fax [phone]                * [place] Office - Appt - Tel [phone] Fax [phone]                                  Hospital Consult line:  [phone]  =======================================================      [facility]-[id number]  MECHE MILLER  follow up: SOB    pt seen and examined.   SOB improving.   no new complaints.     I have personally reviewed the labs and data; pertinent labs and data are listed in this note; please see below.   =======================================================  Past Medical & Surgical Hx:  =====================  PAST MEDICAL & SURGICAL HISTORY:  Afib with RVR  GERD (gastroesophageal reflux disease)  Cyst of kidney, acquired  Cyst of pancreas  Prostate CA radiation  Irritable bowel syndrome with diarrhea  VTE (venous thromboembolism) RLE  HTN (hypertension)  CHF with cardiomyopathy  EF 30%  H/O total knee replacement, right  b/l  Cardiac resynchronization therapy pacemaker (CRT-P) in place  MDT doi 1/28/20-Dr Marcus  S/P bilateral inguinal hernia repair  Status post left partial knee replacement    Problem List:  ==========  HEALTH ISSUES - PROBLEM Dx:  Acute respiratory failure with hypoxia  Gram-negative pneumonia  Discharge planning issues  Acute on chronic HFrEF (heart failure with reduced ejection fraction)  Hypertension  Paroxysmal atrial fibrillation    Social Hx:  =======  no toxic habits currently    FAMILY HISTORY:  no significant family history of immunosuppressive disorders in mother or father   =======================================================    REVIEW OF SYSTEMS:  CONSTITUTIONAL:  No Fever or chills  HEENT:  No diplopia or blurred vision.  No earache, sore throat or runny nose.  CARDIOVASCULAR:  No pressure, squeezing, strangling, tightness, heaviness or aching about the chest, neck, axilla or epigastrium.  RESPIRATORY: no SOB  GASTROINTESTINAL:  No nausea, vomiting or diarrhea.  GENITOURINARY:  No dysuria, frequency or urgency. No Blood in urine  MUSCULOSKELETAL:  no joint aches, no muscle pain  SKIN:  No change in skin, hair or nails.  NEUROLOGIC:  No Headaches, seizures or weakness.  PSYCHIATRIC:  No disorder of thought or mood.  ENDOCRINE:  No heat or cold intolerance  HEMATOLOGICAL:  No easy bruising or bleeding.    =======================================================  Allergies  antibiotic (Rash)  tetracycline (Rash)        ======================================================  Physical Exam:  ============     General:  No acute distress.  ON nasal cannula, comfortable  Eye: Pupils are equal, round and reactive to light, Extraocular movements are intact, Normal conjunctiva.  HENT: Normocephalic, Oral mucosa is moist, No pharyngeal erythema, No sinus tenderness.  Neck: Supple, No lymphadenopathy.  Respiratory: Lungs with rales bilaterally at bases  Cardiovascular: Normal rate, Regular rhythm,    + trace edema bilaterally.  Gastrointestinal: Soft, Non-tender, Non-distended, Normal bowel sounds.  Genitourinary:  no CVA tenderness  Lymphatics: No lymphadenopathy neck,   Musculoskeletal: Normal range of motion, Normal strength.  KYPHOTIC  Integumentary: No rash.  Neurologic: Alert, Oriented, No focal deficits, Cranial Nerves II-XII are grossly intact.  Psychiatric: Appropriate mood & affect.    =======================================================   Vitals:  ============  T(F): 97.8 (22 Mar 2022 05:05), Max: 98.5 (21 Mar 2022 11:27)  HR: 72 (22 Mar 2022 05:05)  BP: 128/76 (22 Mar 2022 05:05)  RR: 18 (22 Mar 2022 05:05)  SpO2: 97% (22 Mar 2022 05:05) (96% - 97%)  temp max in last 48H T(F): , Max: 98.5 (03-21-22 @ 11:27)    =======================================================  Current Antibiotics:    Other medications:  bisacodyl Suppository 10 milliGRAM(s) Rectal daily  celecoxib 200 milliGRAM(s) Oral once  celecoxib 200 milliGRAM(s) Oral daily  enoxaparin Injectable 60 milliGRAM(s) SubCutaneous every 12 hours  furosemide    Tablet 20 milliGRAM(s) Oral daily  magnesium sulfate  IVPB 2 Gram(s) IV Intermittent once  metoprolol succinate ER 25 milliGRAM(s) Oral daily  polyethylene glycol 3350 17 Gram(s) Oral at bedtime  warfarin 4 milliGRAM(s) Oral once      =======================================================  Labs:                        10.5   15.14 )-----------( 128      ( 22 Mar 2022 06:41 )             34.4     WBC Count: 15.14 K/uL (03-22-22 @ 06:41)  WBC Count: 16.20 K/uL (03-21-22 @ 07:16)  WBC Count: 17.37 K/uL (03-19-22 @ 08:06)  WBC Count: 17.18 K/uL (03-18-22 @ 02:35)  WBC Count: 21.20 K/uL (03-17-22 @ 16:35)      03-22    140  |  92<L>  |  18.4  ----------------------------<  102<H>  4.0   |  39.0<H>  |  0.68    Ca    8.1<L>      22 Mar 2022 06:41  Phos  4.2     03-22  Mg     1.7     03-22        Culture - Urine (collected 03-18-22 @ 02:05)  Source: Clean Catch Clean Catch (Midstream)  Final Report (03-18-22 @ 21:54):    <10,000 CFU/mL Normal Urogenital Eileen    Culture - Blood (collected 03-17-22 @ 16:37)  Source: .Blood Blood    Culture - Blood (collected 03-17-22 @ 16:37)  Source: .Blood Blood       SARS-CoV-2: NotDetec (03-17-22 @ 16:36)  SARS-CoV-2: NotDetec (03-09-22 @ 18:36)  SARS-CoV-2: NotDetec (02-26-22 @ 15:27)         Antibiotics Course:  azithromycin  IVPB   255 mL/Hr (03-09-22 @ 18:43)    cefTRIAXone   IVPB   100 mL/Hr (02-26-22 @ 20:02)    piperacillin/tazobactam IVPB.   200 mL/Hr (03-09-22 @ 19:06)    piperacillin/tazobactam IVPB..   25 mL/Hr (03-21-22 @ 21:35)   25 mL/Hr (03-21-22 @ 14:02)   25 mL/Hr (03-21-22 @ 05:31)   25 mL/Hr (03-20-22 @ 23:10)   25 mL/Hr (03-20-22 @ 13:32)   25 mL/Hr (03-20-22 @ 05:15)   25 mL/Hr (03-19-22 @ 22:53)   25 mL/Hr (03-19-22 @ 14:02)   25 mL/Hr (03-19-22 @ 05:17)   25 mL/Hr (03-18-22 @ 20:46)   25 mL/Hr (03-18-22 @ 11:30)   25 mL/Hr (03-18-22 @ 01:50)    piperacillin/tazobactam IVPB..   25 mL/Hr (03-12-22 @ 04:57)   25 mL/Hr (03-11-22 @ 19:45)   25 mL/Hr (03-11-22 @ 12:30)   25 mL/Hr (03-11-22 @ 03:03)   25 mL/Hr (03-10-22 @ 20:55)   25 mL/Hr (03-10-22 @ 12:40)   25 mL/Hr (03-10-22 @ 03:38)    piperacillin/tazobactam IVPB...   200 mL/Hr (03-17-22 @ 16:54)    vancomycin  IVPB   250 mL/Hr (03-18-22 @ 06:04)    vancomycin  IVPB.   250 mL/Hr (03-17-22 @ 19:12)        =======================================================        < from: Xray Chest 1 View- PORTABLE-Urgent (03.17.22 @ 16:36) >    ACC: [id number] EXAM:  XR CHEST PORTABLE URGENT 1V                          PROCEDURE DATE:  03/17/2022          INTERPRETATION:  EXAMINATION: XR CHEST URGENT    CLINICAL INDICATION: Chest Pain    TECHNIQUE: Frontal radiograph of the chest was obtained.    COMPARISON: 3/11/2022.    FINDINGS:    Cardiac silhouette not well evaluated. Lungs are clear. No pleural   effusion or pneumothorax. Left-sided pacemaker    IMPRESSION:  Clear lungs.    --- End of Report ---         LAURA DE LOS SANTOS MD; Attending Radiologist  This document has been electronically signed. Mar 17 2022  6:58PM    < end of copied text >

## 2022-03-22 NOTE — PROGRESS NOTE ADULT - PROBLEM SELECTOR PLAN 7
lymphocyte predominance   chronic elevation  ?CLL

## 2022-03-23 ENCOUNTER — TRANSCRIPTION ENCOUNTER (OUTPATIENT)
Age: 87
End: 2022-03-23

## 2022-03-23 DIAGNOSIS — E87.3 ALKALOSIS: ICD-10-CM

## 2022-03-23 LAB
ANION GAP SERPL CALC-SCNC: 5 MMOL/L — SIGNIFICANT CHANGE UP (ref 5–17)
BUN SERPL-MCNC: 17.4 MG/DL — SIGNIFICANT CHANGE UP (ref 8–20)
CALCIUM SERPL-MCNC: 8.3 MG/DL — LOW (ref 8.6–10.2)
CHLORIDE SERPL-SCNC: 93 MMOL/L — LOW (ref 98–107)
CO2 SERPL-SCNC: 40 MMOL/L — HIGH (ref 22–29)
CREAT SERPL-MCNC: 0.56 MG/DL — SIGNIFICANT CHANGE UP (ref 0.5–1.3)
EGFR: 91 ML/MIN/1.73M2 — SIGNIFICANT CHANGE UP
GAS PNL BLDA: SIGNIFICANT CHANGE UP
GLUCOSE SERPL-MCNC: 143 MG/DL — HIGH (ref 70–99)
HCT VFR BLD CALC: 35.2 % — LOW (ref 39–50)
HGB BLD-MCNC: 10.5 G/DL — LOW (ref 13–17)
INR BLD: 1.36 RATIO — HIGH (ref 0.88–1.16)
MCHC RBC-ENTMCNC: 29.7 PG — SIGNIFICANT CHANGE UP (ref 27–34)
MCHC RBC-ENTMCNC: 29.8 GM/DL — LOW (ref 32–36)
MCV RBC AUTO: 99.4 FL — SIGNIFICANT CHANGE UP (ref 80–100)
PHOSPHATE SERPL-MCNC: 2.4 MG/DL — SIGNIFICANT CHANGE UP (ref 2.4–4.7)
PLATELET # BLD AUTO: 131 K/UL — LOW (ref 150–400)
POTASSIUM SERPL-MCNC: 4.3 MMOL/L — SIGNIFICANT CHANGE UP (ref 3.5–5.3)
POTASSIUM SERPL-SCNC: 4.3 MMOL/L — SIGNIFICANT CHANGE UP (ref 3.5–5.3)
PROTHROM AB SERPL-ACNC: 15.8 SEC — HIGH (ref 10.5–13.4)
RBC # BLD: 3.54 M/UL — LOW (ref 4.2–5.8)
RBC # FLD: 15.2 % — HIGH (ref 10.3–14.5)
SODIUM SERPL-SCNC: 138 MMOL/L — SIGNIFICANT CHANGE UP (ref 135–145)
WBC # BLD: 16.62 K/UL — HIGH (ref 3.8–10.5)
WBC # FLD AUTO: 16.62 K/UL — HIGH (ref 3.8–10.5)

## 2022-03-23 PROCEDURE — 99232 SBSQ HOSP IP/OBS MODERATE 35: CPT

## 2022-03-23 PROCEDURE — 99233 SBSQ HOSP IP/OBS HIGH 50: CPT

## 2022-03-23 PROCEDURE — 71045 X-RAY EXAM CHEST 1 VIEW: CPT | Mod: 26

## 2022-03-23 RX ORDER — FUROSEMIDE 40 MG
40 TABLET ORAL ONCE
Refills: 0 | Status: COMPLETED | OUTPATIENT
Start: 2022-03-23 | End: 2022-03-23

## 2022-03-23 RX ORDER — ACETAZOLAMIDE 250 MG/1
250 TABLET ORAL DAILY
Refills: 0 | Status: DISCONTINUED | OUTPATIENT
Start: 2022-03-23 | End: 2022-03-26

## 2022-03-23 RX ORDER — POLYETHYLENE GLYCOL 3350 17 G/17G
17 POWDER, FOR SOLUTION ORAL
Qty: 0 | Refills: 0 | DISCHARGE
Start: 2022-03-23

## 2022-03-23 RX ORDER — LOPERAMIDE HCL 2 MG
1 TABLET ORAL
Qty: 0 | Refills: 0 | DISCHARGE

## 2022-03-23 RX ORDER — MAGNESIUM SULFATE 500 MG/ML
2 VIAL (ML) INJECTION ONCE
Refills: 0 | Status: COMPLETED | OUTPATIENT
Start: 2022-03-23 | End: 2022-03-23

## 2022-03-23 RX ORDER — ENOXAPARIN SODIUM 100 MG/ML
50 INJECTION SUBCUTANEOUS
Qty: 0 | Refills: 0 | DISCHARGE
Start: 2022-03-23

## 2022-03-23 RX ORDER — MULTIVIT-MIN/FERROUS GLUCONATE 9 MG/15 ML
1 LIQUID (ML) ORAL
Qty: 0 | Refills: 0 | DISCHARGE

## 2022-03-23 RX ORDER — WARFARIN SODIUM 2.5 MG/1
1 TABLET ORAL
Qty: 0 | Refills: 0 | DISCHARGE

## 2022-03-23 RX ORDER — WARFARIN SODIUM 2.5 MG/1
4 TABLET ORAL ONCE
Refills: 0 | Status: COMPLETED | OUTPATIENT
Start: 2022-03-23 | End: 2022-03-23

## 2022-03-23 RX ORDER — IPRATROPIUM/ALBUTEROL SULFATE 18-103MCG
3 AEROSOL WITH ADAPTER (GRAM) INHALATION
Qty: 0 | Refills: 0 | DISCHARGE
Start: 2022-03-23

## 2022-03-23 RX ORDER — PSYLLIUM SEED (WITH DEXTROSE)
0 POWDER (GRAM) ORAL
Qty: 0 | Refills: 0 | DISCHARGE

## 2022-03-23 RX ADMIN — MAGNESIUM OXIDE 400 MG ORAL TABLET 400 MILLIGRAM(S): 241.3 TABLET ORAL at 08:27

## 2022-03-23 RX ADMIN — Medication 25 GRAM(S): at 14:58

## 2022-03-23 RX ADMIN — Medication 40 MILLIGRAM(S): at 18:12

## 2022-03-23 RX ADMIN — WARFARIN SODIUM 4 MILLIGRAM(S): 2.5 TABLET ORAL at 23:53

## 2022-03-23 RX ADMIN — ENOXAPARIN SODIUM 60 MILLIGRAM(S): 100 INJECTION SUBCUTANEOUS at 16:18

## 2022-03-23 RX ADMIN — Medication 20 MILLIGRAM(S): at 07:28

## 2022-03-23 RX ADMIN — ENOXAPARIN SODIUM 60 MILLIGRAM(S): 100 INJECTION SUBCUTANEOUS at 07:28

## 2022-03-23 RX ADMIN — MAGNESIUM OXIDE 400 MG ORAL TABLET 400 MILLIGRAM(S): 241.3 TABLET ORAL at 16:18

## 2022-03-23 RX ADMIN — Medication 40 MILLIGRAM(S): at 13:34

## 2022-03-23 RX ADMIN — MAGNESIUM OXIDE 400 MG ORAL TABLET 400 MILLIGRAM(S): 241.3 TABLET ORAL at 11:44

## 2022-03-23 RX ADMIN — Medication 10 MILLIGRAM(S): at 11:44

## 2022-03-23 RX ADMIN — POLYETHYLENE GLYCOL 3350 17 GRAM(S): 17 POWDER, FOR SOLUTION ORAL at 23:53

## 2022-03-23 RX ADMIN — Medication 25 MILLIGRAM(S): at 07:28

## 2022-03-23 RX ADMIN — Medication 2.5 MILLIGRAM(S): at 16:18

## 2022-03-23 RX ADMIN — ACETAZOLAMIDE 250 MILLIGRAM(S): 250 TABLET ORAL at 16:18

## 2022-03-23 RX ADMIN — CELECOXIB 200 MILLIGRAM(S): 200 CAPSULE ORAL at 11:44

## 2022-03-23 RX ADMIN — Medication 3 MILLILITER(S): at 16:40

## 2022-03-23 RX ADMIN — CELECOXIB 200 MILLIGRAM(S): 200 CAPSULE ORAL at 11:50

## 2022-03-23 NOTE — PROGRESS NOTE ADULT - SUBJECTIVE AND OBJECTIVE BOX
Patient is a 89y old  Male who presents with a chief complaint of SOB   Pt is seen in am awake , mild dyspnea   no CP     during  to go to [facility] 2:30 pt is more SOB , tachypneic   will cancel the DC   discussed with daughter over the phone   she is in agreement to get hospice referal consider home hospice instead of going to short term rehab given his age , comorbidities and poor functional status likely will not benefit from rehab at present       d/w nurse and SW       ALLERGIES:  antibiotic (Rash)  tetracycline (Rash)    MEDICATIONS  (STANDING):  bisacodyl Suppository 10 milliGRAM(s) Rectal daily  celecoxib 200 milliGRAM(s) Oral once  celecoxib 200 milliGRAM(s) Oral daily  enoxaparin Injectable 60 milliGRAM(s) SubCutaneous every 12 hours  furosemide    Tablet 20 milliGRAM(s) Oral daily  magnesium oxide 400 milliGRAM(s) Oral three times a day with meals  metoprolol succinate ER 25 milliGRAM(s) Oral daily  polyethylene glycol 3350 17 Gram(s) Oral at bedtime    MEDICATIONS  (PRN):  acetaminophen     Tablet .. 650 milliGRAM(s) Oral every 8 hours PRN Moderate Pain (4 - 6)  albuterol/ipratropium for Nebulization 3 milliLiter(s) Nebulizer every 6 hours PRN Shortness of Breath and/or Wheezing    Vital Signs Last 24 Hrs  T(F): 98.4 (23 Mar 2022 14:29), Max: 98.4 (23 Mar 2022 14:29)  HR: 74 (23 Mar 2022 14:29) (68 - 74)  BP: 124/73 (23 Mar 2022 14:29) (124/65 - 144/81)  RR: 39 (23 Mar 2022 14:29) (18 - 39)  SpO2: 98% (23 Mar 2022 14:29) (97% - 98%)  I&O's Summary    22 Mar 2022 07:01  -  23 Mar 2022 07:00  --------------------------------------------------------  IN: 640 mL / OUT: 0 mL / NET: 640 mL        PHYSICAL EXAM:  General: awake , frail looks tired   Neck: + JVD   Lungs: diminished BS , poor inspiratory effort   Cardio: irregular s1 /s2 ,   Abdomen: Soft, Nontender, Nondistended; Bowel sounds present  Extremities: No calf tenderness, No pitting edema  Skin : warm       LABS:                        10.5   16.62 )-----------( 131      ( 23 Mar 2022 10:11 )             35.2     03-23    138  |  93  |  17.4  ----------------------------<  143  4.3   |  40.0  |  0.56    Ca    8.3      23 Mar 2022 10:11  Phos  2.4     03-23  Mg     1.7     03-22            PT/INR - ( 23 Mar 2022 06:53 )   PT: 15.8 sec;   INR: 1.36 ratio                                         Culture - Urine (collected 18 Mar 2022 02:05)  Source: Clean Catch Clean Catch (Midstream)  Final Report (18 Mar 2022 21:54):    <10,000 CFU/mL Normal Urogenital Eileen    Culture - Blood (collected 17 Mar 2022 16:37)  Source: .Blood Blood  Final Report (22 Mar 2022 17:00):    No growth at 5 days.    Culture - Blood (collected 17 Mar 2022 16:37)  Source: .Blood Blood  Final Report (22 Mar 2022 17:00):    No growth at 5 days.      COVID-19 PCR: Alidatetomer (03-22-22 @ 09:29)    RADIOLOGY & ADDITIONAL TESTS:

## 2022-03-23 NOTE — DISCHARGE NOTE PROVIDER - NSDCCPCAREPLAN_GEN_ALL_CORE_FT
PRINCIPAL DISCHARGE DIAGNOSIS  Diagnosis: Acute respiratory failure with hypoxia  Assessment and Plan of Treatment: improving .      SECONDARY DISCHARGE DIAGNOSES  Diagnosis: Acute on chronic HFrEF (heart failure with reduced ejection fraction)  Assessment and Plan of Treatment: continue lasix , use higher dise as needed    Diagnosis: Hypertension  Assessment and Plan of Treatment:     Diagnosis: Paroxysmal atrial fibrillation  Assessment and Plan of Treatment:     Diagnosis: Dysphagia  Assessment and Plan of Treatment: small bites soift diet , aspiration precautions    Diagnosis: Aspiration pneumonia  Assessment and Plan of Treatment: treated, aspiration precautions     PRINCIPAL DISCHARGE DIAGNOSIS  Diagnosis: Acute respiratory failure with hypoxia  Assessment and Plan of Treatment: improving .      SECONDARY DISCHARGE DIAGNOSES  Diagnosis: Aspiration pneumonia  Assessment and Plan of Treatment: treated, aspiration precautions    Diagnosis: Acute on chronic HFrEF (heart failure with reduced ejection fraction)  Assessment and Plan of Treatment: continue lasix adn diamox , use higher dise as needed    Diagnosis: Hypertension  Assessment and Plan of Treatment: cotniue ace    Diagnosis: Paroxysmal atrial fibrillation  Assessment and Plan of Treatment: continue metoprolol and warfarin    Diagnosis: Dysphagia  Assessment and Plan of Treatment: small bites soift diet , aspiration precautions

## 2022-03-23 NOTE — PROGRESS NOTE ADULT - PROBLEM SELECTOR PLAN 3
cont diet , aspiration precautions   palliative care consulted given age, debility and comorbidities. on lasix worsening   will add diamox with lasix few days   discussed with cardiologist   monitor HCO3

## 2022-03-23 NOTE — DISCHARGE NOTE NURSING/CASE MANAGEMENT/SOCIAL WORK - NSDCPEPTCAREGIVEDUMATLIST _GEN_ALL_CORE
Heart Failure/Influenza Vaccination Heart Failure/Warfarin/Coumadin/Influenza Vaccination/Enoxaparin/Lovenox

## 2022-03-23 NOTE — DISCHARGE NOTE PROVIDER - HOSPITAL COURSE
This 89 yo male with prostate CA s/p XRT, GERD, bioAVR, permanent AF s/p AVN ablation and PPM on warfarin, NICM with chronic HFrEF (30%) on 20 mg lasix PO, recent admission for aspiration PNA and acute CHF BIBEMS w/ hypoxia to 70s-80s. Per nurse aide, patient was coughing and endorsing SOB. He denies f/c/h/d/n/v, chest pain and abdominal pain. Of note, he recently completed course of PIPERCILLIN/TAZOBACTAM from 3/9/22- 3/12-22.   In the ED, his vitals were notable for RR- 48 and required NC- 4L. He was subsequently transitioned to Bipap. Labs notable for leukocytosis and proBNP- 3061. Patient received IV lasix and Vanco / Zosyn for possible CHF and pneumonia . completed 5 days of zosyn   Pt is stable , iv lasix given as needed , pt is with GOC conversation palliative and daughter decision to send [facility]   Short stay to  possibly improve his strength and stability and possible transition to comfort care given his multiple medical condition overall poor prognosis       total time spend 40 min coordinating care      This 89 yo male with prostate CA s/p XRT, GERD, bioAVR, permanent AF s/p AVN ablation and PPM on warfarin, NICM with chronic HFrEF (30%) on 20 mg lasix PO, recent admission for aspiration PNA and acute CHF BIBEMS w/ hypoxia to 70s-80s. Per nurse aide, patient was coughing and endorsing SOB. He denies f/c/h/d/n/v, chest pain and abdominal pain. Of note, he recently completed course of PIPERCILLIN/TAZOBACTAM from 3/9/22- 3/12-22.   In the ED, his vitals were notable for RR- 48 and required NC- 4L. He was subsequently transitioned to Bipap. Labs notable for leukocytosis and proBNP- 3061. Patient received IV lasix and Vanco / Zosyn for possible CHF and pneumonia . completed 5 days of zosyn   Pt is stable , iv lasix given as needed , pt is with GOC conversation palliative and daughter decision to send [facility] ;   Short stay to  possibly improve his strength and stability and possible transition to comfort care given his multiple medical condition overall poor prognosis   day of discharge 3/23 pt became suddenly more sOB , tachypneic , ABG ordered , BMP with hig Co2 , alkalemia   Diamox added   discussed with daughter the goals and possible transition to comfort possible hospice at home , she is in agreement hospice referral send     total time spend      This 89 yo male with prostate CA s/p XRT, GERD, bioAVR, permanent AF s/p AVN ablation and PPM on warfarin, NICM with chronic HFrEF (30%) on 20 mg lasix PO, recent admission for aspiration PNA and acute CHF BIBEMS w/ hypoxia to 70s-80s. Per nurse aide, patient was coughing and endorsing SOB. He denies f/c/h/d/n/v, chest pain and abdominal pain. Of note, he recently completed course of PIPERCILLIN/TAZOBACTAM from 3/9/22- 3/12-22.   In the ED, his vitals were notable for RR- 48 and required NC- 4L. He was subsequently transitioned to Bipap. Labs notable for leukocytosis and proBNP- 3061. Patient received IV lasix and Vanco / Zosyn for possible CHF and pneumonia . completed 5 days of zosyn   Pt is stable , iv lasix given as needed , pt is with GOC conversation palliative and daughter decision to send [facility] ;   Short stay to  possibly improve his strength and stability and possible transition to comfort care given his multiple medical condition overall poor prognosis   day of discharge 3/23 pt became suddenly more sOB , tachypneic , ABG ordered , BMP with hig Co2 , alkalemia Diamox added.   Plan of care was discussed with daughter the goals and possible transition to comfort possible hospice at home , she is in agreement hospice referral send.

## 2022-03-23 NOTE — DISCHARGE NOTE PROVIDER - NSDCFUSCHEDAPPT_GEN_ALL_CORE_FT
[facility] MECHE SHAIKH ; 04/01/2022 ; [facility] [address] [facility] MECHE SHAIKH ; 04/04/2022 ; NPP Cardio [facility] [address] [facility] MECHE SHAIKH ; 05/12/2022 ; [facility] [address] [facility] MECHE SHAIKH ; 05/20/2022 ; [facility] Cardiology [address] [facility] MECHE SHAIKH ; 05/20/2022 ; N[facility] Cardiology [address]

## 2022-03-23 NOTE — PROGRESS NOTE ADULT - PROBLEM SELECTOR PLAN 5
lymphocyte predominance   chronic elevation  stable - c/w lovenox and warfarin bridging   monitor INR once over 2 will stop lovenox

## 2022-03-23 NOTE — PROGRESS NOTE ADULT - SUBJECTIVE AND OBJECTIVE BOX
MECHE MILLER  [id number]          Chief Complaint:  follow up SOB/PNA/CHF, recalled for worsening SOB    Interval History:  increased dyspnea    Tele:  Af, V-paced        acetaminophen     Tablet .. 650 milliGRAM(s) Oral every 8 hours PRN  acetaZOLAMIDE    Tablet 250 milliGRAM(s) Oral daily  albuterol/ipratropium for Nebulization 3 milliLiter(s) Nebulizer every 6 hours PRN  bisacodyl Suppository 10 milliGRAM(s) Rectal daily  celecoxib 200 milliGRAM(s) Oral once  celecoxib 200 milliGRAM(s) Oral daily  enalapril 2.5 milliGRAM(s) Oral daily  enoxaparin Injectable 60 milliGRAM(s) SubCutaneous every 12 hours  furosemide    Tablet 20 milliGRAM(s) Oral daily  furosemide   Injectable 40 milliGRAM(s) IV Push once  magnesium oxide 400 milliGRAM(s) Oral three times a day with meals  metoprolol succinate ER 25 milliGRAM(s) Oral daily  polyethylene glycol 3350 17 Gram(s) Oral at bedtime  warfarin 4 milliGRAM(s) Oral once          Physical Exam:  T(C): 36.3 (03-23-22 @ 16:44), Max: 36.9 (03-23-22 @ 14:29)  HR: 77 (03-23-22 @ 16:44) (70 - 77)  BP: 152/78 (03-23-22 @ 16:44) (124/65 - 152/78)  RR: 18 (03-23-22 @ 16:44) (18 - 39)  SpO2: 92% (03-23-22 @ 16:44) (92% - 98%)  Wt(kg): --  General: elderly frail patient, NAD but dyspneic  HEENT: dry mm, sclera anicteric  CVS: nl s1s2, rrr, no s3,. mild JVD  Pulm: Diminished, faint crackles with reduced air movement  Abd: soft, non-tender  Ext: 1 b/l LE edema  Neuro A&O x3  Psych: Normal affect    I&O's Summary    22 Mar 2022 07:01  -  23 Mar 2022 07:00  --------------------------------------------------------  IN: 640 mL / OUT: 0 mL / NET: 640 mL    23 Mar 2022 07:01  -  23 Mar 2022 18:03  --------------------------------------------------------  IN: 420 mL / OUT: 0 mL / NET: 420 mL          Labs:   23 Mar 2022 10:11    138    |  93     |  17.4   ----------------------------<  143    4.3     |  40.0   |  0.56     Ca    8.3        23 Mar 2022 10:11  Phos  2.4       23 Mar 2022 06:53  Mg     1.7       22 Mar 2022 06:41                            10.5   16.62 )-----------( 131      ( 23 Mar 2022 10:11 )             35.2     PT/INR - ( 23 Mar 2022 06:53 )   PT: 15.8 sec;   INR: 1.36 ratio                   Echo (3/9/22):   1. Mildly enlarged left atrium.   2. Left ventricular ejection fraction, by visual estimation, is 35 to 40%.   3. Elevated mean left atrial pressure. (LAP = 23 mm Hg)   4. Normal right atrial size.   5. Mildly enlarged right ventricle. Moderately reduced RV systolic function.   6. Moderate mitral valve regurgitation.   7. Mild aortic regurgitation.   8. Mild tricuspid regurgitation.   9. Estimated pulmonary artery systolic pressure is 40.7 mmHg -mild pulmonary hypertension.  10. There is no evidence of pericardial effusion.  11. Dilated Ao root at 4.1cm.  12. Pacemaker wire/lead appears malpositioned.       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 Recently admitted with PNA. Pt was hypoxic and tachypneic, requiring BIPAP. Pt now on NC, breathing comfortably saturating >90%. CXR with mild pulmonary infiltrates. BNP mildly elevated and much lower from last admission. Pt appears clinically dry and with contraction alkalosis. SOB likely due to aspiration PNA. Pt failed dysphagia screen and NPO.   -CTA with no PE and no significant edema or effusion.  -Was planned for discharge but increased work of breathing this evening  -Has struggled with volume status as worsening renal funtion and contraction alkalosis with diuretics. Does appear to have component CHf at this time. Has very limited reserve with poor effort, atelectasis, kyphosis. ? ILD as well. Agree with attempt at further diuresis but may not tolerate well. Given frailty and high risk continued recurrence, do not expect any benefit to further eval with RHC and even inotropic assisted diuresis. No indication at this time. Goal to keep comfortable.     Plan:  1. Give one dose IV lasix now. Agree with adding po diamox for a few days.   2. Rate control AF and continue A/C  3. No need for repeat TTE at this time.   4. Monitor renal function closely  5. Supportive care, seen by palliative. Appears plan was for [facility] and hospice per notes  6. Prognosis guarded            Juan Shaikh MD

## 2022-03-23 NOTE — PROGRESS NOTE ADULT - PROBLEM SELECTOR PROBLEM 2
Acute on chronic HFrEF (heart failure with reduced ejection fraction)
Dysphagia
Dysphagia
Gram-negative pneumonia
Dysphagia
Dysphagia

## 2022-03-23 NOTE — DISCHARGE NOTE PROVIDER - DETAILS OF MALNUTRITION DIAGNOSIS/DIAGNOSES
This patient has been assessed with a concern for Malnutrition and was treated during this hospitalization for the following Nutrition diagnosis/diagnoses:     -  03/20/2022: Severe protein-calorie malnutrition

## 2022-03-23 NOTE — PROGRESS NOTE ADULT - NSPROGADDITIONALINFOA_GEN_ALL_CORE
GOC   discussed with daughter   poor overall prognosis   Pt palliative comfort candidate   will refer to hospice daughter on board with possible home hospice     cont supportive care     d/w  and SW
constipated cont laxative , stool softener  possible discharge in 24 hours

## 2022-03-23 NOTE — CHART NOTE - NSCHARTNOTEFT_GEN_A_CORE
Contacted by RN for tachypnea.  Patient seen and examined at bedside. Patient using accessory breathing muscles, talking in-between rapid breaths, admits difficulty breathing. Denies CP, ABD pain, HA, lightheadedness, tingling or any other complaints.    Vital Signs  T(C): 36.9 (03-23-22 @ 14:29), Max: 36.9 (03-23-22 @ 14:29)  HR: 74 (03-23-22 @ 14:29) (68 - 74)  BP: 124/73 (03-23-22 @ 14:29) (124/65 - 144/81)  RR: 39 (03-23-22 @ 14:29) (18 - 39)  SpO2: 98% (03-23-22 @ 14:29) (97% - 98%)    Physical Exam:  Gen: AOx3, HOB elevated in bed  CV: RRR  Lungs: CTA b/l, quick rapid breaths on 2L NC  Abd: +bowel sounds, soft/nt/nd  Ext: no edema    A/P: dyspnea  Attending present bedside, updated daughter via phone call  Hospice consult to be placed  STAT ABG  STAT CXR  Discharge canceled  resume tele/ monitoring  RN to monitor, assess and escalate to PA PRN.  Will continue to monitor.

## 2022-03-23 NOTE — PROGRESS NOTE ADULT - PROBLEM SELECTOR PLAN 4
- c/w lovenox and warfarin bridging   monitor INR once over 2 will stop lovenox cont diet , aspiration precautions   palliative care consulted given age, debility and comorbidities.

## 2022-03-23 NOTE — DISCHARGE NOTE NURSING/CASE MANAGEMENT/SOCIAL WORK - NSDCPEFALRISK_GEN_ALL_CORE
For information on Fall & Injury Prevention, visit: [email or web address] OR  [email or web address] OR  https://www.cdc.gov/steadi/patient.html

## 2022-03-23 NOTE — DISCHARGE NOTE PROVIDER - NSDCMRMEDTOKEN_GEN_ALL_CORE_FT
amoxicillin-clavulanate 875 mg-125 mg oral tablet: 1 tab(s) orally every 12 hours   enalapril 2.5 mg oral tablet: 1 tab(s) orally once a day  furosemide 20 mg oral tablet: 1 tab(s) orally once a day  Imodium 2 mg oral capsule: 1 cap(s) orally every 4 hours, As Needed  Metamucil 3.4 g/11 g oral powder for reconstitution:   metoprolol succinate 25 mg oral tablet, extended release: 1 tab(s) orally once a day  pantoprazole 40 mg oral delayed release tablet: 1 tab(s) orally once a day  PreserVision AREDS Lutein oral capsule: 1 dose(s) orally once a day  tamsulosin 0.4 mg oral capsule: 2 cap(s) orally once a day (at bedtime)  Tylenol 8 HR Arthritis Pain 650 mg oral tablet, extended release: 2 tab(s) orally every 8 hours  Vitamin D3 400 intl units (10 mcg) oral capsule: 1 cap(s) orally once a day  warfarin 2.5 mg oral tablet: 1 tab(s) orally once a day.        bisacodyl 10 mg rectal suppository: 1 suppository(ies) rectal once a day as needed for constipation   enalapril 2.5 mg oral tablet: 1 tab(s) orally once a day  enoxaparin: 50 milligram(s) subcutaneous 2 times a day stop once INR over 2   furosemide 20 mg oral tablet: 1 tab(s) orally once a day  ipratropium-albuterol 0.5 mg-2.5 mg/3 mL inhalation solution: 3 milliliter(s) inhaled every 6 hours, As needed, Shortness of Breath and/or Wheezing  metoprolol succinate 25 mg oral tablet, extended release: 1 tab(s) orally once a day  pantoprazole 40 mg oral delayed release tablet: 1 tab(s) orally once a day  polyethylene glycol 3350 oral powder for reconstitution: 17 gram(s) orally once a day (at bedtime)  tamsulosin 0.4 mg oral capsule: 2 cap(s) orally once a day (at bedtime)  Tylenol 8 HR Arthritis Pain 650 mg oral tablet, extended release: 2 tab(s) orally every 8 hours  Vitamin D3 400 intl units (10 mcg) oral capsule: 1 cap(s) orally once a day  warfarin 2.5 mg oral tablet: 1 tab(s) orally once a day monitor INR once 2 and above stop lovenox sq injection         acetaZOLAMIDE 250 mg oral tablet: 1 tab(s) orally once a day  bisacodyl 10 mg rectal suppository: 1 suppository(ies) rectal once a day as needed for constipation   enalapril 2.5 mg oral tablet: 1 tab(s) orally once a day  enoxaparin: 50 milligram(s) subcutaneous 2 times a day stop once INR over 2   furosemide 20 mg oral tablet: 1 tab(s) orally once a day  ipratropium-albuterol 0.5 mg-2.5 mg/3 mL inhalation solution: 3 milliliter(s) inhaled every 6 hours, As needed, Shortness of Breath and/or Wheezing  metoprolol succinate 25 mg oral tablet, extended release: 1 tab(s) orally once a day  pantoprazole 40 mg oral delayed release tablet: 1 tab(s) orally once a day  polyethylene glycol 3350 oral powder for reconstitution: 17 gram(s) orally once a day (at bedtime)  tamsulosin 0.4 mg oral capsule: 2 cap(s) orally once a day (at bedtime)  Tylenol 8 HR Arthritis Pain 650 mg oral tablet, extended release: 2 tab(s) orally every 8 hours  Vitamin D3 400 intl units (10 mcg) oral capsule: 1 cap(s) orally once a day  warfarin 2.5 mg oral tablet: 1 tab(s) orally once a day monitor INR once 2 and above stop lovenox sq injection

## 2022-03-23 NOTE — DISCHARGE NOTE NURSING/CASE MANAGEMENT/SOCIAL WORK - NSDCVIVACCINE_GEN_ALL_CORE_FT
influenza, injectable, quadrivalent, preservative free; 21-Sep-2016 06:34; Cynthia James (RN); [employer]; [id number]; IntraMuscular; Deltoid Left.; 0.5 milliLiter(s); VIS (VIS Published: 07-Aug-2015, VIS Presented: 21-Sep-2016);   influenza, injectable, quadrivalent, preservative free; 02-Nov-2019 14:11; Morena Steele (RN); [employer]; [id number] (Exp. Date: 30-Jun-2020); IntraMuscular; Deltoid Left.; 0.5 milliLiter(s); VIS (VIS Published: 15-Aug-2019, VIS Presented: 02-Nov-2019);   Tdap; 19-May-2021 15:47; Elodia Herbert (RN); [employer]; [id number] (Exp. Date: 31-Jul-2022); IntraMuscular; Deltoid Right.; 0.5 milliLiter(s); VIS (VIS Published: 09-May-2013, VIS Presented: 19-May-2021);

## 2022-03-23 NOTE — PROGRESS NOTE ADULT - PROBLEM SELECTOR PLAN 1
- Patient presenting with cough and SOB. Found to have hypoxia to 70s-80s. transient use of bipap.  requiring BiPaP. Patient with recent admission for CHF exacerbation and aspiration pneumonia.   CT negative for PE, pneumonia or CHF findings.  ID following   consider dc abx and monitor off   daily eval for lasix need
- Patient presenting with cough and SOB. Found to have hypoxia to 70s-80s. transient use of bipap.  Patient with recent admission for CHF exacerbation and aspiration pneumonia.   CT negative for PE, pneumonia or CHF findings.  ID following: recommended IV zosyn x 5 days total  completed
- Patient presenting with cough and SOB. Found to have hypoxia to 70s-80s. Now requiring BiPaP. Patient with recent admission for CHF exacerbation and aspiration pneumonia.   - c/w Duoneb Q6H PRN  - Will c/w IV lasix 40 mg QD  - Given CTA chest to r/o PE given elevated D-dimer  - Transitioned from Bipap to NC  - F/u blood cx  - ID c/s placed- will c/w IV Zosyn
- Patient presenting with cough and SOB. Found to have hypoxia to 70s-80s. transient use of bipap.  Patient with recent admission for CHF exacerbation and aspiration pneumonia.   CT negative for PE, pneumonia or CHF findings.  ID following     consider dc abx and monitor off   daily eval for lasix need
with worsening  SOB tachypnea at present   cancel DC   likely due to CHF exacerbation   iv lasix given   CXR stat   ABG   overall prognosis is poor   d/w daughter to consider home hospice   referal send
- Patient presenting with cough and SOB. Found to have hypoxia to 70s-80s. transient use of bipap.  Patient with recent admission for CHF exacerbation and aspiration pneumonia.   CT negative for PE, pneumonia or CHF findings.  ID following: recommend IV zosyn x 5 days total

## 2022-03-23 NOTE — PROGRESS NOTE ADULT - PROBLEM SELECTOR PLAN 2
cont lasix will change to iv   add low dose ace inh   cont metoprolol cont lasix , add diamox HCO3 is high   add low dose ace inh   cont metoprolol

## 2022-03-23 NOTE — PROGRESS NOTE ADULT - PROBLEM SELECTOR PROBLEM 6
Discharge planning issues
Leukocytosis

## 2022-03-23 NOTE — DISCHARGE NOTE NURSING/CASE MANAGEMENT/SOCIAL WORK - PATIENT PORTAL LINK FT
You can access the FollowMyHealth Patient Portal offered by [facility] by registering at the following website: [email or web address]. By joining [email or web address]’s FollowMyHealth portal, you will also be able to view your health information using other applications (apps) compatible with our system.

## 2022-03-24 LAB
ANION GAP SERPL CALC-SCNC: 5 MMOL/L — SIGNIFICANT CHANGE UP (ref 5–17)
BASOPHILS # BLD AUTO: 0.03 K/UL — SIGNIFICANT CHANGE UP (ref 0–0.2)
BASOPHILS NFR BLD AUTO: 0.2 % — SIGNIFICANT CHANGE UP (ref 0–2)
BUN SERPL-MCNC: 19 MG/DL — SIGNIFICANT CHANGE UP (ref 8–20)
CALCIUM SERPL-MCNC: 8.4 MG/DL — LOW (ref 8.6–10.2)
CHLORIDE SERPL-SCNC: 94 MMOL/L — LOW (ref 98–107)
CO2 SERPL-SCNC: 41 MMOL/L — HIGH (ref 22–29)
CREAT SERPL-MCNC: 0.8 MG/DL — SIGNIFICANT CHANGE UP (ref 0.5–1.3)
EGFR: 82 ML/MIN/1.73M2 — SIGNIFICANT CHANGE UP
EOSINOPHIL # BLD AUTO: 0.06 K/UL — SIGNIFICANT CHANGE UP (ref 0–0.5)
EOSINOPHIL NFR BLD AUTO: 0.3 % — SIGNIFICANT CHANGE UP (ref 0–6)
GLUCOSE SERPL-MCNC: 112 MG/DL — HIGH (ref 70–99)
HCT VFR BLD CALC: 33.6 % — LOW (ref 39–50)
HGB BLD-MCNC: 10.2 G/DL — LOW (ref 13–17)
IMM GRANULOCYTES NFR BLD AUTO: 0.7 % — SIGNIFICANT CHANGE UP (ref 0–1.5)
INR BLD: 1.75 RATIO — HIGH (ref 0.88–1.16)
LYMPHOCYTES # BLD AUTO: 13.06 K/UL — HIGH (ref 1–3.3)
LYMPHOCYTES # BLD AUTO: 69.9 % — HIGH (ref 13–44)
MCHC RBC-ENTMCNC: 30.4 GM/DL — LOW (ref 32–36)
MCHC RBC-ENTMCNC: 30.4 PG — SIGNIFICANT CHANGE UP (ref 27–34)
MCV RBC AUTO: 100 FL — SIGNIFICANT CHANGE UP (ref 80–100)
MONOCYTES # BLD AUTO: 0.36 K/UL — SIGNIFICANT CHANGE UP (ref 0–0.9)
MONOCYTES NFR BLD AUTO: 1.9 % — LOW (ref 2–14)
NEUTROPHILS # BLD AUTO: 5.04 K/UL — SIGNIFICANT CHANGE UP (ref 1.8–7.4)
NEUTROPHILS NFR BLD AUTO: 27 % — LOW (ref 43–77)
PHOSPHATE SERPL-MCNC: 1.8 MG/DL — LOW (ref 2.4–4.7)
PLATELET # BLD AUTO: 143 K/UL — LOW (ref 150–400)
POTASSIUM SERPL-MCNC: 4.7 MMOL/L — SIGNIFICANT CHANGE UP (ref 3.5–5.3)
POTASSIUM SERPL-SCNC: 4.7 MMOL/L — SIGNIFICANT CHANGE UP (ref 3.5–5.3)
PROTHROM AB SERPL-ACNC: 20.4 SEC — HIGH (ref 10.5–13.4)
RBC # BLD: 3.36 M/UL — LOW (ref 4.2–5.8)
RBC # FLD: 15.4 % — HIGH (ref 10.3–14.5)
SODIUM SERPL-SCNC: 139 MMOL/L — SIGNIFICANT CHANGE UP (ref 135–145)
WBC # BLD: 18.69 K/UL — HIGH (ref 3.8–10.5)
WBC # FLD AUTO: 18.69 K/UL — HIGH (ref 3.8–10.5)

## 2022-03-24 PROCEDURE — 99232 SBSQ HOSP IP/OBS MODERATE 35: CPT

## 2022-03-24 RX ORDER — WARFARIN SODIUM 2.5 MG/1
4 TABLET ORAL ONCE
Refills: 0 | Status: COMPLETED | OUTPATIENT
Start: 2022-03-24 | End: 2022-03-24

## 2022-03-24 RX ADMIN — ENOXAPARIN SODIUM 60 MILLIGRAM(S): 100 INJECTION SUBCUTANEOUS at 17:17

## 2022-03-24 RX ADMIN — WARFARIN SODIUM 4 MILLIGRAM(S): 2.5 TABLET ORAL at 22:24

## 2022-03-24 RX ADMIN — MAGNESIUM OXIDE 400 MG ORAL TABLET 400 MILLIGRAM(S): 241.3 TABLET ORAL at 08:14

## 2022-03-24 RX ADMIN — ENOXAPARIN SODIUM 60 MILLIGRAM(S): 100 INJECTION SUBCUTANEOUS at 05:41

## 2022-03-24 RX ADMIN — MAGNESIUM OXIDE 400 MG ORAL TABLET 400 MILLIGRAM(S): 241.3 TABLET ORAL at 12:57

## 2022-03-24 RX ADMIN — Medication 3 MILLILITER(S): at 22:24

## 2022-03-24 RX ADMIN — Medication 2.5 MILLIGRAM(S): at 05:41

## 2022-03-24 RX ADMIN — Medication 25 MILLIGRAM(S): at 05:41

## 2022-03-24 RX ADMIN — POLYETHYLENE GLYCOL 3350 17 GRAM(S): 17 POWDER, FOR SOLUTION ORAL at 22:25

## 2022-03-24 RX ADMIN — Medication 3 MILLILITER(S): at 16:27

## 2022-03-24 RX ADMIN — Medication 20 MILLIGRAM(S): at 05:40

## 2022-03-24 RX ADMIN — ACETAZOLAMIDE 250 MILLIGRAM(S): 250 TABLET ORAL at 12:57

## 2022-03-24 NOTE — PROGRESS NOTE ADULT - SUBJECTIVE AND OBJECTIVE BOX
MECHE MILLER  [id number]      Chief Complaint:  SOB/PNA/CHF, recalled for worsening SOB    Interval History:  Patient resting comfortably without c/o.  Lethargic.  Comfortable laying in bed.    Tele:  AF, V-paced        acetaminophen     Tablet .. 650 milliGRAM(s) Oral every 8 hours PRN  acetaZOLAMIDE    Tablet 250 milliGRAM(s) Oral daily  albuterol/ipratropium for Nebulization 3 milliLiter(s) Nebulizer every 6 hours PRN  bisacodyl Suppository 10 milliGRAM(s) Rectal daily  celecoxib 200 milliGRAM(s) Oral once  celecoxib 200 milliGRAM(s) Oral daily  enalapril 2.5 milliGRAM(s) Oral daily  enoxaparin Injectable 60 milliGRAM(s) SubCutaneous every 12 hours  furosemide    Tablet 20 milliGRAM(s) Oral daily  metoprolol succinate ER 25 milliGRAM(s) Oral daily  polyethylene glycol 3350 17 Gram(s) Oral at bedtime          Physical Exam:  T(C): 36.7 (03-24-22 @ 10:35), Max: 36.9 (03-23-22 @ 14:29)  HR: 77 (03-24-22 @ 10:35) (70 - 77)  BP: 116/69 (03-24-22 @ 10:35) (116/69 - 152/78)  RR: 20 (03-24-22 @ 10:35) (18 - 39)  SpO2: 98% (03-24-22 @ 10:35) (92% - 100%)  Wt(kg): --  General: Comfortable in NAD, dry mm  Neck: No JVD  CVS: nl s1s2, no s3  Pulm: Diminished b/l  Abd: soft, non-tender  Ext: 1+ b/l LE edema  Neuro Lethargic, responds to voice  Psych: Calm      Labs:   24 Mar 2022 06:00    139    |  94     |  19.0   ----------------------------<  112    4.7     |  41.0   |  0.80     Ca    8.4        24 Mar 2022 06:00  Phos  1.8       24 Mar 2022 06:00                            10.2   18.69 )-----------( 143      ( 24 Mar 2022 06:00 )             33.6     PT/INR - ( 24 Mar 2022 06:00 )   PT: 20.4 sec;   INR: 1.75 ratio             Echo (3/9/22):   1. Mildly enlarged left atrium.   2. Left ventricular ejection fraction, by visual estimation, is 35 to 40%.   3. Elevated mean left atrial pressure. (LAP = 23 mm Hg)   4. Normal right atrial size.   5. Mildly enlarged right ventricle. Moderately reduced RV systolic function.   6. Moderate mitral valve regurgitation.   7. Mild aortic regurgitation.   8. Mild tricuspid regurgitation.   9. Estimated pulmonary artery systolic pressure is 40.7 mmHg -mild pulmonary hypertension.  10. There is no evidence of pericardial effusion.  11. Dilated Ao root at 4.1cm.  12. Pacemaker wire/lead appears malpositioned.       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 Recently admitted with PNA. Pt was hypoxic and tachypneic, requiring BIPAP. Pt now on NC, breathing comfortably saturating >90%. CXR with mild pulmonary infiltrates. BNP mildly elevated and much lower from last admission. Pt appears clinically dry and with contraction alkalosis. SOB likely due to aspiration PNA. Pt failed dysphagia screen and NPO.   -CTA with no PE and no significant edema or effusion.  -Was planned for discharge but increased work of breathing this evening  -Has struggled with volume status as worsening renal funtion and contraction alkalosis with diuretics. Does appear to have component CHf at this time. Has very limited reserve with poor effort, atelectasis, kyphosis. ? ILD as well. Agree with attempt at further diuresis but may not tolerate well. Given frailty and high risk continued recurrence, do not expect any benefit to further eval with RHC and even inotropic assisted diuresis. No indication at this time. Goal to keep comfortable.     Plan:  1. Continue with po lasix now. Agree with adding po diamox for a few days.  Do not see benefit of more IV diuretic now.  2. Rate control AF and continue A/C  3. No need for repeat TTE at this time.   4. Monitor renal function closely  5. Supportive care, seen by palliative. Appears plan was for [facility] and hospice per notes.  6. Prognosis guarded

## 2022-03-24 NOTE — PROGRESS NOTE ADULT - ASSESSMENT
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   with worsening  SOB tachypnea at present   cancel DC   likely due to CHF exacerbation   iv lasix given   CXR repeat showed worsening   overall prognosis is poor   d/w daughter agree for home hospice     *Acute on chronic HFrEF (heart failure with reduced ejection fraction).   cont lasix , Cont diamox for few doses  cardiology consult appreciated  no repeat TTE at this time     add low dose ace inh   cont metoprolol.  *Alkalemia.  on lasix worsening   Cont diamox with lasix few days   discussed with cardiologist   monitor HCO3.  *Dysphagia.  cont diet , aspiration precautions   palliative care consulted given age, debility and comorbidities.  *Paroxysmal atrial fibrillation.  c/w lovenox and warfarin bridging   monitor INR once over 2 will stop lovenox.  *Leukocytosis.   lymphocyte predominance   chronic elevation  stable.    Palliative consult appreciated  pt will likely discharge home with home hospice  first visit wont be next thr     Cont Diamox for now with lasix for few days   Likely discharge to home with home hospiece

## 2022-03-24 NOTE — CHART NOTE - NSCHARTNOTEFT_GEN_A_CORE
Source: Patient [ ]  Family [ ]   other [x] EMR, staff and ID rounds     Current Diet:   Soft & Bite Sized, mod thick     Patient reports [ ] nausea  [ ] vomiting [ ] diarrhea [ ] constipation  [ ]chewing problems [ ] swallowing issues  [ ] other:     PO intake:  < 50% [ ]   50-75%  [x]   %  [ ]  other :    Source for PO intake [ ] Patient [ ] family [x] chart [x ] staff [ ] other    Current Weight:   (3/17)  137 lbs     % Weight Change: No recent weight documented, will continue to monitor     Pertinent Medications: MEDICATIONS  (STANDING):  acetaZOLAMIDE    Tablet 250 milliGRAM(s) Oral daily  bisacodyl Suppository 10 milliGRAM(s) Rectal daily  celecoxib 200 milliGRAM(s) Oral once  celecoxib 200 milliGRAM(s) Oral daily  enalapril 2.5 milliGRAM(s) Oral daily  enoxaparin Injectable 60 milliGRAM(s) SubCutaneous every 12 hours  furosemide    Tablet 20 milliGRAM(s) Oral daily  metoprolol succinate ER 25 milliGRAM(s) Oral daily  polyethylene glycol 3350 17 Gram(s) Oral at bedtime    MEDICATIONS  (PRN):  acetaminophen     Tablet .. 650 milliGRAM(s) Oral every 8 hours PRN Moderate Pain (4 - 6)  albuterol/ipratropium for Nebulization 3 milliLiter(s) Nebulizer every 6 hours PRN Shortness of Breath and/or Wheezing    Pertinent Labs: CBC Full  -  ( 24 Mar 2022 06:00 )  WBC Count : 18.69 K/uL  RBC Count : 3.36 M/uL  Hemoglobin : 10.2 g/dL  Hematocrit : 33.6 %  Platelet Count - Automated : 143 K/uL  Mean Cell Volume : 100.0 fl  Mean Cell Hemoglobin : 30.4 pg  Mean Cell Hemoglobin Concentration : 30.4 gm/dL  Auto Neutrophil # : 5.04 K/uL  Auto Lymphocyte # : 13.06 K/uL  Auto Monocyte # : 0.36 K/uL  Auto Eosinophil # : 0.06 K/uL  Auto Basophil # : 0.03 K/uL  Auto Neutrophil % : 27.0 %  Auto Lymphocyte % : 69.9 %  Auto Monocyte % : 1.9 %  Auto Eosinophil % : 0.3 %  Auto Basophil % : 0.2 %    03-24 Na139 mmol/L Glu 112 mg/dL<H> K+ 4.7 mmol/L Cr  0.80 mg/dL BUN 19.0 mg/dL Phos 1.8 mg/dL<L> Alb n/a   PAB n/a       Skin:     Nutrition focused physical exam conducted - found signs of malnutrition [ ]absent [ ]present    Subcutaneous fat loss: [ ] Orbital fat pads region, [ ]Buccal fat region, [ ]Triceps region,  [ ]Ribs region    Muscle wasting: [ ]Temples region, [ ]Clavicle region, [ ]Shoulder region, [ ]Scapula region, [ ]Interosseous region,  [ ]thigh region, [ ]Calf region    Estimated Needs:   [ ] no change since previous assessment  [ ] recalculated:     Current Nutrition Diagnosis:  Aware hospice eval pending.   Recommendations:     Monitoring and Evaluation:   [ ] PO intake [ ] Tolerance to diet prescription [X] Weights  [X] Follow up per protocol [X] Labs: Source: Patient [ ]  Family [ ]   other [x] EMR, staff and ID rounds     Current Diet:   Soft & Bite Sized, mod thick     Patient reports [ ] nausea  [ ] vomiting [ ] diarrhea [ ] constipation  [ ]chewing problems [ ] swallowing issues  [ ] other:     PO intake:  < 50% [ ]   50-75%  [x]   %  [ ]  other :    Source for PO intake [ ] Patient [ ] family [x] chart [x ] staff [ ] other    Current Weight:   (3/17)  137 lbs     % Weight Change: No recent weight documented, will continue to monitor     Pertinent Medications: MEDICATIONS  (STANDING):  acetaZOLAMIDE    Tablet 250 milliGRAM(s) Oral daily  bisacodyl Suppository 10 milliGRAM(s) Rectal daily  celecoxib 200 milliGRAM(s) Oral once  celecoxib 200 milliGRAM(s) Oral daily  enalapril 2.5 milliGRAM(s) Oral daily  enoxaparin Injectable 60 milliGRAM(s) SubCutaneous every 12 hours  furosemide    Tablet 20 milliGRAM(s) Oral daily  metoprolol succinate ER 25 milliGRAM(s) Oral daily  polyethylene glycol 3350 17 Gram(s) Oral at bedtime    MEDICATIONS  (PRN):  acetaminophen     Tablet .. 650 milliGRAM(s) Oral every 8 hours PRN Moderate Pain (4 - 6)  albuterol/ipratropium for Nebulization 3 milliLiter(s) Nebulizer every 6 hours PRN Shortness of Breath and/or Wheezing    Pertinent Labs: CBC Full  -  ( 24 Mar 2022 06:00 )  WBC Count : 18.69 K/uL  RBC Count : 3.36 M/uL  Hemoglobin : 10.2 g/dL  Hematocrit : 33.6 %  Platelet Count - Automated : 143 K/uL  Mean Cell Volume : 100.0 fl  Mean Cell Hemoglobin : 30.4 pg  Mean Cell Hemoglobin Concentration : 30.4 gm/dL  Auto Neutrophil # : 5.04 K/uL  Auto Lymphocyte # : 13.06 K/uL  Auto Monocyte # : 0.36 K/uL  Auto Eosinophil # : 0.06 K/uL  Auto Basophil # : 0.03 K/uL  Auto Neutrophil % : 27.0 %  Auto Lymphocyte % : 69.9 %  Auto Monocyte % : 1.9 %  Auto Eosinophil % : 0.3 %  Auto Basophil % : 0.2 %    03-24 Na139 mmol/L Glu 112 mg/dL<H> K+ 4.7 mmol/L Cr  0.80 mg/dL BUN 19.0 mg/dL Phos 1.8 mg/dL<L> Alb n/a   PAB n/a       Skin:     Nutrition focused physical exam conducted - found signs of malnutrition [ ]absent [ ]present    Subcutaneous fat loss: [ ] Orbital fat pads region, [ ]Buccal fat region, [ ]Triceps region,  [ ]Ribs region    Muscle wasting: [ ]Temples region, [ ]Clavicle region, [ ]Shoulder region, [ ]Scapula region, [ ]Interosseous region,  [ ]thigh region, [ ]Calf region    Estimated Needs:   [ ] no change since previous assessment  [ ] recalculated:     Current Nutrition Diagnosis: Pt remains at high nutrition risk secondary to malnutrition (severe chronic) related to inadequate protein calorie intake in the setting of debility, swallowing difficulty, acute respiratory failure as evidenced by PO<75% est needs, 12.2% weight loss, edema, stage II, and physical findings. Aware hospice eval pending, d/c planning in progress.     Recommendations:   1) Add Ensure Enlive TID, consistency per SLP   2) Magic Cup TID   3) Rx MVI daily   4) Provide encouragement/assistance as needed during mealtimes to inc PO     Monitoring and Evaluation:   [ ] PO intake [ ] Tolerance to diet prescription [X] Weights  [X] Follow up per protocol [X] Labs:

## 2022-03-24 NOTE — PROGRESS NOTE ADULT - SUBJECTIVE AND OBJECTIVE BOX
HPI   Pt is a 89M admitted to [facility] for SOB.   Pt was seen and examined at bedside. No over night event. SOB improving     Vital Signs Last 24 Hrs  T(C): 36.7 (24 Mar 2022 10:35), Max: 36.9 (23 Mar 2022 14:29)  T(F): 98 (24 Mar 2022 10:35), Max: 98.4 (23 Mar 2022 14:29)  HR: 77 (24 Mar 2022 10:35) (70 - 77)  BP: 116/69 (24 Mar 2022 10:35) (116/69 - 152/78)  BP(mean): --  RR: 20 (24 Mar 2022 10:35) (18 - 39)  SpO2: 98% (24 Mar 2022 10:35) (92% - 100%)    I&O's Summary    23 Mar 2022 07:01  -  24 Mar 2022 07:00  --------------------------------------------------------  IN: 420 mL / OUT: 0 mL / NET: 420 mL        CAPILLARY BLOOD GLUCOSE          PHYSICAL EXAM:    Constitutional: NAD,   HEENT: PERR, EOMI, Normal Hearing, MMM  Neck: Soft and supple,    Respiratory: Breath sounds are clear bilaterally, No wheezing, rales or rhonchi  Cardiovascular: S1 and S2,    Gastrointestinal: Bowel Sounds present, soft, nontender, nondistended, no guarding, no rebound  Extremities: No peripheral edema  Vascular: 2+ peripheral pulses  Musculoskeletal: 5/5 strength b/l upper and lower extremities      MEDICATIONS:  MEDICATIONS  (STANDING):  acetaZOLAMIDE    Tablet 250 milliGRAM(s) Oral daily  bisacodyl Suppository 10 milliGRAM(s) Rectal daily  celecoxib 200 milliGRAM(s) Oral once  celecoxib 200 milliGRAM(s) Oral daily  enalapril 2.5 milliGRAM(s) Oral daily  enoxaparin Injectable 60 milliGRAM(s) SubCutaneous every 12 hours  furosemide    Tablet 20 milliGRAM(s) Oral daily  metoprolol succinate ER 25 milliGRAM(s) Oral daily  polyethylene glycol 3350 17 Gram(s) Oral at bedtime      LABS: All Labs Reviewed:                        10.2   18.69 )-----------( 143      ( 24 Mar 2022 06:00 )             33.6     03-24    139  |  94<L>  |  19.0  ----------------------------<  112<H>  4.7   |  41.0<H>  |  0.80    Ca    8.4<L>      24 Mar 2022 06:00  Phos  1.8     03-24      PT/INR - ( 24 Mar 2022 06:00 )   PT: 20.4 sec;   INR: 1.75 ratio               Blood Culture:     RADIOLOGY/EKG:    DVT PPX:    ADVANCED DIRECTIVE:    DISPOSITION:

## 2022-03-25 LAB
ANION GAP SERPL CALC-SCNC: 11 MMOL/L — SIGNIFICANT CHANGE UP (ref 5–17)
APTT BLD: 41.9 SEC — HIGH (ref 27.5–35.5)
BUN SERPL-MCNC: 23.4 MG/DL — HIGH (ref 8–20)
CALCIUM SERPL-MCNC: 8.8 MG/DL — SIGNIFICANT CHANGE UP (ref 8.6–10.2)
CHLORIDE SERPL-SCNC: 93 MMOL/L — LOW (ref 98–107)
CO2 SERPL-SCNC: 35 MMOL/L — HIGH (ref 22–29)
CREAT SERPL-MCNC: 0.89 MG/DL — SIGNIFICANT CHANGE UP (ref 0.5–1.3)
EGFR: 79 ML/MIN/1.73M2 — SIGNIFICANT CHANGE UP
GLUCOSE SERPL-MCNC: 98 MG/DL — SIGNIFICANT CHANGE UP (ref 70–99)
HCT VFR BLD CALC: 34.1 % — LOW (ref 39–50)
HGB BLD-MCNC: 10.5 G/DL — LOW (ref 13–17)
INR BLD: 1.73 RATIO — HIGH (ref 0.88–1.16)
MAGNESIUM SERPL-MCNC: 2.5 MG/DL — SIGNIFICANT CHANGE UP (ref 1.6–2.6)
MCHC RBC-ENTMCNC: 30.6 PG — SIGNIFICANT CHANGE UP (ref 27–34)
MCHC RBC-ENTMCNC: 30.8 GM/DL — LOW (ref 32–36)
MCV RBC AUTO: 99.4 FL — SIGNIFICANT CHANGE UP (ref 80–100)
PHOSPHATE SERPL-MCNC: 2 MG/DL — LOW (ref 2.4–4.7)
PLATELET # BLD AUTO: 160 K/UL — SIGNIFICANT CHANGE UP (ref 150–400)
POTASSIUM SERPL-MCNC: 4.8 MMOL/L — SIGNIFICANT CHANGE UP (ref 3.5–5.3)
POTASSIUM SERPL-SCNC: 4.8 MMOL/L — SIGNIFICANT CHANGE UP (ref 3.5–5.3)
PROTHROM AB SERPL-ACNC: 20.2 SEC — HIGH (ref 10.5–13.4)
RBC # BLD: 3.43 M/UL — LOW (ref 4.2–5.8)
RBC # FLD: 15.8 % — HIGH (ref 10.3–14.5)
SODIUM SERPL-SCNC: 139 MMOL/L — SIGNIFICANT CHANGE UP (ref 135–145)
WBC # BLD: 17.54 K/UL — HIGH (ref 3.8–10.5)
WBC # FLD AUTO: 17.54 K/UL — HIGH (ref 3.8–10.5)

## 2022-03-25 PROCEDURE — 99232 SBSQ HOSP IP/OBS MODERATE 35: CPT

## 2022-03-25 RX ORDER — IPRATROPIUM/ALBUTEROL SULFATE 18-103MCG
3 AEROSOL WITH ADAPTER (GRAM) INHALATION
Qty: 1 | Refills: 0
Start: 2022-03-25 | End: 2022-04-07

## 2022-03-25 RX ORDER — WARFARIN SODIUM 2.5 MG/1
4 TABLET ORAL ONCE
Refills: 0 | Status: COMPLETED | OUTPATIENT
Start: 2022-03-25 | End: 2022-03-25

## 2022-03-25 RX ADMIN — Medication 20 MILLIGRAM(S): at 06:27

## 2022-03-25 RX ADMIN — Medication 25 MILLIGRAM(S): at 06:27

## 2022-03-25 RX ADMIN — ACETAZOLAMIDE 250 MILLIGRAM(S): 250 TABLET ORAL at 12:44

## 2022-03-25 RX ADMIN — ENOXAPARIN SODIUM 60 MILLIGRAM(S): 100 INJECTION SUBCUTANEOUS at 06:27

## 2022-03-25 RX ADMIN — POLYETHYLENE GLYCOL 3350 17 GRAM(S): 17 POWDER, FOR SOLUTION ORAL at 23:17

## 2022-03-25 RX ADMIN — Medication 2.5 MILLIGRAM(S): at 12:45

## 2022-03-25 NOTE — PROGRESS NOTE ADULT - ASSESSMENT
89 yo male with PMHx prostate CA s/p XRT, GERD, bioAVR, permanent AF s/p AVN ablation and PPM on warfarin, NICM with chronic HFrEF (30%) on 20 mg lasix PO, recent admission for aspiration PNA and acute CHF discharged with abx presenting with SOB found to have acute hypoxic respiratory failure    *Acute respiratory failure with hypoxia.   with worsening  SOB tachypnea at present   cancel DC 3/23    likely due to CHF exacerbation   iv lasix given   CXR repeat showed worsening   overall prognosis is poor   d/w daughter agree for home hospice which start next Monday     *Acute on chronic HFrEF (heart failure with reduced ejection fraction).   cont lasix , Cont PO diamox for few doses per cardio   cardiology consult appreciated  no repeat TTE at this time     add low dose ace inh   cont metoprolol    *Alkalemia.  on lasix worsening   Cont diamox with lasix few days   discussed with cardiologist   monitor HCO3.  *Dysphagia.  cont diet , aspiration precautions   palliative care consulted given age, debility and comorbidities.  *Paroxysmal atrial fibrillation.  c/w lovenox and warfarin bridging   monitor INR once over 2 will stop lovenox.  *Leukocytosis.   lymphocyte predominance   chronic elevation  stable.    Palliative consult appreciated  pt will likely discharge home with home hospice  first visit wont be next thr     Cont Diamox for now with lasix for few days   Discharge to home with home hospice tomorrow     updated with pt daughter Jessica     89 yo male with PMHx prostate CA s/p XRT, GERD, bioAVR, permanent AF s/p AVN ablation and PPM on warfarin, NICM with chronic HFrEF (30%) on 20 mg lasix PO, recent admission for aspiration PNA and acute CHF discharged with abx presenting with SOB found to have acute hypoxic respiratory failure    *Acute respiratory failure with hypoxia.   with worsening  SOB tachypnea at present   cancel DC 3/23    likely due to CHF exacerbation   iv lasix given   CXR repeat showed worsening   overall prognosis is poor   d/w daughter agree for home hospice which start next Monday     *Acute on chronic HFrEF (heart failure with reduced ejection fraction).   cont lasix , Cont PO diamox for few doses per cardio   cardiology consult appreciated  no repeat TTE at this time     add low dose ace inh   cont metoprolol    *Alkalemia.  on lasix worsening   Cont diamox with lasix few days   discussed with cardiologist   monitor HCO3.    *Dysphagia.  cont diet , aspiration precautions   palliative care consulted given age, debility and comorbidities.    *Paroxysmal atrial fibrillation.  c/w lovenox and warfarin bridging   INR subtheraputic  will cont 4mg Coumadin tonight   monitor INR once over 2 will stop lovenox.    *Leukocytosis.   lymphocyte predominance   chronic elevation  stable.    *Palliative consult appreciated  pt will likely discharge home with home hospice  first visit wont be next thr     Cont Diamox for now with lasix for few days   Discharge to home with home hospice tomorrow     updated with pt daughter Jessica

## 2022-03-25 NOTE — PROGRESS NOTE ADULT - SUBJECTIVE AND OBJECTIVE BOX
MECHE ANGELA  [id number]      Chief Complaint:  SOB/PNA/CHF, recalled for worsening SOB    Subjective:   No complaints offered. Wants to go home. Denies chest pain, SOB, palps.     Tele:  V-paced, Afib        acetaminophen     Tablet .. 650 milliGRAM(s) Oral every 8 hours PRN  acetaZOLAMIDE    Tablet 250 milliGRAM(s) Oral daily  albuterol/ipratropium for Nebulization 3 milliLiter(s) Nebulizer every 6 hours PRN  bisacodyl Suppository 10 milliGRAM(s) Rectal daily  celecoxib 200 milliGRAM(s) Oral once  celecoxib 200 milliGRAM(s) Oral daily  enalapril 2.5 milliGRAM(s) Oral daily  enoxaparin Injectable 60 milliGRAM(s) SubCutaneous every 12 hours  furosemide    Tablet 20 milliGRAM(s) Oral daily  metoprolol succinate ER 25 milliGRAM(s) Oral daily  polyethylene glycol 3350 17 Gram(s) Oral at bedtime  warfarin 4 milliGRAM(s) Oral once          Physical Exam:  T(C): 36.6 (03-25-22 @ 04:11), Max: 36.7 (03-24-22 @ 16:25)  HR: 74 (03-25-22 @ 04:11) (70 - 75)  BP: 123/73 (03-25-22 @ 04:11) (103/63 - 123/73)  RR: 20 (03-25-22 @ 04:11) (19 - 20)  SpO2: 94% (03-25-22 @ 04:11) (94% - 100%)  Wt(kg): --  General: Comfortable in NAD  Head: mm dry  Neck: No JVD  CVS: nl s1s2, no s3  Pulm: diminished   Abd: soft, non-tender  Ext: No c/c/e  Neuro A&O x3, forgetful  Psych: lethargic      Labs:   25 Mar 2022 07:38    139    |  93     |  23.4   ----------------------------<  98     4.8     |  35.0   |  0.89     Ca    8.8        25 Mar 2022 07:38  Phos  2.0       25 Mar 2022 07:38  Mg     2.5       25 Mar 2022 07:38                            10.5   17.54 )-----------( 160      ( 25 Mar 2022 07:38 )             34.1     PT/INR - ( 25 Mar 2022 07:38 )   PT: 20.2 sec;   INR: 1.73 ratio         PTT - ( 25 Mar 2022 07:38 )  PTT:41.9 sec          Echo (3/9/22):   1. Mildly enlarged left atrium.   2. Left ventricular ejection fraction, by visual estimation, is 35 to 40%.   3. Elevated mean left atrial pressure. (LAP = 23 mm Hg)   4. Normal right atrial size.   5. Mildly enlarged right ventricle. Moderately reduced RV systolic function.   6. Moderate mitral valve regurgitation.   7. Mild aortic regurgitation.   8. Mild tricuspid regurgitation.   9. Estimated pulmonary artery systolic pressure is 40.7 mmHg -mild pulmonary hypertension.  10. There is no evidence of pericardial effusion.  11. Dilated Ao root at 4.1cm.  12. Pacemaker wire/lead appears malpositioned.       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 Recently admitted with PNA. Pt was hypoxic and tachypneic, requiring BIPAP. Pt now on NC, breathing comfortably saturating >90%. CXR with mild pulmonary infiltrates. BNP mildly elevated and much lower from last admission. Pt appears clinically dry and with contraction alkalosis. SOB likely due to aspiration PNA. Pt failed dysphagia screen and NPO.   -CTA with no PE and no significant edema or effusion.  -Was planned for discharge but increased work of breathing this evening  -Has struggled with volume status as worsening renal funtion and contraction alkalosis with diuretics. Does appear to have component CHf at this time. Has very limited reserve with poor effort, atelectasis, kyphosis. ? ILD as well. Agree with attempt at further diuresis but may not tolerate well. Given frailty and high risk continued recurrence, do not expect any benefit to further eval with RHC and even inotropic assisted diuresis. No indication at this time. Goal to keep comfortable.     Plan:  1. Continue with po lasix now. Agree with adding po diamox for a few days.  Do not see benefit of more IV diuretic now.  2. Rate control AF and continue A/C  3. No need for repeat TTE at this time.   4. Monitor renal function closely  5. Supportive care, seen by palliative. Appears plan was for [facility] and hospice per notes.  6. Prognosis guarded      Agree with hospice. Cardiology will sign off. Please call with any questions or concerns.        MECHE ANGELA  [id number]      Chief Complaint:  SOB/PNA/CHF, recalled for worsening SOB    Subjective:   No complaints offered. Wants to go home. Denies chest pain, SOB, palps.     Tele:  V-paced, Afib        acetaminophen     Tablet .. 650 milliGRAM(s) Oral every 8 hours PRN  acetaZOLAMIDE    Tablet 250 milliGRAM(s) Oral daily  albuterol/ipratropium for Nebulization 3 milliLiter(s) Nebulizer every 6 hours PRN  bisacodyl Suppository 10 milliGRAM(s) Rectal daily  celecoxib 200 milliGRAM(s) Oral once  celecoxib 200 milliGRAM(s) Oral daily  enalapril 2.5 milliGRAM(s) Oral daily  enoxaparin Injectable 60 milliGRAM(s) SubCutaneous every 12 hours  furosemide    Tablet 20 milliGRAM(s) Oral daily  metoprolol succinate ER 25 milliGRAM(s) Oral daily  polyethylene glycol 3350 17 Gram(s) Oral at bedtime  warfarin 4 milliGRAM(s) Oral once          Physical Exam:  T(C): 36.6 (03-25-22 @ 04:11), Max: 36.7 (03-24-22 @ 16:25)  HR: 74 (03-25-22 @ 04:11) (70 - 75)  BP: 123/73 (03-25-22 @ 04:11) (103/63 - 123/73)  RR: 20 (03-25-22 @ 04:11) (19 - 20)  SpO2: 94% (03-25-22 @ 04:11) (94% - 100%)  Wt(kg): --  General: Comfortable in NAD  Head: mm dry  Neck: No JVD  CVS: nl s1s2, no s3  Pulm: diminished   Abd: soft, non-tender  Ext: No c/c/e  Neuro A&O x3, forgetful  Psych: lethargic      Labs:   25 Mar 2022 07:38    139    |  93     |  23.4   ----------------------------<  98     4.8     |  35.0   |  0.89     Ca    8.8        25 Mar 2022 07:38  Phos  2.0       25 Mar 2022 07:38  Mg     2.5       25 Mar 2022 07:38                            10.5   17.54 )-----------( 160      ( 25 Mar 2022 07:38 )             34.1     PT/INR - ( 25 Mar 2022 07:38 )   PT: 20.2 sec;   INR: 1.73 ratio         PTT - ( 25 Mar 2022 07:38 )  PTT:41.9 sec          Echo (3/9/22):   1. Mildly enlarged left atrium.   2. Left ventricular ejection fraction, by visual estimation, is 35 to 40%.   3. Elevated mean left atrial pressure. (LAP = 23 mm Hg)   4. Normal right atrial size.   5. Mildly enlarged right ventricle. Moderately reduced RV systolic function.   6. Moderate mitral valve regurgitation.   7. Mild aortic regurgitation.   8. Mild tricuspid regurgitation.   9. Estimated pulmonary artery systolic pressure is 40.7 mmHg -mild pulmonary hypertension.  10. There is no evidence of pericardial effusion.  11. Dilated Ao root at 4.1cm.  12. Pacemaker wire/lead appears malpositioned.       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 Recently admitted with PNA. Pt was hypoxic and tachypneic, requiring BIPAP. Pt now on NC, breathing comfortably saturating >90%. CXR with mild pulmonary infiltrates. BNP mildly elevated and much lower from last admission. Pt appears clinically dry and with contraction alkalosis. SOB likely due to aspiration PNA. Pt failed dysphagia screen and NPO.   -CTA with no PE and no significant edema or effusion.  -Was planned for discharge but increased work of breathing this evening  -Has struggled with volume status as worsening renal funtion and contraction alkalosis with diuretics. Does appear to have component CHf at this time. Has very limited reserve with poor effort, atelectasis, kyphosis. ? ILD as well. Agree with attempt at further diuresis but may not tolerate well. Given frailty and high risk continued recurrence, do not expect any benefit to further eval with RHC and even inotropic assisted diuresis. No indication at this time. Goal to keep comfortable.     Plan:  1. Continue with po lasix now. Agree with adding po diamox for a few days.  Do not see benefit of more IV diuretic now.  2. Rate control AF and continue A/C  3. No need for repeat TTE at this time.   4. Monitor renal function closely  5. Supportive care, seen by palliative. Plan for home hospice.  6. Prognosis poor.      Agree with hospice. Cardiology will sign off. Please call with any questions or concerns.

## 2022-03-25 NOTE — PROGRESS NOTE ADULT - SUBJECTIVE AND OBJECTIVE BOX
HPI   Pt is a 89M admitted to [facility] for SOB.   Pt was seen and examined at bedside. No over night event. comfortable in bed     Vital Signs Last 24 Hrs  T(C): 36.6 (25 Mar 2022 04:11), Max: 36.7 (24 Mar 2022 10:35)  T(F): 97.9 (25 Mar 2022 04:11), Max: 98.1 (24 Mar 2022 16:25)  HR: 74 (25 Mar 2022 04:11) (70 - 77)  BP: 123/73 (25 Mar 2022 04:11) (103/63 - 123/73)  BP(mean): --  RR: 20 (25 Mar 2022 04:11) (19 - 20)  SpO2: 94% (25 Mar 2022 04:11) (94% - 100%)    I&O's Summary      CAPILLARY BLOOD GLUCOSE          PHYSICAL EXAM:    Constitutional: NAD,   HEENT: PERR, EOMI, Normal Hearing, MMM on  NC   Neck: Soft and supple,    Respiratory: Breath sounds are clear bilaterally,    Cardiovascular: S1 and S2,    Gastrointestinal: Bowel Sounds present, soft, nontender, nondistended, no guarding, no rebound  Extremities: No peripheral edema  Vascular: 2+ peripheral pulses  Musculoskeletal: 5/5 strength b/l upper and lower extremities    MEDICATIONS:  MEDICATIONS  (STANDING):  acetaZOLAMIDE    Tablet 250 milliGRAM(s) Oral daily  bisacodyl Suppository 10 milliGRAM(s) Rectal daily  celecoxib 200 milliGRAM(s) Oral once  celecoxib 200 milliGRAM(s) Oral daily  enalapril 2.5 milliGRAM(s) Oral daily  enoxaparin Injectable 60 milliGRAM(s) SubCutaneous every 12 hours  furosemide    Tablet 20 milliGRAM(s) Oral daily  metoprolol succinate ER 25 milliGRAM(s) Oral daily  polyethylene glycol 3350 17 Gram(s) Oral at bedtime      LABS: All Labs Reviewed:                        10.5   17.54 )-----------( 160      ( 25 Mar 2022 07:38 )             34.1     03-25    139  |  93<L>  |  23.4<H>  ----------------------------<  98  4.8   |  35.0<H>  |  0.89    Ca    8.8      25 Mar 2022 07:38  Phos  2.0     03-25  Mg     2.5     03-25      PT/INR - ( 25 Mar 2022 07:38 )   PT: 20.2 sec;   INR: 1.73 ratio         PTT - ( 25 Mar 2022 07:38 )  PTT:41.9 sec      Blood Culture:     RADIOLOGY/EKG:    DVT PPX:    ADVANCED DIRECTIVE:    DISPOSITION:

## 2022-03-25 NOTE — PROGRESS NOTE ADULT - NS ATTEND AMEND GEN_ALL_CORE FT
Patient appears unchanged.  Continue po diuretics.  No additional testing or treatment likely to be helpful at this time.  Agree with plan for home hospice with po diuretics to maximize comfort.

## 2022-03-26 VITALS
DIASTOLIC BLOOD PRESSURE: 61 MMHG | TEMPERATURE: 98 F | SYSTOLIC BLOOD PRESSURE: 119 MMHG | RESPIRATION RATE: 18 BRPM | HEART RATE: 80 BPM | OXYGEN SATURATION: 98 %

## 2022-03-26 LAB
ANION GAP SERPL CALC-SCNC: 6 MMOL/L — SIGNIFICANT CHANGE UP (ref 5–17)
APTT BLD: 41.6 SEC — HIGH (ref 27.5–35.5)
BUN SERPL-MCNC: 22.5 MG/DL — HIGH (ref 8–20)
CALCIUM SERPL-MCNC: 8.9 MG/DL — SIGNIFICANT CHANGE UP (ref 8.6–10.2)
CHLORIDE SERPL-SCNC: 98 MMOL/L — SIGNIFICANT CHANGE UP (ref 98–107)
CO2 SERPL-SCNC: 34 MMOL/L — HIGH (ref 22–29)
CREAT SERPL-MCNC: 0.65 MG/DL — SIGNIFICANT CHANGE UP (ref 0.5–1.3)
EGFR: 87 ML/MIN/1.73M2 — SIGNIFICANT CHANGE UP
GLUCOSE SERPL-MCNC: 112 MG/DL — HIGH (ref 70–99)
HCT VFR BLD CALC: 34.8 % — LOW (ref 39–50)
HGB BLD-MCNC: 10.5 G/DL — LOW (ref 13–17)
INR BLD: 1.87 RATIO — HIGH (ref 0.88–1.16)
MCHC RBC-ENTMCNC: 29.7 PG — SIGNIFICANT CHANGE UP (ref 27–34)
MCHC RBC-ENTMCNC: 30.2 GM/DL — LOW (ref 32–36)
MCV RBC AUTO: 98.3 FL — SIGNIFICANT CHANGE UP (ref 80–100)
PLATELET # BLD AUTO: 174 K/UL — SIGNIFICANT CHANGE UP (ref 150–400)
POTASSIUM SERPL-MCNC: 4.6 MMOL/L — SIGNIFICANT CHANGE UP (ref 3.5–5.3)
POTASSIUM SERPL-SCNC: 4.6 MMOL/L — SIGNIFICANT CHANGE UP (ref 3.5–5.3)
PROTHROM AB SERPL-ACNC: 21.8 SEC — HIGH (ref 10.5–13.4)
RBC # BLD: 3.54 M/UL — LOW (ref 4.2–5.8)
RBC # FLD: 15.8 % — HIGH (ref 10.3–14.5)
SODIUM SERPL-SCNC: 138 MMOL/L — SIGNIFICANT CHANGE UP (ref 135–145)
WBC # BLD: 19.23 K/UL — HIGH (ref 3.8–10.5)
WBC # FLD AUTO: 19.23 K/UL — HIGH (ref 3.8–10.5)

## 2022-03-26 PROCEDURE — 82947 ASSAY GLUCOSE BLOOD QUANT: CPT

## 2022-03-26 PROCEDURE — 83735 ASSAY OF MAGNESIUM: CPT

## 2022-03-26 PROCEDURE — 85379 FIBRIN DEGRADATION QUANT: CPT

## 2022-03-26 PROCEDURE — 93005 ELECTROCARDIOGRAM TRACING: CPT

## 2022-03-26 PROCEDURE — 82435 ASSAY OF BLOOD CHLORIDE: CPT

## 2022-03-26 PROCEDURE — U0003: CPT

## 2022-03-26 PROCEDURE — 92610 EVALUATE SWALLOWING FUNCTION: CPT

## 2022-03-26 PROCEDURE — 84132 ASSAY OF SERUM POTASSIUM: CPT

## 2022-03-26 PROCEDURE — 82803 BLOOD GASES ANY COMBINATION: CPT

## 2022-03-26 PROCEDURE — 80048 BASIC METABOLIC PNL TOTAL CA: CPT

## 2022-03-26 PROCEDURE — 80053 COMPREHEN METABOLIC PANEL: CPT

## 2022-03-26 PROCEDURE — 84100 ASSAY OF PHOSPHORUS: CPT

## 2022-03-26 PROCEDURE — 92526 ORAL FUNCTION THERAPY: CPT

## 2022-03-26 PROCEDURE — 94660 CPAP INITIATION&MGMT: CPT

## 2022-03-26 PROCEDURE — 71045 X-RAY EXAM CHEST 1 VIEW: CPT

## 2022-03-26 PROCEDURE — 71275 CT ANGIOGRAPHY CHEST: CPT

## 2022-03-26 PROCEDURE — 85018 HEMOGLOBIN: CPT

## 2022-03-26 PROCEDURE — 87449 NOS EACH ORGANISM AG IA: CPT

## 2022-03-26 PROCEDURE — 85610 PROTHROMBIN TIME: CPT

## 2022-03-26 PROCEDURE — 85730 THROMBOPLASTIN TIME PARTIAL: CPT

## 2022-03-26 PROCEDURE — 84484 ASSAY OF TROPONIN QUANT: CPT

## 2022-03-26 PROCEDURE — 83880 ASSAY OF NATRIURETIC PEPTIDE: CPT

## 2022-03-26 PROCEDURE — 74018 RADEX ABDOMEN 1 VIEW: CPT

## 2022-03-26 PROCEDURE — 0225U NFCT DS DNA&RNA 21 SARSCOV2: CPT

## 2022-03-26 PROCEDURE — 94760 N-INVAS EAR/PLS OXIMETRY 1: CPT

## 2022-03-26 PROCEDURE — 83605 ASSAY OF LACTIC ACID: CPT

## 2022-03-26 PROCEDURE — 36415 COLL VENOUS BLD VENIPUNCTURE: CPT

## 2022-03-26 PROCEDURE — 99285 EMERGENCY DEPT VISIT HI MDM: CPT | Mod: 25

## 2022-03-26 PROCEDURE — U0005: CPT

## 2022-03-26 PROCEDURE — 85014 HEMATOCRIT: CPT

## 2022-03-26 PROCEDURE — 87086 URINE CULTURE/COLONY COUNT: CPT

## 2022-03-26 PROCEDURE — 96375 TX/PRO/DX INJ NEW DRUG ADDON: CPT

## 2022-03-26 PROCEDURE — 81001 URINALYSIS AUTO W/SCOPE: CPT

## 2022-03-26 PROCEDURE — 94640 AIRWAY INHALATION TREATMENT: CPT

## 2022-03-26 PROCEDURE — 84295 ASSAY OF SERUM SODIUM: CPT

## 2022-03-26 PROCEDURE — 85027 COMPLETE CBC AUTOMATED: CPT

## 2022-03-26 PROCEDURE — 82330 ASSAY OF CALCIUM: CPT

## 2022-03-26 PROCEDURE — 99238 HOSP IP/OBS DSCHRG MGMT 30/<: CPT

## 2022-03-26 PROCEDURE — 96374 THER/PROPH/DIAG INJ IV PUSH: CPT

## 2022-03-26 PROCEDURE — 85025 COMPLETE CBC W/AUTO DIFF WBC: CPT

## 2022-03-26 PROCEDURE — 87040 BLOOD CULTURE FOR BACTERIA: CPT

## 2022-03-26 RX ORDER — ACETAZOLAMIDE 250 MG/1
1 TABLET ORAL
Qty: 0 | Refills: 0 | DISCHARGE
Start: 2022-03-26

## 2022-03-26 RX ADMIN — Medication 20 MILLIGRAM(S): at 06:18

## 2022-03-26 RX ADMIN — Medication 25 MILLIGRAM(S): at 06:18

## 2022-03-26 RX ADMIN — WARFARIN SODIUM 4 MILLIGRAM(S): 2.5 TABLET ORAL at 00:13

## 2022-03-26 NOTE — PROGRESS NOTE ADULT - NUTRITIONAL ASSESSMENT
This patient has been assessed with a concern for Malnutrition and has been determined to have a diagnosis/diagnoses of Severe protein-calorie malnutrition.    This patient is being managed with:   Diet Soft and Bite Sized-  Moderately Thick Liquids (MODTHICKLIQS)  Entered: Mar 19 2022 11:47AM    

## 2022-03-26 NOTE — PROGRESS NOTE ADULT - PROVIDER SPECIALTY LIST ADULT
Cardiology
Hospitalist
Infectious Disease
Cardiology
Infectious Disease
Hospitalist

## 2022-03-26 NOTE — PROGRESS NOTE ADULT - SUBJECTIVE AND OBJECTIVE BOX
[facility] Division of Hospital Medicine    Chief Complaint:  respiratory failure     SUBJECTIVE: reports feeling somewhat better, no new medical complains, denied any pain    OVERNIGHT EVENTS: none reported     Patient denies chest pain, SOB, abd pain, N/V, fever, chills, dysuria or any other complaints. All remainder ROS negative.     MEDICATIONS  (STANDING):  acetaZOLAMIDE    Tablet 250 milliGRAM(s) Oral daily  bisacodyl Suppository 10 milliGRAM(s) Rectal daily  celecoxib 200 milliGRAM(s) Oral once  celecoxib 200 milliGRAM(s) Oral daily  enalapril 2.5 milliGRAM(s) Oral daily  enoxaparin Injectable 60 milliGRAM(s) SubCutaneous every 12 hours  furosemide    Tablet 20 milliGRAM(s) Oral daily  metoprolol succinate ER 25 milliGRAM(s) Oral daily  polyethylene glycol 3350 17 Gram(s) Oral at bedtime    MEDICATIONS  (PRN):  acetaminophen     Tablet .. 650 milliGRAM(s) Oral every 8 hours PRN Moderate Pain (4 - 6)  albuterol/ipratropium for Nebulization 3 milliLiter(s) Nebulizer every 6 hours PRN Shortness of Breath and/or Wheezing        I&O's Summary    25 Mar 2022 07:01  -  26 Mar 2022 07:00  --------------------------------------------------------  IN: 320 mL / OUT: 0 mL / NET: 320 mL        PHYSICAL EXAM:  Vital Signs Last 24 Hrs  T(C): 36.4 (26 Mar 2022 10:22), Max: 36.7 (25 Mar 2022 16:51)  T(F): 97.5 (26 Mar 2022 10:22), Max: 98 (25 Mar 2022 16:51)  HR: 80 (26 Mar 2022 10:22) (70 - 80)  BP: 119/61 (26 Mar 2022 10:22) (111/61 - 123/72)  BP(mean): --  RR: 18 (26 Mar 2022 10:22) (18 - 19)  SpO2: 98% (26 Mar 2022 10:22) (97% - 100%)        CONSTITUTIONAL: NAD, chronically ill appearing  ENMT: Moist oral mucosa, no pharyngeal injection or exudates; normal dentition; No JVD  RESPIRATORY: Normal respiratory effort; lungs are with rails on bases b/l, no wheezing,   CARDIOVASCULAR: irregular rate and rhythm, normal S1 and S2, no murmur/rub/gallop; No lower extremity edema; Peripheral pulses are 2+ bilaterally  ABDOMEN: Nontender to palpation, normoactive bowel sounds, no rebound/guarding; No hepatosplenomegaly  MUSCLOSKELETAL:  no clubbing or cyanosis of digits; no joint swelling or tenderness to palpation  PSYCH: A+O to person and place, but not time; affect appropriate  NEUROLOGY: CN 2-12 are intact and symmetric; no gross sensory deficits; was observed moving all 4 ext against gravity cooperating with exam.   SKIN: No rashes; no palpable lesions    LABS:                        10.5   19.23 )-----------( 174      ( 26 Mar 2022 06:35 )             34.8     03-26    138  |  98  |  22.5<H>  ----------------------------<  112<H>  4.6   |  34.0<H>  |  0.65    Ca    8.9      26 Mar 2022 06:35  Phos  2.0     03-25  Mg     2.5     03-25      PT/INR - ( 26 Mar 2022 06:35 )   PT: 21.8 sec;   INR: 1.87 ratio         PTT - ( 26 Mar 2022 06:35 )  PTT:41.6 sec          CAPILLARY BLOOD GLUCOSE            RADIOLOGY & ADDITIONAL TESTS:  Results Reviewed:   Imaging Personally Reviewed:  Electrocardiogram Personally Reviewed:

## 2022-03-26 NOTE — PROGRESS NOTE ADULT - ASSESSMENT
89 yo male with PMHx prostate CA s/p XRT, GERD, bioAVR, permanent AF s/p AVN ablation and PPM on warfarin, NICM with chronic HFrEF (30%) on 20 mg lasix PO, recent admission for aspiration PNA and acute CHF discharged with abx presenting with SOB found to have acute hypoxic respiratory failure this time due to acute on chronic chf exacerbation, he has improved with gentle iv diuretics and his po diuretics were adjusted.     *Acute respiratory failure with hypoxia, likely due to HFrEF exacerbation, has been stable   c/w furosemide 20 and diamox 250 daily   c/w bb, ace  Cardiology consult noted  overall prognosis is poor, pt is stable to be transferred to [facility] for trial of PE with later transitioning to home hospice.     *Alkalemia   Cont diamox     *Dysphagia.  cont diet , aspiration precautions   palliative care consulted given age, debility and comorbidities.    *Paroxysmal atrial fibrillation.  c/w lovenox bridging to warfarin   INR goal 2-3     *Leukocytosis.   lymphocyte predominance   chronic elevation  stable.    *Palliative consult appreciated  pt will likely discharge home with home hospice  first visit wont be next thr     dvt ppx - on ac as above  code - dnr/dni  Dispo - stable for d/c

## 2022-04-01 ENCOUNTER — APPOINTMENT (OUTPATIENT)
Dept: CARDIOLOGY | Facility: CLINIC | Age: 87
End: 2022-04-01

## 2022-04-04 ENCOUNTER — APPOINTMENT (OUTPATIENT)
Dept: ELECTROPHYSIOLOGY | Facility: CLINIC | Age: 87
End: 2022-04-04

## 2022-04-19 NOTE — PHYSICAL THERAPY INITIAL EVALUATION ADULT - MANUAL MUSCLE TESTING RESULTS, REHAB EVAL
Bilateral UE/LE grossly >=3/5 Otezla Counseling: The side effects of Otezla were discussed with the patient, including but not limited to worsening or new depression, weight loss, diarrhea, nausea, upper respiratory tract infection, and headache. Patient instructed to call the office should any adverse effect occur.  The patient verbalized understanding of the proper use and possible adverse effects of Otezla.  All the patient's questions and concerns were addressed.

## 2022-04-20 NOTE — HOME OXYGEN EVALUATION NOTE - NSHOMEOXYEVAL_PHYATTNON-COVID_GEN_ALL_CORE
normal mood with appropriate affect All acute illnesses and/or exacerbations have been treated and resolved; patient is in a chronic stable state. Alternative treatment methods have been tried and failed.

## 2022-04-20 NOTE — CDI QUERY NOTE - NSCDIOTHERTXTBX_GEN_ALL_CORE_HH
Documentation noted sepsis workup and Patient met SIRs criteria on admission.    Please clarify if the clinical indicators support a further diagnosis  A) Sepsis present on admission  B) Other, please specify  C) Not clinically significant    Supporting Documentation:    Consult Note Adult-Critical Care PA [Charted Location: [facility] ED] [Authored: 17-Mar-2022 17:01]  Plan:  - Sepsis w/u --> recent stay w/klebsiella pna   - pulmonary toilet   - Chest PT  - NIPPV to maintain O2 sat>90%  - Abx for HCAP   - Aggressive diuresis as tolerated   - judicious fluid replenishment     WBC:  WBC Count: 21.20 K/uL (03.17.22 @ 16:35)     ,ED ADULT Flow Sheet [Charted Location: [facility] ED] [Authored: 17-Mar-2022 15:45]-  Respiratory/Pulse Oximetry/Oxygen Therapy  Respiration Rate (breaths/min) Respiration Rate (breaths/min): 48 /min  SpO2 (%) SpO2 (%): 90 %

## 2022-05-12 ENCOUNTER — APPOINTMENT (OUTPATIENT)
Dept: ORTHOPEDIC SURGERY | Facility: CLINIC | Age: 87
End: 2022-05-12

## 2022-05-20 ENCOUNTER — APPOINTMENT (OUTPATIENT)
Dept: CARDIOLOGY | Facility: CLINIC | Age: 87
End: 2022-05-20

## 2022-08-29 NOTE — PHYSICAL THERAPY INITIAL EVALUATION ADULT - PATIENT/FAMILY AGREES WITH PLAN
pt, daughter/yes Valeria Valenzuela)  Obstetrics and Gynecology  [address], [address]  [place] [address]  Phone: [phone]  Fax: [phone]  Follow Up Time:

## 2022-09-21 NOTE — ED ADULT TRIAGE NOTE - IDEAL BODY WEIGHT(KG)

## 2022-11-25 NOTE — ED ADULT NURSE NOTE - DATE OF LAST VACCINATION
17-Nov-2021 This patient has been assessed with a concern for Malnutrition and was treated during this hospitalization for the following Nutrition diagnosis/diagnoses:     -  11/29/2022: Moderate protein-calorie malnutrition

## 2022-11-28 NOTE — HISTORY OF PRESENT ILLNESS
Patient updated.  Will call to schedule lab appointment later.  Aliyah ANDRADE RN     [de-identified] : MECHE MILLER is a 89 year old male with hypertension, cardiomyopathy (LVEF: 35-40%) and persistent AF (on Warfarin) status post AVN ablation and MDT CRT-P who presents for device follow up.\par \par To summarize his history, in mid January, 2020 he presented to [facility] with a mechanical fall complicated by pelvic fracture. He had rapid AF and atrial flutter with inability to tolerate AVN blockers secondary to relative hypotension. He could not tolerate anticoagulation due to multiple falls so rhythm control was not pursued. Instead, he underwent MDT CRT-P implantation on 1/28/2020. He did undergo ROSE which demonstrated no LA/ANSELMO thrombus and was started on amiodarone for rate control. An attempt was made for AVN ablation but this was unsuccessful. He returned on 3/5/2020 at which time successful AVN ablation was performed. His device was programmed to VVIR at 90 bpm and has gradually had his lower rate limit lowered to 70 bpm. In follow up he continued to have persistent fatigue and exertional dyspnea. During previous f/u cardioversion for AF was recommended but he preferred to defer any invasive procedures.\par \par He presents today for device follow up after remote monitoring revealed previously persistent AF was now paroxysmal. Interrogation of CRT-P revealed pt in AT. Effective BVP 98.1%. Optivol WNL. He reports he has been wheel chair bound since roughly December after he experienced severe tendonitis after taking Levaquin. He has been participating in PT.

## 2023-01-01 NOTE — SWALLOW BEDSIDE ASSESSMENT ADULT - PHARYNGEAL PHASE
Spoke with mom and she has a wavier from insurance that they will pay bill and mom will bring at visit and sign OON form    increased WOB/Throat clear post oral intake pt initially with no overt s/s however after several trials pt with +throat clear & increased WOB post swallow/Throat clear post oral intake Throat clear post oral intake/Delayed cough post oral intake Cough post oral intake

## 2023-04-27 NOTE — ED ADULT NURSE REASSESSMENT NOTE - GENITOURINARY ASSESSMENT
What Type Of Note Output Would You Prefer (Optional)?: Standard Output How Severe Are Your Spot(S)?: mild Have Your Spot(S) Been Treated In The Past?: has not been treated Hpi Title: Evaluation of Skin Lesions - - -

## 2023-06-09 NOTE — ED ADULT NURSE NOTE - NSSEPSISSUSPECTED_ED_A_ED
"Will have staff send letter regarding normal test results:  \"Your test results fall within the expected range(s) or remain unchanged from previous results.  Please continue with your current treatment plan.\"    Shree Cee MD "
No

## 2023-12-06 NOTE — ASSESSMENT
[FreeTextEntry1] : EKG suggests possible sinus bradycardia with marked first-degree AV block and low amplitude P waves versus accelerated junctional rhythm rate of 53 beats per minute with right bundle branch pattern;\par \par Patient reports recent chest x-ray taken with "clear lung fields"\par \par Plan:\par \par Discussed symptoms with the patient and suggested possibly empirically trying low dose isosorbide mono versus nitroglycerin patch, however patient reluctant to start any medicine at this time;\par \par 24-hour Holter monitor applied today and will await results;\par \par Patient to report any change or worsening pattern of atypical chest symptoms;\par \par Should he have any worsening pattern have recommended he come to the emergency department for further assessment
No

## 2024-03-16 NOTE — ED CDU PROVIDER INITIAL DAY NOTE - NS_OBSORDERDATE_ED_A_ED
The patient's goals for the shift include      The clinical goals for the shift include Pain control, increased mobility    Over the shift, the patient did not make progress toward the following goals. Barriers to progression include . Recommendations to address these barriers include .     19-May-2021 20:52

## 2024-04-22 NOTE — PATIENT PROFILE ADULT - FLU SEASON?
Discharge instructions reviewed with the patient, verbalized understanding.  IV removed, cath tip intact.  All belongings accounted for.  Escorted patient to private vehicle via wheel chair, safety maintained.  No concerns voiced or noted.    Yes...

## 2024-04-29 NOTE — ED CLERICAL - NS ED CLERK NOTE PRE-ARRIVAL INFOMATION; PCP NAME
Bruin   [phone]    
[place]

## 2024-05-09 NOTE — CHART NOTE - NSCHARTNOTEFT_GEN_A_CORE
Baseline creatinine appears to be 0.81 to 1.25  Monitor renal function closely, she was given Lasix in ED due to pulmonary edema   Source: Patient [x ]  Family [ ]   other [ ]    Current Diet: Diet, DASH/TLC:   Sodium & Cholesterol Restricted (01-16-20 @ 23:20)    PO intake:  Pt reports mostly good po intake at meals- likes Ensure.    Current Weight:   (1/23) 144#  (1/20) 147#  (1/18) 142#    % Weight Change no wt loss noted since admission, will continue to monitor.  No edema noted.    Pertinent Medications: MEDICATIONS  (STANDING):  enoxaparin Injectable 40 milliGRAM(s) SubCutaneous daily  furosemide    Tablet 20 milliGRAM(s) Oral daily  metoprolol tartrate 50 milliGRAM(s) Oral every 8 hours  multivitamin 1 Tablet(s) Oral daily  ofloxacin 0.3% Solution 1 Drop(s) Both EYES four times a day  pantoprazole    Tablet 40 milliGRAM(s) Oral before breakfast  PRESERVISION 2 Tablet(s) 2 Tablet(s) Oral daily  tamsulosin 0.4 milliGRAM(s) Oral at bedtime  warfarin 5 milliGRAM(s) Oral once    MEDICATIONS  (PRN):  acetaminophen   Tablet .. 625 milliGRAM(s) Oral every 6 hours PRN Mild Pain (1 - 3)  metoprolol tartrate Injectable 5 milliGRAM(s) IV Push every 6 hours PRN HR > 140 sustained    Pertinent Labs: CBC Full  -  ( 24 Jan 2020 06:07 )  WBC Count : 13.94 K/uL  RBC Count : 3.76 M/uL  Hemoglobin : 11.6 g/dL  Hematocrit : 35.1 %  Platelet Count - Automated : 151 K/uL  Mean Cell Volume : 93.4 fl  Mean Cell Hemoglobin : 30.9 pg  Mean Cell Hemoglobin Concentration : 33.0 gm/dL    Skin: no skin breakdown noted    Nutrition focused physical exam previously conducted - found signs of malnutrition [ ]absent [x ]present    Subcutaneous fat loss: [x ] Orbital fat pads region, [x ]Buccal fat region, [ ]Triceps region,  [ ]Ribs region    Muscle wasting: x[ ]Temples region, [x ]Clavicle region, [x ]Shoulder region, [ ]Scapula region, [x ]Interosseous region,  [ ]thigh region, [ ]Calf region    Estimated Needs:   [x ] no change since previous assessment  [ ] recalculated:     Current Nutrition Diagnosis: Pt remains at nutrition risk secondary to severe protein calorie malnutrition (chronic)related to inability to consume sufficient protein energy intake with previous decreased appetite in setting of GI discomfort as evidenced by pt meeting <75% estimated energy intake > 1 month, 9% unintentional wt loss x 1.5 months, and severe muscle wasting/fat loss.  Pt states mostly good po intake at meals, received Ensure and liked- requesting to receive with all meals.    Recommendations:   RX: Ensure TID   Continue with MVI daily  Monitor daily wts     Monitoring and Evaluation:   [ x] PO intake [x ] Tolerance to diet prescription [X] Weights  [X] Follow up per protocol [X] Labs:
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
## 2024-07-26 NOTE — ED ADULT NURSE NOTE - CADM POA CENTRAL LINE
Post-Dialysis RN Treatment Note    Blood Pressure:  Pre 165/85 mm/Hg  Post 170/86 mmHg   EDW  172.5 kg    Weight:  Pre 166.8 kg   Post 164.2 kg   Mode of weight measurement: Standing Scale   Volume Removed  3,000 ml    Treatment duration 210 minutes    NS given  No    Treatment shortened? No   Medications given during Rx Venofer 100mg IV   Estimated Kt/V  Not Applicable   Access type: Permacath/TDC   Access Issues: Yes, describe: lines reversed d/t machine alarms when BFR increased to 400.     Report called to primary nurse   Yes, via ESC to Elder MAY RN      Started patient on hemodialysis treatment with UF goal of 3L net as tolerated x 3.5 hours x 2K bath for serum k+ of 5.0 on 7/24/24.    Problem: METABOLIC, FLUID AND ELECTROLYTES - ADULT  Goal: Electrolytes maintained within normal limits  Description: INTERVENTIONS:  - Monitor labs and assess patient for signs and symptoms of electrolyte imbalances  - Administer electrolyte replacement as ordered  - Monitor response to electrolyte replacements, including repeat lab results as appropriate  - Instruct patient on fluid and nutrition as appropriate  Outcome: Progressing  Goal: Fluid balance maintained  Description: INTERVENTIONS:  - Monitor labs   - Monitor I/O and WT  - Instruct patient on fluid and nutrition as appropriate  - Assess for signs & symptoms of volume excess or deficit  Outcome: Progressing      No

## 2024-11-18 NOTE — SWALLOW BEDSIDE ASSESSMENT ADULT - SWALLOW EVAL: BUCCAL STRENGTH AND MOBILITY
Called and spoke with mother. Mother reports that at the last appointment they discussed in creasing Topiramate dose due in decreased effectiveness. Mother reports that her and patients father gave patient time to think about it. Patient spoke with parents recently and decided that she would like to increase dose. Mother reports that she did not  that last prescription of Topiramate that was last sent in in October. So they would need a new prescription. Will send message to provider.   Ashley Juan RN     intact Yes

## 2024-11-26 NOTE — ASSESSMENT
[FreeTextEntry1] : 24H Holter monitor 1/15/19:\par Junctional rhythm w/RBBB and sinus rhythm at higher ventricular rates\par HR range 39-95 bpm\par No SVT/VT observed\par 3 patient events noted correlating with junctional bradycardia
no history of blood product transfusion

## 2024-12-23 NOTE — DISCHARGE NOTE PROVIDER - NSDCHHPTASSISTHOME_GEN_ALL_CORE
Assessment & Plan     Exposure to influenza    Patient given information about influenza.  Patient understands they are contagious until their fever has resolved without the use of motrin or tylenol.  At that time they can return to school/work.  Patient is to monitor for any worsening symptoms and return to the clinic if this occurs.  The most common complication of influenza is Pneumonia or other respiratory problems especially in those with underlying lung problems including asthma and COPD.  Patient will follow up if this occurs.    Due to exposure will place on tamiflu  Exposure to influenza  Flu test is NEG  - Influenza A & B Antigen - Clinic Collect    Nasal congestion    May use OTC flonase    Cough, unspecified type    You have been diagnosed with a viral URI.  A viral respiratory infection is an infection of the nose, sinuses, or throat caused by a virus. Colds and the flu are common types of viral respiratory infections.  The symptoms of a viral respiratory infection often start quickly. They include a fever, sore throat, and runny nose. You may also just not feel well. Or you may not want to eat much.  Most viral infections can be treated with home care. This may include drinking lots of fluids and taking over-the-counter pain medicine. You will probably feel better in 4 to 10 days.  Antibiotics are not used to treat a viral infection. Antibiotics don't kill viruses, so they won't help cure a viral illness.           At today's visit with Al Smith , we discussed results, diagnosis, medications and formulated a plan.  We also discussed red flags for immediate return to clinic/ER, as well as indications for follow up with PCP if not improved in 3 days. Patient understood and agreed to plan. Al Smith was discharged with stable vitals and has no further questions.         Yusef Caruso, [facility], PA-RAMILA  [facility]    Chen Overton is a 42 year old male who  presents to clinic today for the following health issues:  Chief Complaint   Patient presents with    URI     Congestion and drainage - exposed to influenzA A       HPI  Review of Systems  Constitutional, HEENT, cardiovascular, pulmonary, gi and gu systems are negative, except as otherwise noted.      Objective    BP (!) 141/106 (BP Location: Right arm, Patient Position: Sitting, Cuff Size: Adult Large)   Pulse 88   Temp 98.6  F (37  C) (Tympanic)   Resp 20   SpO2 100%   Physical Exam   GENERAL: alert and no distress  EYES: Eyes grossly normal to inspection, PERRL and conjunctivae and sclerae normal  HENT: ear canals and TM's normal, nose and mouth without ulcers or lesions  NECK: no adenopathy, no asymmetry, masses, or scars  RESP: lungs clear to auscultation - no rales, rhonchi or wheezes  CV: regular rate and rhythm, normal S1 S2, no S3 or S4, no murmur, click or rub, no peripheral edema  MS: no gross musculoskeletal defects noted, no edema  SKIN: no suspicious lesions or rashes  NEURO: Normal strength and tone, mentation intact and speech normal  PSYCH: mentation appears normal, affect normal/bright    Results for orders placed or performed in visit on 12/23/24   Influenza A & B Antigen - Clinic Collect     Status: Normal    Specimen: Nose; Swab   Result Value Ref Range    Influenza A antigen Negative Negative    Influenza B antigen Negative Negative    Narrative    Test results must be correlated with clinical data. If necessary, results should be confirmed by a molecular assay or viral culture.            Patient Needs Assistance to Leave Residence...

## 2025-06-23 NOTE — DISCUSSION/SUMMARY
de-identified] : L. remove this patient cervical collar at this point in time.He should he continue with his home therapy twice a week with weightbearing as tolerated lower chamois strength training eat and balance training etc. Based upon and essentially been asymptomatic on today's examination she can followup on a p.r.n. basis